# Patient Record
Sex: FEMALE | Race: WHITE | HISPANIC OR LATINO | Employment: OTHER | URBAN - METROPOLITAN AREA
[De-identification: names, ages, dates, MRNs, and addresses within clinical notes are randomized per-mention and may not be internally consistent; named-entity substitution may affect disease eponyms.]

---

## 2017-03-13 NOTE — H&P
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  Risks, benefits, alternatives of all individual procedures   were addressed with the patient in detail over several visits.  She has asked   appropriate questions, signed the appropriate consents and wishes to proceed   forward with surgery as planned.    PAST MEDICAL HISTORY:  Chronic hypertension, arthritis, mixed urinary   incontinence symptoms, currently on oxybutynin therapy.    PAST SURGICAL HISTORY:  Hysterectomy in 1999.    OBSTETRICAL HISTORY:  The patient reports 13 previous vaginal deliveries, 18   total pregnancies with 5 previous miscarriages.    GYNECOLOGIC HISTORY:  The patient has been amenorrheic for over the last 28   years.  She does report dyspareunia, pelvic pain, large bulge at the vaginal   introitus, mixed urinary incontinence symptoms.  She does understand a   dyspareunia, pelvic pain, and mixed urinary incontinence symptoms may actually   worsen with the surgery as described above, needing possible further medical   or surgical management to address these concerns.    SOCIAL HISTORY:  She is now  and is not currently sexually active.  She   denies use of any alcohol, tobacco, or recreational drug use.    MEDICATIONS:  Oxybutynin 5 mg 3 times daily.    ALLERGIES:  No known drug allergies.    PHYSICAL EXAMINATION:  VITAL SIGNS:  She is afebrile, hemodynamically stable.  HEART:  Regular rate and rhythm.  CHEST:  Clear to auscultation bilaterally.  ABDOMEN:  Soft,  nondistended, nontender.  Bowel sounds are present.  PELVIC:  Shows atrophic external female genitalia and vaginal vault grade II   anterior, posterior and apical vaginal relaxation.  Rectovaginal exam confirms   the above.  She is guaiac negative.  Urodynamic study shows mixed urinary   incontinence.    ASSESSMENT AND PLAN:  A 76-year-old woman with symptomatic pelvic floor   relaxation, pelvic pain, type 2 stress urinary incontinence with mixed urinary   incontinence symptoms, now interested in proceeding forward with attempted   definitive surgical correction of the surgery as described above.  The risks,   benefits and alternatives have been addressed with the patient in detail over   several visits.  She has asked appropriate questions, signed the appropriate   consents and wishes to proceed forward with surgery as planned.       ____________________________________     MD SUMAN ALEXIS / DEANA    DD:  03/08/2017 17:58:18  DT:  03/08/2017 18:31:52    D#:  [id number]  Job#:  [id number]

## 2017-03-16 DIAGNOSIS — Z01.812 PRE-OPERATIVE LABORATORY EXAMINATION: ICD-10-CM

## 2017-03-16 LAB
ANION GAP SERPL CALC-SCNC: 7 MMOL/L (ref 0–11.9)
BUN SERPL-MCNC: 20 MG/DL (ref 8–22)
CALCIUM SERPL-MCNC: 9.5 MG/DL (ref 8.5–10.5)
CHLORIDE SERPL-SCNC: 101 MMOL/L (ref 96–112)
CO2 SERPL-SCNC: 29 MMOL/L (ref 20–33)
CREAT SERPL-MCNC: 0.87 MG/DL (ref 0.5–1.4)
EKG IMPRESSION: NORMAL
ERYTHROCYTE [DISTWIDTH] IN BLOOD BY AUTOMATED COUNT: 42.5 FL (ref 35.9–50)
GFR SERPL CREATININE-BSD FRML MDRD: >60 ML/MIN/1.73 M 2
GLUCOSE SERPL-MCNC: 110 MG/DL (ref 65–99)
HCT VFR BLD AUTO: 45.3 % (ref 37–47)
HGB BLD-MCNC: 14.8 G/DL (ref 12–16)
MCH RBC QN AUTO: 29.2 PG (ref 27–33)
MCHC RBC AUTO-ENTMCNC: 32.7 G/DL (ref 33.6–35)
MCV RBC AUTO: 89.5 FL (ref 81.4–97.8)
PLATELET # BLD AUTO: 360 K/UL (ref 164–446)
PMV BLD AUTO: 8.6 FL (ref 9–12.9)
POTASSIUM SERPL-SCNC: 4 MMOL/L (ref 3.6–5.5)
RBC # BLD AUTO: 5.06 M/UL (ref 4.2–5.4)
SODIUM SERPL-SCNC: 137 MMOL/L (ref 135–145)
WBC # BLD AUTO: 7.6 K/UL (ref 4.8–10.8)

## 2017-03-16 PROCEDURE — 85027 COMPLETE CBC AUTOMATED: CPT

## 2017-03-16 PROCEDURE — 36415 COLL VENOUS BLD VENIPUNCTURE: CPT

## 2017-03-16 PROCEDURE — 80048 BASIC METABOLIC PNL TOTAL CA: CPT

## 2017-03-16 RX ORDER — OXYBUTYNIN CHLORIDE 5 MG/1
5 TABLET ORAL 3 TIMES DAILY
COMMUNITY
End: 2018-04-26

## 2017-03-16 RX ORDER — HYDROCODONE BITARTRATE AND ACETAMINOPHEN 5; 325 MG/1; MG/1
1-2 TABLET ORAL PRN
COMMUNITY
End: 2018-04-26

## 2017-03-20 ENCOUNTER — HOSPITAL ENCOUNTER (OUTPATIENT)
Facility: MEDICAL CENTER | Age: 77
End: 2017-03-20
Attending: SPECIALIST | Admitting: SPECIALIST
Payer: MEDICARE

## 2017-03-20 VITALS
TEMPERATURE: 98.6 F | HEART RATE: 67 BPM | OXYGEN SATURATION: 95 % | DIASTOLIC BLOOD PRESSURE: 76 MMHG | RESPIRATION RATE: 16 BRPM | WEIGHT: 186.29 LBS | BODY MASS INDEX: 34.28 KG/M2 | SYSTOLIC BLOOD PRESSURE: 120 MMHG | HEIGHT: 62 IN

## 2017-03-20 PROBLEM — N39.3 SUI (STRESS URINARY INCONTINENCE, FEMALE): Status: ACTIVE | Noted: 2017-03-20

## 2017-03-20 PROCEDURE — 700101 HCHG RX REV CODE 250

## 2017-03-20 PROCEDURE — 160002 HCHG RECOVERY MINUTES (STAT): Performed by: SPECIALIST

## 2017-03-20 PROCEDURE — 110371 HCHG SHELL REV 272: Performed by: SPECIALIST

## 2017-03-20 PROCEDURE — 501330 HCHG SET, CYSTO IRRIG TUBING: Performed by: SPECIALIST

## 2017-03-20 PROCEDURE — 160048 HCHG OR STATISTICAL LEVEL 1-5: Performed by: SPECIALIST

## 2017-03-20 PROCEDURE — 501838 HCHG SUTURE GENERAL: Performed by: SPECIALIST

## 2017-03-20 PROCEDURE — A4606 OXYGEN PROBE USED W OXIMETER: HCPCS | Performed by: SPECIALIST

## 2017-03-20 PROCEDURE — 160035 HCHG PACU - 1ST 60 MINS PHASE I: Performed by: SPECIALIST

## 2017-03-20 PROCEDURE — 501516 HCHG SUTURE, CAPIO: Performed by: SPECIALIST

## 2017-03-20 PROCEDURE — 502240 HCHG MISC OR SUPPLY RC 0272: Performed by: SPECIALIST

## 2017-03-20 PROCEDURE — 160009 HCHG ANES TIME/MIN: Performed by: SPECIALIST

## 2017-03-20 PROCEDURE — 700102 HCHG RX REV CODE 250 W/ 637 OVERRIDE(OP)

## 2017-03-20 PROCEDURE — A9270 NON-COVERED ITEM OR SERVICE: HCPCS

## 2017-03-20 PROCEDURE — A4338 INDWELLING CATHETER LATEX: HCPCS | Performed by: SPECIALIST

## 2017-03-20 PROCEDURE — 160029 HCHG SURGERY MINUTES - 1ST 30 MINS LEVEL 4: Performed by: SPECIALIST

## 2017-03-20 PROCEDURE — 160036 HCHG PACU - EA ADDL 30 MINS PHASE I: Performed by: SPECIALIST

## 2017-03-20 PROCEDURE — 160041 HCHG SURGERY MINUTES - EA ADDL 1 MIN LEVEL 4: Performed by: SPECIALIST

## 2017-03-20 PROCEDURE — 700111 HCHG RX REV CODE 636 W/ 250 OVERRIDE (IP)

## 2017-03-20 PROCEDURE — 110382 HCHG SHELL REV 271: Performed by: SPECIALIST

## 2017-03-20 PROCEDURE — 500892 HCHG PACK, PERI-GYN: Performed by: SPECIALIST

## 2017-03-20 PROCEDURE — C1771 REP DEV, URINARY, W/SLING: HCPCS | Performed by: SPECIALIST

## 2017-03-20 DEVICE — SLING TOT OBTRYX I HALO: Type: IMPLANTABLE DEVICE | Status: FUNCTIONAL

## 2017-03-20 RX ORDER — LIDOCAINE HYDROCHLORIDE 10 MG/ML
INJECTION, SOLUTION INFILTRATION; PERINEURAL
Status: COMPLETED
Start: 2017-03-20 | End: 2017-03-20

## 2017-03-20 RX ORDER — ACETAMINOPHEN 500 MG
1000 TABLET ORAL EVERY 6 HOURS
Status: DISCONTINUED | OUTPATIENT
Start: 2017-03-20 | End: 2017-03-20 | Stop reason: HOSPADM

## 2017-03-20 RX ORDER — KETOROLAC TROMETHAMINE 30 MG/ML
15 INJECTION, SOLUTION INTRAMUSCULAR; INTRAVENOUS EVERY 6 HOURS
Status: DISCONTINUED | OUTPATIENT
Start: 2017-03-20 | End: 2017-03-20 | Stop reason: HOSPADM

## 2017-03-20 RX ORDER — SIMETHICONE 80 MG
80 TABLET,CHEWABLE ORAL EVERY 8 HOURS PRN
Status: DISCONTINUED | OUTPATIENT
Start: 2017-03-20 | End: 2017-03-20 | Stop reason: HOSPADM

## 2017-03-20 RX ORDER — BUPIVACAINE HYDROCHLORIDE AND EPINEPHRINE 2.5; 5 MG/ML; UG/ML
INJECTION, SOLUTION INFILTRATION; PERINEURAL
Status: DISCONTINUED | OUTPATIENT
Start: 2017-03-20 | End: 2017-03-20 | Stop reason: HOSPADM

## 2017-03-20 RX ORDER — SODIUM CHLORIDE, SODIUM LACTATE, POTASSIUM CHLORIDE, CALCIUM CHLORIDE 600; 310; 30; 20 MG/100ML; MG/100ML; MG/100ML; MG/100ML
1000 INJECTION, SOLUTION INTRAVENOUS
Status: COMPLETED | OUTPATIENT
Start: 2017-03-20 | End: 2017-03-20

## 2017-03-20 RX ORDER — BUPIVACAINE HYDROCHLORIDE AND EPINEPHRINE 2.5; 5 MG/ML; UG/ML
INJECTION, SOLUTION EPIDURAL; INFILTRATION; INTRACAUDAL; PERINEURAL
Status: DISCONTINUED
Start: 2017-03-20 | End: 2017-03-20 | Stop reason: HOSPADM

## 2017-03-20 RX ORDER — METOCLOPRAMIDE HYDROCHLORIDE 5 MG/ML
10 INJECTION INTRAMUSCULAR; INTRAVENOUS EVERY 4 HOURS PRN
Status: DISCONTINUED | OUTPATIENT
Start: 2017-03-20 | End: 2017-03-20 | Stop reason: HOSPADM

## 2017-03-20 RX ORDER — OXYCODONE HYDROCHLORIDE 5 MG/1
10 TABLET ORAL
Status: DISCONTINUED | OUTPATIENT
Start: 2017-03-20 | End: 2017-03-20 | Stop reason: HOSPADM

## 2017-03-20 RX ORDER — ACETAMINOPHEN 500 MG
TABLET ORAL
Status: COMPLETED
Start: 2017-03-20 | End: 2017-03-20

## 2017-03-20 RX ORDER — ONDANSETRON 2 MG/ML
4 INJECTION INTRAMUSCULAR; INTRAVENOUS EVERY 6 HOURS PRN
Status: DISCONTINUED | OUTPATIENT
Start: 2017-03-20 | End: 2017-03-20 | Stop reason: HOSPADM

## 2017-03-20 RX ADMIN — SODIUM CHLORIDE, SODIUM LACTATE, POTASSIUM CHLORIDE, CALCIUM CHLORIDE 1000 ML: 600; 310; 30; 20 INJECTION, SOLUTION INTRAVENOUS at 12:30

## 2017-03-20 RX ADMIN — LIDOCAINE HYDROCHLORIDE 0.1 ML: 10 INJECTION, SOLUTION INFILTRATION; PERINEURAL at 12:30

## 2017-03-20 RX ADMIN — ACETAMINOPHEN 1000 MG: 500 TABLET, FILM COATED ORAL at 12:15

## 2017-03-20 ASSESSMENT — PAIN SCALES - GENERAL
PAINLEVEL_OUTOF10: 0

## 2017-03-20 NOTE — OP REPORT
DATE OF SERVICE:  3/20/2017     PREOPERATIVE DIAGNOSES:  Pelvic pain, symptomatic pelvic floor relaxation, Mixed urinary incontinence with sig Type II stress urinary incontinence     POSTOPERATIVE DIAGNOSES: none     PROCEDURES PERFORMED:  Anterior and posterior vaginal repair, enterocele    repair, sacrospinous vault suspension, transobturator tape midurethral sling    procedure, cystoscopy.     SURGEON:  Regulo Burton MD     ASSISTANT:  River Diaz MD     ANESTHESIA:  General     ANESTHESIOLOGIST:  Anesthesiologist: Keyonna Urias M.D.     ESTIMATED BLOOD LOSS FOR THE PROCEDURE:  30 mL.     FINDINGS:  Good support of the anterior and posterior vaginal walls in    addition to the apical vaginal tissue without any undue tension in the suture    sites.  Cystoscopy revealed bilateral ureteral efflux and normal bladder and    no undue tension noted at the level of the mid urethra after sling placement    on cystoscopic evaluation.     DESCRIPTION OF PROCEDURE:  The patient was taken to the operating room where    general anesthesia was performed without difficulty.  Patient was then prepped   and draped in usual sterile fashion.  Lower extremities placed in Omar    stirrups.  Attention was first turned to the perineum where a weighted    speculum was placed with the use of Tracey retractor.  Anterior vaginal mucosa    was then injected with 10 mL of 0.25% Marcaine with epinephrine.  The vaginal    mucosa was then dissected off the underlying pubocervical fascia bridgette until    the levator muscles were then reached.  Her previous transobturator tape sling   was resected followed by placement of horizontal mattress sutures    reapproximating the pubocervical fascia in midline in a double layer closure,    thereby reducing the midline cystocele followed by injection of 10 mL of 0.25%   Marcaine with epinephrine lateral to the clitoris in the labial crural folds    followed by stab incision made with the use of 11 blade  scalpel in this    location on each side followed by placement of the respective Obtryx halo    needle through the labial crural incision sites to the obturator membranes    through the endopelvic fascia out through the vaginal mucosal incision at the    level of the mid urethra.  The sling was then applied to the Obtryx halo    needle.  Needle was then taken back up through the original tract.  Tensioning   of position was then performed.  Jimenez catheter was removed, which had been    placed at the beginning of the case for placement of a 30-degree cystoscope,    which revealed normal urinary bladder, bilateral ureteral efflux and no undue    tension noted at the level of the mid urethra.  The sling was then released,    tensioning of position was then finalized under direct cystoscopic evaluation.    Cystoscope removed.  Jimenez catheter replaced.  All excess vaginal mucosa and   sling material were then excised.  The vaginal mucosa was then reapproximated   with 2-0 Vicryl in a running interlocking fashion in one segment.  Posterior    vaginal mucosa was then injected with 10 mL of 0.25% Marcaine with    epinephrine.  The vaginal mucosa was then dissected off the underlying    rectovaginal fascia bridgette until the levator muscles were then reached.     Horizontal mattress sutures were then used to reapproximate the rectovaginal    fascia in the midline in a double 0 closure, thereby reducing the midline    rectocele.  A large enterocele was located at the superior aspect of the    dissection.  Dissection of the sacrospinous process was then performed in the    ischial spine, 3 cm medial along the sacrospinous ligament with straight    catheterization needle device, 0 Ethibond suture was taken through the    sacrospinous ligament taken out through the right apical portion of the    vaginal cuff and the overlying vaginal mucosa.  Once tied down, there was good   reapproximation of the apex of the vaginal vault to the  sacrospinous    ligament.  The rectovaginal septum was then reapproximated in the posterior    aspect of the vaginal cuff in a double pursestring suture, thereby reducing    the large enterocele located at the superior aspect of the dissection.  All    excess vaginal mucosa was then trimmed.  The vaginal mucosa was then    reapproximated with 2-0 Vicryl in running locking fashion in one segment    performing a perineoplasty on the perineum.  The patient tolerated procedure    well and was awoken from general anesthesia, was taken to the recovery in    stable condition.        ____________________________________     BLANCA TRIMBLE MD

## 2017-03-20 NOTE — IP AVS SNAPSHOT
" Home Care Instructions                                                                                                                Name:Lizzette Burton  Medical Record Number:[id number]  CSN: [id number]    YOB: 1940   Age: 76 y.o.  Sex: female  HT:1.575 m (5' 2\") WT: 84.5 kg (186 lb 4.6 oz)          Admit Date: 3/20/2017     Discharge Date:   Today's Date: 3/20/2017  Attending Doctor:  Regulo Burton M.D.                  Allergies:  Review of patient's allergies indicates no known allergies.                Discharge Instructions       Instrucciones Para [place]  (Home Care Instructions)    ACTIVIDAD: Descanse y tome todo con mucha calma las primeras 24 horas después de cantrell cirugía.  Marizol persona adulta responsable debe permanecer con usted evaristo evangelista periodo de tiempo.  Es normal sentirse sonoliento o sonolienta evaristo esas primeras horas.  Le recomendamos que no melany nada que requiera equilibrio, taylor decisiones a mucha coordinación de cantrell parte.    NO MELANY ESTO PURANTE LAS PRIMERAS 24 HORAS:   Manejar o conducir algún vehiculo, operar maquinarias o utilizar electrodomesticos.   Beber cerveza o algún otro tipo de bebida alcohólica.   Taylor decisiones importantes o firmar documentos legales.    INSTRUCCIONES ESPECIALES: FOLLOW UP TOMORROW WITH DR. BURTON BETWEEN 9AM AND 12 NOON TO HAVE CATHETER REMOVED.  NO HEAVY LIFTING (NOTHING GREATER THAN 10-15 POUNDS).  PELVIC REST FOR 6 WEEKS (NO INTERCOURSE, DOUCHING, OR TAMPONS).  YOU MAY SHOWER IN 24 HOURS AFTER CATHETER IS REMOVED.  NO BATHS, HOT TUBS OR SWIMMING FOR 2 WEEKS.  EMPTY URINE FROM CATHETER INTO TOILET AS NEEDED.    DIETA: Para evitar las nauseas, prosiga despacito con cnatrell dieta a medida que pueda ir tolerándola mejor, evite comidas muy condimentadas o grasosas evaristo evangelista primer día.  Vaya agregando comidas más substanciadas a cantrell dieta a medida que asi lo indique cantrell médica.  Los bebés pueden beber leche preparada o formula, ásl nae también " leche del seno de la madre a medida que vayan teniendo hambre.  SIGA AGREGANDO LIQUIDOS Y COMIDAS CON FIBRA PARA EVITAR ESTREÑIMIENTO.    MEDICAMENTOS/MEDICINAS:  Vuelva a gilberto karin medicamentos diarios.  [place] los medicamentos que se le prescribe con un poco de comida.  Si no le prescribe ningún tipo de medicamento, entonces puede gilberto medicinas para el dolor que no contienen aspirina, si las necesita.  LAS MEDICINAS PARA EL DOLOR PUEDEN ESTREÑIRLE MUCHO.  [place] un suavizante para el excremento o materia fecal (stool softener) o un laxativo nae por ejemplo: senokot, pericolase, o leche de magnesia, si lo necesita.    La prescripción la administro *GIVEN AT PRE-OP APPOINTMENT**.  La ultima sosis de medicina para el dolor fue administrada *______________________**.     Se debe hacer isabel consulta medica con el doctor en *TOMORROW BETWEEN 9AM-12PM*.    Usted debe LIAMAR A CANTRELL MEDICO si tiene los siguientes síntomas:   -   Isabel fiebre más francisco de 101 grados Fahrenheit.   -   Un dolor incesante aún con los medicamentos, o nauseas y vómito persistente.   -   Un sangrado excesivo (max que traspasa los vendajes o gasas) o algúln tipo de drenaje inesperado que proviene de la henda.     -   Un color neumann exagerado o hinchazón alrededor del área en donde se le hizo incisión o rosita, o un drenaje de pus o con olor lei proveniente de la henda.   -    La inhabilidad de orinar o vaciar cantrell vejiga en 8 horas.   -    Problemas con a respiración o sara en el pecho.    Usted debe llamar al 911 si se presentan problemas con el dolor al respirar o el pecho.  Si no se puede ponnoer en comunicación con un medica o con el centro de cirugía, usted debe ir a la estación de emergencia (emergency room) más cercana o a un centro de atención de urgencia (urgent care center).  El teléfono del medico es: *DR. TRIMBLE [phone]**    LOS SÍNTOMAS DE UN LEVE RESFRIO SON MUY NORMALES.  ADEMÁS USTED PUEDE LLEGAR A SENTIR SARA GENERALES DE  MÚSCULOS, IRRITACIÓN EN LA GARGANTA, ASHLEIGH DE JULIO CESAR Y/O UN POCO DE NAUSEAS.    Sie tiene alguna pregunta, llame a cantrell médico.  Si cantrell médico no se encuentra disponible, por favor llame al Centro de Cirugía at [phone].  el Centro está abierto de Lunes a Viernes desde las 7:00 de la manana hasta las 7:00 de la noche.  Usted también puede llamar al CENTRO DE LLAMADAS SOBRE LA CATE o HEALTH HOTLINE.  Ananya está abierto viente y cuatro horas por ludivina, siete niño por semana, allí podrá hablar con isabel enfermera.  Llame al [phone], o al número gratis 1 [phone].    Mi firma a continuación indica que he recibido y entiendco estas instrucciones acera de los cuidados en la casa (Home Care Instructions)    Usted recibirá isabel encuesta en la correspondencia en las siguientes semanas y le pedimos que por favor tome un momento para completar sadie encuesta y regresaría a hosotros.  Nuestro objetivó es brindarle un cuidado muy higuera y par lo tanto apreciamos karin coméntanos.  Muchas michelle por mariam escogido el Centro [facility] Cirugía [facility].       Medication List      CONTINUE taking these medications        Instructions    amlodipine 10 MG Tabs   Commonly known as:  NORVASC    Take 10 mg by mouth every day.   Dose:  10 mg       hydrocodone-acetaminophen 5-325 MG Tabs per tablet   Commonly known as:  NORCO    Take 1-2 Tabs by mouth as needed.   Dose:  1-2 Tab       levothyroxine 175 MCG Tabs   Commonly known as:  SYNTHROID    Take 175 mcg by mouth Every morning on an empty stomach.   Dose:  175 mcg       meloxicam 15 MG tablet   Commonly known as:  MOBIC    Take 1 Tab by mouth every day.   Dose:  15 mg       oxybutynin 5 MG Tabs   Commonly known as:  DITROPAN    Take 5 mg by mouth 3 times a day.   Dose:  5 mg       ranitidine 150 MG Tabs   Commonly known as:  ZANTAC    Take 150 mg by mouth 2 times a day.   Dose:  150 mg               Medication Information     Above and/or attached are the  medications your physician expects you to take upon discharge. Review all of your home medications and newly ordered medications with your doctor and/or pharmacist. Follow medication instructions as directed by your doctor and/or pharmacist. Please keep your medication list with you and share with your physician. Update the information when medications are discontinued, doses are changed, or new medications (including over-the-counter products) are added; and carry medication information at all times in the event of emergency situations.        Resources     Quit Smoking / Tobacco Use:    I understand the use of any tobacco products increases my chance of suffering from future heart disease or stroke and could cause other illnesses which may shorten my life. Quitting the use of tobacco products is the single most important thing I can do to improve my health. For further information on smoking / tobacco cessation call a Toll Free Quit Line at 1-469.267.2204 (*[employer]) or 1-368.369.5388 (American [employer] Association) or you can access the web based program at www.[employer].org.    [place] Tobacco Users Help Line:  (464) 316-3050       Toll Free: 1-769.784.4025  Quit Tobacco Program [facility] ([phone]    Crisis Hotline:    [place] Crisis Hotline:  5-884-JXHWMUF or 1-823.580.6541    [place] Crisis Hotline:    1-806.336.5440 or 937-872-8376    Discharge Survey:   Thank you for choosing [facility]. We hope we did everything we could to make your hospital stay a pleasant one. You may be receiving a survey and we would appreciate your time and participation in answering the questions. Your input is very valuable to us in our efforts to improve our service to our patients and their families.            Signatures     My signature on this form indicates that:    1. I acknowledge receipt and understanding of these Home Care Instruction.  2. My questions regarding this information  have been answered to my satisfaction.  3. I have formulated a plan with my discharge nurse to obtain my prescribed medications for home.    __________________________________      __________________________________                   Patient Signature                                 Guardian/Responsible Adult Signature      __________________________________                 __________       ________                       Nurse Signature                                               Date                 Time

## 2017-03-20 NOTE — OR SURGEON
Immediate Post-Operative Note      PreOp Diagnosis: pelvic pain, sx por, ken(mixed)    PostOp Diagnosis: same    Procedure(s):  ANTERIOR AND POSTERIOR REPAIR - Wound Class: Clean Contaminated  ENTEROCELE REPAIR - PERINEOPLASTY - Wound Class: Clean Contaminated  BLADDER SLING FEMALE - TOT - Wound Class: Clean Contaminated  VAGINAL SUSPENSION -SACROSPINOUS VAULT - Wound Class: Clean Contaminated    Surgeon(s):  VALENCIA Torrez M.D.    Anesthesiologist/Type of Anesthesia:  Anesthesiologist: Keyonna Urias M.D./General    Surgical Staff:  Circulator: Bessie Velasco R.N.  Relief Circulator: Blanca Armenta R.N.  Scrub Person: Ada Hansen    Specimen: none    Estimated Blood Loss: less than 30ccs    Findings: good support of the anterior and posterior and apical vaginal tissue without any undue tension at any suture sites, cystoscopy revealed bilateral ureteral efflux normal bladder and no undue tension mid urethra after sling placement    Complications: none        3/20/2017 3:00 PM Regulo Burton

## 2017-03-20 NOTE — IP AVS SNAPSHOT
3/20/2017          Lizzette Burton  [address]  [place] [address]    Dear Lizzette:    [facility] wants to ensure your discharge home is safe and you or your loved ones have had all your questions answered regarding your care after you leave the hospital.    You may receive a telephone call within two days of your discharge.  This call is to make certain you understand your discharge instructions as well as ensure we provided you with the best care possible during your stay with us.     The call will only last approximately 3-5 minutes and will be done by a nurse.    Once again, we want to ensure your discharge home is safe and that you have a clear understanding of any next steps in your care.  If you have any questions or concerns, please do not hesitate to contact us, we are here for you.  Thank you for choosing [facility] for your healthcare needs.    Sincerely,    Quan Castro    [facility]

## 2017-03-20 NOTE — PROGRESS NOTES
1501-Pt received to PACU with report from Dr. Urias.  Respirations even and unlabored.  VSS.  Jimenez catheter in place, draining clear straw urine to gravity.  Theresa-pad in place, CDI.  Abdomen soft.  Pt denies pain or nausea at this time.  Mainly Turkmen speaking, able to understand a little English.    1630-Daughter Maritza brought to bedside.  Pt still sleeping, no complaints.    1735-Pt eating crackers, tolerates well.      1800-Pt condition stable and meets criteria for discharge.  VSS.  Jimenez in place, continuing to drain to gravity.  Pad has only scant sanguinous drainage.  Replaced with new pad.  Disposable underwear given.  Jimenez secured to left leg.  Jimenez teaching completed with daughter, gloves and urinal to empty given.  Reviewed discharge instructions with pt and her daughter, verbalized understanding.  Daughter and patient refused translation line and provided translation.  Pt dressed and into wheelchair.  Extra crackers and sprite given.  Abdomen remains soft.  VSS.  Pt denies pain or nausea.     1838-Pt discharged by RN in  with son in law and daughter at side.

## 2017-03-21 NOTE — DISCHARGE INSTRUCTIONS
Instrucciones Para [place]  (Home Care Instructions)    ACTIVIDAD: Descanse y tome todo con mucha calma las primeras 24 horas después de cantrell cirugía.  Marizol persona adulta responsable debe permanecer con usted evaristo evangelista periodo de tiempo.  Es normal sentirse sonoliento o sonolienta evaristo esas primeras horas.  Le recomendamos que no melany nada que requiera equilibrio, taylor decisiones a mucha coordinación de cantrell parte.    NO MELANY ESTO PURANTE LAS PRIMERAS 24 HORAS:   Manejar o conducir algún vehiculo, operar maquinarias o utilizar electrodomesticos.   Beber cerveza o algún otro tipo de bebida alcohólica.   [place] decisiones importantes o firmar documentos legales.    INSTRUCCIONES ESPECIALES: FOLLOW UP TOMORROW WITH DR. TRIMBLE BETWEEN 9AM AND 12 NOON TO HAVE CATHETER REMOVED.  NO HEAVY LIFTING (NOTHING GREATER THAN 10-15 POUNDS).  PELVIC REST FOR 6 WEEKS (NO INTERCOURSE, DOUCHING, OR TAMPONS).  YOU MAY SHOWER IN 24 HOURS AFTER CATHETER IS REMOVED.  NO BATHS, HOT TUBS OR SWIMMING FOR 2 WEEKS.  EMPTY URINE FROM CATHETER INTO TOILET AS NEEDED.    DIETA: Para evitar las nauseas, prosiga despacito con cantrell dieta a medida que pueda ir tolerándola mejor, evite comidas muy condimentadas o grasosas evaristo evangelista primer día.  Vaya agregando comidas más substanciadas a cantrell dieta a medida que asi lo indique cantrell médica.  Los bebés pueden beber leche preparada o formula, ásl nae también leche del seno de la madre a medida que vayan teniendo hambre.  SIGA AGREGANDO LIQUIDOS Y COMIDAS CON FIBRA PARA EVITAR ESTREÑIMIENTO.    MEDICAMENTOS/MEDICINAS:  Vuelva a taylor karin medicamentos diarios.  [place] los medicamentos que se le prescribe con un poco de comida.  Si no le prescribe ningún tipo de medicamento, entonces puede taylor medicinas para el dolor que no contienen aspirina, si las necesita.  LAS MEDICINAS PARA EL DOLOR PUEDEN ESTREÑIRLE MUCHO.  [place] un suavizante para el excremento o materia fecal (stool softener) o un laxativo nae por  ejemplo: senokot, pericolase, o leche de magnesia, si lo necesita.    La prescripción la administro *GIVEN AT PRE-OP APPOINTMENT**.  La ultima sosis de medicina para el dolor fue administrada *______________________**.     Se debe hacer isabel consulta medica con el doctor en *TOMORROW BETWEEN 9AM-12PM*.    Usted debe LIAMAR A CORNEJO MEDICO si tiene los siguientes síntomas:   -   Isabel fiebre más francisco de 101 grados Fahrenheit.   -   Un dolor incesante aún con los medicamentos, o nauseas y vómito persistente.   -   Un sangrado excesivo (max que traspasa los vendajes o gasas) o algúln tipo de drenaje inesperado que proviene de la henda.     -   Un color neumann exagerado o hinchazón alrededor del área en donde se le hizo incisión o rosita, o un drenaje de pus o con olor lei proveniente de la henda.   -    La inhabilidad de orinar o vaciar cornejo vejiga en 8 horas.   -    Problemas con a respiración o sara en el pecho.    Usted debe llamar al 911 si se presentan problemas con el dolor al respirar o el pecho.  Si no se puede ponnoer en comunicación con un medica o con el centro de cirugía, usted debe ir a la estación de emergencia (emergency room) más cercana o a un centro de atención de urgencia (urgent care center).  El teléfono del medico es: *DR. TRIMBLE [phone]**    LOS SÍNTOMAS DE UN LEVE RESFRIO SON MUY NORMALES.  ADEMÁS USTED PUEDE LLEGAR A SENTIR SARA GENERALES DE MÚSCULOS, IRRITACIÓN EN LA GARGANTA, SARA DE JULIO CESAR Y/O UN POCO DE NAUSEAS.    Sie tiene alguna pregunta, llame a cornejo médico.  Si cornejo médico no se encuentra disponible, por favor llame al Centro de Cirugía at [phone].  el Centro está abierto de Lunes a Viernes desde las 7:00 de la manana hasta las 7:00 de la noche.  Usted también puede llamar al CENTRO DE LLAMADAS SOBRE LA CATE o HEALTH HOTLINE.  Ananya está abierto viente y cuatro horas por ludivina, siete niño por semana, allí podrá hablar con isabel enfermera.  Llame al [phone], o al número genetis  [phone].    Mi firma a continuación indica que he recibido y entiendco estas instrucciones acera de los cuidados en la casa (Home Care Instructions)    Usted recibirá isabel encuesta en la correspondencia en las siguientes semanas y le pedimos que por favor tome un momento para completar sadie encuesta y regresaría a hosotros.  Nuestro objetivó es brindarle un cuidado muy higuera y par lo tanto apreciamos karin coméntanos.  Muchas michelle por mariam escogido el Cent[facility] Cirugía de [facility].

## 2017-05-01 ENCOUNTER — HOSPITAL ENCOUNTER (OUTPATIENT)
Dept: RADIOLOGY | Facility: MEDICAL CENTER | Age: 77
End: 2017-05-01
Attending: NURSE PRACTITIONER
Payer: MEDICARE

## 2017-05-01 DIAGNOSIS — R60.9 EDEMA, UNSPECIFIED TYPE: ICD-10-CM

## 2017-05-01 DIAGNOSIS — R01.1 UNDIAGNOSED CARDIAC MURMURS: ICD-10-CM

## 2017-05-01 DIAGNOSIS — I10 ESSENTIAL HYPERTENSION, MALIGNANT: ICD-10-CM

## 2017-05-01 PROCEDURE — 71020 DX-CHEST-2 VIEWS: CPT

## 2017-05-06 ENCOUNTER — HOSPITAL ENCOUNTER (OUTPATIENT)
Dept: LAB | Facility: MEDICAL CENTER | Age: 77
End: 2017-05-06
Attending: FAMILY MEDICINE
Payer: MEDICARE

## 2017-05-06 LAB
ALBUMIN SERPL BCP-MCNC: 3.7 G/DL (ref 3.2–4.9)
ALBUMIN/GLOB SERPL: 0.9 G/DL
ALP SERPL-CCNC: 88 U/L (ref 30–99)
ALT SERPL-CCNC: 10 U/L (ref 2–50)
ANION GAP SERPL CALC-SCNC: 7 MMOL/L (ref 0–11.9)
AST SERPL-CCNC: 15 U/L (ref 12–45)
BASOPHILS # BLD AUTO: 1.2 % (ref 0–1.8)
BASOPHILS # BLD: 0.09 K/UL (ref 0–0.12)
BILIRUB SERPL-MCNC: 0.5 MG/DL (ref 0.1–1.5)
BNP SERPL-MCNC: 35 PG/ML (ref 0–100)
BUN SERPL-MCNC: 12 MG/DL (ref 8–22)
CALCIUM SERPL-MCNC: 9.4 MG/DL (ref 8.5–10.5)
CHLORIDE SERPL-SCNC: 104 MMOL/L (ref 96–112)
CHOLEST SERPL-MCNC: 180 MG/DL (ref 100–199)
CO2 SERPL-SCNC: 28 MMOL/L (ref 20–33)
CREAT SERPL-MCNC: 0.8 MG/DL (ref 0.5–1.4)
EOSINOPHIL # BLD AUTO: 0.21 K/UL (ref 0–0.51)
EOSINOPHIL NFR BLD: 2.7 % (ref 0–6.9)
ERYTHROCYTE [DISTWIDTH] IN BLOOD BY AUTOMATED COUNT: 44.7 FL (ref 35.9–50)
GFR SERPL CREATININE-BSD FRML MDRD: >60 ML/MIN/1.73 M 2
GLOBULIN SER CALC-MCNC: 4.1 G/DL (ref 1.9–3.5)
GLUCOSE SERPL-MCNC: 92 MG/DL (ref 65–99)
HCT VFR BLD AUTO: 43 % (ref 37–47)
HDLC SERPL-MCNC: 50 MG/DL
HGB BLD-MCNC: 13.9 G/DL (ref 12–16)
IMM GRANULOCYTES # BLD AUTO: 0.02 K/UL (ref 0–0.11)
IMM GRANULOCYTES NFR BLD AUTO: 0.3 % (ref 0–0.9)
LDLC SERPL CALC-MCNC: 110 MG/DL
LYMPHOCYTES # BLD AUTO: 2.05 K/UL (ref 1–4.8)
LYMPHOCYTES NFR BLD: 26.3 % (ref 22–41)
MCH RBC QN AUTO: 29.5 PG (ref 27–33)
MCHC RBC AUTO-ENTMCNC: 32.3 G/DL (ref 33.6–35)
MCV RBC AUTO: 91.3 FL (ref 81.4–97.8)
MONOCYTES # BLD AUTO: 0.71 K/UL (ref 0–0.85)
MONOCYTES NFR BLD AUTO: 9.1 % (ref 0–13.4)
NEUTROPHILS # BLD AUTO: 4.72 K/UL (ref 2–7.15)
NEUTROPHILS NFR BLD: 60.4 % (ref 44–72)
NRBC # BLD AUTO: 0 K/UL
NRBC BLD AUTO-RTO: 0 /100 WBC
PLATELET # BLD AUTO: 433 K/UL (ref 164–446)
PMV BLD AUTO: 8.7 FL (ref 9–12.9)
POTASSIUM SERPL-SCNC: 3.8 MMOL/L (ref 3.6–5.5)
PROT SERPL-MCNC: 7.8 G/DL (ref 6–8.2)
RBC # BLD AUTO: 4.71 M/UL (ref 4.2–5.4)
SODIUM SERPL-SCNC: 139 MMOL/L (ref 135–145)
TRIGL SERPL-MCNC: 102 MG/DL (ref 0–149)
WBC # BLD AUTO: 7.8 K/UL (ref 4.8–10.8)

## 2017-05-06 PROCEDURE — 80061 LIPID PANEL: CPT

## 2017-05-06 PROCEDURE — 85025 COMPLETE CBC W/AUTO DIFF WBC: CPT

## 2017-05-06 PROCEDURE — 36415 COLL VENOUS BLD VENIPUNCTURE: CPT

## 2017-05-06 PROCEDURE — 83880 ASSAY OF NATRIURETIC PEPTIDE: CPT

## 2017-05-06 PROCEDURE — 80053 COMPREHEN METABOLIC PANEL: CPT

## 2017-06-13 ENCOUNTER — HOSPITAL ENCOUNTER (OUTPATIENT)
Dept: CARDIOLOGY | Facility: MEDICAL CENTER | Age: 77
End: 2017-06-13
Attending: NURSE PRACTITIONER
Payer: MEDICARE

## 2017-06-13 DIAGNOSIS — R01.1 UNDIAGNOSED CARDIAC MURMURS: ICD-10-CM

## 2017-06-13 DIAGNOSIS — R60.9 EDEMA, UNSPECIFIED TYPE: ICD-10-CM

## 2017-06-13 DIAGNOSIS — I10 ESSENTIAL HYPERTENSION, MALIGNANT: ICD-10-CM

## 2017-06-13 DIAGNOSIS — I51.7 CARDIOMEGALY: ICD-10-CM

## 2017-06-13 LAB
LV EJECT FRACT  99904: 60
LV EJECT FRACT MOD 2C 99903: 57.34
LV EJECT FRACT MOD 4C 99902: 59.91
LV EJECT FRACT MOD BP 99901: 60.53

## 2017-06-13 PROCEDURE — 93306 TTE W/DOPPLER COMPLETE: CPT | Mod: 26 | Performed by: INTERNAL MEDICINE

## 2017-06-13 PROCEDURE — 93306 TTE W/DOPPLER COMPLETE: CPT

## 2017-08-09 ENCOUNTER — OFFICE VISIT (OUTPATIENT)
Dept: CARDIOLOGY | Facility: MEDICAL CENTER | Age: 77
End: 2017-08-09
Payer: MEDICARE

## 2017-08-09 VITALS
SYSTOLIC BLOOD PRESSURE: 132 MMHG | OXYGEN SATURATION: 92 % | HEIGHT: 62 IN | BODY MASS INDEX: 34.78 KG/M2 | DIASTOLIC BLOOD PRESSURE: 86 MMHG | HEART RATE: 60 BPM | WEIGHT: 189 LBS

## 2017-08-09 DIAGNOSIS — I10 ESSENTIAL HYPERTENSION: ICD-10-CM

## 2017-08-09 DIAGNOSIS — I15.9 SECONDARY HYPERTENSION: ICD-10-CM

## 2017-08-09 DIAGNOSIS — R01.1 HEART MURMUR: ICD-10-CM

## 2017-08-09 DIAGNOSIS — R00.2 PALPITATIONS: ICD-10-CM

## 2017-08-09 LAB — EKG IMPRESSION: NORMAL

## 2017-08-09 PROCEDURE — 99204 OFFICE O/P NEW MOD 45 MIN: CPT | Performed by: INTERNAL MEDICINE

## 2017-08-09 PROCEDURE — 93000 ELECTROCARDIOGRAM COMPLETE: CPT | Performed by: INTERNAL MEDICINE

## 2017-08-09 ASSESSMENT — ENCOUNTER SYMPTOMS
LOSS OF CONSCIOUSNESS: 0
BRUISES/BLEEDS EASILY: 0
PALPITATIONS: 1
PND: 0
DIZZINESS: 0
DEPRESSION: 0
SHORTNESS OF BREATH: 0
ORTHOPNEA: 0
FALLS: 0
ABDOMINAL PAIN: 0

## 2017-08-09 NOTE — PROGRESS NOTES
Subjective:   Lizzette Burton is a 77 y.o. female with possible history of CVA who was referred to cardiology clinical concern for cardiomegaly. Patient reports undergoing an echocardiogram following which the referral was placed. She reports having occasional palpitations which only last for a short duration. Later in the interview she said that she was having symptoms very rarely. She denies any dizziness or history of syncope. No chest pain or pressure. No history of dyspnea. She reports a history of her food getting stuck in her throat for which she is going to get some workup in the near future. Her main concern is significant tiredness recently.     Per chart review she possibly has a history of a CVA, however patient does not know. She reports having a severe nosebleed 6 to 7 years ago for which she thinks she underwent cauterization.    History is limited due to language barrier despite using an . Patient's answers are very tangential.     Past Medical History   Diagnosis Date   • Glaucoma    • CATARACT    • Hypertension    • Coughing blood    • Breath shortness      WITH EXERCISE   • Stroke (CMS-HCC)      denies hx. of stroke   • Unspecified disorder of thyroid      'thyroid medicine taken in past'   • Angina      CONSULT NEG. RESULTS   • Pneumonia 2/8/2014   • Acute renal failure (CMS-HCC) 2/11/2014   • Urinary incontinence    • Heart burn    • Indigestion    • Arthritis      bilateral knees   • Unspecified hemorrhagic conditions 2009      treated in er,nose bleed   • Bowel habit changes      constipation   • Cold 2 weeks ago   • Sepsis (CMS-HCC) 2/8/2014   • Anesthesia      PONV     Past Surgical History   Procedure Laterality Date   • Hysterectomy, total abdominal  1989   • Gyn surgery     • Trabeculectomy  9/26/2012     Performed by Miller Fletcher M.D. at SURGERY SAME DAY [facility]   • Cataract phaco with iol  9/26/2012     Performed by Miller Fletcher M.D. at SURGERY [facility] DAY  [facility]   • Knee arthroplasty total  2/5/2009     Performed by GISELA OLSON at SURGERY [facility]   • Baerveldt implant  12/12/2012     Performed by Miller Fletcher M.D. at SURGERY SAME DAY [facility]   • Baerveldt implant  3/13/2013     Performed by Miller Fletcher M.D. at SURGERY SAME DAY [facility]   • Baerveldt implant  5/8/2013     Performed by Miller Fletcher M.D. at SURGERY SAME DAY [facility]   • Cataract phaco with iol  5/8/2013     Performed by Miller Fletcher M.D. at SURGERY SAME DAY [facility]   • Knee arthroplasty total Left 8/20/2015     Procedure: KNEE ARTHROPLASTY TOTAL;  Surgeon: Juan Manuel Choi M.D.;  Location: SURGERY [facility];  Service:    • Anterior and posterior repair  3/20/2017     Procedure: ANTERIOR AND POSTERIOR REPAIR;  Surgeon: Regluo Burton M.D.;  Location: SURGERY SAME DAY [facility];  Service:    • Enterocele repair  3/20/2017     Procedure: ENTEROCELE REPAIR - PERINEOPLASTY;  Surgeon: Regulo Burton M.D.;  Location: SURGERY SAME DAY [facility];  Service:    • Bladder sling female  3/20/2017     Procedure: BLADDER SLING FEMALE - TOT;  Surgeon: Regulo Burton M.D.;  Location: SURGERY SAME DAY [facility];  Service:    • Vaginal suspension  3/20/2017     Procedure: VAGINAL SUSPENSION -SACROSPINOUS VAULT;  Surgeon: Regulo Burton M.D.;  Location: SURGERY SAME DAY [facility];  Service:      History reviewed. No pertinent family history.  History   Smoking status   • Former Smoker   • Types: Cigarettes   • Quit date: 03/16/2002   Smokeless tobacco   • Never Used     No Known Allergies  Outpatient Encounter Prescriptions as of 8/9/2017   Medication Sig Dispense Refill   • sertraline (ZOLOFT) 50 MG Tab Take 50 mg by mouth every day.     • levothyroxine (SYNTHROID) 175 MCG Tab Take 175 mcg by mouth Every morning on an empty stomach.     • hydrocodone-acetaminophen (NORCO) 5-325 MG Tab per tablet Take 1-2 Tabs by mouth as needed.     •  "oxybutynin (DITROPAN) 5 MG Tab Take 5 mg by mouth 3 times a day.     • ranitidine (ZANTAC) 150 MG Tab Take 150 mg by mouth 2 times a day.     • meloxicam (MOBIC) 15 MG tablet Take 1 Tab by mouth every day. 30 Tab 1   • amlodipine (NORVASC) 10 MG Tab Take 10 mg by mouth every day.       No facility-administered encounter medications on file as of 8/9/2017.     Review of Systems   Constitutional: Positive for malaise/fatigue.   HENT: Negative.    Respiratory: Negative for shortness of breath.    Cardiovascular: Positive for palpitations. Negative for chest pain, orthopnea, leg swelling and PND.   Gastrointestinal: Negative for abdominal pain.   Musculoskeletal: Negative for falls.   Skin: Negative.    Neurological: Negative for dizziness and loss of consciousness.   Endo/Heme/Allergies: Does not bruise/bleed easily.   Psychiatric/Behavioral: Negative for depression.   All other systems reviewed and are negative.       Objective:   /86 mmHg  Pulse 60  Ht 1.575 m (5' 2.01\")  Wt 85.73 kg (189 lb)  BMI 34.56 kg/m2  SpO2 92%    Physical Exam   Constitutional: She is oriented to person, place, and time. No distress.   HENT:   Head: Normocephalic and atraumatic.   Eyes: Conjunctivae are normal.   Neck: Normal range of motion. Neck supple. No JVD present.   Cardiovascular: Normal rate and regular rhythm.  Exam reveals no gallop and no friction rub.    Murmur heard.   Crescendo decrescendo systolic murmur is present with a grade of 1/6   Pulmonary/Chest: Effort normal and breath sounds normal. No respiratory distress. She has no wheezes. She has no rales.   Abdominal: Soft. There is no tenderness.   Musculoskeletal: She exhibits no edema.   Neurological: She is alert and oriented to person, place, and time.   Skin: Skin is warm and dry. She is not diaphoretic.   Psychiatric: She has a normal mood and affect.   Nursing note and vitals reviewed.       Labs from June 2017 were reviewed. Hemoglobin 13.9. Creatinine " normal. .    Echocardiogram from June 2017 was reviewed  CONCLUSIONS  Normal left ventricular systolic function.  Mild left ventricular hypertrophy.  Aortic sclerosis without stenosis.  No prior study is available for comparison.    EKG from today was reviewed and shows normal sinus rhythm at 60 bpm, minimal criteria for left ventricular hypertrophy, intraventricular conduction delay.    Assessment:     1. Heart murmur     2. Secondary hypertension     3. Essential hypertension  EKG   4. Palpitations  HOLTER MONITOR STUDY       Medical Decision Making:  Today's Assessment / Status / Plan:     Concern for cardiomegaly-it appears that cardiomegaly was noted on a chest x-ray performed in May 2017. Patient recently underwent a echocardiogram which did not show LV enlargement or hypertrophy. Echocardiogram was relatively unremarkable. We have requested records from the PCPs office to confirm reason for referral.    Heart murmur-likely secondary to aortic sclerosis. No evidence of stenosis.    Hypertension-blood pressure is at goal. Continue current medication regimen.    Palpitations-I have referred the patient for a 48 hour Holter monitor for further evaluation. No new medications at this time.    Return to clinic in 6 months or earlier if needed.    Thank you for allowing me to participate in the care of this patient. Please do not hesitate to contact me with any questions.    Sandy Mena MD  Cardiologist  [facility]      PLEASE NOTE: This dictation was created using voice recognition software.

## 2017-08-09 NOTE — Clinical Note
[facility]   [address], [address]  [place] [address]  Phone: [phone]  Fax: [phone]              Lizzette Burton  1940    Encounter Date: 8/9/2017    Sandy Mena M.D.          PROGRESS NOTE:  Subjective:   Lizzette Burton is a 77 y.o. female with possible history of CVA who was referred to cardiology clinical concern for cardiomegaly. Patient reports undergoing an echocardiogram following which the referral was placed. She reports having occasional palpitations which only last for a short duration. Later in the interview she said that she was having symptoms very rarely. She denies any dizziness or history of syncope. No chest pain or pressure. No history of dyspnea. She reports a history of her food getting stuck in her throat for which she is going to get some workup in the near future. Her main concern is significant tiredness recently.     Per chart review she possibly has a history of a CVA, however patient does not know. She reports having a severe nosebleed 6 to 7 years ago for which she thinks she underwent cauterization.    History is limited due to language barrier despite using an . Patient's answers are very tangential.     Past Medical History   Diagnosis Date   • Glaucoma    • CATARACT    • Hypertension    • Coughing blood    • Breath shortness      WITH EXERCISE   • Stroke (CMS-HCC)      denies hx. of stroke   • Unspecified disorder of thyroid      'thyroid medicine taken in past'   • Angina      CONSULT NEG. RESULTS   • Pneumonia 2/8/2014   • Acute renal failure (CMS-HCC) 2/11/2014   • Urinary incontinence    • Heart burn    • Indigestion    • Arthritis      bilateral knees   • Unspecified hemorrhagic conditions 2009      treated in er,nose bleed   • Bowel habit changes      constipation   • Cold 2 weeks ago   • Sepsis (CMS-HCC) 2/8/2014   • Anesthesia      PONV     Past Surgical History   Procedure Laterality  Date   • Hysterectomy, total abdominal  1989   • Gyn surgery     • Trabeculectomy  9/26/2012     Performed by Miller Fletcher M.D. at SURGERY SAME DAY [facility]   • Cataract phaco with iol  9/26/2012     Performed by Miller Fletcher M.D. at SURGERY SAME DAY [facility]   • Knee arthroplasty total  2/5/2009     Performed by GISELA OLSON at SURGERY [facility]   • Baerveldt implant  12/12/2012     Performed by Miller Fletcher M.D. at SURGERY SAME DAY [facility]   • Baerveldt implant  3/13/2013     Performed by Miller Fletcher M.D. at SURGERY SAME DAY [facility]   • Baerveldt implant  5/8/2013     Performed by Miller Fletcher M.D. at SURGERY SAME DAY [facility]   • Cataract phaco with iol  5/8/2013     Performed by Miller Fletcher M.D. at SURGERY SAME DAY [facility]   • Knee arthroplasty total Left 8/20/2015     Procedure: KNEE ARTHROPLASTY TOTAL;  Surgeon: Juan Manuel Choi M.D.;  Location: [facility];  Service:    • Anterior and posterior repair  3/20/2017     Procedure: ANTERIOR AND POSTERIOR REPAIR;  Surgeon: Regulo Burton M.D.;  Location: SURGERY SAME DAY [facility];  Service:    • Enterocele repair  3/20/2017     Procedure: ENTEROCELE REPAIR - PERINEOPLASTY;  Surgeon: Regulo Burton M.D.;  Location: SURGERY SAME DAY [facility];  Service:    • Bladder sling female  3/20/2017     Procedure: BLADDER SLING FEMALE - TOT;  Surgeon: Regulo Burton M.D.;  Location: SURGERY SAME DAY [facility];  Service:    • Vaginal suspension  3/20/2017     Procedure: VAGINAL SUSPENSION -SACROSPINOUS VAULT;  Surgeon: Regulo Burton M.D.;  Location: SURGERY SAME DAY [facility];  Service:      History reviewed. No pertinent family history.  History   Smoking status   • Former Smoker   • Types: Cigarettes   • Quit date: 03/16/2002   Smokeless tobacco   • Never Used     No Known Allergies  Outpatient Encounter Prescriptions as of 8/9/2017   Medication Sig Dispense Refill   •  "sertraline (ZOLOFT) 50 MG Tab Take 50 mg by mouth every day.     • levothyroxine (SYNTHROID) 175 MCG Tab Take 175 mcg by mouth Every morning on an empty stomach.     • hydrocodone-acetaminophen (NORCO) 5-325 MG Tab per tablet Take 1-2 Tabs by mouth as needed.     • oxybutynin (DITROPAN) 5 MG Tab Take 5 mg by mouth 3 times a day.     • ranitidine (ZANTAC) 150 MG Tab Take 150 mg by mouth 2 times a day.     • meloxicam (MOBIC) 15 MG tablet Take 1 Tab by mouth every day. 30 Tab 1   • amlodipine (NORVASC) 10 MG Tab Take 10 mg by mouth every day.       No facility-administered encounter medications on file as of 8/9/2017.     Review of Systems   Constitutional: Positive for malaise/fatigue.   HENT: Negative.    Respiratory: Negative for shortness of breath.    Cardiovascular: Positive for palpitations. Negative for chest pain, orthopnea, leg swelling and PND.   Gastrointestinal: Negative for abdominal pain.   Musculoskeletal: Negative for falls.   Skin: Negative.    Neurological: Negative for dizziness and loss of consciousness.   Endo/Heme/Allergies: Does not bruise/bleed easily.   Psychiatric/Behavioral: Negative for depression.   All other systems reviewed and are negative.       Objective:   /86 mmHg  Pulse 60  Ht 1.575 m (5' 2.01\")  Wt 85.73 kg (189 lb)  BMI 34.56 kg/m2  SpO2 92%    Physical Exam   Constitutional: She is oriented to person, place, and time. No distress.   HENT:   Head: Normocephalic and atraumatic.   Eyes: Conjunctivae are normal.   Neck: Normal range of motion. Neck supple. No JVD present.   Cardiovascular: Normal rate and regular rhythm.  Exam reveals no gallop and no friction rub.    Murmur heard.   Crescendo decrescendo systolic murmur is present with a grade of 1/6   Pulmonary/Chest: Effort normal and breath sounds normal. No respiratory distress. She has no wheezes. She has no rales.   Abdominal: Soft. There is no tenderness.   Musculoskeletal: She exhibits no edema.   Neurological: " She is alert and oriented to person, place, and time.   Skin: Skin is warm and dry. She is not diaphoretic.   Psychiatric: She has a normal mood and affect.   Nursing note and vitals reviewed.       Labs from June 2017 were reviewed. Hemoglobin 13.9. Creatinine normal. .    Echocardiogram from June 2017 was reviewed  CONCLUSIONS  Normal left ventricular systolic function.  Mild left ventricular hypertrophy.  Aortic sclerosis without stenosis.  No prior study is available for comparison.    Assessment:     1. Heart murmur     2. Secondary hypertension     3. Essential hypertension  EKG   4. Palpitations  HOLTER MONITOR STUDY       Medical Decision Making:  Today's Assessment / Status / Plan:     Concern for cardiomegaly-it appears that cardiomegaly was noted on a chest x-ray performed in May 2017. Patient recently underwent a echocardiogram which did not show LV enlargement or hypertrophy. Echocardiogram was relatively unremarkable. We have requested records from the PCPs office to confirm reason for referral.    Heart murmur-likely secondary to aortic sclerosis. No evidence of stenosis.    Hypertension-blood pressure is at goal. Continue current medication regimen.    Palpitations-I have referred the patient for a 48 hour Holter monitor for further evaluation. No new medications at this time.    Return to clinic in 6 months or earlier if needed.    Thank you for allowing me to participate in the care of this patient. Please do not hesitate to contact me with any questions.    Sandy Mena MD  Cardiologist  [facility]      PLEASE NOTE: This dictation was created using voice recognition software.         Frederick Javier M.D.  [address]  [address]  [place] [address]  VIA Facsimile: [phone]

## 2017-08-09 NOTE — MR AVS SNAPSHOT
"        Lizzette Gunn Chacon   2017 2:20 PM   Office Visit   MRN: [id number]    Department:  [facility]   Dept Phone:  [phone]    Description:  Female : 1940   Provider:  Sandy Mena M.D.           Reason for Visit     New Patient           Allergies as of 2017     No Known Allergies      You were diagnosed with     Heart murmur   [072266]       Secondary hypertension   [4116839]       Essential hypertension   [4290233]       Palpitations   [785.1.ICD-9-CM]         Vital Signs     Blood Pressure Pulse Height Weight Body Mass Index Oxygen Saturation    132/86 mmHg 60 1.575 m (5' 2.01\") 85.73 kg (189 lb) 34.56 kg/m2 92%    Smoking Status                   Former Smoker           Basic Information     Date Of Birth Sex Race Ethnicity Preferred Language    1940 Female  or   Origin (Kinyarwanda,Maltese,Fijian,Palestinian, etc) English      Your appointments     Aug 23, 2017 11:00 AM   HOLTER MONITOR 48 HRS with [facility]   [facility] (--)    [address]  [place] [address]   [phone]            2018 10:20 AM   FOLLOW UP with Sandy Mena M.D.   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   2013 - Present    Sepsis (CMS-HCC) A41.9 High  2014 - Present    HTN (hypertension) I10 Medium  2014 - Present    Hyponatremia E87.1 Medium  2014 - Present    Hypokalemia E87.6 Medium  2014 - Present    Hypoxia R09.02 High  2014 - Present    Hypothyroid E03.9   2014 - Present    Hypertension I10   2014 - Present    Overweight E66.3   2014 - Present    Menopause Z78.0   2014 - " Present    Pneumonia J18.9   7/30/2014 - Present    Overweight E66.3   7/30/2014 - Present    Osteoarthrosis involving lower leg M17.10   8/20/2015 - Present    CAP (community acquired pneumonia) J18.9   12/29/2015 - Present    Status post bilateral knee replacements Z96.653   12/12/2016 - Present    MILAN (stress urinary incontinence, female) N39.3   3/20/2017 - Present      Health Maintenance        Date Due Completion Dates    IMM DTaP/Tdap/Td Vaccine (1 - Tdap) 4/15/1958 ---    PAP SMEAR 4/15/1960 ---    MAMMOGRAM 4/15/1979 ---    COLONOSCOPY 4/15/1989 ---    IMM ZOSTER VACCINE 4/15/1999 ---    BONE DENSITY 4/15/2004 ---    IMM INFLUENZA (1) 9/1/2017 2/9/2014            Results       Current Immunizations     13-VALENT PCV PREVNAR 8/21/2015  8:47 AM    Influenza TIV (IM) 2/9/2014 11:23 AM    Pneumococcal polysaccharide vaccine (PPSV-23) 2/9/2014  4:11 PM      Below and/or attached are the medications your provider expects you to take. Review all of your home medications and newly ordered medications with your provider and/or pharmacist. Follow medication instructions as directed by your provider and/or pharmacist. Please keep your medication list with you and share with your provider. Update the information when medications are discontinued, doses are changed, or new medications (including over-the-counter products) are added; and carry medication information at all times in the event of emergency situations     Allergies:  No Known Allergies          Medications  Valid as of: August 09, 2017 -  3:07 PM    Generic Name Brand Name Tablet Size Instructions for use    AmLODIPine Besylate (Tab) NORVASC 10 MG Take 10 mg by mouth every day.        Hydrocodone-Acetaminophen (Tab) NORCO 5-325 MG Take 1-2 Tabs by mouth as needed.        Levothyroxine Sodium (Tab) SYNTHROID 175 MCG Take 175 mcg by mouth Every morning on an empty stomach.        Meloxicam (Tab) MOBIC 15 MG Take 1 Tab by mouth every day.        Oxybutynin  Chloride (Tab) DITROPAN 5 MG Take 5 mg by mouth 3 times a day.        RaNITidine HCl (Tab) ZANTAC 150 MG Take 150 mg by mouth 2 times a day.        Sertraline HCl (Tab) ZOLOFT 50 MG Take 50 mg by mouth every day.        .                 Medicines prescribed today were sent to:     [id number] - [place], [place] - [address]    [address] [place] [address]    Phone: [phone] Fax: [phone]    Open 24 Hours?: No      Medication refill instructions:       If your prescription bottle indicates you have medication refills left, it is not necessary to call your provider’s office. Please contact your pharmacy and they will refill your medication.    If your prescription bottle indicates you do not have any refills left, you may request refills at any time through one of the following ways: The online [employer] system (except Urgent Care), by calling your provider’s office, or by asking your pharmacy to contact your provider’s office with a refill request. Medication refills are processed only during regular business hours and may not be available until the next business day. Your provider may request additional information or to have a follow-up visit with you prior to refilling your medication.   *Please Note: Medication refills are assigned a new Rx number when refilled electronically. Your pharmacy may indicate that no refills were authorized even though a new prescription for the same medication is available at the pharmacy. Please request the medicine by name with the pharmacy before contacting your provider for a refill.        Your To Do List     Future Labs/Procedures Complete By Expires    HOLTER MONITOR STUDY  As directed 8/9/2018         [employer] Access Code: [email or web address]  Expires: 9/8/2017  3:07 PM    [employer]  A secure, online tool to manage your health information     [employer]® is a secure, online tool that connects you to your personalized health information from the privacy of your  home -- day or night - making it very easy for you to manage your healthcare. Once the activation process is completed, you can even access your medical information using the [employer] augustine, which is available for free in the [employer] store or Google Play store.     [employer] provides the following levels of access (as shown below):   My Chart Features   [facility] Doctor [facility]  Specialists [facility]  Urgent  Care Non-[facility]  Primary Care  Doctor   Email your healthcare team securely and privately 24/7 X X X    Manage appointments: schedule your next appointment; view details of past/upcoming appointments X      Request prescription refills. X      View recent personal medical records, including lab and immunizations X X X X   View health record, including health history, allergies, medications X X X X   Read reports about your outpatient visits, procedures, consult and ER notes X X X X   See your discharge summary, which is a recap of your hospital and/or ER visit that includes your diagnosis, lab results, and care plan. X X       How to register for [employer]:  1. Go to  [email or web address].  2. Click on the Sign Up Now box, which takes you to the New Member Sign Up page. You will need to provide the following information:  a. Enter your [employer] Access Code exactly as it appears at the top of this page. (You will not need to use this code after you’ve completed the sign-up process. If you do not sign up before the expiration date, you must request a new code.)   b. Enter your date of birth.   c. Enter your home email address.   d. Click Submit, and follow the next screen’s instructions.  3. Create a [employer] ID. This will be your [employer] login ID and cannot be changed, so think of one that is secure and easy to remember.  4. Create a [employer] password. You can change your password at any time.  5. Enter your Password Reset Question and Answer. This can be used at a later time if you forget your password.    6. Enter your e-mail address. This allows you to receive e-mail notifications when new information is available in [employer].  7. Click Sign Up. You can now view your health information.    For assistance activating your [employer] account, call [phone]

## 2017-08-23 ENCOUNTER — NON-PROVIDER VISIT (OUTPATIENT)
Dept: CARDIOLOGY | Facility: MEDICAL CENTER | Age: 77
End: 2017-08-23
Payer: MEDICARE

## 2017-08-23 DIAGNOSIS — R00.1 SINUS BRADYCARDIA: ICD-10-CM

## 2017-08-23 DIAGNOSIS — I49.8 BIGEMINY: ICD-10-CM

## 2017-08-23 DIAGNOSIS — I49.1 PREMATURE ATRIAL CONTRACTION: ICD-10-CM

## 2017-08-23 DIAGNOSIS — I49.3 PVC (PREMATURE VENTRICULAR CONTRACTION): ICD-10-CM

## 2017-08-28 ENCOUNTER — TELEPHONE (OUTPATIENT)
Dept: CARDIOLOGY | Facility: MEDICAL CENTER | Age: 77
End: 2017-08-28

## 2017-08-28 DIAGNOSIS — R00.2 PALPITATIONS: ICD-10-CM

## 2017-08-28 LAB — EKG IMPRESSION: NORMAL

## 2017-08-28 PROCEDURE — 93224 XTRNL ECG REC UP TO 48 HRS: CPT | Performed by: INTERNAL MEDICINE

## 2017-08-28 NOTE — TELEPHONE ENCOUNTER
Patient [place] speaking only. Lucila RASHID assisted in informing pt of Holter results. FU with AA 1/31/18

## 2017-08-28 NOTE — TELEPHONE ENCOUNTER
Reviewed Holter monitor. Occasional PACs associated with symptoms. Only 2% of total beats.  Please advise patient to watch her caffeine intake. Ideally she should restrict her caffeine intake to no more than one drink per day.  Given the low burden of her PACs, there is no clear indication for medications at this time.  I will reevaluate her in follow-up as previously discussed.    Thank you    Sandy Mena MD  Cardiologist  [facility]

## 2018-01-05 ENCOUNTER — HOSPITAL ENCOUNTER (OUTPATIENT)
Dept: LAB | Facility: MEDICAL CENTER | Age: 78
End: 2018-01-05
Attending: NURSE PRACTITIONER
Payer: MEDICARE

## 2018-01-05 LAB
ALBUMIN SERPL BCP-MCNC: 4 G/DL (ref 3.2–4.9)
ALBUMIN/GLOB SERPL: 1.1 G/DL
ALP SERPL-CCNC: 71 U/L (ref 30–99)
ALT SERPL-CCNC: 18 U/L (ref 2–50)
ANION GAP SERPL CALC-SCNC: 4 MMOL/L (ref 0–11.9)
AST SERPL-CCNC: 22 U/L (ref 12–45)
BILIRUB SERPL-MCNC: 0.5 MG/DL (ref 0.1–1.5)
BUN SERPL-MCNC: 19 MG/DL (ref 8–22)
CALCIUM SERPL-MCNC: 9.4 MG/DL (ref 8.5–10.5)
CHLORIDE SERPL-SCNC: 104 MMOL/L (ref 96–112)
CHOLEST SERPL-MCNC: 189 MG/DL (ref 100–199)
CO2 SERPL-SCNC: 30 MMOL/L (ref 20–33)
CREAT SERPL-MCNC: 0.93 MG/DL (ref 0.5–1.4)
GFR SERPL CREATININE-BSD FRML MDRD: 58 ML/MIN/1.73 M 2
GLOBULIN SER CALC-MCNC: 3.6 G/DL (ref 1.9–3.5)
GLUCOSE SERPL-MCNC: 91 MG/DL (ref 65–99)
HDLC SERPL-MCNC: 58 MG/DL
LDLC SERPL CALC-MCNC: 106 MG/DL
POTASSIUM SERPL-SCNC: 4.7 MMOL/L (ref 3.6–5.5)
PROT SERPL-MCNC: 7.6 G/DL (ref 6–8.2)
SODIUM SERPL-SCNC: 138 MMOL/L (ref 135–145)
TRIGL SERPL-MCNC: 125 MG/DL (ref 0–149)
TSH SERPL DL<=0.005 MIU/L-ACNC: 0.32 UIU/ML (ref 0.38–5.33)

## 2018-01-05 PROCEDURE — 80053 COMPREHEN METABOLIC PANEL: CPT

## 2018-01-05 PROCEDURE — 36415 COLL VENOUS BLD VENIPUNCTURE: CPT

## 2018-01-05 PROCEDURE — 84443 ASSAY THYROID STIM HORMONE: CPT

## 2018-01-05 PROCEDURE — 80061 LIPID PANEL: CPT

## 2018-03-06 ENCOUNTER — OFFICE VISIT (OUTPATIENT)
Dept: URGENT CARE | Facility: PHYSICIAN GROUP | Age: 78
End: 2018-03-06
Payer: MEDICARE

## 2018-03-06 ENCOUNTER — HOSPITAL ENCOUNTER (OUTPATIENT)
Dept: RADIOLOGY | Facility: MEDICAL CENTER | Age: 78
End: 2018-03-06
Attending: PHYSICIAN ASSISTANT
Payer: MEDICARE

## 2018-03-06 VITALS
SYSTOLIC BLOOD PRESSURE: 132 MMHG | BODY MASS INDEX: 34.74 KG/M2 | WEIGHT: 190 LBS | DIASTOLIC BLOOD PRESSURE: 68 MMHG | OXYGEN SATURATION: 96 % | TEMPERATURE: 97.6 F | RESPIRATION RATE: 20 BRPM | HEART RATE: 59 BPM

## 2018-03-06 DIAGNOSIS — J22 LRTI (LOWER RESPIRATORY TRACT INFECTION): ICD-10-CM

## 2018-03-06 DIAGNOSIS — R05.9 COUGH: ICD-10-CM

## 2018-03-06 PROCEDURE — 99203 OFFICE O/P NEW LOW 30 MIN: CPT | Performed by: PHYSICIAN ASSISTANT

## 2018-03-06 PROCEDURE — 71046 X-RAY EXAM CHEST 2 VIEWS: CPT

## 2018-03-06 RX ORDER — FLUTICASONE PROPIONATE 50 MCG
1 SPRAY, SUSPENSION (ML) NASAL DAILY
COMMUNITY
End: 2022-12-26

## 2018-03-06 RX ORDER — ALBUTEROL SULFATE 90 UG/1
1 AEROSOL, METERED RESPIRATORY (INHALATION) EVERY 4 HOURS PRN
Qty: 1 INHALER | Refills: 0 | Status: SHIPPED | OUTPATIENT
Start: 2018-03-06 | End: 2020-11-01

## 2018-03-06 RX ORDER — LOSARTAN POTASSIUM 50 MG/1
100 TABLET ORAL DAILY
COMMUNITY
End: 2022-12-26

## 2018-03-06 RX ORDER — ALBUTEROL SULFATE 2.5 MG/3ML
2.5 SOLUTION RESPIRATORY (INHALATION) EVERY 4 HOURS PRN
Qty: 30 BULLET | Refills: 1 | Status: SHIPPED | OUTPATIENT
Start: 2018-03-06 | End: 2020-11-01

## 2018-03-06 RX ORDER — BENZONATATE 200 MG/1
200 CAPSULE ORAL 3 TIMES DAILY PRN
Qty: 30 CAP | Refills: 0 | Status: SHIPPED | OUTPATIENT
Start: 2018-03-06 | End: 2021-11-28

## 2018-03-06 RX ORDER — DOXYCYCLINE HYCLATE 100 MG
100 TABLET ORAL 2 TIMES DAILY
Qty: 14 TAB | Refills: 0 | Status: SHIPPED | OUTPATIENT
Start: 2018-03-06 | End: 2018-03-13

## 2018-03-06 ASSESSMENT — ENCOUNTER SYMPTOMS
VOMITING: 0
MYALGIAS: 0
FOCAL WEAKNESS: 0
HEADACHES: 0
WHEEZING: 1
RHINORRHEA: 1
SHORTNESS OF BREATH: 1
TINGLING: 0
PALPITATIONS: 0
ABDOMINAL PAIN: 0
DIARRHEA: 0
SENSORY CHANGE: 0
COUGH: 1
HEMOPTYSIS: 0
NAUSEA: 0
CHILLS: 0
SORE THROAT: 0
SPUTUM PRODUCTION: 1
FEVER: 1

## 2018-03-06 ASSESSMENT — COPD QUESTIONNAIRES: COPD: 0

## 2018-03-06 NOTE — PROGRESS NOTES
Subjective:      Lizzette Burton is a 77 y.o. female who presents with Cough (x5days, congestion, wheezing)            Cough   This is a new problem. Episode onset: 5 days. The problem has been unchanged. The cough is productive of sputum (yellow-green sputum). Associated symptoms include a fever (subjective fever 2 days ago), nasal congestion, rhinorrhea, shortness of breath and wheezing. Pertinent negatives include no chest pain, chills, ear congestion, ear pain, headaches, hemoptysis, myalgias, postnasal drip or sore throat. Treatments tried: albuterol nebulizer, Flonase, Mucinex. The treatment provided mild relief. Her past medical history is significant for bronchitis and pneumonia. There is no history of asthma or COPD.     Past Medical History:   Diagnosis Date   • Acute renal failure (CMS-HCC) 2/11/2014   • Anesthesia     PONV   • Angina     CONSULT NEG. RESULTS   • Arthritis     bilateral knees   • Bowel habit changes     constipation   • Breath shortness     WITH EXERCISE   • CATARACT    • Cold 2 weeks ago   • Coughing blood    • Glaucoma    • Heart burn    • Hypertension    • Indigestion    • Pneumonia 2/8/2014   • Sepsis (CMS-HCC) 2/8/2014   • Stroke (CMS-HCC)     denies hx. of stroke   • Unspecified disorder of thyroid     'thyroid medicine taken in past'   • Unspecified hemorrhagic conditions 2009     treated in er,nose bleed   • Urinary incontinence        Past Surgical History:   Procedure Laterality Date   • ANTERIOR AND POSTERIOR REPAIR  3/20/2017    Procedure: ANTERIOR AND POSTERIOR REPAIR;  Surgeon: Regulo Burton M.D.;  Location: SURGERY SAME DAY [facility] ORS;  Service:    • ENTEROCELE REPAIR  3/20/2017    Procedure: ENTEROCELE REPAIR - PERINEOPLASTY;  Surgeon: Regulo Burton M.D.;  Location: SURGERY SAME DAY [facility] ORS;  Service:    • BLADDER SLING FEMALE  3/20/2017    Procedure: BLADDER SLING FEMALE - TOT;  Surgeon: Regulo Burton M.D.;  Location: SURGERY SAME DAY [facility] ORS;   Service:    • VAGINAL SUSPENSION  3/20/2017    Procedure: VAGINAL SUSPENSION -SACROSPINOUS VAULT;  Surgeon: Regulo Burton M.D.;  Location: SURGERY SAME DAY [facility];  Service:    • KNEE ARTHROPLASTY TOTAL Left 8/20/2015    Procedure: KNEE ARTHROPLASTY TOTAL;  Surgeon: Juan Manuel Choi M.D.;  Location: SURGERY [facility];  Service:    • BAERVELDT IMPLANT  5/8/2013    Performed by Miller Fletcher M.D. at SURGERY SAME DAY [facility]   • CATARACT PHACO WITH IOL  5/8/2013    Performed by Miller Fletcher M.D. at SURGERY SAME DAY [facility]   • BAERVELDT IMPLANT  3/13/2013    Performed by Miller Fletcher M.D. at SURGERY SAME DAY [facility]   • BAERVELDT IMPLANT  12/12/2012    Performed by Miller Fletcher M.D. at SURGERY SAME DAY [facility]   • TRABECULECTOMY  9/26/2012    Performed by Miller Fletcher M.D. at SURGERY SAME DAY [facility]   • CATARACT PHACO WITH IOL  9/26/2012    Performed by Miller Fletcher M.D. at SURGERY SAME DAY [facility] ORS   • KNEE ARTHROPLASTY TOTAL  2/5/2009    Performed by GISELA OLSON at SURGERY [facility]   • HYSTERECTOMY, TOTAL ABDOMINAL  1989   • GYN SURGERY         No family history on file.    No Known Allergies    Medications, Allergies, and current problem list reviewed today in Epic      Review of Systems   Constitutional: Positive for fever (subjective fever 2 days ago) and malaise/fatigue. Negative for chills.   HENT: Positive for congestion and rhinorrhea. Negative for ear discharge, ear pain, postnasal drip and sore throat.    Respiratory: Positive for cough, sputum production, shortness of breath and wheezing. Negative for hemoptysis.    Cardiovascular: Negative for chest pain, palpitations and leg swelling.        No calf swelling or pain   Gastrointestinal: Negative for abdominal pain, diarrhea, nausea and vomiting.   Musculoskeletal: Negative for myalgias.   Neurological: Negative for tingling, sensory change, focal weakness and headaches.      All other systems reviewed and are negative.        Objective:     /68   Pulse (!) 59   Temp 36.4 °C (97.6 °F)   Resp 20   Wt 86.2 kg (190 lb)   SpO2 96%   BMI 34.74 kg/m²      Physical Exam   Constitutional: She is oriented to person, place, and time. She appears well-developed and well-nourished. No distress.   HENT:   Head: Normocephalic and atraumatic.   Right Ear: Tympanic membrane, external ear and ear canal normal.   Left Ear: Tympanic membrane, external ear and ear canal normal.   Nose: Mucosal edema and rhinorrhea present.   Mouth/Throat: Uvula is midline, oropharynx is clear and moist and mucous membranes are normal.   Eyes: Conjunctivae are normal.   Cardiovascular: Normal rate, regular rhythm and normal heart sounds.  Exam reveals no gallop and no friction rub.    No murmur heard.  Pulmonary/Chest: Effort normal. No respiratory distress. She has no decreased breath sounds. She has wheezes (diffuse wheezes. Rhonchi upper lung fields ). She has rhonchi in the right upper field and the left upper field. She has no rales.   Musculoskeletal:   Lower extremities without edema, swelling or TTP   Neurological: She is alert and oriented to person, place, and time. No cranial nerve deficit.   Skin: Skin is warm and dry. No rash noted.   Psychiatric: She has a normal mood and affect. Her behavior is normal. Judgment and thought content normal.            3/6/2018 11:17 AM    HISTORY/REASON FOR EXAM:  Cough.      TECHNIQUE/EXAM DESCRIPTION AND NUMBER OF VIEWS:  Two views of the chest.    COMPARISON:  05/01/2017    FINDINGS:  The heart is mildly enlarged.  Perihilar opacifications with poorly defined borders are again noted medially in each lung but are less prominent than on prior exam. No new infiltrates or consolidations are identified.  No pleural effusions are appreciated.     Impression       1.  Mild cardiomegaly again noted.    2.  Some decrease in perihilar opacifications with poorly defined  borders since prior exam could indicate decrease in pulmonary edema or inflammation.    3.  No new infiltrates or consolidations identified.          Assessment/Plan:     1. LRTI (lower respiratory tract infection)  DX-CHEST-2 VIEWS    doxycycline (VIBRAMYCIN) 100 MG Tab    albuterol (PROVENTIL) 2.5mg/3ml Nebu Soln solution for nebulization    benzonatate (TESSALON) 200 MG capsule    albuterol 108 (90 Base) MCG/ACT Aero Soln inhalation aerosol       •  doxycycline (VIBRAMYCIN) 100 MG Tab, Take 1 Tab by mouth 2 times a day for 7 days., Disp: 14 Tab, Rfl: 0  •  albuterol (PROVENTIL) 2.5mg/3ml Nebu Soln solution for nebulization, 3 mL by Nebulization route every four hours as needed for Shortness of Breath., Disp: 30 Bullet, Rfl: 1  •  benzonatate (TESSALON) 200 MG capsule, Take 1 Cap by mouth 3 times a day as needed for Cough., Disp: 30 Cap, Rfl: 0  •  albuterol 108 (90 Base) MCG/ACT Aero Soln inhalation aerosol, Inhale 1 Puff by mouth every four hours as needed for Shortness of Breath., Disp: 1 Inhaler, Rfl: 0     Differential diagnoses, Supportive care, and indications for immediate follow-up discussed with patient and son.   Instructed to return to clinic or nearest emergency department for any change in condition, further concerns, or worsening of symptoms.    The patient and sondemonstrated a good understanding and agreed with the treatment plan.    Radha Colmenares P.A.-C.

## 2018-04-26 DIAGNOSIS — Z01.812 PRE-OPERATIVE LABORATORY EXAMINATION: ICD-10-CM

## 2018-04-26 DIAGNOSIS — Z01.810 PRE-OPERATIVE CARDIOVASCULAR EXAMINATION: ICD-10-CM

## 2018-04-26 LAB
ANION GAP SERPL CALC-SCNC: 5 MMOL/L (ref 0–11.9)
BUN SERPL-MCNC: 17 MG/DL (ref 8–22)
CALCIUM SERPL-MCNC: 9.4 MG/DL (ref 8.5–10.5)
CHLORIDE SERPL-SCNC: 105 MMOL/L (ref 96–112)
CO2 SERPL-SCNC: 31 MMOL/L (ref 20–33)
CREAT SERPL-MCNC: 0.76 MG/DL (ref 0.5–1.4)
EKG IMPRESSION: NORMAL
ERYTHROCYTE [DISTWIDTH] IN BLOOD BY AUTOMATED COUNT: 44.1 FL (ref 35.9–50)
GLUCOSE SERPL-MCNC: 94 MG/DL (ref 65–99)
HCT VFR BLD AUTO: 41.5 % (ref 37–47)
HGB BLD-MCNC: 13.9 G/DL (ref 12–16)
MCH RBC QN AUTO: 31 PG (ref 27–33)
MCHC RBC AUTO-ENTMCNC: 33.5 G/DL (ref 33.6–35)
MCV RBC AUTO: 92.4 FL (ref 81.4–97.8)
PLATELET # BLD AUTO: 312 K/UL (ref 164–446)
PMV BLD AUTO: 9.3 FL (ref 9–12.9)
POTASSIUM SERPL-SCNC: 4.1 MMOL/L (ref 3.6–5.5)
RBC # BLD AUTO: 4.49 M/UL (ref 4.2–5.4)
SODIUM SERPL-SCNC: 141 MMOL/L (ref 135–145)
WBC # BLD AUTO: 6.6 K/UL (ref 4.8–10.8)

## 2018-04-26 PROCEDURE — 93010 ELECTROCARDIOGRAM REPORT: CPT | Performed by: INTERNAL MEDICINE

## 2018-04-26 PROCEDURE — 80048 BASIC METABOLIC PNL TOTAL CA: CPT

## 2018-04-26 PROCEDURE — 93005 ELECTROCARDIOGRAM TRACING: CPT

## 2018-04-26 PROCEDURE — 85027 COMPLETE CBC AUTOMATED: CPT

## 2018-04-26 PROCEDURE — 36415 COLL VENOUS BLD VENIPUNCTURE: CPT

## 2018-04-26 RX ORDER — IBUPROFEN 200 MG
200 TABLET ORAL EVERY 6 HOURS PRN
COMMUNITY
End: 2021-11-28

## 2018-04-27 ENCOUNTER — HOSPITAL ENCOUNTER (OUTPATIENT)
Dept: LAB | Facility: MEDICAL CENTER | Age: 78
End: 2018-04-27
Attending: NURSE PRACTITIONER
Payer: MEDICARE

## 2018-04-27 LAB — TSH SERPL DL<=0.005 MIU/L-ACNC: 0.24 UIU/ML (ref 0.38–5.33)

## 2018-04-27 PROCEDURE — 36415 COLL VENOUS BLD VENIPUNCTURE: CPT

## 2018-04-27 PROCEDURE — 84443 ASSAY THYROID STIM HORMONE: CPT

## 2018-05-02 ENCOUNTER — APPOINTMENT (OUTPATIENT)
Dept: RADIOLOGY | Facility: MEDICAL CENTER | Age: 78
DRG: 483 | End: 2018-05-02
Attending: SURGERY
Payer: MEDICARE

## 2018-05-02 ENCOUNTER — HOSPITAL ENCOUNTER (INPATIENT)
Facility: MEDICAL CENTER | Age: 78
LOS: 1 days | DRG: 483 | End: 2018-05-03
Attending: SURGERY | Admitting: SURGERY
Payer: MEDICARE

## 2018-05-02 DIAGNOSIS — Z96.611 STATUS POST REVERSE TOTAL ARTHROPLASTY OF RIGHT SHOULDER: ICD-10-CM

## 2018-05-02 DIAGNOSIS — G89.18 POSTOPERATIVE PAIN: ICD-10-CM

## 2018-05-02 DIAGNOSIS — R09.02 HYPOXIA: ICD-10-CM

## 2018-05-02 PROCEDURE — 160036 HCHG PACU - EA ADDL 30 MINS PHASE I: Performed by: SURGERY

## 2018-05-02 PROCEDURE — 0RRJ00Z REPLACEMENT OF RIGHT SHOULDER JOINT WITH REVERSE BALL AND SOCKET SYNTHETIC SUBSTITUTE, OPEN APPROACH: ICD-10-PCS | Performed by: SURGERY

## 2018-05-02 PROCEDURE — 700101 HCHG RX REV CODE 250

## 2018-05-02 PROCEDURE — 160009 HCHG ANES TIME/MIN: Performed by: SURGERY

## 2018-05-02 PROCEDURE — 73030 X-RAY EXAM OF SHOULDER: CPT | Mod: RT

## 2018-05-02 PROCEDURE — 700102 HCHG RX REV CODE 250 W/ 637 OVERRIDE(OP)

## 2018-05-02 PROCEDURE — 160035 HCHG PACU - 1ST 60 MINS PHASE I: Performed by: SURGERY

## 2018-05-02 PROCEDURE — 502000 HCHG MISC OR IMPLANTS RC 0278: Performed by: SURGERY

## 2018-05-02 PROCEDURE — 700111 HCHG RX REV CODE 636 W/ 250 OVERRIDE (IP): Performed by: SURGERY

## 2018-05-02 PROCEDURE — 160002 HCHG RECOVERY MINUTES (STAT): Performed by: SURGERY

## 2018-05-02 PROCEDURE — A9270 NON-COVERED ITEM OR SERVICE: HCPCS | Performed by: SURGERY

## 2018-05-02 PROCEDURE — 302128 INFUSION PUMP: Performed by: PHYSICIAN ASSISTANT

## 2018-05-02 PROCEDURE — 160041 HCHG SURGERY MINUTES - EA ADDL 1 MIN LEVEL 4: Performed by: SURGERY

## 2018-05-02 PROCEDURE — 700105 HCHG RX REV CODE 258: Performed by: SURGERY

## 2018-05-02 PROCEDURE — 501838 HCHG SUTURE GENERAL: Performed by: SURGERY

## 2018-05-02 PROCEDURE — 700102 HCHG RX REV CODE 250 W/ 637 OVERRIDE(OP): Performed by: SURGERY

## 2018-05-02 PROCEDURE — 700112 HCHG RX REV CODE 229: Performed by: SURGERY

## 2018-05-02 PROCEDURE — 700111 HCHG RX REV CODE 636 W/ 250 OVERRIDE (IP)

## 2018-05-02 PROCEDURE — A9270 NON-COVERED ITEM OR SERVICE: HCPCS

## 2018-05-02 PROCEDURE — 502578 HCHG PACK, TOTAL HIP: Performed by: SURGERY

## 2018-05-02 PROCEDURE — 160029 HCHG SURGERY MINUTES - 1ST 30 MINS LEVEL 4: Performed by: SURGERY

## 2018-05-02 PROCEDURE — 770006 HCHG ROOM/CARE - MED/SURG/GYN SEMI*

## 2018-05-02 PROCEDURE — 160048 HCHG OR STATISTICAL LEVEL 1-5: Performed by: SURGERY

## 2018-05-02 PROCEDURE — A6222 GAUZE <=16 IN NO W/SAL W/O B: HCPCS | Performed by: SURGERY

## 2018-05-02 DEVICE — IMPLANTABLE DEVICE: Type: IMPLANTABLE DEVICE | Site: SHOULDER | Status: FUNCTIONAL

## 2018-05-02 RX ORDER — AMOXICILLIN 250 MG
1 CAPSULE ORAL
Status: DISCONTINUED | OUTPATIENT
Start: 2018-05-02 | End: 2018-05-03 | Stop reason: HOSPADM

## 2018-05-02 RX ORDER — BENZONATATE 100 MG/1
200 CAPSULE ORAL 3 TIMES DAILY PRN
Status: DISCONTINUED | OUTPATIENT
Start: 2018-05-02 | End: 2018-05-03 | Stop reason: HOSPADM

## 2018-05-02 RX ORDER — ENEMA 19; 7 G/133ML; G/133ML
1 ENEMA RECTAL
Status: DISCONTINUED | OUTPATIENT
Start: 2018-05-02 | End: 2018-05-03 | Stop reason: HOSPADM

## 2018-05-02 RX ORDER — SCOLOPAMINE TRANSDERMAL SYSTEM 1 MG/1
1 PATCH, EXTENDED RELEASE TRANSDERMAL
Status: DISCONTINUED | OUTPATIENT
Start: 2018-05-02 | End: 2018-05-03 | Stop reason: HOSPADM

## 2018-05-02 RX ORDER — OXYCODONE HYDROCHLORIDE 5 MG/1
5 TABLET ORAL
Status: DISCONTINUED | OUTPATIENT
Start: 2018-05-02 | End: 2018-05-03 | Stop reason: HOSPADM

## 2018-05-02 RX ORDER — ONDANSETRON 2 MG/ML
4 INJECTION INTRAMUSCULAR; INTRAVENOUS EVERY 4 HOURS PRN
Status: DISCONTINUED | OUTPATIENT
Start: 2018-05-02 | End: 2018-05-03 | Stop reason: HOSPADM

## 2018-05-02 RX ORDER — DEXAMETHASONE SODIUM PHOSPHATE 4 MG/ML
4 INJECTION, SOLUTION INTRA-ARTICULAR; INTRALESIONAL; INTRAMUSCULAR; INTRAVENOUS; SOFT TISSUE
Status: DISCONTINUED | OUTPATIENT
Start: 2018-05-02 | End: 2018-05-03 | Stop reason: HOSPADM

## 2018-05-02 RX ORDER — CEFAZOLIN SODIUM 2 G/100ML
2 INJECTION, SOLUTION INTRAVENOUS EVERY 8 HOURS
Status: COMPLETED | OUTPATIENT
Start: 2018-05-02 | End: 2018-05-03

## 2018-05-02 RX ORDER — LABETALOL HYDROCHLORIDE 5 MG/ML
INJECTION, SOLUTION INTRAVENOUS
Status: COMPLETED
Start: 2018-05-02 | End: 2018-05-02

## 2018-05-02 RX ORDER — FLUTICASONE PROPIONATE 50 MCG
1 SPRAY, SUSPENSION (ML) NASAL DAILY
Status: DISCONTINUED | OUTPATIENT
Start: 2018-05-02 | End: 2018-05-03 | Stop reason: HOSPADM

## 2018-05-02 RX ORDER — BISACODYL 10 MG
10 SUPPOSITORY, RECTAL RECTAL
Status: DISCONTINUED | OUTPATIENT
Start: 2018-05-02 | End: 2018-05-03 | Stop reason: HOSPADM

## 2018-05-02 RX ORDER — ALBUTEROL SULFATE 90 UG/1
1 AEROSOL, METERED RESPIRATORY (INHALATION) EVERY 4 HOURS PRN
Status: DISCONTINUED | OUTPATIENT
Start: 2018-05-02 | End: 2018-05-03 | Stop reason: HOSPADM

## 2018-05-02 RX ORDER — LEVOTHYROXINE SODIUM 0.03 MG/1
175 TABLET ORAL
Status: DISCONTINUED | OUTPATIENT
Start: 2018-05-02 | End: 2018-05-03 | Stop reason: HOSPADM

## 2018-05-02 RX ORDER — CELECOXIB 200 MG/1
200 CAPSULE ORAL
Status: DISCONTINUED | OUTPATIENT
Start: 2018-05-03 | End: 2018-05-03 | Stop reason: HOSPADM

## 2018-05-02 RX ORDER — ACETAMINOPHEN 500 MG
TABLET ORAL
Status: COMPLETED
Start: 2018-05-02 | End: 2018-05-02

## 2018-05-02 RX ORDER — OXYCODONE HYDROCHLORIDE 10 MG/1
10 TABLET ORAL
Status: DISCONTINUED | OUTPATIENT
Start: 2018-05-02 | End: 2018-05-03 | Stop reason: HOSPADM

## 2018-05-02 RX ORDER — AMOXICILLIN 250 MG
1 CAPSULE ORAL NIGHTLY
Status: DISCONTINUED | OUTPATIENT
Start: 2018-05-02 | End: 2018-05-03 | Stop reason: HOSPADM

## 2018-05-02 RX ORDER — ACETAMINOPHEN 500 MG
1000 TABLET ORAL EVERY 6 HOURS
Status: DISCONTINUED | OUTPATIENT
Start: 2018-05-02 | End: 2018-05-03 | Stop reason: HOSPADM

## 2018-05-02 RX ORDER — POLYETHYLENE GLYCOL 3350 17 G/17G
1 POWDER, FOR SOLUTION ORAL 2 TIMES DAILY PRN
Status: DISCONTINUED | OUTPATIENT
Start: 2018-05-02 | End: 2018-05-03 | Stop reason: HOSPADM

## 2018-05-02 RX ORDER — SODIUM CHLORIDE, SODIUM LACTATE, POTASSIUM CHLORIDE, CALCIUM CHLORIDE 600; 310; 30; 20 MG/100ML; MG/100ML; MG/100ML; MG/100ML
INJECTION, SOLUTION INTRAVENOUS CONTINUOUS
Status: DISCONTINUED | OUTPATIENT
Start: 2018-05-02 | End: 2018-05-03 | Stop reason: HOSPADM

## 2018-05-02 RX ORDER — DIPHENHYDRAMINE HYDROCHLORIDE 50 MG/ML
25 INJECTION INTRAMUSCULAR; INTRAVENOUS EVERY 6 HOURS PRN
Status: DISCONTINUED | OUTPATIENT
Start: 2018-05-02 | End: 2018-05-03 | Stop reason: HOSPADM

## 2018-05-02 RX ORDER — MAGNESIUM HYDROXIDE 1200 MG/15ML
LIQUID ORAL
Status: COMPLETED | OUTPATIENT
Start: 2018-05-02 | End: 2018-05-02

## 2018-05-02 RX ORDER — DOCUSATE SODIUM 100 MG/1
100 CAPSULE, LIQUID FILLED ORAL 2 TIMES DAILY
Status: DISCONTINUED | OUTPATIENT
Start: 2018-05-02 | End: 2018-05-03 | Stop reason: HOSPADM

## 2018-05-02 RX ORDER — HALOPERIDOL 5 MG/ML
1 INJECTION INTRAMUSCULAR EVERY 6 HOURS PRN
Status: DISCONTINUED | OUTPATIENT
Start: 2018-05-02 | End: 2018-05-03 | Stop reason: HOSPADM

## 2018-05-02 RX ORDER — LOSARTAN POTASSIUM 50 MG/1
100 TABLET ORAL DAILY
Status: DISCONTINUED | OUTPATIENT
Start: 2018-05-03 | End: 2018-05-03 | Stop reason: HOSPADM

## 2018-05-02 RX ADMIN — ACETAMINOPHEN 1000 MG: 500 TABLET ORAL at 23:44

## 2018-05-02 RX ADMIN — ACETAMINOPHEN 1000 MG: 500 TABLET ORAL at 11:35

## 2018-05-02 RX ADMIN — ACETAMINOPHEN 1000 MG: 500 TABLET ORAL at 16:59

## 2018-05-02 RX ADMIN — SODIUM CHLORIDE, POTASSIUM CHLORIDE, SODIUM LACTATE AND CALCIUM CHLORIDE: 600; 310; 30; 20 INJECTION, SOLUTION INTRAVENOUS at 17:02

## 2018-05-02 RX ADMIN — CEFAZOLIN SODIUM 2 G: 2 INJECTION, SOLUTION INTRAVENOUS at 16:59

## 2018-05-02 RX ADMIN — LABETALOL HYDROCHLORIDE 5 MG: 5 INJECTION, SOLUTION INTRAVENOUS at 10:20

## 2018-05-02 RX ADMIN — OXYCODONE HYDROCHLORIDE 5 MG: 5 TABLET ORAL at 22:29

## 2018-05-02 RX ADMIN — CEFAZOLIN SODIUM 2 G: 2 INJECTION, SOLUTION INTRAVENOUS at 23:44

## 2018-05-02 RX ADMIN — ACETAMINOPHEN 1000 MG: 500 TABLET, FILM COATED ORAL at 11:35

## 2018-05-02 RX ADMIN — DOCUSATE SODIUM 100 MG: 100 CAPSULE ORAL at 20:03

## 2018-05-02 RX ADMIN — STANDARDIZED SENNA CONCENTRATE AND DOCUSATE SODIUM 1 TABLET: 8.6; 5 TABLET, FILM COATED ORAL at 20:03

## 2018-05-02 RX ADMIN — OXYCODONE HYDROCHLORIDE 5 MG: 5 TABLET ORAL at 18:56

## 2018-05-02 ASSESSMENT — PATIENT HEALTH QUESTIONNAIRE - PHQ9
1. LITTLE INTEREST OR PLEASURE IN DOING THINGS: NOT AT ALL
SUM OF ALL RESPONSES TO PHQ9 QUESTIONS 1 AND 2: 0
2. FEELING DOWN, DEPRESSED, IRRITABLE, OR HOPELESS: NOT AT ALL

## 2018-05-02 ASSESSMENT — COPD QUESTIONNAIRES
IN THE PAST 12 MONTHS DO YOU DO LESS THAN YOU USED TO BECAUSE OF YOUR BREATHING PROBLEMS: DISAGREE/UNSURE
HAVE YOU SMOKED AT LEAST 100 CIGARETTES IN YOUR ENTIRE LIFE: NO/DON'T KNOW
COPD SCREENING SCORE: 2
DO YOU EVER COUGH UP ANY MUCUS OR PHLEGM?: NO/ONLY WITH OCCASIONAL COLDS OR INFECTIONS
DURING THE PAST 4 WEEKS HOW MUCH DID YOU FEEL SHORT OF BREATH: NONE/LITTLE OF THE TIME

## 2018-05-02 ASSESSMENT — PAIN SCALES - GENERAL
PAINLEVEL_OUTOF10: 2
PAINLEVEL_OUTOF10: 0
PAINLEVEL_OUTOF10: 2
PAINLEVEL_OUTOF10: 0
PAINLEVEL_OUTOF10: 6
PAINLEVEL_OUTOF10: 2
PAINLEVEL_OUTOF10: 0
PAINLEVEL_OUTOF10: 0
PAINLEVEL_OUTOF10: ASSUMED PAIN PRESENT
PAINLEVEL_OUTOF10: 2
PAINLEVEL_OUTOF10: 2

## 2018-05-02 ASSESSMENT — LIFESTYLE VARIABLES
EVER_SMOKED: NEVER
ALCOHOL_USE: NO

## 2018-05-02 NOTE — PROGRESS NOTES
2 RN skin check completed.  R shoulder dressing in place, CDI.  Hemovac in place with scant drainage.   All other skin intact.

## 2018-05-02 NOTE — OR NURSING
0955- Pt arrives from OR and report received.  Drain and sling in place to right shoulder.  Good CMS.  Pt drowsy.  Pt to be admitted.    1010- Pt denies pain.    1013- Daughter brought to bedside.    1020- 5mg labetolol given IV for HBP.  Pt tolerating sips of water.  Switched pt to n/c.    1053- Xray at bedside of right shoulder.    1100- Pt changed into hospital gown, awaiting room assignment.    1115- Ice pack applied to right shoulder.    1156- Report called to Monique on ortho.  Transport requested.    1215- Transport here to take pt to room.  Family with pt.  All belongings (and teeth) provided.

## 2018-05-02 NOTE — OP REPORT
DATE OF SERVICE:  05/02/2018    SURGEON:  Scott Payton MD    ASSISTANT:  Maycol Veliz PA-C.    PREOPERATIVE DIAGNOSIS:  Right shoulder rotator cuff arthropathy.    POSTOPERATIVE DIAGNOSIS:  Right shoulder rotator cuff arthropathy.    PROCEDURES PERFORMED:  1.  Right reverse total shoulder arthroplasty.  2.  Right open biceps tenodesis.    IMPLANTS:  Integra Titan reverse total shoulder arthroplasty system, size 11   press fit stem standard body 3 mm polyethylene and a 50 mm baseplate with a   38x5 mm lateralized glenosphere.    ANESTHESIOLOGIST:  Carl Bañuelos MD    ANESTHESIA:  General with upper extremity nerve block for pain control.    COMPLICATIONS:  None noted.    DRAINS:  Hemovac x1.    SPECIMENS:  None.    ESTIMATED BLOOD LOSS:  100 mL.    INDICATIONS:  The patient is a 78-year-old female well known to me through my   outpatient clinic for the above-mentioned diagnosis.  She believes and agrees   she has failed conservative treatment and is a candidate for the   above-mentioned procedure.  Prior to the procedure, she understood the risks,   benefits and alternatives to surgery.  She understood the risks to include,   but not be limited to the risk of infection requiring repeat surgery, bleeding   requiring blood transfusion, nerve, blood vessel and tendon injury requiring   repair, chronic pain, failure to alleviate or worsening of her symptoms   requiring revision surgery, periprosthetic fracture subsidence or loosening,   DVT, pulmonary embolism, heart attack, stroke and even death.  The patient   states despite these risks, she wished to proceed with surgery.    DESCRIPTION OF PROCEDURE:  The patient was met in the preoperative holding   area.  Her right shoulder was initialed as correct operative site.  She had an   opportunity to ask questions, all questions were answered and informed   consent was obtained.  The patient was brought to the operating room and laid   supine on the  operating table.  All bony and dependent prominences were   well-padded.  Upper extremity nerve block and general anesthesia were induced   without any complication.  Ancef was administered for infection prophylaxis.    The patient was placed in well-padded beachchair position.  The right upper   extremity was prepped and draped in the usual sterile fashion.  A formal   time-out was performed, in which all parties agreed upon the correct patient,   procedure, and operative site.  I began the procedure by making an oblique   incision over the anterior aspect of the shoulder for deltopectoral approach,   identified the cephalic vein and retracted medially, I cauterized lateral   branches.  I then released the upper third of the pectoralis major tendon to   mobilize the shoulder.  Deep to this, I identified the long head of the biceps   tendon.  I then performed an open biceps tenodesis at resting tension to the   upper half of the pectoralis major tendon using 0 Ethibond suture to prevent   painful sesar deformity and subluxation of the prosthesis.  I then released   the biceps tendon proximally and followed into the rotator interval and   excised off the superior aspect of the labrum.  I then performed a   longitudinal tenotomy of the attenuated subscapularis tendon, which I tagged   for later repair.  I then while palpating and protecting the axillary nerve   released the capsule and labrum circumferentially around the glenoid, placed   the glenoid guide, reamed and then placed a 50 mm baseplate with multiple   locking and nonlocking screws, which had excellent fixation of the scapula and   placed the above-mentioned glenosphere.  I turned my attention back to the   humerus.  I broached up to a size 11, which had excellent press fit, reamed   the metaphysis and placed a size 11 stem with standard body at 30 degrees of   retroversion.  I then trialed up to a size 3 mm polyethylene, which had   excellent stability  and range of motion and selected this final component   after copiously irrigating the joint with normal saline and then repaired the   subscapularis tenotomy with #5 Tycron suture.  I then closed the skin over   deep Hemovac drain with 2-0 Monocryl suture and skin stapler all covered with   sterile Xeroform, 4x4s, Ioban and a sling.  The patient awoke from anesthesia   without any complication and was transferred in stable condition to the PACU   where she had no immediate postoperative complaints.    POSTOPERATIVE PLAN:  The patient will be admitted for postoperative pain   control, likely discharged home on postoperative day #1.       ____________________________________     MD AVA Harkins / DEANA    DD:  05/02/2018 10:19:59  DT:  05/02/2018 10:37:33    D#:  [id number]  Job#:  [id number]

## 2018-05-02 NOTE — CARE PLAN
Problem: Communication  Goal: The ability to communicate needs accurately and effectively will improve  Pt is Kinyarwanda speaking only. Family at bedside and able to translate at this time. Will get  when needed.    Problem: Fluid Volume:  Goal: Will maintain balanced intake and output  Explained to family that pt needs to urinate. Will bladder scan/straight cath PRN.

## 2018-05-02 NOTE — PROGRESS NOTES
Pt received from transport.  Pt is AAO x4.  Malian speaking only.  Pt reports a 2/10 R shoulder pain level.  Ice pack given.  R shoulder dressing in place, CDI.  Hemovac in place with scant drainage.  SCDs place.  L PIV, running fluids.  VS WNL.  Family at bedside.  POC discussed.  All needs met at this time.  Bed in low position.  Call light within reach.  Rounding in place.

## 2018-05-03 VITALS
OXYGEN SATURATION: 93 % | HEIGHT: 61 IN | RESPIRATION RATE: 16 BRPM | SYSTOLIC BLOOD PRESSURE: 164 MMHG | WEIGHT: 191.58 LBS | HEART RATE: 68 BPM | DIASTOLIC BLOOD PRESSURE: 93 MMHG | TEMPERATURE: 97.8 F | BODY MASS INDEX: 36.17 KG/M2

## 2018-05-03 PROBLEM — G89.18 POSTOPERATIVE PAIN: Status: ACTIVE | Noted: 2018-05-03

## 2018-05-03 PROBLEM — M19.011 PRIMARY OSTEOARTHRITIS OF RIGHT SHOULDER: Status: ACTIVE | Noted: 2018-05-03

## 2018-05-03 PROBLEM — Z96.611 STATUS POST REVERSE TOTAL ARTHROPLASTY OF RIGHT SHOULDER: Status: ACTIVE | Noted: 2018-05-03

## 2018-05-03 PROCEDURE — 700102 HCHG RX REV CODE 250 W/ 637 OVERRIDE(OP): Performed by: SURGERY

## 2018-05-03 PROCEDURE — A9270 NON-COVERED ITEM OR SERVICE: HCPCS | Performed by: SURGERY

## 2018-05-03 PROCEDURE — 700112 HCHG RX REV CODE 229: Performed by: SURGERY

## 2018-05-03 RX ORDER — PSEUDOEPHEDRINE HCL 30 MG
100 TABLET ORAL 2 TIMES DAILY
Qty: 60 CAP | Refills: 0 | Status: SHIPPED | OUTPATIENT
Start: 2018-05-03 | End: 2020-11-01

## 2018-05-03 RX ORDER — OXYCODONE HYDROCHLORIDE 5 MG/1
5 TABLET ORAL
Qty: 60 TAB | Refills: 0 | Status: SHIPPED | OUTPATIENT
Start: 2018-05-03 | End: 2018-05-18

## 2018-05-03 RX ADMIN — FLUTICASONE PROPIONATE 50 MCG: 50 SPRAY, METERED NASAL at 11:20

## 2018-05-03 RX ADMIN — OXYCODONE HYDROCHLORIDE 5 MG: 5 TABLET ORAL at 09:32

## 2018-05-03 RX ADMIN — CELECOXIB 200 MG: 200 CAPSULE ORAL at 09:32

## 2018-05-03 RX ADMIN — ACETAMINOPHEN 1000 MG: 500 TABLET ORAL at 06:24

## 2018-05-03 RX ADMIN — LEVOTHYROXINE SODIUM 175 MCG: 25 TABLET ORAL at 06:27

## 2018-05-03 RX ADMIN — LOSARTAN POTASSIUM 100 MG: 50 TABLET, FILM COATED ORAL at 09:32

## 2018-05-03 RX ADMIN — ACETAMINOPHEN 1000 MG: 500 TABLET ORAL at 12:50

## 2018-05-03 RX ADMIN — OXYCODONE HYDROCHLORIDE 5 MG: 5 TABLET ORAL at 06:27

## 2018-05-03 RX ADMIN — OXYCODONE HYDROCHLORIDE 10 MG: 10 TABLET ORAL at 15:52

## 2018-05-03 RX ADMIN — DOCUSATE SODIUM 100 MG: 100 CAPSULE ORAL at 09:32

## 2018-05-03 RX ADMIN — ACETAMINOPHEN 1000 MG: 500 TABLET ORAL at 18:23

## 2018-05-03 ASSESSMENT — PAIN SCALES - GENERAL
PAINLEVEL_OUTOF10: 8
PAINLEVEL_OUTOF10: 3
PAINLEVEL_OUTOF10: 3
PAINLEVEL_OUTOF10: 2
PAINLEVEL_OUTOF10: 3
PAINLEVEL_OUTOF10: 5
PAINLEVEL_OUTOF10: 6
PAINLEVEL_OUTOF10: 3

## 2018-05-03 NOTE — PROGRESS NOTES
Notified PA Robe of difficulty weaning pt off O2; sats drop to 80s when below 1.5 L and pt seems SOB. Will do home O2 test per Robe.

## 2018-05-03 NOTE — PROGRESS NOTES
Family at bedside for most of morning. Discussed discharge, pt family request after 4 pm. Discussed requirement for pt to be weaned off oxygen as she does not use home O2. Pt started shift at 3 L, down to 1.5L via nc and 93%. Encouraged IS and TCDB with return demonstration.

## 2018-05-03 NOTE — FACE TO FACE
Face to Face Note  -  Durable Medical Equipment    Maycol Veliz P.A.-C. - NPI: [id number]  I certify that this patient is under my care and that they had a durable medical equipment(DME)face to face encounter by myself that meets the physician DME face-to-face encounter requirements with this patient on:    Date of encounter:   Patient:                    MRN:                       YOB: 2019  Lizzette Burton  [id number]  1940     The encounter with the patient was in whole, or in part, for the following medical condition, which is the primary reason for durable medical equipment:  Post-Op Surgery    I certify that, based on my findings, the following durable medical equipment is medically necessary:  Oxygen, continuous, 1.5 LPM via nasal Cannula.    HOME O2 Saturation Measurements:(Values must be present for Home Oxygen orders)    Testing recorded by RN   93% SAO2 at 1.5 LPM via NC  80% SAO2 at 1 LPM     My Clinical findings support the need for the above equipment due to:  Hypoxia    Supporting Symptoms: as above. Lungs CTAB on exam.

## 2018-05-03 NOTE — CARE PLAN
Problem: Pain Management  Goal: Pain level will decrease to patient's comfort goal  Patient medicated for pain rated at 6/10.  Arm elevated on pillows.  Ice pack used.  Patient able to rest comfortably.

## 2018-05-03 NOTE — CARE PLAN
Problem: Safety  Goal: Will remain free from falls  Patient's room is located near nurses station.  Patient is compliant with call light.  Family is present.  Frequent rounding.

## 2018-05-03 NOTE — DISCHARGE PLANNING
Anticipated Discharge Disposition: Home with home oxygen    Action: Pt will require home oxygen. LSW met with pt and pt's daughter, Maritza, at bedside to obtain DME choice. DME choice form signed by daughter consenting for referral to be sent to [facility]. LSW faxed choice form to [facility].     Barriers to Discharge: None    Plan: Pending DME referral acceptance to [facility].

## 2018-05-03 NOTE — CARE PLAN
Problem: Safety  Goal: Will remain free from injury  Outcome: PROGRESSING AS EXPECTED  Pt calls appropriately, steady on feet with hand held assist.    Problem: Knowledge Deficit  Goal: Knowledge of disease process/condition, treatment plan, diagnostic tests, and medications will improve  Outcome: PROGRESSING AS EXPECTED  Discussed oxygen weaning and discharge. Pt able to do 1250 IS, TCDB teaching with return demonstration.    Problem: Respiratory:  Goal: Respiratory status will improve  Outcome: PROGRESSING SLOWER THAN EXPECTED  Pt SOB when oxygen off, dim LL sounds. Can do 1250 IS, TCDB with return demonstration, trying to wean off O2. Currently using 1.5L and sat 93%, from 3L nc at beginning of shift.

## 2018-05-03 NOTE — DISCHARGE SUMMARY
DISCHARGE SUMMARY    PATIENTS NAME: Lizzette Burton    MRN: [id number]  CSN: [id number]    ADMIT DATE:  5/2/2018  ADMIT MD: Scott Payton M.D.    DISCHARGE DATE: 5/3/2018  DISCHARGE DIAGNOSIS:Status Post Right Reverse Total Shoulder Arthroplasty  DISCHARGE MD: Scott Payton M.D    REASON FOR ADMISSION:Severe osteoarthritis right shoulder    PRINCIPAL DIAGNOSIS:Severe osteoarthritis right shoulder    SECONDARY DIAGNOSIS:none    PROCEDURES: Right Reverse Total Shoulder Arthroplasty  Scott Payton M.D.    CONSULTATIONS: RICKI Qureshi*     HOSPITAL COURSE: Patient is a pleaseant 78 year-old female who failed lengthy conservative treatment for her severe right shoulder arhriotis. She consulted Dr. Payton and  They discussed the indications, risks, benefits, and alternatives to surgical treatment. The patient wished to proceed with surgery. Accordingly she was admitted and taken to the OR for the above mentioned procedure.  There were no complications and minimal blood loss. Lizzette Burton has done well with mobilization and pain has been well controlled with oral medications. Drain was removed the morning of postoperatiove day 1.Wound care instructions were given, patient's questions answered, and Lizzette Burton is ready for discharge to [facility] at this time.     DISCHARGE LOCATION: [place], [place]    DVT PROPHYLAXIS:daily ambulation  ANTIBIOTICS:complete  MEDICATIONS:   Current Outpatient Prescriptions   Medication Sig Dispense Refill   • oxyCODONE immediate-release (ROXICODONE) 5 MG Tab Take 1 Tab by mouth every 3 hours as needed for up to 15 days. 60 Tab 0   • docusate sodium 100 MG Cap Take 100 mg by mouth 2 Times a Day. 60 Cap 0     WEIGHT BEARING STATUS:non-weight-bearing right shoulder    FOLLOW UP: 10-14 days post operatively with Dr. Scott Payton M.D

## 2018-05-04 ENCOUNTER — PATIENT OUTREACH (OUTPATIENT)
Dept: HEALTH INFORMATION MANAGEMENT | Facility: OTHER | Age: 78
End: 2018-05-04

## 2018-05-04 NOTE — PROGRESS NOTES
Discharge orders and prescriptions given to patient. Pt verbalized understanding of the orders. Will f/u with Dr. Payton. IV removed, tip intact. R shoulder incision with gauze/occlusive dressing CDI. Daughter will take patient home. Pt has new oxygen for home; completed teaching with patient and daughter on equipment use and safety. Pt discharged to home and escorted via wheelchair with all her personal belongings after all questions were answered.

## 2018-05-04 NOTE — DISCHARGE INSTRUCTIONS
Discharge Instructions    Discharged to home by car with relative. Discharged via wheelchair, hospital escort: Yes.  Special equipment needed: Oxygen    Be sure to schedule a follow-up appointment with your primary care doctor or any specialists as instructed.     Discharge Plan:      Keep Sling on at all times until clinic followup except for gentle elbow motion and hygeine.   Sedentary use only of hand, no shoulder motion until then.   Okay to remove bandage in 4 days (Monday) and cover incision with bandaids and get wet.   No driving while on narcotic pain medications.   Contact auto-insurance carrier for clearance to begin driving again; wait at least 6 weeks.   Call MD or come to ER for any major concerns.    I understand that a diet low in cholesterol, fat, and sodium is recommended for good health. Unless I have been given specific instructions below for another diet, I accept this instruction as my diet prescription.   Other diet: regular    Special Instructions: Discharge instructions for the Orthopedic Patient    Follow up with Primary Care Physician within 2 weeks of discharge to home, regarding:  Review of medications and diagnostic testing.  Surveillance for medical complications.  Workup and treatment of osteoporosis, if appropriate.     -Is this a Joint Replacement patient? No    -Is this patient being discharged with medication to prevent blood clots?  No    · Is patient discharged on Warfarin / Coumadin?   No       Depression / Suicide Risk    As you are discharged from this [facility] facility, it is important to learn how to keep safe from harming yourself.    Recognize the warning signs:  · Abrupt changes in personality, positive or negative- including increase in energy   · Giving away possessions  · Change in eating patterns- significant weight changes-  positive or negative  · Change in sleeping patterns- unable to sleep or sleeping all the time   · Unwillingness or inability to  communicate  · Depression  · Unusual sadness, discouragement and loneliness  · Talk of wanting to die  · Neglect of personal appearance   · Rebelliousness- reckless behavior  · Withdrawal from people/activities they love  · Confusion- inability to concentrate     If you or a loved one observes any of these behaviors or has concerns about self-harm, here's what you can do:  · Talk about it- your feelings and reasons for harming yourself  · Remove any means that you might use to hurt yourself (examples: pills, rope, extension cords, firearm)  · Get professional help from the community (Mental Health, Substance Abuse, psychological counseling)  · Do not be alone:Call your Safe Contact- someone whom you trust who will be there for you.  · Call your local CRISIS HOTLINE 660-5899 or 873-301-8904  · Call your local Children's Mobile Crisis Response Team [place] (735) 671-0768 or www.norin.tv  · Call the toll free National Suicide Prevention Hotlines   · National Suicide Prevention Lifeline 786-451-SWBO (0919)  · [facility] Line Network 800-SUICIDE (587-4218)      Uso del oxígeno en el hogar  (Oxygen Use at Home)  Se le ha prescripto oxígeno para cantrell uso domiciliario. Se requiere la prescripción del médico. La prescripción mostrará la tasa del flujo, que es cuánto oxígeno se utilizará por minuto. Estará indicado en litros por minuto (LPM o L/M). Un litro es isabel medida métrica de cantidad (volumen) que es un poco más que un cuarto de galón.  Use el oxígeno del modo en que se le ha indicado. Puede usarlo mientras practica ejercicios, mientras duerme o descansa. Kal vez necesite oxígeno de forma continua. El médico podrá pedirle un análisis de max (gasometría) o isabel prueba de oxímetro de pulso que mostrará cuál es cantrell nivel de oxígeno. El médico utilizará estas medidas para saber cuáles son karin necesidades y seguir cantrell progreso.   La terapia de oxígeno en el hogar suele utilizarse en pacientes con enfermedades  relacionadas con los pulmones. Entre ellas se incluyen:  · Asma.  · Cáncer de pulmón.  · Neumonía.  · Enfisema.  · Bronquitis crónica  · Fibrosis quística  · Otras enfermedades pulmonares.  · Fibrosis pulmonar.  · Enfermedad pulmonar ocupacional.  · Insuficiencia cardiaca congestiva.  · Enfermedad pulmonar obstructiva crónica.  BELGICA FORMAS COMUNES DE PROPORCIONAR SHONDA TERAPIA DE OXÍGENO:  · El oxígeno puede administrarse en casa en forma de gas con cilindros o tanques de varios tamaños. Estos contienen oxígeno comprimido. El cilindro está equipado con un regulador que controla el ritmo del flujo. Debido a que el flujo de oxígeno fuera del cilindro es albin, se adjuntará al sistema un dispositivo conservador para evitar el desperdicio. Ananya dispositivo libera el gas sólo cuando inhala y se corta cuando exhala. El oxígeno puede proporcionarse en un cilindro pequeño que podrá llevar con usted, khushbu los tanques grandes son pesados y sólo para cantrell uso domiciliario. Luego de la utilización, los tanques vacíos deberán cambiarse por otros llenos.  · El oxígeno puede estar en forma líquida en un contenedor similar a un termo. Al liberarse, el líquido se convierte en gas y lo respirará nae aire comprimido. Ananya método de almacenamiento kathy menos espacio que el cilindro de gas comprimido, y podrá transferir el líquido a un recipiente pequeño y portable en casa. El oxígeno líquido es más darnell que el gas comprimido, y se venteará (seguirá saliendo del recipiente que lo aloja) aún cuando no se utilice. Para conservar en oxígeno en el recipiente, podrá colocar un dispositivo conservador en él. El oxígeno líquido es muy frio  alrededor de 297 grados Farenheit bajo cero (-182,7° C).  · La tercera forma de proporcionar terapia con oxígeno es con un concentrador. Estos dispositivos médicos filtran el oxígeno de la habitación y otorgan sumit un 100 por ciento de oxígeno al paciente. Los concentradores de oxígeno funcionan con  electricidad. Ananya sistema tiene varias ventajas:  · No debe preocuparse por conseguir el suministro.  · No es tan costoso nae el oxígeno líquido.  · Algunos tubos adicionales le permitirán moverse con facilidad.  Se barrera desarrollado sistemas pequeños y portables para ofrecer al usuario marizol mayor capacidad para moverse. Hay varios tipos de oxígeno portable para ayudarlo a permanecer activo y poder moverse. Utilizar oxígeno portable no es diferente de utilizar marizol ayuda para oir; es sólo un poco más voluminoso. Deberá tener un cilindro de oxígeno nae ayuda en jane de que no haya electricidad. Deberá avisar a la compañía de electricidad que está bajo terapia de oxígeno para tener prioridad en el servicio cuando hay marizol abbi eléctrica.  DISPOSITIVOS DE ADMISTRACIÓN DE OXÍGENO (PARA QUE INGRESE A CORNEJO CUERPO)  Hay maurizio formas comunes de administrar oxígeno a cornejo cuerpo:  · Marizol cánula nasal es un dispositivo de dos partes que se insertan en los orificios nasales y se conectan al tubo que lleva el oxígeno. La tubería puede pasar por la oreja o estar unida al miladis de los anteojos. Si utiliza marizol cánula nasal evaristo el día, preferirá marizol máscara por la noche o cuando cornejo nariz esté irritada o tapada por un resfriado.  · Las personas que necesitan marizol gran cantidad de oxígeno por lo general utilizan marizol máscara.  · La terapia con oxígeno transtraqueal requiere la inserción de un catéter pequeño y flexible en la tráquea. El catéter transtraqueal se mantiene en cornejo sitio con un collar. Debido a que el oxígeno transtraqueal saltea la boca, la nariz y la garganta, necesitará un humidificador para las tasas de 1 litro por minuto o mayores.  TEMAS DE SEGURIDAD  · Nunca fume mientras utiliza oxígeno. No debería fumar de todas maneras, ni nadie de cornejo casa.  · El oxígeno no se quema ni explota, khushbu los materiales sherrill más rápido en presencia del oxígeno.  · Tenga un extinguidor a mano, y avise al departamento de bomberos que tiene  oxígeno en casa.  · Advierta a las visitas que no fumen cerca cuando utiliza oxígeno. Coloque señales de no fumar en casa en los lugares en que utilice el oxígeno con frecuencia.  · Cuando vaya a un restaurante con isabel debbie de oxígeno portable, pida que lo sienten en el sector de no fumadores.  · Manténgase al menos a rachel pies de cocinas a gas, juan manuel, chimeneas encendidas u otras michael de calor.  · No utilice productos inflamables (materiales que se queman fácilmente) cuando use el oxígeno.  · Si utiliza un cilindro de oxígeno, asegúrese de que está fijo. Si utiliza oxígeno líquido, asegúrese de que el recipiente está hacia arriba para evitr que el exígeno se salga; el oxígeno líquido es muy frío y puede quemar cantrell piel.  · Si utiliza un concentrador, avise a la compañía de electricidad para que le den prioridad si hay isabel abbi eléctrica. Además, de ser posible evite utilizar prolongadores.  DIRECTIVAS PARA LIMPIAR EL EQUIPO  · Lave los dispositivos nasales con jabón líquido y enjuáguelos cuidadosamente isabel o dos veces por semana.  · Reemplácelos cada dos a cuatro semanas. Si tiene isabel infección nae un resfrío o neumonía, cámbielos cuando ya se sienta kathryn.  · El médico le dará instrucciones sobre cómo limpiar el catéter transtraqueal.  · La botella humidificadora deberá lavarse con jabón y [place] y enjuagarse cuidadosamente entre llenados. Seque la botella con aire antes de llenarla con agua estéril o destilada. La botella y cantrell parte superior deberán desinfectarse isabel vez limpias.  · Si utiliza un concentrador de oxígeno, desenchufe la unidad, luego pásele un trapo húmedo y séquelo a diario. El filtro de aire deberá limpiarse al menos dos veces por semana.  · Sigas las directivas del equipo y de la compañía que le marilu el servicio para limpiar el filtro compresor.  INSTRUCCIONES PARA EL CUIDADO DOMICILIARIO  · No modifique el flujo de oxígeno a menos que se lo haya indicado el profesional que lo  asiste.  · No consuma drogas sedantes ni alcohol a menos que se lo indique. Le bajarán la frecuencia cardíaca.  · Tenga siempre a mano un tanque de oxígeno de repuesto y tenga en cuenta las vacaciones cuando no tendrá isabel prescripción disponible. El departamento de emergencia es un buen lugar para hacerse cargo de esos problemas.  · Utilice lubricantes basados en agua para karin labios u orificios nasales. No utilice un producto basado en aceite, nae vaselina.  · Para evitar que karin mejillas o la piel de detrás de la orejas se irriten, coloque un poco de gasa debajo de las tuberías.  · Si tiene enrojecimiento persistente debajo de la nariz, contáctese con el profesional que lo asiste.  · El oxígeno no es adictivo ni crea hábito. Cuando ya no lo necesite, el médico interrumpirá el tratamiento.  · El oxígeno es isabel droga y debe utilizarse symone nae se le indica. Demasiado oxígeno puede ser dañino y demasiado poco podrá no darle el beneficio que necesita.  · La falta de respiración no siempre es debido a la falta de oxígeno. Si cantrell nivel de oxígeno no es la causa de los problemas respiratorios, el tomarlo no le ayudará.  · El oxígeno no se quema ni explota. Sin embargo, los materiales sherrill más rápido en presencia del oxígeno (combustible).  SOLICITE ATENCIÓN MÉDICA SI:  · Eliza de ton frecuentes  · Presenta dificultad para respirar, tos persistente.  · Ansiedad  · Confusión.  · Confusión o somnolencia.  · Presenta isabel enfermedad que le produce problemas para respirar.  · La intolerancia al ejercicio empeora.  · Labios o uñas azulados  · Agitación o ansiedad.  · Respiración dificultosa o irregular que empeora.  · Usted tiene isabel temperatura oral de más de 38,9° C (102° F).  Document Released: 03/14/2011 Document Revised: 03/11/2013  [employer]® Patient Information ©2014 [employer].

## 2018-05-19 ENCOUNTER — HOSPITAL ENCOUNTER (OUTPATIENT)
Dept: LAB | Facility: MEDICAL CENTER | Age: 78
End: 2018-05-19
Attending: NURSE PRACTITIONER
Payer: MEDICARE

## 2018-05-19 LAB — TSH SERPL DL<=0.005 MIU/L-ACNC: 0.02 UIU/ML (ref 0.38–5.33)

## 2018-05-19 PROCEDURE — 84443 ASSAY THYROID STIM HORMONE: CPT

## 2018-05-19 PROCEDURE — 36415 COLL VENOUS BLD VENIPUNCTURE: CPT

## 2018-06-06 ENCOUNTER — PATIENT OUTREACH (OUTPATIENT)
Dept: HEALTH INFORMATION MANAGEMENT | Facility: OTHER | Age: 78
End: 2018-06-06

## 2018-06-06 NOTE — PROGRESS NOTES
Patient Lizzette Burton  was discharged on 05/03/2018. [facility] Patient Advocate assisted with multiple discharge orders including, 1- Orthopedic follow up with Dr. Payton on 05/15/2018.  The patient did not follow  discharge orders to follow up with her Primary care physician upon discharge. [facility] offered patient assistance with scheduling PCP appointment and patient declined. [facility] also assisted with discharge orders for Home oxygen which the patient received  on 05/03/2018 .The patient has  1 future Orthopedic follow up with Dr. Payton on 6/14/2018 scheduled.

## 2018-06-16 ENCOUNTER — HOSPITAL ENCOUNTER (OUTPATIENT)
Dept: LAB | Facility: MEDICAL CENTER | Age: 78
End: 2018-06-16
Attending: NURSE PRACTITIONER
Payer: MEDICARE

## 2018-06-16 LAB — TSH SERPL DL<=0.005 MIU/L-ACNC: 0.03 UIU/ML (ref 0.38–5.33)

## 2018-06-16 PROCEDURE — 84443 ASSAY THYROID STIM HORMONE: CPT

## 2018-06-16 PROCEDURE — 36415 COLL VENOUS BLD VENIPUNCTURE: CPT

## 2018-08-23 ENCOUNTER — APPOINTMENT (OUTPATIENT)
Dept: RADIOLOGY | Facility: MEDICAL CENTER | Age: 78
End: 2018-08-23
Attending: PHYSICIAN ASSISTANT
Payer: MEDICARE

## 2018-08-23 DIAGNOSIS — M25.552 LEFT HIP PAIN: ICD-10-CM

## 2018-08-23 PROCEDURE — 73721 MRI JNT OF LWR EXTRE W/O DYE: CPT | Mod: LT

## 2018-11-26 ENCOUNTER — HOSPITAL ENCOUNTER (OUTPATIENT)
Dept: LAB | Facility: MEDICAL CENTER | Age: 78
End: 2018-11-26
Attending: NURSE PRACTITIONER
Payer: MEDICARE

## 2018-11-26 PROCEDURE — 80061 LIPID PANEL: CPT

## 2018-11-26 PROCEDURE — 36415 COLL VENOUS BLD VENIPUNCTURE: CPT

## 2018-11-26 PROCEDURE — 84443 ASSAY THYROID STIM HORMONE: CPT

## 2018-11-26 PROCEDURE — 80053 COMPREHEN METABOLIC PANEL: CPT

## 2018-11-27 LAB
ALBUMIN SERPL BCP-MCNC: 4 G/DL (ref 3.2–4.9)
ALBUMIN/GLOB SERPL: 1.1 G/DL
ALP SERPL-CCNC: 74 U/L (ref 30–99)
ALT SERPL-CCNC: 17 U/L (ref 2–50)
ANION GAP SERPL CALC-SCNC: 6 MMOL/L (ref 0–11.9)
AST SERPL-CCNC: 22 U/L (ref 12–45)
BILIRUB SERPL-MCNC: 0.6 MG/DL (ref 0.1–1.5)
BUN SERPL-MCNC: 19 MG/DL (ref 8–22)
CALCIUM SERPL-MCNC: 9.4 MG/DL (ref 8.5–10.5)
CHLORIDE SERPL-SCNC: 102 MMOL/L (ref 96–112)
CHOLEST SERPL-MCNC: 204 MG/DL (ref 100–199)
CO2 SERPL-SCNC: 30 MMOL/L (ref 20–33)
CREAT SERPL-MCNC: 0.94 MG/DL (ref 0.5–1.4)
GLOBULIN SER CALC-MCNC: 3.6 G/DL (ref 1.9–3.5)
GLUCOSE SERPL-MCNC: 77 MG/DL (ref 65–99)
HDLC SERPL-MCNC: 57 MG/DL
LDLC SERPL CALC-MCNC: 127 MG/DL
POTASSIUM SERPL-SCNC: 4.1 MMOL/L (ref 3.6–5.5)
PROT SERPL-MCNC: 7.6 G/DL (ref 6–8.2)
SODIUM SERPL-SCNC: 138 MMOL/L (ref 135–145)
TRIGL SERPL-MCNC: 101 MG/DL (ref 0–149)
TSH SERPL DL<=0.005 MIU/L-ACNC: 0.94 UIU/ML (ref 0.38–5.33)

## 2018-12-05 ENCOUNTER — HOSPITAL ENCOUNTER (OUTPATIENT)
Dept: RADIOLOGY | Facility: MEDICAL CENTER | Age: 78
End: 2018-12-05
Attending: NURSE PRACTITIONER
Payer: MEDICARE

## 2018-12-05 DIAGNOSIS — Z12.39 SCREENING BREAST EXAMINATION: ICD-10-CM

## 2018-12-05 DIAGNOSIS — N95.9 MENOPAUSAL PROBLEM: ICD-10-CM

## 2018-12-05 PROCEDURE — 77080 DXA BONE DENSITY AXIAL: CPT

## 2018-12-05 PROCEDURE — 77067 SCR MAMMO BI INCL CAD: CPT

## 2018-12-11 ENCOUNTER — HOSPITAL ENCOUNTER (OUTPATIENT)
Dept: RADIOLOGY | Facility: MEDICAL CENTER | Age: 78
End: 2018-12-11

## 2019-08-12 ENCOUNTER — HOSPITAL ENCOUNTER (OUTPATIENT)
Dept: RADIOLOGY | Facility: MEDICAL CENTER | Age: 79
End: 2019-08-12
Attending: NURSE PRACTITIONER
Payer: MEDICARE

## 2019-08-12 DIAGNOSIS — J20.8 ACUTE BRONCHITIS, VIRAL: ICD-10-CM

## 2019-08-12 PROCEDURE — 71046 X-RAY EXAM CHEST 2 VIEWS: CPT

## 2019-10-13 ENCOUNTER — HOSPITAL ENCOUNTER (OUTPATIENT)
Dept: RADIOLOGY | Facility: MEDICAL CENTER | Age: 79
End: 2019-10-13
Attending: PHYSICIAN ASSISTANT
Payer: MEDICARE

## 2019-10-13 ENCOUNTER — OFFICE VISIT (OUTPATIENT)
Dept: URGENT CARE | Facility: PHYSICIAN GROUP | Age: 79
End: 2019-10-13
Payer: MEDICARE

## 2019-10-13 VITALS
TEMPERATURE: 97.1 F | SYSTOLIC BLOOD PRESSURE: 142 MMHG | DIASTOLIC BLOOD PRESSURE: 78 MMHG | HEART RATE: 70 BPM | OXYGEN SATURATION: 96 % | HEIGHT: 61 IN | BODY MASS INDEX: 33.99 KG/M2 | WEIGHT: 180 LBS

## 2019-10-13 DIAGNOSIS — S82.831A CLOSED FRACTURE OF DISTAL END OF RIGHT FIBULA, UNSPECIFIED FRACTURE MORPHOLOGY, INITIAL ENCOUNTER: ICD-10-CM

## 2019-10-13 DIAGNOSIS — M25.561 ACUTE PAIN OF RIGHT KNEE: ICD-10-CM
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
## 2019-10-13 DIAGNOSIS — Z96.653 STATUS POST BILATERAL KNEE REPLACEMENTS: ICD-10-CM

## 2019-10-13 DIAGNOSIS — M25.552 ACUTE HIP PAIN, LEFT: ICD-10-CM

## 2019-10-13 DIAGNOSIS — M25.571 ACUTE RIGHT ANKLE PAIN: ICD-10-CM

## 2019-10-13 DIAGNOSIS — S82.831A CLOSED FRACTURE OF PROXIMAL END OF RIGHT FIBULA, UNSPECIFIED FRACTURE MORPHOLOGY, INITIAL ENCOUNTER: ICD-10-CM

## 2019-10-13 DIAGNOSIS — M79.661 PAIN OF RIGHT LOWER LEG: ICD-10-CM

## 2019-10-13 PROCEDURE — 73522 X-RAY EXAM HIPS BI 3-4 VIEWS: CPT

## 2019-10-13 PROCEDURE — 73562 X-RAY EXAM OF KNEE 3: CPT | Mod: RT

## 2019-10-13 PROCEDURE — 99354 PR PROLONGED SVC OUTPATIENT SETTING 1ST HOUR: CPT | Performed by: PHYSICIAN ASSISTANT

## 2019-10-13 PROCEDURE — 73610 X-RAY EXAM OF ANKLE: CPT | Mod: RT

## 2019-10-13 PROCEDURE — 99214 OFFICE O/P EST MOD 30 MIN: CPT | Performed by: PHYSICIAN ASSISTANT

## 2019-10-13 PROCEDURE — 73590 X-RAY EXAM OF LOWER LEG: CPT | Mod: RT

## 2019-10-13 RX ORDER — HYDROCODONE BITARTRATE AND ACETAMINOPHEN 5; 325 MG/1; MG/1
1 TABLET ORAL EVERY 4 HOURS PRN
Qty: 10 TAB | Refills: 0 | Status: SHIPPED | OUTPATIENT
Start: 2019-10-13 | End: 2019-10-16

## 2019-10-13 ASSESSMENT — ENCOUNTER SYMPTOMS
LOSS OF CONSCIOUSNESS: 0
HEADACHES: 0
BACK PAIN: 0
CHILLS: 0
NAUSEA: 0
FEVER: 0
VOMITING: 0
DIZZINESS: 0
MYALGIAS: 1
FALLS: 1

## 2019-10-13 NOTE — PROGRESS NOTES
Subjective:   Lizzette Burton is a 79 y.o. female who presents for Leg Pain (knee injury, leg pain, hx of knee surgery, fell down stairs yesterday)        This is a new problem.  Patient complains of diffuse right leg pain x1 day.  Symptoms began after she slipped and fell down multiple stairs.  She states that the primary site of pain is right knee and secondary site of pain in his right ankle.  She is also experiencing some diffuse lower leg pain and left hip pain.  Pain is aching and does not radiate.  She is unable to bear weight due to discomfort.  She is tried applying ice with mild symptomatic improvement.  She also took some Motrin without significant symptomatic relief.  No other aggravating or alleviating factors.  Denies history of osteoporosis and osteopenia.  Past medical history significant for bilateral total knee arthroplasty. Surgeon is with [facility].    Review of Systems   Constitutional: Negative for chills and fever.   Gastrointestinal: Negative for nausea and vomiting.   Musculoskeletal: Positive for falls, joint pain and myalgias. Negative for back pain.   Neurological: Negative for dizziness, loss of consciousness and headaches.   All other systems reviewed and are negative.      PMH:  has a past medical history of Acute renal failure ([facility]) (2/11/2014), Anesthesia, Angina, Arthritis, Asthma, Bowel habit changes, Breath shortness, CATARACT, Dental disorder, Glaucoma, Heart burn, Hypertension, Indigestion, Pneumonia (03/2018), Sepsis ([facility]) (2/8/2014), Stroke ([facility]), Unspecified disorder of thyroid, Unspecified hemorrhagic conditions (2009), and Urinary incontinence. She also has no past medical history of COPD.  MEDS:   Current Outpatient Medications:   •  ibuprofen (MOTRIN) 200 MG Tab, Take 200 mg by mouth every 6 hours as needed., Disp: , Rfl:   •  fluticasone (FLONASE) 50 MCG/ACT nasal spray, Spray 1 Spray in nose every day., Disp: , Rfl:   •  losartan (COZAAR) 50 MG Tab, Take 100 mg by mouth  every day., Disp: , Rfl:   •  benzonatate (TESSALON) 200 MG capsule, Take 1 Cap by mouth 3 times a day as needed for Cough., Disp: 30 Cap, Rfl: 0  •  levothyroxine (SYNTHROID) 175 MCG Tab, Take 175 mcg by mouth Every morning on an empty stomach., Disp: , Rfl:   •  docusate sodium 100 MG Cap, Take 100 mg by mouth 2 Times a Day. (Patient not taking: Reported on 10/13/2019), Disp: 60 Cap, Rfl: 0  •  albuterol (PROVENTIL) 2.5mg/3ml Nebu Soln solution for nebulization, 3 mL by Nebulization route every four hours as needed for Shortness of Breath. (Patient not taking: Reported on 10/13/2019), Disp: 30 Bullet, Rfl: 1  •  albuterol 108 (90 Base) MCG/ACT Aero Soln inhalation aerosol, Inhale 1 Puff by mouth every four hours as needed for Shortness of Breath. (Patient not taking: Reported on 10/13/2019), Disp: 1 Inhaler, Rfl: 0  ALLERGIES: No Known Allergies  SURGHX:   Past Surgical History:   Procedure Laterality Date   • SHOULDER ARTHROPLASTY TOTAL Right 5/2/2018    Procedure: SHOULDER ARTHROPLASTY TOTAL/ REVERSE;  Surgeon: Scott Payton M.D.;  Location: SURGERY SAME DAY [facility];  Service: Orthopedics   • ANTERIOR AND POSTERIOR REPAIR  3/20/2017    Procedure: ANTERIOR AND POSTERIOR REPAIR;  Surgeon: Regulo Burton M.D.;  Location: SURGERY SAME DAY [facility];  Service:    • ENTEROCELE REPAIR  3/20/2017    Procedure: ENTEROCELE REPAIR - PERINEOPLASTY;  Surgeon: Regulo Burton M.D.;  Location: SURGERY SAME DAY [facility];  Service:    • BLADDER SLING FEMALE  3/20/2017    Procedure: BLADDER SLING FEMALE - TOT;  Surgeon: Regulo Burton M.D.;  Location: SURGERY SAME DAY [facility];  Service:    • VAGINAL SUSPENSION  3/20/2017    Procedure: VAGINAL SUSPENSION -SACROSPINOUS VAULT;  Surgeon: Regulo Burton M.D.;  Location: SURGERY SAME DAY [facility];  Service:    • KNEE ARTHROPLASTY TOTAL Left 8/20/2015    Procedure: KNEE ARTHROPLASTY TOTAL;  Surgeon: Juan Manuel Choi M.D.;  Location: [facility]  "ORS;  Service:    • BAERVELDT IMPLANT  5/8/2013    Performed by Miller Fletcher M.D. at SURGERY SAME DAY [facility]   • CATARACT PHACO WITH IOL  5/8/2013    Performed by Miller Fletcher M.D. at SURGERY SAME DAY [facility]   • BAERVELDT IMPLANT  3/13/2013    Performed by Miller Fletcher M.D. at SURGERY SAME DAY [facility]   • BAERVELDT IMPLANT  12/12/2012    Performed by Miller Fletcher M.D. at SURGERY SAME DAY [facility]   • TRABECULECTOMY  9/26/2012    Performed by Miller Fletcher M.D. at SURGERY SAME DAY [facility]   • CATARACT PHACO WITH IOL  9/26/2012    Performed by Miller Fletcher M.D. at SURGERY SAME DAY [facility]   • KNEE ARTHROPLASTY TOTAL  2/5/2009    Performed by GISELA OLSON at SURGERY [facility]   • HYSTERECTOMY, TOTAL ABDOMINAL  1989   • GYN SURGERY       SOCHX:  reports that she quit smoking about 17 years ago. Her smoking use included cigarettes. She has a 10.00 pack-year smoking history. She has never used smokeless tobacco. She reports that she does not drink alcohol or use drugs.  FH: Family history was reviewed, no pertinent findings to report   Objective:   /78   Pulse 70   Temp 36.2 °C (97.1 °F)   Ht 1.542 m (5' 0.7\")   Wt 81.6 kg (180 lb)   SpO2 96%   BMI 34.35 kg/m²   Physical Exam   Constitutional: She is oriented to person, place, and time. She appears well-developed and well-nourished.  Non-toxic appearance. No distress.   HENT:   Head: Normocephalic and atraumatic.   Right Ear: External ear normal.   Left Ear: External ear normal.   Nose: Nose normal.   Eyes: Conjunctivae and lids are normal.   Neck: Neck supple.   Cardiovascular: Normal rate and regular rhythm.   Pulmonary/Chest: Effort normal and breath sounds normal. No respiratory distress.   Abdominal: Normal appearance.   Musculoskeletal:   Patient is in wheelchair.    Right knee  Appearance -well-healed midline surgical incision.  No bruising, erythema, or deformity " appreciated  Palpation -proximal fibula and lateral joint  to palpation.  No tenderness to palpation  patellar tendon, hamstring tendons, or quads.    ROM -limited  Strength - not tested due to pain  Neuro - Sensation equal and intact bilaterally    Right ankle/foot:  Appearance -diffuse moderate edema.  No bruising, erythema, or deformity appreciated  Palpation: lateral malleolus and AITFL tender to palpation.  No TTP along medial malleolus or deltoid ligament.  No TTP ATFL, CFL, or PTFL.  No TTP along midfoot, base of the 5th metatarsal, MTP joints, or toes.   ROM - limited  Strength - not tested due to pain  Special testing - positive squeeze test  Neurovascular - 2+ dorsalis pedis and posterior tibial.  Sensation intact and equal bilaterally    Patient is unable to get on exam table.  No significant pain or instability with pelvic compression.  Greater trochanter tender to palpation bilaterally-left more than right.       Neurological: She is alert and oriented to person, place, and time. No cranial nerve deficit or sensory deficit.   Skin: Skin is warm, dry and intact. Capillary refill takes less than 2 seconds.   Psychiatric: She has a normal mood and affect. Her speech is normal and behavior is normal. Judgment and thought content normal. Cognition and memory are normal.   Vitals reviewed.        Assessment/Plan:   1. Acute pain of right knee  - DX-TIBIA AND FIBULA RIGHT; Future    2. Acute right ankle pain  - DX-ANKLE 3+ VIEWS RIGHT; Future    3. Fall, initial encounter    4. Status post bilateral knee replacements    5. Pain of right lower leg    Patient has diffuse pain following significant mechanism.  I will obtain initial imaging of the ankle and knee, as these are primary sites of pain.  We will adjust work-up as indicated. Pt given ice packs for pain and swelling.    Ankle:  FINDINGS:  Bony alignment shows borderline widening of the mortise joint, just above 4 mm, measuring about 4.4 mm,  however, the mortise joint measured about 4.3 mm on prior study from 11/12/2013.    There is a subtle cortical step-off the medial aspect of the right distal fibular metaphysis seen on the oblique view which is suspicious for fracture. However, no overt fracture line is seen.    There is moderate soft tissue swelling over the lateral malleolus and mild soft tissue swelling over the medial malleolus.    Again seen is some dystrophic calcification at the tip of the medial malleolus which is unchanged from 2013. This does not represent an acute avulsion fracture.    The lateral view shows some nonspecific calcaneal spurring. Incidentally noted os peroneum. Minor talonavicular spurring.      Impression       1.  Borderline widening of the mortise joint at about 4.4 mm, however, no significant change from plain films 11/12/2013.  2.  Subtle cortical step-off at the distal fibular metaphysis suspicious for minimally displaced fracture. No such contour irregularity is evident on the prior study from 2013.  3.  Moderate soft tissue swelling over the lateral malleolus and mild soft tissue swelling over the medial malleolus.     Knee:  FINDINGS:  Bone density is normal.  Comminuted and mildly displaced acute fracture of the proximal fibula is identified. Right knee arthroplasty is noted in place. No acute fracture of the distal femur or tibia is identified. There is no evidence of dislocation.      Impression       1.  There appears to be a an acute and mildly displaced fracture of the proximal fibular diaphysis.    2.  No other fracture or dislocation.    3.  Right knee arthroplasty is noted in place.     1311: Patient advised that imaging is concerning for both proximal and distal fibula fractures.  I will obtain additional imaging of the lower leg as well as hips and pelvis.    Tib/Fib:  FINDINGS:  Bone mineralization is normal.  Comminuted mildly displaced acute fracture of the proximal fibular diaphysis is identified.   Possible nondisplaced oblique fracture of distal fibula is again noted.  Right knee arthroplasty is again noted.      Impression       1.  Follow-up displaced proximal fibular diaphyseal fracture.    2.  Possible subtle nondisplaced fracture of the distal fibula.    3.  No tibial fractures identified.        Hip/Pelvis:  FINDINGS:  There is no evidence of acute fracture involving the pelvis. No proximal femoral fracture is identified.  There is no evidence of femoral head flattening. No femoral head deformity is identified. No bone erosions are appreciated.  There is mild hip joint space narrowing.      Impression       1.  No radiographic evidence of acute traumatic injury.    2.  Findings are consistent with mild osteoarthritis.     Patient advised that no additional fractures identified on imaging.  Patient placed in full-length cam boot.  She was fitted with crutches.  Patient may benefit from wheelchair-patient's daughter was advised that they may be able to rent one for patient.  Urgent referral placed for orthopedics.  I would like her to be evaluated this week.  Patient and daughter instructed to call orthopedic surgeon directly tomorrow to set up appointment.    Patient was given a limited amount of narcotic pain medication.  Patient verbalized understanding of the risks, benefits, side effects of this medication.    Patient evaluation commenced at 1210.  Patient discharged from clinic at 1400.    Differential diagnosis, natural history, supportive care, and indications for immediate follow-up discussed.

## 2020-11-01 ENCOUNTER — OFFICE VISIT (OUTPATIENT)
Dept: URGENT CARE | Facility: PHYSICIAN GROUP | Age: 80
End: 2020-11-01
Payer: MEDICARE

## 2020-11-01 VITALS
DIASTOLIC BLOOD PRESSURE: 72 MMHG | RESPIRATION RATE: 18 BRPM | BODY MASS INDEX: 34.35 KG/M2 | TEMPERATURE: 96.9 F | WEIGHT: 180 LBS | OXYGEN SATURATION: 97 % | HEART RATE: 78 BPM | SYSTOLIC BLOOD PRESSURE: 138 MMHG

## 2020-11-01 DIAGNOSIS — M54.42 ACUTE BILATERAL LOW BACK PAIN WITH BILATERAL SCIATICA: ICD-10-CM

## 2020-11-01 DIAGNOSIS — M54.41 ACUTE BILATERAL LOW BACK PAIN WITH BILATERAL SCIATICA: ICD-10-CM

## 2020-11-01 PROCEDURE — 99214 OFFICE O/P EST MOD 30 MIN: CPT | Performed by: NURSE PRACTITIONER

## 2020-11-01 RX ORDER — FLUTICASONE PROPIONATE 50 MCG
1 SPRAY, SUSPENSION (ML) NASAL DAILY
Qty: 16 G | Refills: 0 | Status: SHIPPED | OUTPATIENT
Start: 2020-11-01 | End: 2021-11-28

## 2020-11-01 RX ORDER — IBUPROFEN 600 MG/1
600 TABLET ORAL EVERY 6 HOURS PRN
Qty: 30 TAB | Refills: 0 | Status: SHIPPED | OUTPATIENT
Start: 2020-11-01 | End: 2021-11-28

## 2020-11-01 RX ORDER — LORATADINE 10 MG/1
1 CAPSULE, LIQUID FILLED ORAL DAILY
Qty: 30 CAP | Refills: 0 | Status: SHIPPED | OUTPATIENT
Start: 2020-11-01 | End: 2020-12-01

## 2020-11-01 ASSESSMENT — ENCOUNTER SYMPTOMS
CHILLS: 0
SINUS PAIN: 0
ABDOMINAL PAIN: 0
SHORTNESS OF BREATH: 0
SORE THROAT: 0
NAUSEA: 0
STRIDOR: 0
HEADACHES: 0
DIZZINESS: 1
DIARRHEA: 0
BACK PAIN: 1
FALLS: 0
FEVER: 1
VOMITING: 0
WHEEZING: 0
COUGH: 0

## 2020-11-02 NOTE — PROGRESS NOTES
Subjective:   Lizzette Burton is a 80 y.o. female who presents for Back Pain (fever , headache since wednesday , dizzy worse )      HPI  80-year-old [place]ak-speaking female in urgent care for new problem that started 5 days ago.  Patient states she has bilateral lower back pain with radiation to bilateral thighs.  Patient denies any mechanism of injury but does have arthritis and is currently not taking anything over-the-counter for symptoms.  Denies any issues with her bowel or bladder but does note that she has been dizzy for the last 5 days as well.  Denies any seasonal allergies and is currently not taking anything over-the-counter for symptoms.  Denies any nausea, vomiting.  Patient also states that she has felt warm with subjective fever for the last 5 days as well and is taken over-the-counter Tylenol with moderate relief of fever but then when she is due for her next dose she needs to take additional Tylenol.  Has not taken any Tylenol since this morning.  Denies cough, dysuria, diarrhea, runny nose, recent upper respiratory tract infection or sinus congestion.  Patient denies ear pain    Review of Systems   Constitutional: Positive for fever. Negative for chills and malaise/fatigue.   HENT: Negative for congestion, ear discharge, ear pain, sinus pain and sore throat.    Respiratory: Negative for cough, shortness of breath, wheezing and stridor.    Gastrointestinal: Negative for abdominal pain, diarrhea, nausea and vomiting.   Musculoskeletal: Positive for back pain. Negative for falls.   Neurological: Positive for dizziness. Negative for headaches.       Patient Active Problem List   Diagnosis   • Glaucoma, left eye   • Primary open angle glaucoma   • Senile nuclear sclerosis   • Sepsis (HCC)   • HTN (hypertension)   • Hyponatremia   • Hypokalemia   • Hypoxia   • Hypothyroid   • Hypertension   • Overweight   • Menopause   • Pneumonia   • Overweight   • Osteoarthrosis involving lower leg   • CAP  (community acquired pneumonia)   • Status post bilateral knee replacements   • MILAN (stress urinary incontinence, female)   • Postoperative pain   • Status post reverse total arthroplasty of right shoulder   • Primary osteoarthritis of right shoulder     Past Surgical History:   Procedure Laterality Date   • SHOULDER ARTHROPLASTY TOTAL Right 5/2/2018    Procedure: SHOULDER ARTHROPLASTY TOTAL/ REVERSE;  Surgeon: Scott Payton M.D.;  Location: SURGERY SAME DAY [facility];  Service: Orthopedics   • ANTERIOR AND POSTERIOR REPAIR  3/20/2017    Procedure: ANTERIOR AND POSTERIOR REPAIR;  Surgeon: Regulo Burton M.D.;  Location: SURGERY SAME DAY [facility];  Service:    • ENTEROCELE REPAIR  3/20/2017    Procedure: ENTEROCELE REPAIR - PERINEOPLASTY;  Surgeon: Regulo Burton M.D.;  Location: SURGERY SAME DAY [facility];  Service:    • BLADDER SLING FEMALE  3/20/2017    Procedure: BLADDER SLING FEMALE - TOT;  Surgeon: Regulo Burton M.D.;  Location: SURGERY SAME DAY [facility];  Service:    • VAGINAL SUSPENSION  3/20/2017    Procedure: VAGINAL SUSPENSION -SACROSPINOUS VAULT;  Surgeon: Regulo Burton M.D.;  Location: SURGERY SAME DAY [facility];  Service:    • KNEE ARTHROPLASTY TOTAL Left 8/20/2015    Procedure: KNEE ARTHROPLASTY TOTAL;  Surgeon: Juan Manuel Choi M.D.;  Location: [facility];  Service:    • BAERVELDT IMPLANT  5/8/2013    Performed by Miller Fletcher M.D. at SURGERY SAME DAY [facility]   • CATARACT PHACO WITH IOL  5/8/2013    Performed by Miller Fletcher M.D. at SURGERY SAME DAY [facility]   • BAERVELDT IMPLANT  3/13/2013    Performed by Miller Fletcher M.D. at SURGERY SAME DAY [facility]   • BAERVELDT IMPLANT  12/12/2012    Performed by Miller Fletcher M.D. at SURGERY SAME DAY [facility]   • TRABECULECTOMY  9/26/2012    Performed by Miller Fletcher M.D. at SURGERY SAME DAY [facility]   • CATARACT PHACO WITH IOL  9/26/2012    Performed by Miller Fletcher  M.D. at SURGERY SAME DAY [facility]   • KNEE ARTHROPLASTY TOTAL  2009    Performed by GISELA OLSON at SURGERY [facility]S   • HYSTERECTOMY, TOTAL ABDOMINAL  1989   • GYN SURGERY       Social History     Tobacco Use   • Smoking status: Former Smoker     Packs/day: 0.50     Years: 20.00     Pack years: 10.00     Types: Cigarettes     Quit date: 3/16/2002     Years since quittin.6   • Smokeless tobacco: Never Used   Substance Use Topics   • Alcohol use: No   • Drug use: No      History reviewed. No pertinent family history.   (Allergies, Medications, & Tobacco/Substance Use were reconciled by the Medical Assistant and reviewed by myself. The family history is prepopulated)     Objective:     /72   Pulse 78   Temp 36.1 °C (96.9 °F) (Temporal)   Resp 18   Wt 81.6 kg (180 lb)   SpO2 97%   BMI 34.35 kg/m²     Physical Exam  Vitals signs reviewed.   Constitutional:       Appearance: Normal appearance.   HENT:      Right Ear: Ear canal and external ear normal.      Left Ear: Ear canal and external ear normal.      Ears:      Comments: Bilateral middle ear effusion     Mouth/Throat:      Mouth: Mucous membranes are moist.      Comments: Post nasal drip noted  Cardiovascular:      Rate and Rhythm: Normal rate and regular rhythm.      Heart sounds: Normal heart sounds.   Pulmonary:      Effort: Pulmonary effort is normal.      Breath sounds: Normal breath sounds.   Musculoskeletal:      Lumbar back: She exhibits decreased range of motion, tenderness and pain. She exhibits no bony tenderness, no swelling, no edema, no deformity, no laceration, no spasm and normal pulse.        Back:    Skin:     General: Skin is warm.   Neurological:      Mental Status: She is alert and oriented to person, place, and time.   Psychiatric:         Mood and Affect: Mood normal.         Behavior: Behavior normal.         Thought Content: Thought content normal.         Judgment: Judgment normal.          Assessment/Plan:     1. Acute bilateral low back pain with bilateral sciatica  ibuprofen (MOTRIN) 600 MG Tab    fluticasone (FLONASE) 50 MCG/ACT nasal spray    Loratadine (CLARITIN) 10 MG Cap     Discussed physical examination findings and patient presentation is consistent with bilateral middle ear effusion due to allergies and sinusitis which could be causing her dizziness. Will prescribe flonase and advised to start claritin. As patient has not sustained an injury, more than likely has a strain or arthritis to the spine causing the low back pain. Advised, heat, ice, rest and gentle exercises and ibuprofen. Advised to follow up with primary care provider for further work up and evaluation.     Differential diagnosis, natural history, supportive care, and indications for immediate follow-up discussed.    Advised the patient to follow-up with the primary care physician for recheck, reevaluation, and consideration of further management.    Please note that this dictation was created using voice recognition software. I have made a reasonable attempt to correct obvious errors, but I expect that there are errors of grammar and possibly content that I did not discover before finalizing the note.    This note was electronically signed RALPH Duron

## 2021-01-11 DIAGNOSIS — Z23 NEED FOR VACCINATION: ICD-10-CM

## 2021-06-25 ENCOUNTER — PATIENT OUTREACH (OUTPATIENT)
Dept: HEALTH INFORMATION MANAGEMENT | Facility: OTHER | Age: 81
End: 2021-06-25

## 2021-06-25 NOTE — PROGRESS NOTES
Outcome: Left Message    Called PT to schedule JONATHAN.   LVM requesting a call back.    Please transfer to Patient Outreach Team at [phone] when patient returns call.    Attempt # 1

## 2021-06-29 ENCOUNTER — HOSPITAL ENCOUNTER (OUTPATIENT)
Dept: LAB | Facility: MEDICAL CENTER | Age: 81
End: 2021-06-29
Attending: NURSE PRACTITIONER
Payer: MEDICARE

## 2021-06-29 LAB
25(OH)D3 SERPL-MCNC: 28 NG/ML (ref 30–100)
ALBUMIN SERPL BCP-MCNC: 3.9 G/DL (ref 3.2–4.9)
ALBUMIN/GLOB SERPL: 1.3 G/DL
ALP SERPL-CCNC: 65 U/L (ref 30–99)
ALT SERPL-CCNC: 12 U/L (ref 2–50)
ANION GAP SERPL CALC-SCNC: 9 MMOL/L (ref 7–16)
AST SERPL-CCNC: 21 U/L (ref 12–45)
BASOPHILS # BLD AUTO: 0.7 % (ref 0–1.8)
BASOPHILS # BLD: 0.05 K/UL (ref 0–0.12)
BILIRUB SERPL-MCNC: 0.4 MG/DL (ref 0.1–1.5)
BUN SERPL-MCNC: 14 MG/DL (ref 8–22)
CALCIUM SERPL-MCNC: 8.7 MG/DL (ref 8.5–10.5)
CHLORIDE SERPL-SCNC: 104 MMOL/L (ref 96–112)
CHOLEST SERPL-MCNC: 167 MG/DL (ref 100–199)
CO2 SERPL-SCNC: 25 MMOL/L (ref 20–33)
CREAT SERPL-MCNC: 0.95 MG/DL (ref 0.5–1.4)
EOSINOPHIL # BLD AUTO: 0.14 K/UL (ref 0–0.51)
EOSINOPHIL NFR BLD: 2 % (ref 0–6.9)
ERYTHROCYTE [DISTWIDTH] IN BLOOD BY AUTOMATED COUNT: 45 FL (ref 35.9–50)
GLOBULIN SER CALC-MCNC: 3.1 G/DL (ref 1.9–3.5)
GLUCOSE SERPL-MCNC: 126 MG/DL (ref 65–99)
HCT VFR BLD AUTO: 44.2 % (ref 37–47)
HDLC SERPL-MCNC: 51 MG/DL
HGB BLD-MCNC: 14.4 G/DL (ref 12–16)
IMM GRANULOCYTES # BLD AUTO: 0.03 K/UL (ref 0–0.11)
IMM GRANULOCYTES NFR BLD AUTO: 0.4 % (ref 0–0.9)
LDLC SERPL CALC-MCNC: 94 MG/DL
LYMPHOCYTES # BLD AUTO: 3.08 K/UL (ref 1–4.8)
LYMPHOCYTES NFR BLD: 43.4 % (ref 22–41)
MCH RBC QN AUTO: 30.4 PG (ref 27–33)
MCHC RBC AUTO-ENTMCNC: 32.6 G/DL (ref 33.6–35)
MCV RBC AUTO: 93.2 FL (ref 81.4–97.8)
MONOCYTES # BLD AUTO: 0.57 K/UL (ref 0–0.85)
MONOCYTES NFR BLD AUTO: 8 % (ref 0–13.4)
NEUTROPHILS # BLD AUTO: 3.23 K/UL (ref 2–7.15)
NEUTROPHILS NFR BLD: 45.5 % (ref 44–72)
NRBC # BLD AUTO: 0 K/UL
NRBC BLD-RTO: 0 /100 WBC
PLATELET # BLD AUTO: 264 K/UL (ref 164–446)
PMV BLD AUTO: 9.6 FL (ref 9–12.9)
POTASSIUM SERPL-SCNC: 3.8 MMOL/L (ref 3.6–5.5)
PROT SERPL-MCNC: 7 G/DL (ref 6–8.2)
RBC # BLD AUTO: 4.74 M/UL (ref 4.2–5.4)
SODIUM SERPL-SCNC: 138 MMOL/L (ref 135–145)
TRIGL SERPL-MCNC: 111 MG/DL (ref 0–149)
TSH SERPL DL<=0.005 MIU/L-ACNC: 3.07 UIU/ML (ref 0.38–5.33)
WBC # BLD AUTO: 7.1 K/UL (ref 4.8–10.8)

## 2021-06-29 PROCEDURE — 80061 LIPID PANEL: CPT

## 2021-06-29 PROCEDURE — 80053 COMPREHEN METABOLIC PANEL: CPT

## 2021-06-29 PROCEDURE — 82306 VITAMIN D 25 HYDROXY: CPT

## 2021-06-29 PROCEDURE — 84443 ASSAY THYROID STIM HORMONE: CPT

## 2021-06-29 PROCEDURE — 36415 COLL VENOUS BLD VENIPUNCTURE: CPT

## 2021-06-29 PROCEDURE — 85025 COMPLETE CBC W/AUTO DIFF WBC: CPT

## 2021-07-19 ENCOUNTER — HOSPITAL ENCOUNTER (OUTPATIENT)
Dept: RADIOLOGY | Facility: MEDICAL CENTER | Age: 81
End: 2021-07-19
Attending: NURSE PRACTITIONER
Payer: MEDICARE

## 2021-07-19 DIAGNOSIS — R42 DIZZINESS AND GIDDINESS: ICD-10-CM

## 2021-07-19 PROCEDURE — 70551 MRI BRAIN STEM W/O DYE: CPT | Mod: MH

## 2021-09-17 ENCOUNTER — HOSPITAL ENCOUNTER (OUTPATIENT)
Dept: CARDIOLOGY | Facility: MEDICAL CENTER | Age: 81
End: 2021-09-17
Attending: NURSE PRACTITIONER
Payer: MEDICARE

## 2021-09-17 DIAGNOSIS — R42 DIZZINESS AND GIDDINESS: ICD-10-CM

## 2021-09-17 LAB
LV EJECT FRACT  99904: 55
LV EJECT FRACT MOD 2C 99903: 65.25
LV EJECT FRACT MOD 4C 99902: 53.14
LV EJECT FRACT MOD BP 99901: 58.45

## 2021-09-17 PROCEDURE — 93306 TTE W/DOPPLER COMPLETE: CPT | Mod: 26 | Performed by: INTERNAL MEDICINE

## 2021-09-17 PROCEDURE — 93306 TTE W/DOPPLER COMPLETE: CPT

## 2021-11-10 NOTE — PROGRESS NOTES
Outcome: Left Message    Please transfer to Patient Outreach Team at [phone] when patient returns call.      Attempt # 2

## 2021-11-28 ENCOUNTER — HOSPITAL ENCOUNTER (OUTPATIENT)
Dept: RADIOLOGY | Facility: MEDICAL CENTER | Age: 81
End: 2021-11-28
Attending: FAMILY MEDICINE
Payer: MEDICARE

## 2021-11-28 ENCOUNTER — OFFICE VISIT (OUTPATIENT)
Dept: URGENT CARE | Facility: PHYSICIAN GROUP | Age: 81
End: 2021-11-28
Payer: MEDICARE

## 2021-11-28 VITALS
OXYGEN SATURATION: 96 % | DIASTOLIC BLOOD PRESSURE: 92 MMHG | HEART RATE: 68 BPM | WEIGHT: 195 LBS | TEMPERATURE: 98.2 F | HEIGHT: 63 IN | RESPIRATION RATE: 12 BRPM | SYSTOLIC BLOOD PRESSURE: 144 MMHG | BODY MASS INDEX: 34.55 KG/M2

## 2021-11-28 DIAGNOSIS — S09.93XA FACIAL INJURY, INITIAL ENCOUNTER: ICD-10-CM

## 2021-11-28 PROCEDURE — 99215 OFFICE O/P EST HI 40 MIN: CPT | Performed by: FAMILY MEDICINE

## 2021-11-28 PROCEDURE — 70450 CT HEAD/BRAIN W/O DYE: CPT | Mod: ME

## 2021-11-28 PROCEDURE — 70480 CT ORBIT/EAR/FOSSA W/O DYE: CPT | Mod: ME

## 2021-11-28 RX ORDER — BRIMONIDINE TARTRATE, TIMOLOL MALEATE 2; 5 MG/ML; MG/ML
SOLUTION/ DROPS OPHTHALMIC
COMMUNITY
Start: 2021-11-07 | End: 2022-12-26

## 2021-11-28 RX ORDER — LATANOPROST 50 UG/ML
SOLUTION/ DROPS OPHTHALMIC
COMMUNITY
Start: 2021-09-20 | End: 2022-12-26

## 2021-11-28 ASSESSMENT — FIBROSIS 4 INDEX: FIB4 SCORE: 1.88
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
## 2021-12-13 ENCOUNTER — HOSPITAL ENCOUNTER (OUTPATIENT)
Dept: RADIOLOGY | Facility: MEDICAL CENTER | Age: 81
End: 2021-12-13
Attending: NURSE PRACTITIONER
Payer: MEDICARE

## 2021-12-13 ENCOUNTER — OFFICE VISIT (OUTPATIENT)
Dept: URGENT CARE | Facility: PHYSICIAN GROUP | Age: 81
End: 2021-12-13
Payer: MEDICARE

## 2021-12-13 VITALS
BODY MASS INDEX: 31.89 KG/M2 | OXYGEN SATURATION: 92 % | HEIGHT: 63 IN | DIASTOLIC BLOOD PRESSURE: 82 MMHG | SYSTOLIC BLOOD PRESSURE: 142 MMHG | WEIGHT: 180 LBS | TEMPERATURE: 97.5 F | RESPIRATION RATE: 12 BRPM | HEART RATE: 76 BPM
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
## 2021-12-13 DIAGNOSIS — M54.50 ACUTE BILATERAL LOW BACK PAIN WITHOUT SCIATICA: ICD-10-CM

## 2021-12-13 DIAGNOSIS — M25.552 LEFT HIP PAIN: ICD-10-CM

## 2021-12-13 DIAGNOSIS — M43.10 RETROLISTHESIS OF VERTEBRAE: ICD-10-CM

## 2021-12-13 DIAGNOSIS — M47.816 FACET ARTHROPATHY, LUMBAR: ICD-10-CM

## 2021-12-13 DIAGNOSIS — M79.10 MYALGIA: ICD-10-CM

## 2021-12-13 DIAGNOSIS — E03.9 HYPOTHYROIDISM, UNSPECIFIED TYPE: ICD-10-CM

## 2021-12-13 PROBLEM — R73.09 ABNORMAL GLUCOSE LEVEL: Status: ACTIVE | Noted: 2021-12-13

## 2021-12-13 PROBLEM — M81.0 SENILE OSTEOPOROSIS: Status: ACTIVE | Noted: 2018-12-26

## 2021-12-13 PROBLEM — N18.30 STAGE 3 CHRONIC KIDNEY DISEASE: Status: ACTIVE | Noted: 2018-12-26

## 2021-12-13 PROCEDURE — 73521 X-RAY EXAM HIPS BI 2 VIEWS: CPT

## 2021-12-13 PROCEDURE — 99214 OFFICE O/P EST MOD 30 MIN: CPT | Performed by: NURSE PRACTITIONER

## 2021-12-13 PROCEDURE — 72100 X-RAY EXAM L-S SPINE 2/3 VWS: CPT

## 2021-12-13 RX ORDER — LEVOTHYROXINE SODIUM 175 UG/1
175 TABLET ORAL
Qty: 30 TABLET | Refills: 0 | Status: SHIPPED | OUTPATIENT
Start: 2021-12-13 | End: 2022-12-26

## 2021-12-13 RX ORDER — KETOROLAC TROMETHAMINE 30 MG/ML
15 INJECTION, SOLUTION INTRAMUSCULAR; INTRAVENOUS ONCE
Status: COMPLETED | OUTPATIENT
Start: 2021-12-13 | End: 2021-12-13

## 2021-12-13 RX ORDER — METHYLPREDNISOLONE 4 MG/1
TABLET ORAL
Qty: 21 TABLET | Refills: 0 | Status: ON HOLD | OUTPATIENT
Start: 2021-12-13 | End: 2023-01-03

## 2021-12-13 RX ORDER — TOLTERODINE 4 MG/1
CAPSULE, EXTENDED RELEASE ORAL
COMMUNITY
End: 2022-12-26

## 2021-12-13 RX ADMIN — KETOROLAC TROMETHAMINE 15 MG: 30 INJECTION, SOLUTION INTRAMUSCULAR; INTRAVENOUS at 13:05

## 2021-12-13 ASSESSMENT — ENCOUNTER SYMPTOMS
BACK PAIN: 1
CONSTITUTIONAL NEGATIVE: 1
MYALGIAS: 1
EYES NEGATIVE: 1
FALLS: 1
GASTROINTESTINAL NEGATIVE: 1
NEUROLOGICAL NEGATIVE: 1
CARDIOVASCULAR NEGATIVE: 1
RESPIRATORY NEGATIVE: 1
PSYCHIATRIC NEGATIVE: 1

## 2021-12-13 ASSESSMENT — FIBROSIS 4 INDEX: FIB4 SCORE: 1.88

## 2021-12-13 NOTE — PROGRESS NOTES
Subjective:   Lizzette Burton is a 81 y.o. female who presents for Fall (2weeks ago, body pain)       HPI  Pt presents for evaluation of a new problem, presents with her time who interprets at the visit, and reports falling on her carpet 2 weeks ago, and being seen in urgent care at that time.  She had a CT scan done due to hitting her left eye on the corner of a dresser, did not show acute intracranial process.  Since that time, patient has had significant myalgias, mostly her low back and left hip, unfortunately making it difficulty for her to perform ADLs and walking.  Patient has tried over-the-counter Tylenol and Advil without relief.  Patient denies bowel or bladder changes, and/or sensory changes.    Additionally, patient request refill of her levothyroxine.  In my review, patient levothyroxine refilled in July, she has not seen her primary care provider since that time.    Review of Systems   Constitutional: Negative.    HENT: Negative.    Eyes: Negative.    Respiratory: Negative.    Cardiovascular: Negative.    Gastrointestinal: Negative.    Genitourinary: Negative.    Musculoskeletal: Positive for back pain, falls and myalgias.   Skin: Negative.    Neurological: Negative.    Psychiatric/Behavioral: Negative.    All other systems reviewed and are negative.        MEDS:   Current Outpatient Medications:   •  tolterodine ER (DETROL LA) 4 MG CAPSULE SR 24 HR, 1 capsule, Disp: , Rfl:   •  COMBIGAN 0.2-0.5 % Solution, , Disp: , Rfl:   •  latanoprost (XALATAN) 0.005 % Solution, , Disp: , Rfl:   •  fluticasone (FLONASE) 50 MCG/ACT nasal spray, Spray 1 Spray in nose every day., Disp: , Rfl:   •  losartan (COZAAR) 50 MG Tab, Take 100 mg by mouth every day., Disp: , Rfl:   •  levothyroxine (SYNTHROID) 175 MCG Tab, Take 175 mcg by mouth Every morning on an empty stomach., Disp: , Rfl:   ALLERGIES: No Known Allergies    Patient's PMH, SocHx, SurgHx, FamHx, Drug allergies and medications were reviewed.      "Objective:   /82   Pulse (!) 46   Temp 36.4 °C (97.5 °F) (Temporal)   Resp 12   Ht 1.6 m (5' 3\")   Wt 81.6 kg (180 lb) Comment: pt states  SpO2 92%   BMI 31.89 kg/m²     Physical Exam  Vitals and nursing note reviewed.   Constitutional:       General: She is awake.      Appearance: Normal appearance. She is well-developed.   HENT:      Head: Normocephalic and atraumatic.      Right Ear: External ear normal.      Left Ear: External ear normal.      Nose: Nose normal.      Mouth/Throat:      Mouth: Mucous membranes are moist.      Pharynx: Oropharynx is clear.   Eyes:      Extraocular Movements: Extraocular movements intact.      Conjunctiva/sclera: Conjunctivae normal.   Cardiovascular:      Rate and Rhythm: Normal rate and regular rhythm.   Pulmonary:      Effort: Pulmonary effort is normal.      Breath sounds: Normal breath sounds.   Musculoskeletal:         General: Normal range of motion.      Cervical back: Normal range of motion and neck supple.      Lumbar back: Spasms and tenderness present.        Back:       Comments: Gait is stable and unassisted, though it is noted that she hunches forward and walks slowly.   Skin:     General: Skin is warm and dry.   Neurological:      Mental Status: She is alert and oriented to person, place, and time.   Psychiatric:         Mood and Affect: Mood normal.         Behavior: Behavior normal.         Thought Content: Thought content normal.       Narrative & Impression     12/13/2021 1:11 PM     HISTORY/REASON FOR EXAM:  Pelvic/Hip Pain Following Trauma. Low back and bilateral hip pain. Fall 2 weeks prior        TECHNIQUE/EXAM DESCRIPTION AND NUMBER OF VIEWS:  3 views of the bilateral hip.     COMPARISON: 10/13/2019     FINDINGS:  Diffuse, decreased bone mineral density. SI joints and pubic symphysis are intact. Distal sacrum is obscured by bowel gas. There is no displaced fracture. No dislocation. Mild bilateral hip joint space " narrowing.     IMPRESSION:     1.  Diffuse, decreased bone mineral density with no acute, displaced fracture or dislocation.  2.  Mild bilateral hip joint space narrowing.             Exam Ended: 12/13/21  1:34 PM Last Resulted: 12/13/21  2:06 PM           Narrative & Impression     12/13/2021 1:12 PM     HISTORY/REASON FOR EXAM:  Pain Following Trauma.  Low back pain and bilateral hip pain with a fall 2 weeks prior     TECHNIQUE/ EXAM DESCRIPTION AND NUMBER OF VIEWS:  3 views of the lumbar spine.     COMPARISON: CT 1-14     FINDINGS:  The lowest formed intervertebral disc will be designated L5-S1 for the purposes of this report and vertebral levels numbered accordingly.  Diffuse, decreased bone mineral density.  Atherosclerotic disease of the aorta is noted.     No acute fracture is evident.  There is retrolisthesis of L1 on L2 and L2 on L3 with anterolisthesis of L4 on L5 and L5 on S1.  Multilevel disc height loss.  Mild dextroscoliotic curvature.  There are degenerative changes of the mid to lower lumbar facet joints.     IMPRESSION:        1. Diffuse, decreased bone mineral density.  2. No acute vertebral body height loss.  3. There is anterolisthesis of L4 on L5 and L5 on S1 with minimal retrolisthesis of L1 on L2.  4. Multilevel disc height loss and lower lumbar predominate facet arthropathy.  5. Atherosclerotic disease noted.             Exam Ended: 12/13/21  1:34 PM Last Resulted: 12/13/21  2:10 PM               Assessment/Plan:   Assessment      1. Retrolisthesis of vertebrae  - ketorolac (TORADOL) injection 15 mg  - methylPREDNISolone (MEDROL DOSEPAK) 4 MG Tablet Therapy Pack; Follow schedule on package instructions.  Dispense: 21 Tablet; Refill: 0  - Referral to Sports Medicine    2. Facet arthropathy, lumbar  - ketorolac (TORADOL) injection 15 mg  - methylPREDNISolone (MEDROL DOSEPAK) 4 MG Tablet Therapy Pack; Follow schedule on package instructions.  Dispense: 21 Tablet; Refill: 0  - Referral to Sports  Medicine    3. Fall from ground level  - DX-LUMBAR SPINE-2 OR 3 VIEWS; Future  - DX-HIP-BILATERAL-WITH PELVIS-2 VIEWS; Future  - ketorolac (TORADOL) injection 15 mg  - methylPREDNISolone (MEDROL DOSEPAK) 4 MG Tablet Therapy Pack; Follow schedule on package instructions.  Dispense: 21 Tablet; Refill: 0  - Referral to Sports Medicine    4. Acute bilateral low back pain without sciatica  - DX-LUMBAR SPINE-2 OR 3 VIEWS; Future  - ketorolac (TORADOL) injection 15 mg  - methylPREDNISolone (MEDROL DOSEPAK) 4 MG Tablet Therapy Pack; Follow schedule on package instructions.  Dispense: 21 Tablet; Refill: 0  - Referral to Sports Medicine    5. Left hip pain  - DX-HIP-BILATERAL-WITH PELVIS-2 VIEWS; Future  - ketorolac (TORADOL) injection 15 mg  - methylPREDNISolone (MEDROL DOSEPAK) 4 MG Tablet Therapy Pack; Follow schedule on package instructions.  Dispense: 21 Tablet; Refill: 0  - Referral to Sports Medicine    6. Myalgia  - ketorolac (TORADOL) injection 15 mg  - methylPREDNISolone (MEDROL DOSEPAK) 4 MG Tablet Therapy Pack; Follow schedule on package instructions.  Dispense: 21 Tablet; Refill: 0  - Referral to Sports Medicine    7. Hypothyroidism, unspecified type  - levothyroxine (SYNTHROID) 175 MCG Tab; Take 1 Tablet by mouth every morning on an empty stomach.  Dispense: 30 Tablet; Refill: 0      Vital signs stable at today's acute urgent care visit.  Patient felt significant relief with Toradol in clinic, discussed with patient and her son at great length potential side effects to this medication over the next 3 days.  Reviewed test results that were completed in the clinic. Begin medications as listed. Discussed management options (risks, benefits, and alternatives to treatment).  Will refer the patient to sports medicine.    Advised the patient to follow-up with the primary care provider for recheck, reevaluation, and/or consideration of further management.  Refilled 1 months worth of levothyroxine, advised patient follow  with primary care for labs as well as further prescriptions.  Return to urgent care with any worsening symptoms or if there is no improvement in their current condition. Red flags discussed and indications to immediately call 911 or present to the ED.  All questions were encouraged and answered to the patient's satisfaction and understanding, and they agree to the plan of care.     I personally reviewed prior external notes and test results pertinent to today's visit.  I have independently reviewed and interpreted all diagnostics ordered during this urgent care acute visit. Time spent evaluating this patient was a minimum of 30 minutes and includes preparing for visit, counseling/education, exam, evaluation, obtaining history, and ordering lab/test/procedures.      Please note that this dictation was created using voice recognition software. I have made a reasonable attempt to correct obvious errors, but I expect that there are errors of grammar and possibly content that I did not discover before finalizing the note.

## 2022-01-22 ENCOUNTER — HOSPITAL ENCOUNTER (OUTPATIENT)
Dept: LAB | Facility: MEDICAL CENTER | Age: 82
End: 2022-01-22
Attending: NURSE PRACTITIONER
Payer: MEDICARE

## 2022-01-22 LAB
ALBUMIN SERPL BCP-MCNC: 4.1 G/DL (ref 3.2–4.9)
ALBUMIN/GLOB SERPL: 1.4 G/DL
ALP SERPL-CCNC: 88 U/L (ref 30–99)
ALT SERPL-CCNC: 17 U/L (ref 2–50)
ANION GAP SERPL CALC-SCNC: 10 MMOL/L (ref 7–16)
AST SERPL-CCNC: 22 U/L (ref 12–45)
BILIRUB SERPL-MCNC: 0.5 MG/DL (ref 0.1–1.5)
BUN SERPL-MCNC: 14 MG/DL (ref 8–22)
CALCIUM SERPL-MCNC: 9.3 MG/DL (ref 8.5–10.5)
CHLORIDE SERPL-SCNC: 99 MMOL/L (ref 96–112)
CHOLEST SERPL-MCNC: 192 MG/DL (ref 100–199)
CO2 SERPL-SCNC: 27 MMOL/L (ref 20–33)
CREAT SERPL-MCNC: 0.76 MG/DL (ref 0.5–1.4)
GLOBULIN SER CALC-MCNC: 3 G/DL (ref 1.9–3.5)
GLUCOSE SERPL-MCNC: 87 MG/DL (ref 65–99)
HDLC SERPL-MCNC: 66 MG/DL
LDLC SERPL CALC-MCNC: 110 MG/DL
POTASSIUM SERPL-SCNC: 4.4 MMOL/L (ref 3.6–5.5)
PROT SERPL-MCNC: 7.1 G/DL (ref 6–8.2)
SODIUM SERPL-SCNC: 136 MMOL/L (ref 135–145)
TRIGL SERPL-MCNC: 82 MG/DL (ref 0–149)
TSH SERPL DL<=0.005 MIU/L-ACNC: 0.21 UIU/ML (ref 0.38–5.33)

## 2022-01-22 PROCEDURE — 80061 LIPID PANEL: CPT

## 2022-01-22 PROCEDURE — 84443 ASSAY THYROID STIM HORMONE: CPT

## 2022-01-22 PROCEDURE — 36415 COLL VENOUS BLD VENIPUNCTURE: CPT

## 2022-01-22 PROCEDURE — 80053 COMPREHEN METABOLIC PANEL: CPT

## 2022-01-28 ENCOUNTER — TELEPHONE (OUTPATIENT)
Dept: HEALTH INFORMATION MANAGEMENT | Facility: OTHER | Age: 82
End: 2022-01-28

## 2022-03-08 ENCOUNTER — HOSPITAL ENCOUNTER (OUTPATIENT)
Dept: LAB | Facility: MEDICAL CENTER | Age: 82
End: 2022-03-08
Attending: NURSE PRACTITIONER
Payer: MEDICARE

## 2022-03-08 LAB — TSH SERPL DL<=0.005 MIU/L-ACNC: 0.1 UIU/ML (ref 0.38–5.33)

## 2022-03-08 PROCEDURE — 84443 ASSAY THYROID STIM HORMONE: CPT

## 2022-03-08 PROCEDURE — 36415 COLL VENOUS BLD VENIPUNCTURE: CPT

## 2022-05-07 ENCOUNTER — HOSPITAL ENCOUNTER (OUTPATIENT)
Dept: LAB | Facility: MEDICAL CENTER | Age: 82
End: 2022-05-07
Attending: NURSE PRACTITIONER
Payer: MEDICARE

## 2022-05-07 LAB — TSH SERPL DL<=0.005 MIU/L-ACNC: 1.41 UIU/ML (ref 0.38–5.33)

## 2022-05-07 PROCEDURE — 36415 COLL VENOUS BLD VENIPUNCTURE: CPT

## 2022-05-07 PROCEDURE — 84443 ASSAY THYROID STIM HORMONE: CPT

## 2022-05-26 ENCOUNTER — HOSPITAL ENCOUNTER (OUTPATIENT)
Dept: RADIOLOGY | Facility: MEDICAL CENTER | Age: 82
End: 2022-05-26
Attending: NURSE PRACTITIONER
Payer: MEDICARE

## 2022-05-26 DIAGNOSIS — E04.1 NONTOXIC SINGLE THYROID NODULE: ICD-10-CM

## 2022-05-26 DIAGNOSIS — E03.9 HYPOTHYROIDISM, UNSPECIFIED TYPE: ICD-10-CM

## 2022-05-26 PROCEDURE — 76536 US EXAM OF HEAD AND NECK: CPT

## 2022-05-31 ENCOUNTER — OFFICE VISIT (OUTPATIENT)
Dept: URGENT CARE | Facility: PHYSICIAN GROUP | Age: 82
End: 2022-05-31
Payer: MEDICARE

## 2022-06-07 ENCOUNTER — APPOINTMENT (OUTPATIENT)
Dept: RADIOLOGY | Facility: MEDICAL CENTER | Age: 82
End: 2022-06-07
Attending: EMERGENCY MEDICINE
Payer: MEDICARE

## 2022-06-07 ENCOUNTER — HOSPITAL ENCOUNTER (EMERGENCY)
Facility: MEDICAL CENTER | Age: 82
End: 2022-06-08
Attending: EMERGENCY MEDICINE
Payer: MEDICARE

## 2022-06-07 DIAGNOSIS — M54.31 BILATERAL SCIATICA: ICD-10-CM

## 2022-06-07 DIAGNOSIS — M54.17 LUMBOSACRAL RADICULOPATHY: ICD-10-CM

## 2022-06-07 DIAGNOSIS — M54.32 BILATERAL SCIATICA: ICD-10-CM

## 2022-06-07 LAB
ANION GAP SERPL CALC-SCNC: 11 MMOL/L (ref 7–16)
APPEARANCE UR: CLEAR
BACTERIA #/AREA URNS HPF: NEGATIVE /HPF
BASOPHILS # BLD AUTO: 0.4 % (ref 0–1.8)
BASOPHILS # BLD: 0.04 K/UL (ref 0–0.12)
BILIRUB UR QL STRIP.AUTO: NEGATIVE
BUN SERPL-MCNC: 27 MG/DL (ref 8–22)
CALCIUM SERPL-MCNC: 9.1 MG/DL (ref 8.5–10.5)
CHLORIDE SERPL-SCNC: 100 MMOL/L (ref 96–112)
CO2 SERPL-SCNC: 26 MMOL/L (ref 20–33)
COLOR UR: YELLOW
CREAT SERPL-MCNC: 0.88 MG/DL (ref 0.5–1.4)
EOSINOPHIL # BLD AUTO: 0.15 K/UL (ref 0–0.51)
EOSINOPHIL NFR BLD: 1.4 % (ref 0–6.9)
EPI CELLS #/AREA URNS HPF: ABNORMAL /HPF
ERYTHROCYTE [DISTWIDTH] IN BLOOD BY AUTOMATED COUNT: 44.5 FL (ref 35.9–50)
GFR SERPLBLD CREATININE-BSD FMLA CKD-EPI: 65 ML/MIN/1.73 M 2
GLUCOSE SERPL-MCNC: 98 MG/DL (ref 65–99)
GLUCOSE UR STRIP.AUTO-MCNC: NEGATIVE MG/DL
HCT VFR BLD AUTO: 43.3 % (ref 37–47)
HGB BLD-MCNC: 14.8 G/DL (ref 12–16)
HYALINE CASTS #/AREA URNS LPF: ABNORMAL /LPF
IMM GRANULOCYTES # BLD AUTO: 0.06 K/UL (ref 0–0.11)
IMM GRANULOCYTES NFR BLD AUTO: 0.6 % (ref 0–0.9)
KETONES UR STRIP.AUTO-MCNC: NEGATIVE MG/DL
LEUKOCYTE ESTERASE UR QL STRIP.AUTO: ABNORMAL
LYMPHOCYTES # BLD AUTO: 3.06 K/UL (ref 1–4.8)
LYMPHOCYTES NFR BLD: 28.8 % (ref 22–41)
MCH RBC QN AUTO: 30.6 PG (ref 27–33)
MCHC RBC AUTO-ENTMCNC: 34.2 G/DL (ref 33.6–35)
MCV RBC AUTO: 89.5 FL (ref 81.4–97.8)
MICRO URNS: ABNORMAL
MONOCYTES # BLD AUTO: 0.91 K/UL (ref 0–0.85)
MONOCYTES NFR BLD AUTO: 8.6 % (ref 0–13.4)
NEUTROPHILS # BLD AUTO: 6.42 K/UL (ref 2–7.15)
NEUTROPHILS NFR BLD: 60.2 % (ref 44–72)
NITRITE UR QL STRIP.AUTO: NEGATIVE
NRBC # BLD AUTO: 0 K/UL
NRBC BLD-RTO: 0 /100 WBC
PH UR STRIP.AUTO: 6 [PH] (ref 5–8)
PLATELET # BLD AUTO: 342 K/UL (ref 164–446)
PMV BLD AUTO: 7.9 FL (ref 9–12.9)
POTASSIUM SERPL-SCNC: 3.8 MMOL/L (ref 3.6–5.5)
PROT UR QL STRIP: NEGATIVE MG/DL
RBC # BLD AUTO: 4.84 M/UL (ref 4.2–5.4)
RBC # URNS HPF: ABNORMAL /HPF
RBC UR QL AUTO: NEGATIVE
SODIUM SERPL-SCNC: 137 MMOL/L (ref 135–145)
SP GR UR STRIP.AUTO: 1.02
UROBILINOGEN UR STRIP.AUTO-MCNC: 0.2 MG/DL
WBC # BLD AUTO: 10.6 K/UL (ref 4.8–10.8)
WBC #/AREA URNS HPF: ABNORMAL /HPF

## 2022-06-07 PROCEDURE — 85025 COMPLETE CBC W/AUTO DIFF WBC: CPT

## 2022-06-07 PROCEDURE — 96374 THER/PROPH/DIAG INJ IV PUSH: CPT

## 2022-06-07 PROCEDURE — 96375 TX/PRO/DX INJ NEW DRUG ADDON: CPT

## 2022-06-07 PROCEDURE — 700111 HCHG RX REV CODE 636 W/ 250 OVERRIDE (IP): Performed by: EMERGENCY MEDICINE

## 2022-06-07 PROCEDURE — 72131 CT LUMBAR SPINE W/O DYE: CPT | Mod: ME

## 2022-06-07 PROCEDURE — 81001 URINALYSIS AUTO W/SCOPE: CPT

## 2022-06-07 PROCEDURE — 99284 EMERGENCY DEPT VISIT MOD MDM: CPT

## 2022-06-07 PROCEDURE — 80048 BASIC METABOLIC PNL TOTAL CA: CPT

## 2022-06-07 PROCEDURE — 36415 COLL VENOUS BLD VENIPUNCTURE: CPT

## 2022-06-07 RX ORDER — KETOROLAC TROMETHAMINE 30 MG/ML
15 INJECTION, SOLUTION INTRAMUSCULAR; INTRAVENOUS ONCE
Status: COMPLETED | OUTPATIENT
Start: 2022-06-07 | End: 2022-06-07

## 2022-06-07 RX ORDER — ONDANSETRON 2 MG/ML
4 INJECTION INTRAMUSCULAR; INTRAVENOUS ONCE
Status: COMPLETED | OUTPATIENT
Start: 2022-06-07 | End: 2022-06-07

## 2022-06-07 RX ORDER — MORPHINE SULFATE 4 MG/ML
4 INJECTION INTRAVENOUS ONCE
Status: COMPLETED | OUTPATIENT
Start: 2022-06-07 | End: 2022-06-07

## 2022-06-07 RX ORDER — HYDROCODONE BITARTRATE AND ACETAMINOPHEN 5; 325 MG/1; MG/1
1 TABLET ORAL EVERY 6 HOURS PRN
Qty: 12 TABLET | Refills: 0 | Status: SHIPPED | OUTPATIENT
Start: 2022-06-07 | End: 2022-06-12

## 2022-06-07 RX ORDER — KETOROLAC TROMETHAMINE 10 MG/1
10 TABLET, FILM COATED ORAL EVERY 6 HOURS PRN
Qty: 15 TABLET | Refills: 0 | Status: SHIPPED | OUTPATIENT
Start: 2022-06-07 | End: 2022-12-26

## 2022-06-07 RX ADMIN — ONDANSETRON HYDROCHLORIDE 4 MG: 2 SOLUTION INTRAMUSCULAR; INTRAVENOUS at 21:55

## 2022-06-07 RX ADMIN — MORPHINE SULFATE 4 MG: 4 INJECTION INTRAVENOUS at 21:55

## 2022-06-07 RX ADMIN — KETOROLAC TROMETHAMINE 15 MG: 30 INJECTION, SOLUTION INTRAMUSCULAR at 23:00

## 2022-06-07 ASSESSMENT — FIBROSIS 4 INDEX: FIB4 SCORE: 1.68

## 2022-06-07 ASSESSMENT — PAIN DESCRIPTION - PAIN TYPE: TYPE: CHRONIC PAIN

## 2022-06-08 VITALS
OXYGEN SATURATION: 94 % | DIASTOLIC BLOOD PRESSURE: 88 MMHG | TEMPERATURE: 97.4 F | SYSTOLIC BLOOD PRESSURE: 189 MMHG | RESPIRATION RATE: 15 BRPM | HEART RATE: 68 BPM | HEIGHT: 62 IN | WEIGHT: 170 LBS | BODY MASS INDEX: 31.28 KG/M2

## 2022-06-08 NOTE — ED PROVIDER NOTES
ED Provider Note    ED Provider Note    Scribed for Bayron Olmstead MD by Bayron Olmstead M.D.. 6/7/2022, 9:39 PM.    Primary care provider: [facility]  Means of arrival: Private  History obtained from: Patient  History limited by: None    CHIEF COMPLAINT  Chief Complaint   Patient presents with   • Low Back Pain     X3wks now bilat hip pain and right leg pain       HPI  Lizztete Burton is a 82 y.o. female who presents to the Emergency Department for evaluation of severe low back pain.  Patient's had the symptoms for last 3 weeks, pain radiates down both of her legs including the hips and below the knees.  No preceding trauma.  Patient notes no bowel or bladder dysfunction acutely.  She was seen at [facility] and started on Medrol Dosepak on the fourth, no improvement with that as such she came to be assessed.  MRI was done but results are not available at this time.  No fever, no dysuria.    REVIEW OF SYSTEMS  Pertinent positives include low back pain rating down both legs without trauma. Pertinent negatives include no bowel or bladder dysfunction, no new numbness nor weakness, no trauma, no fever.  All other systems reviewed and negative.    PAST MEDICAL HISTORY   has a past medical history of Acute renal failure (HCC) (2/11/2014), Anesthesia, Angina, Arthritis, Asthma, Bowel habit changes, Breath shortness, CATARACT, Dental disorder, Glaucoma, Heart burn, Hypertension, Indigestion, Pneumonia (03/2018), Sepsis (HCC) (2/8/2014), Stroke (HCC), Unspecified disorder of thyroid, Unspecified hemorrhagic conditions (2009), and Urinary incontinence.    SURGICAL HISTORY   has a past surgical history that includes hysterectomy, total abdominal (1989); gyn surgery; trabeculectomy (9/26/2012); cataract phaco with iol (9/26/2012); knee arthroplasty total (2/5/2009); baerveldt implant (12/12/2012); baerveldt implant (3/13/2013); baerveldt implant (5/8/2013); cataract phaco with iol  "(2013); knee arthroplasty total (Left, 2015); anterior and posterior repair (3/20/2017); enterocele repair (3/20/2017); bladder sling female (3/20/2017); vaginal suspension (3/20/2017); and shoulder arthroplasty total (Right, 2018).    SOCIAL HISTORY  Social History     Tobacco Use   • Smoking status: Former Smoker     Packs/day: 0.50     Years: 20.00     Pack years: 10.00     Types: Cigarettes     Quit date: 3/16/2002     Years since quittin.2   • Smokeless tobacco: Never Used   Substance Use Topics   • Alcohol use: No   • Drug use: No      Social History     Substance and Sexual Activity   Drug Use No       FAMILY HISTORY  Noncontributory    CURRENT MEDICATIONS  Home Medications     Reviewed by Leila Helm R.N. (Registered Nurse) on 22 at 1851  Med List Status: Partial   Medication Last Dose Status   COMBIGAN 0.2-0.5 % Solution  Active   cyclobenzaprine (FLEXERIL) 5 mg tablet  Active   fluticasone (FLONASE) 50 MCG/ACT nasal spray  Active   latanoprost (XALATAN) 0.005 % Solution  Active   levothyroxine (SYNTHROID) 175 MCG Tab  Active   losartan (COZAAR) 50 MG Tab  Active   methylPREDNISolone (MEDROL DOSEPAK) 4 MG Tablet Therapy Pack  Active   methylPREDNISolone (MEDROL DOSEPAK) 4 MG Tablet Therapy Pack  Active   tolterodine ER (DETROL LA) 4 MG CAPSULE SR 24 HR  Active                ALLERGIES  No Known Allergies    PHYSICAL EXAM  VITAL SIGNS: BP (!) 189/88   Pulse 68   Temp 36.3 °C (97.4 °F) (Temporal)   Resp 15   Ht 1.575 m (5' 2\")   Wt 77.1 kg (170 lb)   SpO2 94%   BMI 31.09 kg/m²     General: Alert, mild acute distress, appears uncomfortable  Skin: Warm, dry, normal for ethnicity  Head: Normocephalic, atraumatic  Neck: Trachea midline, no tenderness  Eye: Sclera anicteric  ENMT: Oral mucosa moist, no pharyngeal erythema or exudate  Cardiovascular: Regular rate and rhythm,  Normal peripheral perfusion  Respiratory:  respirations are non-labored  Musculoskeletal: No swelling, " no deformity.  Midline and paraspinous tenderness throughout the lumbosacral spine without step-off.  Positive straight leg raise on the left.  Neurological: Alert and oriented to person, place, time, and situation.  No foot drop, no saddle anesthesia.  5 x 5 dorsal and plantar flexion of feet and toes.  Lymphatics: No lymphadenopathy  Psychiatric: Cooperative, appropriate mood & affect      DIAGNOSTIC STUDIES/PROCEDURES    LABS  Results for orders placed or performed during the hospital encounter of 06/07/22   CBC WITH DIFFERENTIAL   Result Value Ref Range    WBC 10.6 4.8 - 10.8 K/uL    RBC 4.84 4.20 - 5.40 M/uL    Hemoglobin 14.8 12.0 - 16.0 g/dL    Hematocrit 43.3 37.0 - 47.0 %    MCV 89.5 81.4 - 97.8 fL    MCH 30.6 27.0 - 33.0 pg    MCHC 34.2 33.6 - 35.0 g/dL    RDW 44.5 35.9 - 50.0 fL    Platelet Count 342 164 - 446 K/uL    MPV 7.9 (L) 9.0 - 12.9 fL    Neutrophils-Polys 60.20 44.00 - 72.00 %    Lymphocytes 28.80 22.00 - 41.00 %    Monocytes 8.60 0.00 - 13.40 %    Eosinophils 1.40 0.00 - 6.90 %    Basophils 0.40 0.00 - 1.80 %    Immature Granulocytes 0.60 0.00 - 0.90 %    Nucleated RBC 0.00 /100 WBC    Neutrophils (Absolute) 6.42 2.00 - 7.15 K/uL    Lymphs (Absolute) 3.06 1.00 - 4.80 K/uL    Monos (Absolute) 0.91 (H) 0.00 - 0.85 K/uL    Eos (Absolute) 0.15 0.00 - 0.51 K/uL    Baso (Absolute) 0.04 0.00 - 0.12 K/uL    Immature Granulocytes (abs) 0.06 0.00 - 0.11 K/uL    NRBC (Absolute) 0.00 K/uL   BASIC METABOLIC PANEL   Result Value Ref Range    Sodium 137 135 - 145 mmol/L    Potassium 3.8 3.6 - 5.5 mmol/L    Chloride 100 96 - 112 mmol/L    Co2 26 20 - 33 mmol/L    Glucose 98 65 - 99 mg/dL    Bun 27 (H) 8 - 22 mg/dL    Creatinine 0.88 0.50 - 1.40 mg/dL    Calcium 9.1 8.5 - 10.5 mg/dL    Anion Gap 11.0 7.0 - 16.0   URINALYSIS    Specimen: Urine   Result Value Ref Range    Color Yellow     Character Clear     Specific Gravity 1.017 <1.035    Ph 6.0 5.0 - 8.0    Glucose Negative Negative mg/dL    Ketones Negative  Negative mg/dL    Protein Negative Negative mg/dL    Bilirubin Negative Negative    Urobilinogen, Urine 0.2 Negative    Nitrite Negative Negative    Leukocyte Esterase Small (A) Negative    Occult Blood Negative Negative    Micro Urine Req Microscopic    ESTIMATED GFR   Result Value Ref Range    GFR (CKD-EPI) 65 >60 mL/min/1.73 m 2   URINE MICROSCOPIC (W/UA)   Result Value Ref Range    WBC 2-5 /hpf    RBC 0-2 /hpf    Bacteria Negative None /hpf    Epithelial Cells Few /hpf    Hyaline Cast 3-5 (A) /lpf     All labs reviewed by me.      RADIOLOGY  CT-LSPINE W/O PLUS RECONS   Final Result      1.  Moderate to severe lumbar spondylosis, with grade 2 degenerative anterolisthesis at L4-L5 level.   2.  Grade 1 degenerative retrolisthesis at L1-L3 levels. Grade 1 degenerative anterolisthesis at L5-S1 level.   3.  At least mild canal stenosis at L1-L2, secondary to posterior osteophytic spurring.   4.  No acute fracture.   5.  Bone demineralization.        The radiologist's interpretation of all radiological studies have been reviewed by me.    COURSE & MEDICAL DECISION MAKING  Pertinent Labs & Imaging studies reviewed. (See chart for details)    9:39 PM - Patient seen and examined at bedside. Patient will be treated with morphine/Zofran. Ordered CT imaging of the lumbosacral spine and metabolic work-up to evaluate her symptoms. The differential diagnoses include but are not limited to: Lumbosacral radiculopathy    2251: Patient reassessed, feeling better but still somewhat uncomfortable.  I have updated with unremarkable labs as far, awaiting imaging.  I have ordered ketorolac given the continued pain, dose attenuated for age.  Renal function thankfully is quite reassuring.    0018: Patient reassessed.  She is pain-free after the low dose of ketorolac.  I updated her with the degenerative changes noted on the CT but thankfully no evidence of a compression fracture.    Patient Vitals for the past 24 hrs:   BP Temp Temp src  "Pulse Resp SpO2 Height Weight   06/08/22 0000 (!) 189/88 36.3 °C (97.4 °F) Temporal 68 15 94 % -- --   06/07/22 2301 (!) 183/82 -- -- 68 -- -- -- --   06/07/22 2300 -- -- -- 66 -- 92 % -- --   06/07/22 2200 (!) 204/89 -- -- 77 -- 96 % -- --   06/07/22 2147 (!) 223/103 -- -- -- -- -- -- --   06/07/22 2145 -- -- -- 71 -- 97 % -- --   06/07/22 2114 -- -- -- 69 -- 94 % -- --   06/07/22 2113 (!) 207/95 -- -- -- -- -- -- --   06/07/22 1848 -- -- -- -- -- -- -- 77.1 kg (170 lb)   06/07/22 1832 (!) 172/89 36.1 °C (96.9 °F) Temporal 95 18 98 % 1.575 m (5' 2\") --     HTN/IDDM FOLLOW UP:  The patient is referred to a primary physician for blood pressure management, diabetic screening, and for all other preventive health concerns    Decision Making:  This is a 82 y.o. year old female who presents with radicular back pain radiating down the low back to the hips and down both legs.  Patient has had no trauma, she has no numbness or weakness below the waist, and she has had no acute bowel or bladder dysfunction.  Patient has completed a Medrol Dosepak with no improvement.  Also written for Flexeril by [facility] again with no improvement.  Given severity and radicular nature of her pain I do think is reasonable to obtain acute imaging especially given the patient's age.  Unfortunately I am unable to access the MRI that she had done on the fourth.  CT is obtained here in the ED and thankfully demonstrates no evidence of a compression fracture.  She has a fair amount of degenerative changes including anterolisthesis of L4-L5 and mild canal narrowing at L1-L2.  Given no foot drop, no saddle anesthesia, there is no indication for emergent neurosurgical intervention.  Given the radicular symptoms I suspect sciatica, she is doing better after small dose of morphine and a dose of Toradol and is attenuated for her age.  Patient's GFR is within normal limits, there is no contraindication to a short course of NSAIDs given no " better after her course of steroids.  Given failure of nonnarcotic analgesia there is no contraindication to a brief course of Norco.  Patient comfortable following up with [facility] with whom she is already established or her MRI results and further evaluation.    I reviewed prescription monitoring program for patient's narcotic use before prescribing a scheduled drug.The patient will not drink alcohol nor drive with prescribed medications      In prescribing controlled substances to this patient, I certify that I have obtained and reviewed the medical history this patient I have also made a good fidel effort to obtain applicable records from other providers who have treated the patient and records did not demonstrate any increased risk of substance abuse that would prevent me from prescribing controlled substances.     I have conducted a physical exam and documented it. I have reviewed Ms. Burton’s prescription history as maintained by the [place] Prescription Monitoring Program.     I have assessed the patient’s risk for abuse, dependency, and addiction using the validated Opioid Risk Tool available at https://www.mdcalc.com/kfxxfm-orzp-kkbe-ort-narcotic-abuse.     Given the above, I believe the benefits of controlled substance therapy outweigh the risks. The reasons for prescribing controlled substances include in my professional opinion, controlled substances are a reasonable choice for this patient. Accordingly, I have discussed the risk and benefits, treatment plan, and alternative therapies with the patient. The patient has been consented for the medication and understands the risks.      I reviewed prescription monitoring program for patient's narcotic use before prescribing a scheduled drug.The patient will not drink alcohol nor drive with prescribed medications. The patient will return for new or worsening symptoms and is stable at the time of discharge.    Patient has had high blood pressure  while in the emergency department, felt likely secondary to medical condition. Counseled patient to monitor blood pressure at home and follow up with primary care physician.     DISPOSITION:  Patient will be discharged home in stable condition.    FOLLOW UP:  [facility]  [address]  [address]  [place] [id number]  [phone]    Schedule an appointment as soon as possible for a visit       [facility]  [address] [place] [address]  [phone]  Schedule an appointment as soon as possible for a visit         OUTPATIENT MEDICATIONS:  Discharge Medication List as of 6/8/2022 12:18 AM      START taking these medications    Details   ketorolac (TORADOL) 10 MG Tab Take 1 Tablet by mouth every 6 hours as needed for Moderate Pain., Disp-15 Tablet, R-0, Normal      HYDROcodone-acetaminophen (NORCO) 5-325 MG Tab per tablet Take 1 Tablet by mouth every 6 hours as needed (Severe Pain) for up to 5 days., Disp-12 Tablet, R-0, Normal             FINAL IMPRESSION  1. Bilateral sciatica    2. Lumbosacral radiculopathy          Bayron STALEY M.D. (Scribe), am scribing for, and in the presence of, Bayron Olmstead MD.    Electronically signed by: Bayron Olmstead M.D. (Scribe), 6/7/2022    Bayron STALEY MD personally performed the services described in this documentation, as scribed by Bayron Olmstead M.D. in my presence, and it is both accurate and complete    The note accurately reflects work and decisions made by me.  Bayron Olmstead M.D.  6/8/2022  2:57 AM

## 2022-06-08 NOTE — ED TRIAGE NOTES
Pt to triage in WC w/ daughter.  Chief Complaint   Patient presents with   • Low Back Pain     X3wks now bilat hip pain and right leg pain     Pt went to [facility], has had an MRI and tried PT yesterday. No relief. Pt also taking flexeril and medrol dose pack.

## 2022-06-08 NOTE — ED NOTES
"Pt discharged to lobby with slow, steady gait. AOX4. IV discontinued and gauze placed, pt in possession of belongings. Pt provided discharge education and information pertaining to medications and follow up appointments. Pt received copy of discharge instructions and verbalized understanding. BP (!) 189/88   Pulse 68   Temp 36.3 °C (97.4 °F) (Temporal)   Resp 15   Ht 1.575 m (5' 2\")   Wt 77.1 kg (170 lb)   SpO2 94%   BMI 31.09 kg/m²   "

## 2022-06-08 NOTE — ED NOTES
Pt ambulatory to restroom with slow, steady gait and assistance by family and this RN. Family in restroom with pt, pt requested this RN to not be in restroom.

## 2022-09-06 ENCOUNTER — DOCUMENTATION (OUTPATIENT)
Dept: HEALTH INFORMATION MANAGEMENT | Facility: OTHER | Age: 82
End: 2022-09-06
Payer: MEDICARE

## 2022-11-05 ENCOUNTER — HOSPITAL ENCOUNTER (OUTPATIENT)
Dept: LAB | Facility: MEDICAL CENTER | Age: 82
End: 2022-11-05
Attending: NURSE PRACTITIONER
Payer: MEDICARE

## 2022-11-05 LAB
ALBUMIN SERPL BCP-MCNC: 4.1 G/DL (ref 3.2–4.9)
ALBUMIN/GLOB SERPL: 1.3 G/DL
ALP SERPL-CCNC: 77 U/L (ref 30–99)
ALT SERPL-CCNC: 16 U/L (ref 2–50)
ANION GAP SERPL CALC-SCNC: 10 MMOL/L (ref 7–16)
AST SERPL-CCNC: 14 U/L (ref 12–45)
BILIRUB SERPL-MCNC: 0.5 MG/DL (ref 0.1–1.5)
BUN SERPL-MCNC: 26 MG/DL (ref 8–22)
CALCIUM SERPL-MCNC: 9.1 MG/DL (ref 8.5–10.5)
CHLORIDE SERPL-SCNC: 101 MMOL/L (ref 96–112)
CHOLEST SERPL-MCNC: 214 MG/DL (ref 100–199)
CO2 SERPL-SCNC: 27 MMOL/L (ref 20–33)
CREAT SERPL-MCNC: 0.7 MG/DL (ref 0.5–1.4)
FASTING STATUS PATIENT QL REPORTED: NORMAL
GFR SERPLBLD CREATININE-BSD FMLA CKD-EPI: 85 ML/MIN/1.73 M 2
GLOBULIN SER CALC-MCNC: 3.2 G/DL (ref 1.9–3.5)
GLUCOSE SERPL-MCNC: 93 MG/DL (ref 65–99)
HDLC SERPL-MCNC: 65 MG/DL
LDLC SERPL CALC-MCNC: 127 MG/DL
POTASSIUM SERPL-SCNC: 4 MMOL/L (ref 3.6–5.5)
PROT SERPL-MCNC: 7.3 G/DL (ref 6–8.2)
SODIUM SERPL-SCNC: 138 MMOL/L (ref 135–145)
TRIGL SERPL-MCNC: 108 MG/DL (ref 0–149)
TSH SERPL DL<=0.005 MIU/L-ACNC: 0.32 UIU/ML (ref 0.38–5.33)

## 2022-11-05 PROCEDURE — 36415 COLL VENOUS BLD VENIPUNCTURE: CPT

## 2022-11-05 PROCEDURE — 80061 LIPID PANEL: CPT

## 2022-11-05 PROCEDURE — 80053 COMPREHEN METABOLIC PANEL: CPT

## 2022-11-05 PROCEDURE — 84443 ASSAY THYROID STIM HORMONE: CPT

## 2022-11-16 ENCOUNTER — HOSPITAL ENCOUNTER (OUTPATIENT)
Dept: RADIOLOGY | Facility: MEDICAL CENTER | Age: 82
End: 2022-11-16
Attending: PHYSICAL MEDICINE & REHABILITATION
Payer: MEDICARE

## 2022-11-16 DIAGNOSIS — M51.36 DEGENERATION OF LUMBAR INTERVERTEBRAL DISC: ICD-10-CM

## 2022-11-16 PROCEDURE — 72148 MRI LUMBAR SPINE W/O DYE: CPT

## 2022-12-23 ENCOUNTER — HOSPITAL ENCOUNTER (OUTPATIENT)
Dept: LAB | Facility: MEDICAL CENTER | Age: 82
DRG: 193 | End: 2022-12-23
Attending: NURSE PRACTITIONER
Payer: MEDICARE

## 2022-12-23 LAB — TSH SERPL DL<=0.005 MIU/L-ACNC: 0.74 UIU/ML (ref 0.38–5.33)

## 2022-12-23 PROCEDURE — 36415 COLL VENOUS BLD VENIPUNCTURE: CPT

## 2022-12-23 PROCEDURE — 84443 ASSAY THYROID STIM HORMONE: CPT

## 2022-12-26 ENCOUNTER — APPOINTMENT (OUTPATIENT)
Dept: RADIOLOGY | Facility: MEDICAL CENTER | Age: 82
DRG: 193 | End: 2022-12-26
Attending: EMERGENCY MEDICINE
Payer: MEDICARE

## 2022-12-26 ENCOUNTER — HOSPITAL ENCOUNTER (INPATIENT)
Facility: MEDICAL CENTER | Age: 82
LOS: 8 days | DRG: 193 | End: 2023-01-03
Attending: EMERGENCY MEDICINE | Admitting: STUDENT IN AN ORGANIZED HEALTH CARE EDUCATION/TRAINING PROGRAM
Payer: MEDICARE

## 2022-12-26 ENCOUNTER — APPOINTMENT (OUTPATIENT)
Dept: CARDIOLOGY | Facility: MEDICAL CENTER | Age: 82
DRG: 193 | End: 2022-12-26
Attending: STUDENT IN AN ORGANIZED HEALTH CARE EDUCATION/TRAINING PROGRAM
Payer: MEDICARE

## 2022-12-26 DIAGNOSIS — J18.9 PNEUMONIA DUE TO INFECTIOUS ORGANISM, UNSPECIFIED LATERALITY, UNSPECIFIED PART OF LUNG: ICD-10-CM

## 2022-12-26 DIAGNOSIS — R29.6 RECURRENT FALLS: ICD-10-CM

## 2022-12-26 LAB
ALBUMIN SERPL BCP-MCNC: 3.8 G/DL (ref 3.2–4.9)
ALBUMIN/GLOB SERPL: 1.3 G/DL
ALP SERPL-CCNC: 71 U/L (ref 30–99)
ALT SERPL-CCNC: 11 U/L (ref 2–50)
ANION GAP SERPL CALC-SCNC: 10 MMOL/L (ref 7–16)
APTT PPP: 30.4 SEC (ref 24.7–36)
AST SERPL-CCNC: 15 U/L (ref 12–45)
BASOPHILS # BLD AUTO: 0.7 % (ref 0–1.8)
BASOPHILS # BLD: 0.08 K/UL (ref 0–0.12)
BILIRUB SERPL-MCNC: 0.6 MG/DL (ref 0.1–1.5)
BUN SERPL-MCNC: 20 MG/DL (ref 8–22)
CALCIUM ALBUM COR SERPL-MCNC: 9.3 MG/DL (ref 8.5–10.5)
CALCIUM SERPL-MCNC: 9.1 MG/DL (ref 8.5–10.5)
CHLORIDE SERPL-SCNC: 96 MMOL/L (ref 96–112)
CO2 SERPL-SCNC: 25 MMOL/L (ref 20–33)
CREAT SERPL-MCNC: 1.13 MG/DL (ref 0.5–1.4)
EKG IMPRESSION: NORMAL
EOSINOPHIL # BLD AUTO: 0.01 K/UL (ref 0–0.51)
EOSINOPHIL NFR BLD: 0.1 % (ref 0–6.9)
ERYTHROCYTE [DISTWIDTH] IN BLOOD BY AUTOMATED COUNT: 45.4 FL (ref 35.9–50)
GFR SERPLBLD CREATININE-BSD FMLA CKD-EPI: 48 ML/MIN/1.73 M 2
GLOBULIN SER CALC-MCNC: 2.9 G/DL (ref 1.9–3.5)
GLUCOSE SERPL-MCNC: 167 MG/DL (ref 65–99)
HCT VFR BLD AUTO: 37.5 % (ref 37–47)
HGB BLD-MCNC: 12.8 G/DL (ref 12–16)
IMM GRANULOCYTES # BLD AUTO: 0.05 K/UL (ref 0–0.11)
IMM GRANULOCYTES NFR BLD AUTO: 0.5 % (ref 0–0.9)
INR PPP: 1.11 (ref 0.87–1.13)
LACTATE SERPL-SCNC: 1.1 MMOL/L (ref 0.5–2)
LACTATE SERPL-SCNC: 2.2 MMOL/L (ref 0.5–2)
LV EJECT FRACT  99904: 60
LV EJECT FRACT MOD 2C 99903: 55.69
LV EJECT FRACT MOD 4C 99902: 62.19
LV EJECT FRACT MOD BP 99901: 55.63
LYMPHOCYTES # BLD AUTO: 1.05 K/UL (ref 1–4.8)
LYMPHOCYTES NFR BLD: 9.8 % (ref 22–41)
MCH RBC QN AUTO: 31.6 PG (ref 27–33)
MCHC RBC AUTO-ENTMCNC: 34.1 G/DL (ref 33.6–35)
MCV RBC AUTO: 92.6 FL (ref 81.4–97.8)
MONOCYTES # BLD AUTO: 1.2 K/UL (ref 0–0.85)
MONOCYTES NFR BLD AUTO: 11.2 % (ref 0–13.4)
NEUTROPHILS # BLD AUTO: 8.31 K/UL (ref 2–7.15)
NEUTROPHILS NFR BLD: 77.7 % (ref 44–72)
NRBC # BLD AUTO: 0 K/UL
NRBC BLD-RTO: 0 /100 WBC
NT-PROBNP SERPL IA-MCNC: 2025 PG/ML (ref 0–125)
PLATELET # BLD AUTO: 256 K/UL (ref 164–446)
PMV BLD AUTO: 8.7 FL (ref 9–12.9)
POTASSIUM SERPL-SCNC: 3.5 MMOL/L (ref 3.6–5.5)
PROT SERPL-MCNC: 6.7 G/DL (ref 6–8.2)
PROTHROMBIN TIME: 14.1 SEC (ref 12–14.6)
RBC # BLD AUTO: 4.05 M/UL (ref 4.2–5.4)
SODIUM SERPL-SCNC: 131 MMOL/L (ref 135–145)
TROPONIN T SERPL-MCNC: 25 NG/L (ref 6–19)
TROPONIN T SERPL-MCNC: 32 NG/L (ref 6–19)
WBC # BLD AUTO: 10.7 K/UL (ref 4.8–10.8)

## 2022-12-26 PROCEDURE — 85730 THROMBOPLASTIN TIME PARTIAL: CPT

## 2022-12-26 PROCEDURE — 770020 HCHG ROOM/CARE - TELE (206)

## 2022-12-26 PROCEDURE — 700105 HCHG RX REV CODE 258: Performed by: EMERGENCY MEDICINE

## 2022-12-26 PROCEDURE — 87040 BLOOD CULTURE FOR BACTERIA: CPT

## 2022-12-26 PROCEDURE — 85025 COMPLETE CBC W/AUTO DIFF WBC: CPT

## 2022-12-26 PROCEDURE — 70450 CT HEAD/BRAIN W/O DYE: CPT

## 2022-12-26 PROCEDURE — 96367 TX/PROPH/DG ADDL SEQ IV INF: CPT

## 2022-12-26 PROCEDURE — 94760 N-INVAS EAR/PLS OXIMETRY 1: CPT

## 2022-12-26 PROCEDURE — 700117 HCHG RX CONTRAST REV CODE 255: Performed by: EMERGENCY MEDICINE

## 2022-12-26 PROCEDURE — 93306 TTE W/DOPPLER COMPLETE: CPT

## 2022-12-26 PROCEDURE — 96376 TX/PRO/DX INJ SAME DRUG ADON: CPT

## 2022-12-26 PROCEDURE — 96375 TX/PRO/DX INJ NEW DRUG ADDON: CPT

## 2022-12-26 PROCEDURE — 85610 PROTHROMBIN TIME: CPT

## 2022-12-26 PROCEDURE — 99285 EMERGENCY DEPT VISIT HI MDM: CPT

## 2022-12-26 PROCEDURE — 99223 1ST HOSP IP/OBS HIGH 75: CPT | Mod: AI | Performed by: STUDENT IN AN ORGANIZED HEALTH CARE EDUCATION/TRAINING PROGRAM

## 2022-12-26 PROCEDURE — 93005 ELECTROCARDIOGRAM TRACING: CPT | Performed by: EMERGENCY MEDICINE

## 2022-12-26 PROCEDURE — 83605 ASSAY OF LACTIC ACID: CPT

## 2022-12-26 PROCEDURE — 93306 TTE W/DOPPLER COMPLETE: CPT | Mod: 26 | Performed by: INTERNAL MEDICINE

## 2022-12-26 PROCEDURE — 71260 CT THORAX DX C+: CPT

## 2022-12-26 PROCEDURE — A9270 NON-COVERED ITEM OR SERVICE: HCPCS | Performed by: STUDENT IN AN ORGANIZED HEALTH CARE EDUCATION/TRAINING PROGRAM

## 2022-12-26 PROCEDURE — 72125 CT NECK SPINE W/O DYE: CPT

## 2022-12-26 PROCEDURE — 700111 HCHG RX REV CODE 636 W/ 250 OVERRIDE (IP): Performed by: STUDENT IN AN ORGANIZED HEALTH CARE EDUCATION/TRAINING PROGRAM

## 2022-12-26 PROCEDURE — 96365 THER/PROPH/DIAG IV INF INIT: CPT

## 2022-12-26 PROCEDURE — 83880 ASSAY OF NATRIURETIC PEPTIDE: CPT

## 2022-12-26 PROCEDURE — 94669 MECHANICAL CHEST WALL OSCILL: CPT

## 2022-12-26 PROCEDURE — 87077 CULTURE AEROBIC IDENTIFY: CPT | Mod: 91

## 2022-12-26 PROCEDURE — 700111 HCHG RX REV CODE 636 W/ 250 OVERRIDE (IP): Performed by: EMERGENCY MEDICINE

## 2022-12-26 PROCEDURE — 80053 COMPREHEN METABOLIC PANEL: CPT

## 2022-12-26 PROCEDURE — 84484 ASSAY OF TROPONIN QUANT: CPT | Mod: 91

## 2022-12-26 PROCEDURE — 36415 COLL VENOUS BLD VENIPUNCTURE: CPT

## 2022-12-26 PROCEDURE — 87150 DNA/RNA AMPLIFIED PROBE: CPT | Mod: 91

## 2022-12-26 PROCEDURE — 700102 HCHG RX REV CODE 250 W/ 637 OVERRIDE(OP): Performed by: STUDENT IN AN ORGANIZED HEALTH CARE EDUCATION/TRAINING PROGRAM

## 2022-12-26 RX ORDER — LEVOTHYROXINE SODIUM 0.1 MG/1
100 TABLET ORAL
Status: DISCONTINUED | OUTPATIENT
Start: 2022-12-26 | End: 2022-12-31

## 2022-12-26 RX ORDER — LOSARTAN POTASSIUM 100 MG/1
100 TABLET ORAL DAILY
Status: ON HOLD | COMMUNITY
End: 2023-01-03 | Stop reason: SDUPTHER

## 2022-12-26 RX ORDER — IBUPROFEN 200 MG
400 TABLET ORAL EVERY 6 HOURS PRN
Status: ON HOLD | COMMUNITY
End: 2023-01-03

## 2022-12-26 RX ORDER — AZITHROMYCIN 250 MG/1
500 TABLET, FILM COATED ORAL DAILY
Status: DISCONTINUED | OUTPATIENT
Start: 2022-12-27 | End: 2022-12-28

## 2022-12-26 RX ORDER — POTASSIUM CHLORIDE 20 MEQ/1
40 TABLET, EXTENDED RELEASE ORAL ONCE
Status: DISPENSED | OUTPATIENT
Start: 2022-12-26 | End: 2022-12-27

## 2022-12-26 RX ORDER — MORPHINE SULFATE 4 MG/ML
3 INJECTION INTRAVENOUS EVERY 4 HOURS PRN
Status: DISCONTINUED | OUTPATIENT
Start: 2022-12-26 | End: 2022-12-29

## 2022-12-26 RX ORDER — ACETAMINOPHEN 325 MG/1
650 TABLET ORAL EVERY 6 HOURS PRN
Status: DISCONTINUED | OUTPATIENT
Start: 2022-12-26 | End: 2022-12-30

## 2022-12-26 RX ORDER — AMOXICILLIN 250 MG
2 CAPSULE ORAL 2 TIMES DAILY
Status: DISCONTINUED | OUTPATIENT
Start: 2022-12-26 | End: 2023-01-03 | Stop reason: HOSPADM

## 2022-12-26 RX ORDER — POLYETHYLENE GLYCOL 3350 17 G/17G
1 POWDER, FOR SOLUTION ORAL
Status: DISCONTINUED | OUTPATIENT
Start: 2022-12-26 | End: 2023-01-03 | Stop reason: HOSPADM

## 2022-12-26 RX ORDER — LEVOTHYROXINE SODIUM 0.1 MG/1
100 TABLET ORAL
Status: ON HOLD | COMMUNITY
Start: 2022-12-07 | End: 2023-01-03 | Stop reason: SDUPTHER

## 2022-12-26 RX ORDER — OXYBUTYNIN CHLORIDE 5 MG/1
5 TABLET, EXTENDED RELEASE ORAL EVERY EVENING
Status: DISCONTINUED | OUTPATIENT
Start: 2022-12-26 | End: 2022-12-31

## 2022-12-26 RX ORDER — OXYCODONE HYDROCHLORIDE 5 MG/1
5 TABLET ORAL EVERY 4 HOURS PRN
Status: DISCONTINUED | OUTPATIENT
Start: 2022-12-26 | End: 2022-12-29

## 2022-12-26 RX ORDER — TOLTERODINE 4 MG/1
4 CAPSULE, EXTENDED RELEASE ORAL DAILY
Status: DISCONTINUED | OUTPATIENT
Start: 2022-12-26 | End: 2022-12-26

## 2022-12-26 RX ORDER — LOSARTAN POTASSIUM 50 MG/1
100 TABLET ORAL DAILY
Status: DISCONTINUED | OUTPATIENT
Start: 2022-12-26 | End: 2023-01-03 | Stop reason: HOSPADM

## 2022-12-26 RX ORDER — OXYCODONE HYDROCHLORIDE 5 MG/1
5 TABLET ORAL
Status: DISCONTINUED | OUTPATIENT
Start: 2022-12-26 | End: 2022-12-26

## 2022-12-26 RX ORDER — BISACODYL 10 MG
10 SUPPOSITORY, RECTAL RECTAL
Status: DISCONTINUED | OUTPATIENT
Start: 2022-12-26 | End: 2023-01-03 | Stop reason: HOSPADM

## 2022-12-26 RX ORDER — OXYCODONE HYDROCHLORIDE 5 MG/1
2.5 TABLET ORAL
Status: DISCONTINUED | OUTPATIENT
Start: 2022-12-26 | End: 2022-12-26

## 2022-12-26 RX ORDER — HYDRALAZINE HYDROCHLORIDE 20 MG/ML
10 INJECTION INTRAMUSCULAR; INTRAVENOUS ONCE
Status: COMPLETED | OUTPATIENT
Start: 2022-12-26 | End: 2022-12-26

## 2022-12-26 RX ORDER — MIRABEGRON 25 MG/1
25 TABLET, FILM COATED, EXTENDED RELEASE ORAL DAILY
Status: ON HOLD | COMMUNITY
Start: 2022-12-14 | End: 2023-01-03 | Stop reason: SDUPTHER

## 2022-12-26 RX ORDER — METHOCARBAMOL 750 MG/1
1500 TABLET, FILM COATED ORAL 3 TIMES DAILY PRN
Status: ON HOLD | COMMUNITY
End: 2023-01-03

## 2022-12-26 RX ORDER — MORPHINE SULFATE 4 MG/ML
2 INJECTION INTRAVENOUS
Status: DISCONTINUED | OUTPATIENT
Start: 2022-12-26 | End: 2022-12-26

## 2022-12-26 RX ORDER — GUAIFENESIN 600 MG/1
600 TABLET, EXTENDED RELEASE ORAL EVERY 12 HOURS
Status: DISCONTINUED | OUTPATIENT
Start: 2022-12-26 | End: 2023-01-03 | Stop reason: HOSPADM

## 2022-12-26 RX ORDER — HYDRALAZINE HYDROCHLORIDE 20 MG/ML
10 INJECTION INTRAMUSCULAR; INTRAVENOUS EVERY 4 HOURS PRN
Status: DISCONTINUED | OUTPATIENT
Start: 2022-12-26 | End: 2023-01-03 | Stop reason: HOSPADM

## 2022-12-26 RX ORDER — AZITHROMYCIN 500 MG/1
500 INJECTION, POWDER, LYOPHILIZED, FOR SOLUTION INTRAVENOUS ONCE
Status: COMPLETED | OUTPATIENT
Start: 2022-12-26 | End: 2022-12-26

## 2022-12-26 RX ORDER — SODIUM CHLORIDE 9 MG/ML
INJECTION, SOLUTION INTRAVENOUS CONTINUOUS
Status: DISCONTINUED | OUTPATIENT
Start: 2022-12-26 | End: 2022-12-26

## 2022-12-26 RX ADMIN — IOHEXOL 100 ML: 350 INJECTION, SOLUTION INTRAVENOUS at 06:00

## 2022-12-26 RX ADMIN — LOSARTAN POTASSIUM 100 MG: 50 TABLET, FILM COATED ORAL at 13:04

## 2022-12-26 RX ADMIN — MORPHINE SULFATE 2 MG: 4 INJECTION INTRAVENOUS at 05:45

## 2022-12-26 RX ADMIN — OXYBUTYNIN CHLORIDE 5 MG: 5 TABLET, EXTENDED RELEASE ORAL at 18:34

## 2022-12-26 RX ADMIN — AMPICILLIN AND SULBACTAM 3 G: 1; 2 INJECTION, POWDER, FOR SOLUTION INTRAMUSCULAR; INTRAVENOUS at 08:45

## 2022-12-26 RX ADMIN — GUAIFENESIN 600 MG: 600 TABLET, EXTENDED RELEASE ORAL at 17:24

## 2022-12-26 RX ADMIN — MORPHINE SULFATE 2 MG: 4 INJECTION INTRAVENOUS at 09:47

## 2022-12-26 RX ADMIN — HYDRALAZINE HYDROCHLORIDE 10 MG: 20 INJECTION INTRAMUSCULAR; INTRAVENOUS at 09:40

## 2022-12-26 RX ADMIN — CEFTRIAXONE SODIUM 1000 MG: 10 INJECTION, POWDER, FOR SOLUTION INTRAVENOUS at 14:11

## 2022-12-26 RX ADMIN — ACETAMINOPHEN 650 MG: 325 TABLET, FILM COATED ORAL at 22:10

## 2022-12-26 RX ADMIN — AZITHROMYCIN MONOHYDRATE 500 MG: 500 INJECTION, POWDER, LYOPHILIZED, FOR SOLUTION INTRAVENOUS at 09:27

## 2022-12-26 RX ADMIN — MORPHINE SULFATE 3 MG: 4 INJECTION INTRAVENOUS at 14:44

## 2022-12-26 RX ADMIN — GUAIFENESIN 600 MG: 600 TABLET, EXTENDED RELEASE ORAL at 12:28

## 2022-12-26 RX ADMIN — HYDRALAZINE HYDROCHLORIDE 10 MG: 20 INJECTION INTRAMUSCULAR; INTRAVENOUS at 07:28

## 2022-12-26 RX ADMIN — RIVAROXABAN 10 MG: 10 TABLET, FILM COATED ORAL at 17:24

## 2022-12-26 ASSESSMENT — LIFESTYLE VARIABLES: SUBSTANCE_ABUSE: 0

## 2022-12-26 ASSESSMENT — PATIENT HEALTH QUESTIONNAIRE - PHQ9
2. FEELING DOWN, DEPRESSED, IRRITABLE, OR HOPELESS: NOT AT ALL
1. LITTLE INTEREST OR PLEASURE IN DOING THINGS: NOT AT ALL
SUM OF ALL RESPONSES TO PHQ9 QUESTIONS 1 AND 2: 0

## 2022-12-26 ASSESSMENT — ENCOUNTER SYMPTOMS
DIZZINESS: 0
BACK PAIN: 0
ABDOMINAL PAIN: 0
FEVER: 0
CHILLS: 1
HEADACHES: 0
INSOMNIA: 0
SHORTNESS OF BREATH: 0
SPUTUM PRODUCTION: 1
WHEEZING: 0
LOSS OF CONSCIOUSNESS: 0
FALLS: 1
PALPITATIONS: 0
FOCAL WEAKNESS: 0
VOMITING: 0
COUGH: 1
SENSORY CHANGE: 0
NAUSEA: 0

## 2022-12-26 ASSESSMENT — COPD QUESTIONNAIRES
HAVE YOU SMOKED AT LEAST 100 CIGARETTES IN YOUR ENTIRE LIFE: NO/DON'T KNOW
COPD SCREENING SCORE: 5
DURING THE PAST 4 WEEKS HOW MUCH DID YOU FEEL SHORT OF BREATH: SOME OF THE TIME
DO YOU EVER COUGH UP ANY MUCUS OR PHLEGM?: YES, A FEW DAYS A WEEK OR MONTH

## 2022-12-26 ASSESSMENT — FIBROSIS 4 INDEX
FIB4 SCORE: 0.85
FIB4 SCORE: 1.47

## 2022-12-26 ASSESSMENT — PAIN DESCRIPTION - PAIN TYPE
TYPE: ACUTE PAIN
TYPE: ACUTE PAIN;CHRONIC PAIN

## 2022-12-26 NOTE — LETTER
December 28, 2022    To Whom It May Concern:         This is confirmation that Lizzette Burton has been hospitalized since 12/26/22 to the present date. Her daughter Maritza Simmons, as her main caregiver, has been taking care of her while in the hospital as well.          If you have any questions please do not hesitate to call me at the phone number listed below.    Sincerely,          Kerry Acosta M.D.

## 2022-12-26 NOTE — ASSESSMENT & PLAN NOTE
TSH 0.75, possibly contributing to dizziness and HTN, as TSH might be too suppressed for her age. Adjusted this admission.   - Adjust LVTX to 88mcg   - Will need follow up as outpt

## 2022-12-26 NOTE — ED NOTES
Pt became very restless, pulling at all her lines, pulse dipped in the mid 80's and pt had a wet sounding cough, OXy mask applied, 2nd PIV inserted.  Hospitalist paged to re-assess pt.    Pt repositioned, given morphine for pain, Hydralazine again for her BP.  Pt now resting quietly on 6L NC, at 90-91%.   BP improved.  Holding IV fluids at this time

## 2022-12-26 NOTE — ASSESSMENT & PLAN NOTE
Clinical presentation compatible with CAP, image with COURTNEY consolidation and elevated procal. Viral panel was negative. Blood cultures grew Coag-neg staph, likely contaminated. Patient improved with CAP ABx regimen, no SIRS, no O2 requirements.  -Completed ceftriaxone and doxycycline  -Awaiting placement for rehab; Covid-19 swab ordered

## 2022-12-26 NOTE — ED TRIAGE NOTES
Chief Complaint   Patient presents with    T-5000 GLF     BIBA after MGLF. -Head strike, -LOC, -thinners. Per EMS, pt has multiple recent falls, and recent broken rib on left side. Pt reports left side rib pain.  Pt hypoxic on RA and course crackles in all lung fields.     .BP (!) 127/91   Pulse 78   Temp 36.9 °C (98.5 °F) (Oral)   Resp 20   SpO2 88%

## 2022-12-26 NOTE — ASSESSMENT & PLAN NOTE
Hypertensive urgency on presentation, likely related to pain and discomfort. Now better controlled but still stage II. Goal Systolic <160 while inpatient with plan for further titration w/ PCP.  - Continue losartan, amlodipine  - maintain carvedilol to 6.25 BID in setting of HR 60-70  - Avoiding thiazides due to hyponatremia  - Monitor

## 2022-12-26 NOTE — ED NOTES
Received report, pt resting with son at bedside.  Lab called to draw blood cultures.  No needs at this time

## 2022-12-26 NOTE — DISCHARGE PLANNING
HTH/SCP TCN chart review completed. Collaborated with WINDY Gannon prior to meeting with the pt. The most current review of medical record, knowledge of pt's PLOF and social support, LACE+ score of 25 and 6 clicks scores (none) were considered.      Pt seen at bedside. She speaks Albanian only. TCN able to communicate in Albanian with her. Patient had a hard time staying awake. RN reports she was just given pain meds. TCN called patient's son, Norberto and spoke to his wife She. Introduced TCN program. Provided education regarding post acute levels of care. Discussed HTH/SCP plan benefits (Meds to Beds, medical uber and [facility] transitional care). Family verbalizes understanding.     Daughter in law reports that patient lives with her dtr Maritza in [place], [place]. Prior to fall, patient was independent with mobility, ADLs, IADLs, and driving. She does not use any assistive devices or home oxygen. This morning, patient was on 4L O2 via NC.     PT/OT evals pending. Family would like to wait and see how patient does with PT/OT before giving choice on HH, SNF, or IRF. They are okay with using DME for O2 if needed at time of discharge. Family would like RN, MD, and/or WINDY to call jonatan Thornton at [phone] or dtr Maritza [phone] with any updates (patient room #, etc.). Choice form faxed to [facility] and saved in media.    TCN will continue to follow and collaborate with discharge planning team as additional post acute needs arise. Thank you.     Completed today:  PT/OT evals pending  Choice obtained: DME (O2)  [facility] referral sent 12/26/22

## 2022-12-26 NOTE — DISCHARGE PLANNING
Received Choice Form @: 1527  Agency/ Facility Name: [facility]  Referral Sent per Choice Form @: Did not send as there are no orders or F2F

## 2022-12-26 NOTE — ED NOTES
Pt much more comfortable and relaxed, maintaining on 5 L NC.  HS eis able to swallow pills and medicated for BP and with Mucinex for her rattle cough

## 2022-12-26 NOTE — ED PROVIDER NOTES
ED Provider Note    CHIEF COMPLAINT  Chief Complaint   Patient presents with    T-5000 GLF       HPI  Lizzette Burton is a 82 y.o. female who presents after a ground-level fall.  Patient fell down a few days ago.  She went to [facility].  Was diagnosed with rib fractures.  Today got up to use the restroom lost her balance fell onto her nightstand.  Hit the center of her chest has had persistent chest pain.  Pain is worse with movement bending and breathing.  She has had a moist cough.  She has not had a fever.  No hemoptysis, leg swelling or leg pain.  Denies abdominal pain vomiting diarrhea.  Does not think she hit her head.  Denies spine pain.    REVIEW OF SYSTEMS  As per HPI, otherwise a 10 point review of systems is negative    PAST MEDICAL HISTORY  Past Medical History:   Diagnosis Date    Acute renal failure (HCC) 2014    Anesthesia     PONV    Angina     CONSULT NEG. RESULTS    Arthritis     bilateral knees    Asthma     Bowel habit changes     constipation    Breath shortness     WITH EXERCISE    CATARACT     bilat IOL    Dental disorder     upper/lower partials    Glaucoma     Heart burn     Hypertension     Indigestion     Pneumonia 2018    Sepsis ([facility]) 2014    Stroke ([facility])     denies hx. of stroke    Unspecified disorder of thyroid     'thyroid medicine taken in past'    Unspecified hemorrhagic conditions      treated in er,nose bleed    Urinary incontinence        SOCIAL HISTORY  Social History     Tobacco Use    Smoking status: Former     Packs/day: 0.50     Years: 20.00     Pack years: 10.00     Types: Cigarettes     Quit date: 3/16/2002     Years since quittin.7    Smokeless tobacco: Never   Vaping Use    Vaping Use: Never used   Substance Use Topics    Alcohol use: No    Drug use: No       SURGICAL HISTORY  Past Surgical History:   Procedure Laterality Date    SHOULDER ARTHROPLASTY TOTAL Right 2018    Procedure: SHOULDER ARTHROPLASTY TOTAL/ REVERSE;   Surgeon: Scott Payton M.D.;  Location: SURGERY SAME DAY [facility];  Service: Orthopedics    ANTERIOR AND POSTERIOR REPAIR  3/20/2017    Procedure: ANTERIOR AND POSTERIOR REPAIR;  Surgeon: Regulo Bruton M.D.;  Location: SURGERY SAME DAY [facility];  Service:     ENTEROCELE REPAIR  3/20/2017    Procedure: ENTEROCELE REPAIR - PERINEOPLASTY;  Surgeon: Regulo Burton M.D.;  Location: SURGERY SAME DAY [facility];  Service:     BLADDER SLING FEMALE  3/20/2017    Procedure: BLADDER SLING FEMALE - TOT;  Surgeon: Regulo Burton M.D.;  Location: SURGERY SAME DAY [facility];  Service:     VAGINAL SUSPENSION  3/20/2017    Procedure: VAGINAL SUSPENSION -SACROSPINOUS VAULT;  Surgeon: Regulo Burton M.D.;  Location: SURGERY SAME DAY [facility];  Service:     KNEE ARTHROPLASTY TOTAL Left 8/20/2015    Procedure: KNEE ARTHROPLASTY TOTAL;  Surgeon: Juan Manuel Choi M.D.;  Location: SURGERY [facility];  Service:     BAERVELDT IMPLANT  5/8/2013    Performed by Miller Fletcher M.D. at SURGERY SAME DAY [facility] ORS    CATARACT PHACO WITH IOL  5/8/2013    Performed by Miller Fletcher M.D. at SURGERY SAME DAY [facility]    BAERVELDT IMPLANT  3/13/2013    Performed by Miller Fletcher M.D. at SURGERY SAME DAY [facility]    BAERVELDT IMPLANT  12/12/2012    Performed by Miller Fletcher M.D. at SURGERY SAME DAY [facility] ORS    TRABECULECTOMY  9/26/2012    Performed by Miller Fletcher M.D. at SURGERY SAME DAY [facility]    CATARACT PHACO WITH IOL  9/26/2012    Performed by Miller Fletcher M.D. at SURGERY SAME DAY [facility]    KNEE ARTHROPLASTY TOTAL  2/5/2009    Performed by GISELA OLSON at SURGERY [facility] ORS    HYSTERECTOMY, TOTAL ABDOMINAL  1989    GYN SURGERY         CURRENT MEDICATIONS  Home Medications       Reviewed by Sun Hendrix R.N. (Registered Nurse) on 12/26/22 at 0504  Med List Status: Not Addressed     Medication Last Dose Status   COMBIGAN 0.2-0.5 % Solution  Active  "  fluticasone (FLONASE) 50 MCG/ACT nasal spray  Active   ketorolac (TORADOL) 10 MG Tab  Active   latanoprost (XALATAN) 0.005 % Solution  Active   levothyroxine (SYNTHROID) 175 MCG Tab  Active   losartan (COZAAR) 50 MG Tab  Active   methylPREDNISolone (MEDROL DOSEPAK) 4 MG Tablet Therapy Pack  Active   methylPREDNISolone (MEDROL DOSEPAK) 4 MG Tablet Therapy Pack  Active   tolterodine ER (DETROL LA) 4 MG CAPSULE SR 24 HR  Active                    ALLERGIES  No Known Allergies    PHYSICAL EXAM  VITAL SIGNS: BP (!) 189/88   Pulse 80   Temp 36.9 °C (98.5 °F) (Oral)   Resp (!) 22   Ht 1.575 m (5' 2\")   Wt 77.1 kg (170 lb)   SpO2 90%   BMI 31.09 kg/m²    Constitutional: Awake and alert.  Appears [id number]  HENT: Normal inspection  Eyes: Normal inspection  Neck: Grossly normal range of motion.  Cardiovascular: Normal heart rate, Normal rhythm.  Symmetric peripheral pulses.   Thorax & Lungs: Endor chest wall right anterior and left lateral.  Rhonchorous breath sounds notable on the left versus the right.  No wheezing.  Abdomen: Bowel sounds normal, soft, non-distended, nontender, no mass  Skin: No obvious rash.  Back: No tenderness, No CVA tenderness.   Extremities: No clubbing, cyanosis, edema, no Homans or cords.  Neurologic: Grossly normal   Psychiatric: Normal for situation    RADIOLOGY/PROCEDURES  CT-CHEST,ABDOMEN,PELVIS WITH   Final Result      1.  No definite acute traumatic abnormality.   2.  Left upper lobe perihilar opacity suspicious for pneumonitis.   3.  Subsegmental right middle lobe atelectasis.   4.  Additional findings as detailed.      CT-CSPINE WITHOUT PLUS RECONS   Final Result      No acute fracture or dislocation cervical spine.      Degenerative changes.      CT-HEAD W/O   Final Result      1.  Chronic microvascular ischemic type changes.   2.  No acute intracranial abnormality.              Imaging is interpreted by radiologist    Labs:  Results for orders placed or performed during the " hospital encounter of 12/26/22   CBC WITH DIFFERENTIAL   Result Value Ref Range    WBC 10.7 4.8 - 10.8 K/uL    RBC 4.05 (L) 4.20 - 5.40 M/uL    Hemoglobin 12.8 12.0 - 16.0 g/dL    Hematocrit 37.5 37.0 - 47.0 %    MCV 92.6 81.4 - 97.8 fL    MCH 31.6 27.0 - 33.0 pg    MCHC 34.1 33.6 - 35.0 g/dL    RDW 45.4 35.9 - 50.0 fL    Platelet Count 256 164 - 446 K/uL    MPV 8.7 (L) 9.0 - 12.9 fL    Neutrophils-Polys 77.70 (H) 44.00 - 72.00 %    Lymphocytes 9.80 (L) 22.00 - 41.00 %    Monocytes 11.20 0.00 - 13.40 %    Eosinophils 0.10 0.00 - 6.90 %    Basophils 0.70 0.00 - 1.80 %    Immature Granulocytes 0.50 0.00 - 0.90 %    Nucleated RBC 0.00 /100 WBC    Neutrophils (Absolute) 8.31 (H) 2.00 - 7.15 K/uL    Lymphs (Absolute) 1.05 1.00 - 4.80 K/uL    Monos (Absolute) 1.20 (H) 0.00 - 0.85 K/uL    Eos (Absolute) 0.01 0.00 - 0.51 K/uL    Baso (Absolute) 0.08 0.00 - 0.12 K/uL    Immature Granulocytes (abs) 0.05 0.00 - 0.11 K/uL    NRBC (Absolute) 0.00 K/uL   COMP METABOLIC PANEL   Result Value Ref Range    Sodium 131 (L) 135 - 145 mmol/L    Potassium 3.5 (L) 3.6 - 5.5 mmol/L    Chloride 96 96 - 112 mmol/L    Co2 25 20 - 33 mmol/L    Anion Gap 10.0 7.0 - 16.0    Glucose 167 (H) 65 - 99 mg/dL    Bun 20 8 - 22 mg/dL    Creatinine 1.13 0.50 - 1.40 mg/dL    Calcium 9.1 8.5 - 10.5 mg/dL    AST(SGOT) 15 12 - 45 U/L    ALT(SGPT) 11 2 - 50 U/L    Alkaline Phosphatase 71 30 - 99 U/L    Total Bilirubin 0.6 0.1 - 1.5 mg/dL    Albumin 3.8 3.2 - 4.9 g/dL    Total Protein 6.7 6.0 - 8.2 g/dL    Globulin 2.9 1.9 - 3.5 g/dL    A-G Ratio 1.3 g/dL   TROPONIN   Result Value Ref Range    Troponin T 32 (H) 6 - 19 ng/L   APTT   Result Value Ref Range    APTT 30.4 24.7 - 36.0 sec   PROTHROMBIN TIME (INR)   Result Value Ref Range    PT 14.1 12.0 - 14.6 sec    INR 1.11 0.87 - 1.13   proBrain Natriuretic Peptide, NT   Result Value Ref Range    NT-proBNP 2025 (H) 0 - 125 pg/mL   ESTIMATED GFR   Result Value Ref Range    GFR (CKD-EPI) 48 (A) >60 mL/min/1.73 m 2    CORRECTED CALCIUM   Result Value Ref Range    Correct Calcium 9.3 8.5 - 10.5 mg/dL   EKG   Result Value Ref Range    Report       [facility] Emergency Dept.    Test Date:  2022  Pt Name:    MONICA TRIMBLE                 Department: ER  MRN:        [id number]                      Room:        22  Gender:     Female                       Technician: [id number]  :        [id number]                   Requested By:STACEY MANN  Order #:    [id number]                    Reading MD: STACEY MANN MD    Measurements  Intervals                                Axis  Rate:       78                           P:          41  KS:         186                          QRS:        -22  QRSD:       118                          T:          56  QT:         427  QTc:        487    Interpretive Statements  Sinus arrhythmia  LVH with secondary repolarization abnormality  Borderline prolonged QT interval  Baseline wander in lead(s) V2  Compared to ECG 2018 10:33:22  Early repolarization now present  Sinus rhythm no longer present  Electronically Signed On 2022 7:10:43 PST by STACEY MANN MD         Medications   morphine 4 MG/ML injection 2 mg (2 mg Intravenous Given 22 0545)   ampicillin/sulbactam (UNASYN) 3 g in  mL IVPB (has no administration in time range)   azithromycin (ZITHROMAX) injection 500 mg (has no administration in time range)   hydrALAZINE (APRESOLINE) injection 10 mg (has no administration in time range)   iohexol (OMNIPAQUE) 350 mg/mL (IV) (100 mL Intravenous Given 22 0600)     COURSE & MEDICAL DECISION MAKING  Patient presents with chest pain after trauma.  Has tenderness on examination.  She was noted to be hypoxic on arrival.  She has abnormal breath sounds.  Evaluation for trauma was undertaken.  Obtain CT scan of the head, cervical spine, chest abdomen pelvis.  Laboratory data was ordered.  Treat with morphine.  Monitor for improvement of blood  pressure.    EKG does not show acute ischemia.  LVH is present.  Pressure not improved.  Ordered hydralazine intravenously.    CT scan returned showing pneumonia.  Ordered antibiotics.  Patient has an elevated troponin and BNP.  This will need to be explored further on an inpatient basis while being treated for pneumonia.  Hospitalist was paged for admission.    FINAL IMPRESSION  1.  Hypoxic respiratory failure  2.  Pneumonia  3.  Chest wall contusion  4.  Elevated troponin  5.  Elevated BNP  6.  Hypertensive crisis    CRITICAL CARE TIME 35 minutes  There was a very real possibility of deterioration of the patient's condition.  This patient required the highest level of care.  I provided critical care services which included: review of the medical record, treatment orders, ordering and reviewing test results, frequent reevaluation of the patient's condition and response to treatment, as well as discussing the case with appropriate personnel and various consultants. The critical care time associated with the care of this patient is exclusive of any procedures or specific interventions.      This dictation was created using voice recognition software. The accuracy of the dictation is limited to the abilities of the software.  The nursing notes were reviewed and certain aspects of this information were incorporated into this note.      Electronically signed by: Damine Muniz M.D., 12/26/2022 5:40 AM

## 2022-12-26 NOTE — H&P
Hospital Medicine History & Physical Note    Date of Service  12/26/2022    Primary Care Physician  LIBERTY Dyer      Code Status  Full Code    Chief Complaint  Chief Complaint   Patient presents with    T-5000 GLF       History of Presenting Illness  Lizzette Burton is a 82 y.o. female who presented 12/26/2022 with fall. Patient with history of hypertension, presents with fall. Patient reports she had fall a few days ago and was told she had rib fractures when seen at [facility], she was then discharged home. Today she states she fell again yesterday and continues to have chest pain. She also reports cough with productive sputum, but states her breathing feels okay. She denies fever but states she is very cold. She denies pain anywhere else.  In the ED, RR 20, max /93, on 4L oxygen NC. WBC 10.7, K 3.5, troponin T 32, BNP 2000, CT chest shows left upper lobe opacity, subsegmental right middle lobe atelectasis, no acute trauma abnormalities. CT head with no acute abnormalities, CT c spine with degenerative changes. EKG shows normal sinus rhythm with no acute ischemic changes. Patient will be admitted for acute hypoxia due to pneumonia.    I discussed the plan of care with patient.    Review of Systems  Review of Systems   Constitutional:  Positive for chills. Negative for fever.   Respiratory:  Positive for cough and sputum production. Negative for shortness of breath and wheezing.    Cardiovascular:  Positive for chest pain. Negative for palpitations and leg swelling.   Gastrointestinal:  Negative for abdominal pain, nausea and vomiting.   Genitourinary:  Negative for dysuria and urgency.   Musculoskeletal:  Positive for falls. Negative for back pain.   Neurological:  Negative for dizziness, sensory change, focal weakness, loss of consciousness and headaches.   Psychiatric/Behavioral:  Negative for substance abuse. The patient does not have insomnia.      Past Medical History   has a past  medical history of Acute renal failure (HCC) (2/11/2014), Anesthesia, Angina, Arthritis, Asthma, Bowel habit changes, Breath shortness, CATARACT, Dental disorder, Glaucoma, Heart burn, Hypertension, Indigestion, Pneumonia (03/2018), Sepsis (HCC) (2/8/2014), Stroke (HCC), Unspecified disorder of thyroid, Unspecified hemorrhagic conditions (2009), and Urinary incontinence.    Surgical History   has a past surgical history that includes hysterectomy, total abdominal (1989); gyn surgery; trabeculectomy (9/26/2012); cataract phaco with iol (9/26/2012); knee arthroplasty total (2/5/2009); baerveldt implant (12/12/2012); baerveldt implant (3/13/2013); baerveldt implant (5/8/2013); cataract phaco with iol (5/8/2013); knee arthroplasty total (Left, 8/20/2015); anterior and posterior repair (3/20/2017); enterocele repair (3/20/2017); bladder sling female (3/20/2017); vaginal suspension (3/20/2017); and shoulder arthroplasty total (Right, 5/2/2018).     Family History  family history is not on file.   Family history reviewed with patient. There is no family history that is pertinent to the chief complaint.     Social History   reports that she quit smoking about 20 years ago. Her smoking use included cigarettes. She has a 10.00 pack-year smoking history. She has never used smokeless tobacco. She reports that she does not drink alcohol and does not use drugs.    Allergies  No Known Allergies    Medications  Prior to Admission Medications   Prescriptions Last Dose Informant Patient Reported? Taking?   MYRBETRIQ 25 MG TABLET SR 24 HR 12/25/2022 at AM Family Member Yes No   Sig: Take 25 mg by mouth every day.   ibuprofen (MOTRIN) 200 MG Tab 12/24/2022 at PM Family Member Yes Yes   Sig: Take 400 mg by mouth every 6 hours as needed for Mild Pain or Inflammation.   levothyroxine (SYNTHROID) 100 MCG Tab 12/25/2022 at AM Family Member Yes No   Sig: Take 100 mcg by mouth every morning before breakfast.   losartan (COZAAR) 100 MG Tab  12/25/2022 at AM Family Member Yes No   Sig: Take 100 mg by mouth every day.   methocarbamol (ROBAXIN) 750 MG Tab 12/25/2022 at PM Family Member Yes No   Sig: Take 1,500 mg by mouth 3 times a day as needed for Muscle Spasms.   methylPREDNISolone (MEDROL DOSEPAK) 4 MG Tablet Therapy [facility] Family [facility] No No   Sig: Follow schedule on package instructions.      Facility-Administered Medications: None       Physical Exam  Temp:  [36.9 °C (98.5 °F)] 36.9 °C (98.5 °F)  Pulse:  [78-82] 78  Resp:  [15-49] 49  BP: (127-213)/(88-93) 213/93  SpO2:  [88 %-90 %] 90 %  Blood Pressure : (!) 213/93   Temperature: 36.9 °C (98.5 °F)   Pulse: 78   Respiration: (!) 49   Pulse Oximetry: 90 %       Physical Exam  Vitals reviewed.   Constitutional:       General: She is in acute distress.      Appearance: She is not diaphoretic.   HENT:      Head: Normocephalic and atraumatic.      Right Ear: External ear normal.      Left Ear: External ear normal.   Eyes:      Extraocular Movements: Extraocular movements intact.      Pupils: Pupils are equal, round, and reactive to light.   Cardiovascular:      Rate and Rhythm: Normal rate and regular rhythm.   Pulmonary:      Effort: Pulmonary effort is normal. No respiratory distress.      Breath sounds: Rhonchi present. No wheezing or rales.   Abdominal:      General: Bowel sounds are normal. There is no distension.      Palpations: Abdomen is soft.      Tenderness: There is no abdominal tenderness. There is no guarding.   Musculoskeletal:         General: No swelling. Normal range of motion.      Cervical back: Normal range of motion and neck supple.      Right lower leg: No edema.      Left lower leg: No edema.   Skin:     General: Skin is warm and dry.   Neurological:      General: No focal deficit present.      Mental Status: She is alert and oriented to person, place, and time.      Cranial Nerves: No cranial nerve deficit.      Sensory: No sensory deficit.      Motor: No  weakness.   Psychiatric:         Mood and Affect: Mood normal.         Behavior: Behavior normal.       Laboratory:  Recent Labs     12/26/22 0512   WBC 10.7   RBC 4.05*   HEMOGLOBIN 12.8   HEMATOCRIT 37.5   MCV 92.6   MCH 31.6   MCHC 34.1   RDW 45.4   PLATELETCT 256   MPV 8.7*     Recent Labs     12/26/22 0512   SODIUM 131*   POTASSIUM 3.5*   CHLORIDE 96   CO2 25   GLUCOSE 167*   BUN 20   CREATININE 1.13   CALCIUM 9.1     Recent Labs     12/26/22 0512   ALTSGPT 11   ASTSGOT 15   ALKPHOSPHAT 71   TBILIRUBIN 0.6   GLUCOSE 167*     Recent Labs     12/26/22 0512   APTT 30.4   INR 1.11     Recent Labs     12/26/22 0512   NTPROBNP 2025*         Recent Labs     12/26/22 0512   TROPONINT 32*       Imaging:  CT-CHEST,ABDOMEN,PELVIS WITH   Final Result      1.  No definite acute traumatic abnormality.   2.  Left upper lobe perihilar opacity suspicious for pneumonitis.   3.  Subsegmental right middle lobe atelectasis.   4.  Additional findings as detailed.      CT-CSPINE WITHOUT PLUS RECONS   Final Result      No acute fracture or dislocation cervical spine.      Degenerative changes.      CT-HEAD W/O   Final Result      1.  Chronic microvascular ischemic type changes.   2.  No acute intracranial abnormality.         EC-ECHOCARDIOGRAM COMPLETE W/O CONT    (Results Pending)       EKG:  I have personally reviewed the images and compared with prior images.    Assessment/Plan:  Justification for Admission Status  I anticipate this patient will require at least two midnights for appropriate medical management, necessitating inpatient admission because treatment of hypoxia and pneumonia is expected to take >2 midnights.    Patient will need a Telemetry bed on MEDICAL service .  The need is secondary to continued IV antibiotics and treatment.    * Pneumonia due to infectious organism- (present on admission)  Assessment & Plan  Presents for fall and chest pain, she reports fall few days ago, dx with rib fracture at [facility] and  discharged home, fell again yesterday and presents for chest pain after fall  CT chest shows pneumonia, no displaced rib fractures  Troponin mildly elevated, EKG with no acute ischemic changes  Bruising on anterior chest, she states chest pain is by bruising and left lateral body wall  Continue antibiotics for pneumonia  Pain control, incentive spirometry  Trend troponin, monitor on telemetry  Wean oxygen as tolerated    Hypothyroidism- (present on admission)  Assessment & Plan  Continue home thyroid supplement  TSH checked few days ago normal    Hypoxia- (present on admission)  Assessment & Plan  Likely due to combination of pneumonia and atelectasis in setting of rib fracture pain  On 4L oxygen on presentation, wean as tolerated  Antibiotics and pain control    Hypokalemia- (present on admission)  Assessment & Plan  K 3.5 on presentation  Replete, monitor    HTN (hypertension)- (present on admission)  Assessment & Plan  Hypertensive urgency on presentation, likely related to pain and discomfort  Continue home losartan  HTN prn's  Pain control        VTE prophylaxis: Xarelto 10 mg daily as prophylaxis

## 2022-12-26 NOTE — ED NOTES
Med rec completed per patients daughterMaritza, on landline, [phone]  Interviewed patient at bedside with Language Line Interpretor  Adeel // [id number]  Patient stated daughter handles all medications  Allergies reviewed with Maritza  No PO antibiotics in the last 30 days

## 2022-12-26 NOTE — LETTER
December 28, 2022    To Whom It May Concern:         This is confirmation that Lizzette Burton has been hospitalized since 12/26/22 to present date. Her daughter She is her main caregiver and has been taking care of her as well while in the hospital.          If you have any questions please do not hesitate to call me at the phone number listed below.    Sincerely,          Kerry Acosta M.D.

## 2022-12-27 PROBLEM — S22.49XA RIB FRACTURES: Status: ACTIVE | Noted: 2022-12-27

## 2022-12-27 PROBLEM — R29.6 RECURRENT FALLS: Status: ACTIVE | Noted: 2022-12-27

## 2022-12-27 LAB
ANION GAP SERPL CALC-SCNC: 13 MMOL/L (ref 7–16)
BUN SERPL-MCNC: 22 MG/DL (ref 8–22)
CALCIUM SERPL-MCNC: 8.6 MG/DL (ref 8.5–10.5)
CHLORIDE SERPL-SCNC: 99 MMOL/L (ref 96–112)
CO2 SERPL-SCNC: 22 MMOL/L (ref 20–33)
CREAT SERPL-MCNC: 1.13 MG/DL (ref 0.5–1.4)
ERYTHROCYTE [DISTWIDTH] IN BLOOD BY AUTOMATED COUNT: 47.2 FL (ref 35.9–50)
FLUAV RNA SPEC QL NAA+PROBE: NEGATIVE
FLUBV RNA SPEC QL NAA+PROBE: NEGATIVE
GFR SERPLBLD CREATININE-BSD FMLA CKD-EPI: 48 ML/MIN/1.73 M 2
GLUCOSE SERPL-MCNC: 123 MG/DL (ref 65–99)
HCT VFR BLD AUTO: 34.5 % (ref 37–47)
HGB BLD-MCNC: 11.4 G/DL (ref 12–16)
MAGNESIUM SERPL-MCNC: 2 MG/DL (ref 1.5–2.5)
MCH RBC QN AUTO: 31.5 PG (ref 27–33)
MCHC RBC AUTO-ENTMCNC: 33 G/DL (ref 33.6–35)
MCV RBC AUTO: 95.3 FL (ref 81.4–97.8)
PHOSPHATE SERPL-MCNC: 3.2 MG/DL (ref 2.5–4.5)
PLATELET # BLD AUTO: 230 K/UL (ref 164–446)
PMV BLD AUTO: 8.7 FL (ref 9–12.9)
POTASSIUM SERPL-SCNC: 3.6 MMOL/L (ref 3.6–5.5)
PROCALCITONIN SERPL-MCNC: 1.47 NG/ML
RBC # BLD AUTO: 3.62 M/UL (ref 4.2–5.4)
RSV RNA SPEC QL NAA+PROBE: NEGATIVE
SARS-COV-2 RNA RESP QL NAA+PROBE: NOTDETECTED
SODIUM SERPL-SCNC: 134 MMOL/L (ref 135–145)
SPECIMEN SOURCE: NORMAL
WBC # BLD AUTO: 10.6 K/UL (ref 4.8–10.8)

## 2022-12-27 PROCEDURE — 94640 AIRWAY INHALATION TREATMENT: CPT

## 2022-12-27 PROCEDURE — 700102 HCHG RX REV CODE 250 W/ 637 OVERRIDE(OP)

## 2022-12-27 PROCEDURE — 94760 N-INVAS EAR/PLS OXIMETRY 1: CPT

## 2022-12-27 PROCEDURE — 97162 PT EVAL MOD COMPLEX 30 MIN: CPT

## 2022-12-27 PROCEDURE — 700101 HCHG RX REV CODE 250: Performed by: STUDENT IN AN ORGANIZED HEALTH CARE EDUCATION/TRAINING PROGRAM

## 2022-12-27 PROCEDURE — 770020 HCHG ROOM/CARE - TELE (206)

## 2022-12-27 PROCEDURE — A9270 NON-COVERED ITEM OR SERVICE: HCPCS

## 2022-12-27 PROCEDURE — 85027 COMPLETE CBC AUTOMATED: CPT

## 2022-12-27 PROCEDURE — 97535 SELF CARE MNGMENT TRAINING: CPT

## 2022-12-27 PROCEDURE — 94669 MECHANICAL CHEST WALL OSCILL: CPT

## 2022-12-27 PROCEDURE — 99232 SBSQ HOSP IP/OBS MODERATE 35: CPT | Mod: GC | Performed by: INTERNAL MEDICINE

## 2022-12-27 PROCEDURE — A9270 NON-COVERED ITEM OR SERVICE: HCPCS | Performed by: STUDENT IN AN ORGANIZED HEALTH CARE EDUCATION/TRAINING PROGRAM

## 2022-12-27 PROCEDURE — 84145 PROCALCITONIN (PCT): CPT

## 2022-12-27 PROCEDURE — 36415 COLL VENOUS BLD VENIPUNCTURE: CPT

## 2022-12-27 PROCEDURE — 83735 ASSAY OF MAGNESIUM: CPT

## 2022-12-27 PROCEDURE — 84100 ASSAY OF PHOSPHORUS: CPT

## 2022-12-27 PROCEDURE — 83036 HEMOGLOBIN GLYCOSYLATED A1C: CPT

## 2022-12-27 PROCEDURE — C9803 HOPD COVID-19 SPEC COLLECT: HCPCS

## 2022-12-27 PROCEDURE — 700101 HCHG RX REV CODE 250

## 2022-12-27 PROCEDURE — 80048 BASIC METABOLIC PNL TOTAL CA: CPT

## 2022-12-27 PROCEDURE — 0241U HCHG SARS-COV-2 COVID-19 NFCT DS RESP RNA 4 TRGT MIC: CPT

## 2022-12-27 PROCEDURE — 700111 HCHG RX REV CODE 636 W/ 250 OVERRIDE (IP): Performed by: STUDENT IN AN ORGANIZED HEALTH CARE EDUCATION/TRAINING PROGRAM

## 2022-12-27 PROCEDURE — 700102 HCHG RX REV CODE 250 W/ 637 OVERRIDE(OP): Performed by: STUDENT IN AN ORGANIZED HEALTH CARE EDUCATION/TRAINING PROGRAM

## 2022-12-27 RX ORDER — DOXYCYCLINE 100 MG/1
100 TABLET ORAL EVERY 12 HOURS
Status: COMPLETED | OUTPATIENT
Start: 2022-12-28 | End: 2022-12-28

## 2022-12-27 RX ORDER — HYDROCODONE BITARTRATE AND HOMATROPINE METHYLBROMIDE ORAL SOLUTION 5; 1.5 MG/5ML; MG/5ML
5 LIQUID ORAL EVERY 4 HOURS
Status: DISCONTINUED | OUTPATIENT
Start: 2022-12-27 | End: 2022-12-28

## 2022-12-27 RX ORDER — POTASSIUM CHLORIDE 20 MEQ/1
40 TABLET, EXTENDED RELEASE ORAL ONCE
Status: COMPLETED | OUTPATIENT
Start: 2022-12-27 | End: 2022-12-27

## 2022-12-27 RX ORDER — FAMOTIDINE 20 MG/1
20 TABLET, FILM COATED ORAL DAILY
Status: DISCONTINUED | OUTPATIENT
Start: 2022-12-27 | End: 2023-01-03 | Stop reason: HOSPADM

## 2022-12-27 RX ORDER — FLUTICASONE PROPIONATE 50 MCG
2 SPRAY, SUSPENSION (ML) NASAL
Status: DISCONTINUED | OUTPATIENT
Start: 2022-12-27 | End: 2023-01-03 | Stop reason: HOSPADM

## 2022-12-27 RX ORDER — ALBUTEROL SULFATE 90 UG/1
2 AEROSOL, METERED RESPIRATORY (INHALATION)
Status: DISCONTINUED | OUTPATIENT
Start: 2022-12-27 | End: 2022-12-29 | Stop reason: ALTCHOICE

## 2022-12-27 RX ORDER — LIDOCAINE 50 MG/G
1 PATCH TOPICAL EVERY 24 HOURS
Status: DISCONTINUED | OUTPATIENT
Start: 2022-12-27 | End: 2023-01-03 | Stop reason: HOSPADM

## 2022-12-27 RX ORDER — DEXTROMETHORPHAN POLISTIREX 30 MG/5ML
60 SUSPENSION ORAL 2 TIMES DAILY
Status: DISCONTINUED | OUTPATIENT
Start: 2022-12-27 | End: 2022-12-28

## 2022-12-27 RX ADMIN — ALBUTEROL SULFATE 2 PUFF: 90 AEROSOL, METERED RESPIRATORY (INHALATION) at 15:10

## 2022-12-27 RX ADMIN — GUAIFENESIN 600 MG: 600 TABLET, EXTENDED RELEASE ORAL at 05:29

## 2022-12-27 RX ADMIN — ALBUTEROL SULFATE 2 PUFF: 90 AEROSOL, METERED RESPIRATORY (INHALATION) at 21:58

## 2022-12-27 RX ADMIN — OXYBUTYNIN CHLORIDE 5 MG: 5 TABLET, EXTENDED RELEASE ORAL at 17:33

## 2022-12-27 RX ADMIN — ACETAMINOPHEN 650 MG: 325 TABLET, FILM COATED ORAL at 06:09

## 2022-12-27 RX ADMIN — LOSARTAN POTASSIUM 100 MG: 50 TABLET, FILM COATED ORAL at 05:28

## 2022-12-27 RX ADMIN — DOCUSATE SODIUM 50 MG AND SENNOSIDES 8.6 MG 2 TABLET: 8.6; 5 TABLET, FILM COATED ORAL at 05:27

## 2022-12-27 RX ADMIN — DEXTROMETHORPHAN POLISTIREX 60 MG: 30 SUSPENSION, EXTENDED RELEASE ORAL at 17:35

## 2022-12-27 RX ADMIN — GUAIFENESIN 600 MG: 600 TABLET, EXTENDED RELEASE ORAL at 17:34

## 2022-12-27 RX ADMIN — HYDROCODONE BITARTRATE AND HOMATROPINE METHYLBROMIDE 5 ML: 5; 1.5 SOLUTION ORAL at 18:47

## 2022-12-27 RX ADMIN — HYDROCODONE BITARTRATE AND HOMATROPINE METHYLBROMIDE 5 ML: 5; 1.5 SOLUTION ORAL at 14:12

## 2022-12-27 RX ADMIN — LIDOCAINE 1 PATCH: 50 PATCH TOPICAL at 09:48

## 2022-12-27 RX ADMIN — RIVAROXABAN 10 MG: 10 TABLET, FILM COATED ORAL at 17:34

## 2022-12-27 RX ADMIN — CEFTRIAXONE SODIUM 1000 MG: 10 INJECTION, POWDER, FOR SOLUTION INTRAVENOUS at 05:29

## 2022-12-27 RX ADMIN — POTASSIUM CHLORIDE 40 MEQ: 1500 TABLET, EXTENDED RELEASE ORAL at 08:25

## 2022-12-27 RX ADMIN — ALBUTEROL SULFATE 2 PUFF: 90 AEROSOL, METERED RESPIRATORY (INHALATION) at 18:49

## 2022-12-27 RX ADMIN — FAMOTIDINE 20 MG: 20 TABLET, FILM COATED ORAL at 12:38

## 2022-12-27 RX ADMIN — LEVOTHYROXINE SODIUM 100 MCG: 0.1 TABLET ORAL at 05:29

## 2022-12-27 RX ADMIN — AZITHROMYCIN MONOHYDRATE 500 MG: 250 TABLET ORAL at 09:47

## 2022-12-27 ASSESSMENT — COGNITIVE AND FUNCTIONAL STATUS - GENERAL
PERSONAL GROOMING: A LOT
TOILETING: A LOT
SUGGESTED CMS G CODE MODIFIER MOBILITY: CL
MOBILITY SCORE: 10
DRESSING REGULAR LOWER BODY CLOTHING: A LOT
SUGGESTED CMS G CODE MODIFIER MOBILITY: CL
MOVING FROM LYING ON BACK TO SITTING ON SIDE OF FLAT BED: A LOT
CLIMB 3 TO 5 STEPS WITH RAILING: TOTAL
MOBILITY SCORE: 11
STANDING UP FROM CHAIR USING ARMS: A LOT
STANDING UP FROM CHAIR USING ARMS: A LOT
TURNING FROM BACK TO SIDE WHILE IN FLAT BAD: A LOT
WALKING IN HOSPITAL ROOM: A LOT
HELP NEEDED FOR BATHING: A LOT
EATING MEALS: A LOT
MOVING TO AND FROM BED TO CHAIR: A LOT
DRESSING REGULAR UPPER BODY CLOTHING: A LOT
DAILY ACTIVITIY SCORE: 12
CLIMB 3 TO 5 STEPS WITH RAILING: TOTAL
TURNING FROM BACK TO SIDE WHILE IN FLAT BAD: A LOT
MOVING FROM LYING ON BACK TO SITTING ON SIDE OF FLAT BED: A LOT
WALKING IN HOSPITAL ROOM: TOTAL
MOVING TO AND FROM BED TO CHAIR: A LOT
SUGGESTED CMS G CODE MODIFIER DAILY ACTIVITY: CL

## 2022-12-27 ASSESSMENT — ENCOUNTER SYMPTOMS
NAUSEA: 0
HEADACHES: 0
FEVER: 0
VOMITING: 0
DIZZINESS: 1
FOCAL WEAKNESS: 0
INSOMNIA: 0
SPUTUM PRODUCTION: 1
SENSORY CHANGE: 0
SHORTNESS OF BREATH: 0
LOSS OF CONSCIOUSNESS: 0
ABDOMINAL PAIN: 0
COUGH: 1
WHEEZING: 0
SORE THROAT: 0
BACK PAIN: 0
CHILLS: 1
PALPITATIONS: 0
FALLS: 1

## 2022-12-27 ASSESSMENT — GAIT ASSESSMENTS
DEVIATION: BRADYKINETIC;INCREASED BASE OF SUPPORT;SHUFFLED GAIT
DISTANCE (FEET): 8
ASSISTIVE DEVICE: FRONT WHEEL WALKER
GAIT LEVEL OF ASSIST: MINIMAL ASSIST

## 2022-12-27 ASSESSMENT — LIFESTYLE VARIABLES: SUBSTANCE_ABUSE: 0

## 2022-12-27 NOTE — CARE PLAN
The patient is Stable - Low risk of patient condition declining or worsening    Shift Goals  Clinical Goals: Decrease work of breathing, Pain management  Patient Goals: Rest  Family Goals: Get her home    Progress made toward(s) clinical / shift goals:    Problem: Skin Integrity  Goal: Skin integrity is maintained or improved  Outcome: Progressing     Problem: Fall Risk  Goal: Patient will remain free from falls  Outcome: Progressing     Problem: Pain - Standard  Goal: Alleviation of pain or a reduction in pain to the patient’s comfort goal  Outcome: Progressing       Patient is not progressing towards the following goals:

## 2022-12-27 NOTE — PROGRESS NOTES
4 Eyes Skin Assessment Completed by JOSSIE Beavers and JOSSIE Perez.    Head WDL  Ears Blanching  Nose WDL  Mouth WDL  Neck WDL  Breast/Chest WDL  Shoulder Blades WDL  Spine WDL  (R) Arm/Elbow/Hand WDL  (L) Arm/Elbow/Hand WDL  Abdomen WDL  Groin Redness and Blanching  Scrotum/Coccyx/Buttocks Redness and Blanching  (R) Leg WDL  (L) Leg WDL  (R) Heel/Foot/Toe Redness and Blanching  (L) Heel/Foot/Toe Redness and Blanching          Devices In Places Tele Box, Pulse Ox, and Oxy Mask      Interventions In Place Waffle Overlay, Pillows, Q2 Turns, Barrier Cream, and Heels Loaded W/Pillows    Possible Skin Injury No    Pictures Uploaded Into Epic N/A  Wound Consult Placed N/A  RN Wound Prevention Protocol Ordered Yes

## 2022-12-27 NOTE — CARE PLAN
Problem: Bronchoconstriction  Goal: Improve in air movement and diminished wheezing  Description: Target End Date:  2 to 3 days    1.  Implement inhaled treatments  2.  Evaluate and manage medication effects  Outcome: Progressing  Flowsheets (Taken 12/27/2022 1520)  Bronchodilator Goals/Outcome: Patient at Stable Baseline  Bronchodilator Indications: Strong Subjective / Objective Improvement      Respiratory Update    Treatment modality: ALB  Frequency: Q4    Pt tolerating current treatments well with no adverse reactions.       Problem: Hyperinflation  Goal: Prevent or improve atelectasis  Description: Target End Date:  3 to 4 days    1. Instruct incentive spirometry usage  2.  Perform hyperinflation therapy as indicated  Outcome: Progressing  Flowsheets (Taken 12/26/2022 1715 by Elizabeth Perkins, NAVNEET)  Hyperinflation Protocol Goals/Outcome: Improvement in Repeat CXR  Hyperinflation Protocol Indications: Pneumonia      Respiratory Update    Treatment modality: PEP/IPV  Frequency: QID      Pt tolerating current treatments well with no adverse reactions.

## 2022-12-27 NOTE — PROGRESS NOTES
[facility] Internal Medicine Daily Progress Note    Date of Service  12/27/2022    R Team: R IM Green Team   Attending: Eber Lisa M.d.  Senior Resident: Dr. Levine  Intern:  Dr. Garcia  Contact Number: [phone]    Chief Complaint  Lizzette Burton is a 82 y.o. female admitted 12/26/2022 with acute hypoxia due to pneumonia.    Hospital Course  82 y.o. female with history of HTN, previous tobacco dependence (10 pack year, quit 20 years ago), arthritis (s/p knee surgery), asthma, hypothyroidism and urinary rentention who presented 12/26/2022 with GLF morning of admission. Also s/p GLF few days ago at which time she was seen by NN and told she had rib fractures. Presented to ED on 12/16 due to progressively worsening coughing with yellow sputum and musculoskeletal chest pain (L  > R) related to rib fractures. Also has been feeling some chills and subjective fever.     In ED, afebrile and normal HR at 78 however tachypneic at 49, hypertensive to 213/93. Required up to 4L oxygen (none at baseline). Labs remarkable for leukocytosis to 10.7, hyponatremia to 131, hypokalemia to 3.5, hyperglycemia to 167,  elevated trop 32, elevated BNP to 2025. Imaging remarkable for CT chest showing COURTNEY opacity and subsegmental RML atelectasis without acute trauma abnormalities. CT head unremarkable for acute abnormalities and CT C spine also remarkable apart from age related degenerative changes. Exam remarkable for rhonchi throughout and bruising over left anterior ribs. EKG normal sinus rhythm without acute ischemic changes however slightly elevated qtc at 487.Given IV unasyn 3g x1 and IV azithromycin 500mg.     Interval Problem Update    No acute events overnight. Seen at bedside this morning with son Norberto and daughter in law She who helped with translation as patient is only Citizen of [place] speaking. Also help with translation from Dr. Acosta who is a also a resident on medical team. She asking for work note stating she needs to take a  few weeks off in order to take care of the patient. Communicated to her that the medical team can only state that patient has been admitted to [facility] on 12/26/2022 and nothing else and she expressed understanding.    Patient continues to have wet coughing with sputum production. Required 7L oxymask at night as she is a mouth breather and desatting during sleep on nasal cannula. WBC 10.6 from 10.7. Continues to have L sided rib pain worse with coughing or leaning forward. On exam, there is an area of yellow-purple bruising over left rib cage. Coarse crackles and diminished breath sounds throughout all lung fields.    RVP negative for covid or influenza. Procal this morning elevated to 1.47. Started on famotidine. Switched azithromycin to doxycycline given prolonged qtc at 487, c/w IV rocephin. Pending blood cx. Started on scheduled albuterol, hydrocodone-homotropine, dextrometorphan, flonase prn and c/w IS, RT protocol, mucinex. Nurse will obtain orthostatic vitals today given history of recent falls. Provided with lidocaine patch for chest pain likely MSK etiology due to rib fractures. Reminded patient that she can ask for oxycodone and morphine if pain.    I have discussed this patient's plan of care and discharge plan at IDT rounds today with Case Management, Nursing, Nursing leadership, and other members of the IDT team.    Consultants/Specialty  PT/OT  RT    Code Status  Full Code    Disposition  Patient is not medically cleared for discharge.   Anticipate discharge to to home with organized home healthcare and close outpatient follow-up/SNF.  I have placed the appropriate orders for post-discharge needs.    Review of Systems  Review of Systems   Constitutional:  Positive for chills. Negative for fever.   HENT:  Positive for congestion. Negative for sore throat.    Respiratory:  Positive for cough and sputum production. Negative for shortness of breath and wheezing.    Cardiovascular:  Positive for chest pain  and leg swelling. Negative for palpitations.   Gastrointestinal:  Negative for abdominal pain, nausea and vomiting.   Genitourinary:  Negative for dysuria and urgency.   Musculoskeletal:  Positive for falls. Negative for back pain.        Rib pain R>L   Neurological:  Positive for dizziness. Negative for sensory change, focal weakness, loss of consciousness and headaches.   Psychiatric/Behavioral:  Negative for substance abuse. The patient does not have insomnia.    All other systems reviewed and are negative.     Physical Exam  Temp:  [36.4 °C (97.5 °F)-37.8 °C (100.1 °F)] 36.7 °C (98 °F)  Pulse:  [73-98] 80  Resp:  [18-20] 19  BP: (119-168)/(67-92) 146/67  SpO2:  [91 %-99 %] 96 %    Physical Exam  Vitals and nursing note reviewed. Exam conducted with a chaperone present (son, daughter in law).   Constitutional:       General: She is not in acute distress.     Appearance: She is not diaphoretic.   HENT:      Head: Normocephalic and atraumatic.      Right Ear: External ear normal.      Left Ear: External ear normal.   Eyes:      Extraocular Movements: Extraocular movements intact.      Pupils: Pupils are equal, round, and reactive to light.   Cardiovascular:      Rate and Rhythm: Normal rate and regular rhythm.   Pulmonary:      Effort: Pulmonary effort is normal. No respiratory distress.      Breath sounds: Rhonchi and rales present. No wheezing.   Abdominal:      General: Bowel sounds are normal. There is no distension.      Palpations: Abdomen is soft.      Tenderness: There is no abdominal tenderness. There is no guarding.   Musculoskeletal:         General: Tenderness (rib cage R > L) present. No swelling. Normal range of motion.      Cervical back: Normal range of motion and neck supple.      Right lower leg: Edema (1+ pitting) present.      Left lower leg: Edema (1+ pitting) present.   Skin:     General: Skin is warm and dry.   Neurological:      General: No focal deficit present.      Mental Status: She is  alert and oriented to person, place, and time.      Cranial Nerves: No cranial nerve deficit.      Sensory: No sensory deficit.      Motor: No weakness.   Psychiatric:         Mood and Affect: Mood normal.         Behavior: Behavior normal.       Fluids    Intake/Output Summary (Last 24 hours) at 12/27/2022 1957  Last data filed at 12/27/2022 1100  Gross per 24 hour   Intake 120 ml   Output --   Net 120 ml       Laboratory  Recent Labs     12/26/22 0512 12/27/22 0018   WBC 10.7 10.6   RBC 4.05* 3.62*   HEMOGLOBIN 12.8 11.4*   HEMATOCRIT 37.5 34.5*   MCV 92.6 95.3   MCH 31.6 31.5   MCHC 34.1 33.0*   RDW 45.4 47.2   PLATELETCT 256 230   MPV 8.7* 8.7*     Recent Labs     12/26/22 0512 12/27/22 0018   SODIUM 131* 134*   POTASSIUM 3.5* 3.6   CHLORIDE 96 99   CO2 25 22   GLUCOSE 167* 123*   BUN 20 22   CREATININE 1.13 1.13   CALCIUM 9.1 8.6     Recent Labs     12/26/22 0512   APTT 30.4   INR 1.11               Imaging  EC-ECHOCARDIOGRAM COMPLETE W/O CONT   Final Result      CT-CHEST,ABDOMEN,PELVIS WITH   Final Result      1.  No definite acute traumatic abnormality.   2.  Left upper lobe perihilar opacity suspicious for pneumonitis.   3.  Subsegmental right middle lobe atelectasis.   4.  Additional findings as detailed.      CT-CSPINE WITHOUT PLUS RECONS   Final Result      No acute fracture or dislocation cervical spine.      Degenerative changes.      CT-HEAD W/O   Final Result      1.  Chronic microvascular ischemic type changes.   2.  No acute intracranial abnormality.              Assessment/Plan  Problem Representation:    * Pneumonia due to infectious organism- (present on admission)  Assessment & Plan  Presents for fall and chest pain, she reports fall few days ago, dx with rib fracture at [facility] and discharged home, fell again yesterday and presents for chest pain after fall  CT chest shows pneumonia, no displaced rib fractures  Troponin mildly elevated, EKG with no acute ischemic changes  Bruising on anterior  chest, she states chest pain is by bruising and left lateral body wall  Pain control, incentive spirometry  Trend troponin, monitor on telemetry  Wean oxygen as tolerated  C/w C3, doxy (12/26- ), pending blood cx    Recurrent falls  Assessment & Plan  Orthostatic vitals  PT/OT eval    Rib fractures  Assessment & Plan  Sustained prior to admission due to GLF  Pain control with prn tylenol, oxy, morphine  Lidocaine patch provided    Hypoxia- (present on admission)  Assessment & Plan  Likely due to combination of pneumonia and atelectasis in setting of rib fracture pain  On 4L oxygen on presentation, wean as tolerated  Antibiotics and pain control    Hypokalemia- (present on admission)  Assessment & Plan  K 3.5 on presentation  Replete, monitor    HTN (hypertension)- (present on admission)  Assessment & Plan  Hypertensive urgency on presentation, likely related to pain and discomfort  Continue home losartan  HTN prn's  Pain control    Hypothyroidism- (present on admission)  Assessment & Plan  Continue home thyroid supplement  TSH checked few days ago normal       VTE prophylaxis: Xarelto 10 mg daily as prophylaxis    I have performed a physical exam and reviewed and updated ROS and Plan today (12/27/2022). In review of yesterday's note (12/26/2022), there are no changes except as documented above.

## 2022-12-27 NOTE — PROGRESS NOTES
Pt rested quietly throughout the night. 02 sat % with oxymask 6L. Pt spiked slight temp 100.1 Tylenol 650 mg with cool compresses applied to forehead effective 98.6. Wet sounding weak cough non productive. Lungs sounds adventitious  Rales. No report of pain.

## 2022-12-27 NOTE — HOSPITAL COURSE
82 y.o. female with history of HTN, previous tobacco dependence (10 pack year, quit 20 years ago), arthritis (s/p knee surgery), asthma, hypothyroidism and urinary rentention who presented 12/26/2022 with GLF morning of admission. Also s/p GLF few days ago at which time she was seen by NN and told she had rib fractures. Presented to ED on 12/16 due to progressively worsening coughing with yellow sputum and musculoskeletal chest pain (L  > R) related to rib fractures. Also has been feeling some chills and subjective fever.     In ED, afebrile and normal HR at 78 however tachypneic at 49, hypertensive to 213/93. Required up to 4L oxygen (none at baseline). Labs remarkable for leukocytosis to 10.7, hyponatremia to 131, hypokalemia to 3.5, hyperglycemia to 167,  elevated trop 32, elevated BNP to 2025. Imaging remarkable for CT chest showing COURTNEY opacity and subsegmental RML atelectasis without acute trauma abnormalities. CT head unremarkable for acute abnormalities and CT C spine also remarkable apart from age related degenerative changes. Exam remarkable for rhonchi throughout and bruising over left anterior ribs. EKG normal sinus rhythm without acute ischemic changes however slightly elevated qtc at 487.Given IV unasyn 3g x1 and IV azithromycin 500mg.

## 2022-12-28 PROBLEM — R73.9 HYPERGLYCEMIA: Status: ACTIVE | Noted: 2022-12-28

## 2022-12-28 LAB
BACTERIA BLD CULT: ABNORMAL
BACTERIA BLD CULT: ABNORMAL
EST. AVERAGE GLUCOSE BLD GHB EST-MCNC: 114 MG/DL
HBA1C MFR BLD: 5.6 % (ref 4–5.6)
SIGNIFICANT IND 70042: ABNORMAL
SITE SITE: ABNORMAL
SOURCE SOURCE: ABNORMAL

## 2022-12-28 PROCEDURE — 700102 HCHG RX REV CODE 250 W/ 637 OVERRIDE(OP)

## 2022-12-28 PROCEDURE — 99232 SBSQ HOSP IP/OBS MODERATE 35: CPT | Mod: GC | Performed by: INTERNAL MEDICINE

## 2022-12-28 PROCEDURE — A9270 NON-COVERED ITEM OR SERVICE: HCPCS

## 2022-12-28 PROCEDURE — 700101 HCHG RX REV CODE 250

## 2022-12-28 PROCEDURE — 94669 MECHANICAL CHEST WALL OSCILL: CPT

## 2022-12-28 PROCEDURE — 700102 HCHG RX REV CODE 250 W/ 637 OVERRIDE(OP): Performed by: STUDENT IN AN ORGANIZED HEALTH CARE EDUCATION/TRAINING PROGRAM

## 2022-12-28 PROCEDURE — 94640 AIRWAY INHALATION TREATMENT: CPT

## 2022-12-28 PROCEDURE — 94760 N-INVAS EAR/PLS OXIMETRY 1: CPT

## 2022-12-28 PROCEDURE — A9270 NON-COVERED ITEM OR SERVICE: HCPCS | Performed by: STUDENT IN AN ORGANIZED HEALTH CARE EDUCATION/TRAINING PROGRAM

## 2022-12-28 PROCEDURE — 770020 HCHG ROOM/CARE - TELE (206)

## 2022-12-28 PROCEDURE — 700111 HCHG RX REV CODE 636 W/ 250 OVERRIDE (IP): Performed by: STUDENT IN AN ORGANIZED HEALTH CARE EDUCATION/TRAINING PROGRAM

## 2022-12-28 RX ORDER — DEXTROMETHORPHAN POLISTIREX 30 MG/5ML
60 SUSPENSION ORAL 2 TIMES DAILY PRN
Status: DISCONTINUED | OUTPATIENT
Start: 2022-12-28 | End: 2022-12-29

## 2022-12-28 RX ADMIN — DOXYCYCLINE 100 MG: 100 TABLET, FILM COATED ORAL at 05:27

## 2022-12-28 RX ADMIN — ALBUTEROL SULFATE 2 PUFF: 90 AEROSOL, METERED RESPIRATORY (INHALATION) at 14:11

## 2022-12-28 RX ADMIN — OXYBUTYNIN CHLORIDE 5 MG: 5 TABLET, EXTENDED RELEASE ORAL at 17:38

## 2022-12-28 RX ADMIN — ACETAMINOPHEN 650 MG: 325 TABLET, FILM COATED ORAL at 09:26

## 2022-12-28 RX ADMIN — HYDROCODONE BITARTRATE AND HOMATROPINE METHYLBROMIDE 5 ML: 5; 1.5 SOLUTION ORAL at 05:28

## 2022-12-28 RX ADMIN — DEXTROMETHORPHAN POLISTIREX 60 MG: 30 SUSPENSION, EXTENDED RELEASE ORAL at 05:28

## 2022-12-28 RX ADMIN — LEVOTHYROXINE SODIUM 100 MCG: 0.1 TABLET ORAL at 05:28

## 2022-12-28 RX ADMIN — DOXYCYCLINE 100 MG: 100 TABLET, FILM COATED ORAL at 17:36

## 2022-12-28 RX ADMIN — GUAIFENESIN 600 MG: 600 TABLET, EXTENDED RELEASE ORAL at 17:36

## 2022-12-28 RX ADMIN — GUAIFENESIN 600 MG: 600 TABLET, EXTENDED RELEASE ORAL at 05:27

## 2022-12-28 RX ADMIN — DEXTROMETHORPHAN 60 MG: 30 SUSPENSION, EXTENDED RELEASE ORAL at 17:37

## 2022-12-28 RX ADMIN — RIVAROXABAN 10 MG: 10 TABLET, FILM COATED ORAL at 17:37

## 2022-12-28 RX ADMIN — LIDOCAINE 1 PATCH: 50 PATCH TOPICAL at 09:31

## 2022-12-28 RX ADMIN — LOSARTAN POTASSIUM 100 MG: 50 TABLET, FILM COATED ORAL at 05:28

## 2022-12-28 RX ADMIN — ALBUTEROL SULFATE 2 PUFF: 90 AEROSOL, METERED RESPIRATORY (INHALATION) at 06:15

## 2022-12-28 RX ADMIN — DOCUSATE SODIUM 50 MG AND SENNOSIDES 8.6 MG 2 TABLET: 8.6; 5 TABLET, FILM COATED ORAL at 05:27

## 2022-12-28 RX ADMIN — OXYCODONE 5 MG: 5 TABLET ORAL at 20:25

## 2022-12-28 RX ADMIN — CEFTRIAXONE SODIUM 1000 MG: 10 INJECTION, POWDER, FOR SOLUTION INTRAVENOUS at 05:28

## 2022-12-28 RX ADMIN — FAMOTIDINE 20 MG: 20 TABLET, FILM COATED ORAL at 05:27

## 2022-12-28 RX ADMIN — ALBUTEROL SULFATE 2 PUFF: 90 AEROSOL, METERED RESPIRATORY (INHALATION) at 01:48

## 2022-12-28 RX ADMIN — DOCUSATE SODIUM 50 MG AND SENNOSIDES 8.6 MG 2 TABLET: 8.6; 5 TABLET, FILM COATED ORAL at 17:36

## 2022-12-28 RX ADMIN — ALBUTEROL SULFATE 2 PUFF: 90 AEROSOL, METERED RESPIRATORY (INHALATION) at 11:08

## 2022-12-28 ASSESSMENT — ENCOUNTER SYMPTOMS
FEVER: 0
WHEEZING: 0
VOMITING: 0
SENSORY CHANGE: 0
SHORTNESS OF BREATH: 0
FALLS: 1
PALPITATIONS: 0
ABDOMINAL PAIN: 0
NAUSEA: 0
FOCAL WEAKNESS: 0
BACK PAIN: 0
SPUTUM PRODUCTION: 1
HEADACHES: 0
INSOMNIA: 0
COUGH: 1
DIZZINESS: 1
CHILLS: 0
LOSS OF CONSCIOUSNESS: 0
SORE THROAT: 0

## 2022-12-28 ASSESSMENT — PAIN DESCRIPTION - PAIN TYPE: TYPE: ACUTE PAIN

## 2022-12-28 ASSESSMENT — LIFESTYLE VARIABLES: SUBSTANCE_ABUSE: 0

## 2022-12-28 NOTE — DISCHARGE PLANNING
Case Management Discharge Planning    Admission Date: 12/26/2022  GMLOS: 4  ALOS: 2    6-Clicks ADL Score: 12  6-Clicks Mobility Score: 11  PT and/or OT Eval ordered: Yes  Post-acute Referrals Ordered: Yes  Post-acute Choice Obtained: Yes  Has referral(s) been sent to post-acute provider:  Yes      Anticipated Discharge Dispo:      DME Needed: No    Action(s) Taken: Updated Provider/Nurse on Discharge Plan    Escalations Completed: None    Medically Clear: Yes    Next Steps: pending accepting facility    Barriers to Discharge: None    Is the patient up for discharge tomorrow: Yes    Is transport arranged for discharge disposition: Yes  CM visits w/ pt, dtr Denise and MALDONADO @ bedside. Dtr speaks english and conveys message to pt.   Anticipating DC tomorrow if medically cleared. Choice signed for SNF for rehab Choices [facility].   [employer] TCN here and will build auth to accepting facility w/ beds available.  Following rehab, family intends for her to return home with them in the household of son Norberto and some additional  oversight if indicated @ that time.   DC plan: SNF for rehab likely 12/29. Accepting facility pending.

## 2022-12-28 NOTE — PROGRESS NOTES
[facility] Internal Medicine Daily Progress Note    Date of Service  12/28/2022    [facility] Team: R IM Green Team   Attending: Eber Lisa M.d.  Senior Resident: Dr. Levine  Intern:  Dr. Acosta   Contact Number: [phone]    Chief Complaint  Lizzette Burton is a 82 y.o. female admitted 12/26/2022 with dyspnea and cough.    Hospital Course  Lizzette Burton is a 82 y.o. female who presented 12/26/2022 with fall. Patient with history of hypertension, hypothyroidism, osteoarthritis (s/p knee surgery, lumbar spondylosis), asthma, and chronic urinary incontinence. She presented to the ED after 2 GFL, first few days ago and the second one right before this admission, non-syncopal GLF, she was admitted due to dyspnea and thoracic pain. In the ED she was found to be tachypneic, hypertensive, requiring 4lt by NC, hyperglycemia to 167,  elevated trop 32, elevated BNP to 2025. Imaging remarkable for CT chest showing COURTNEY opacity and subsegmental RML atelectasis without acute trauma abnormalities. CT head unremarkable for acute abnormalities and CT C spine also remarkable apart from age related degenerative changes. Given acute hypoxic respiratory failure, along with cough, sputum, elevated procalcitonin and COURTNEY oppacity, initiated management for CAP. RVT negative for covid or influenza. TTE with normal left ventricular systolic function (60%), moderate dilated LA, and grade I diastolic dysfunction. Blood cultures growing Staphylococcus sp.     Interval Problem Update    No overnight events, monitor with very frequent PACs and PVCs, intermittent bigemini.   Vital signs with O2sat 100% 2-3lt, HR 80s, no hypotension, no fever. Negative orthostatic vitals.     Patient reports feeling better, no orthopnea, exertional dyspnea improving, sputum improving as well. No N/V/D. Les lightheadedness when standing up although it persists if done rapidly, denies numbness or tingling over the legs.    Hospital admission certification provided to  daughter.     Consultants/Specialty  PT/OT  RT    Code Status  Full Code    Disposition  Patient is not medically cleared for discharge.   Anticipate discharge to to home with organized home healthcare and close outpatient follow-up/SNF.  I have placed the appropriate orders for post-discharge needs.    Review of Systems  Review of Systems   Constitutional:  Negative for chills and fever.   HENT:  Negative for congestion and sore throat.    Respiratory:  Positive for cough and sputum production. Negative for shortness of breath and wheezing.    Cardiovascular:  Negative for chest pain, palpitations and leg swelling.   Gastrointestinal:  Negative for abdominal pain, nausea and vomiting.   Genitourinary:  Negative for dysuria and urgency.   Musculoskeletal:  Positive for falls. Negative for back pain.        Rib pain R>L   Neurological:  Positive for dizziness. Negative for sensory change, focal weakness, loss of consciousness and headaches.   Psychiatric/Behavioral:  Negative for substance abuse. The patient does not have insomnia.    All other systems reviewed and are negative.     Physical Exam  Temp:  [36.1 °C (97 °F)-36.9 °C (98.5 °F)] 36.6 °C (97.8 °F)  Pulse:  [69-83] 76  Resp:  [16-20] 20  BP: (114-168)/(67-92) 167/90  SpO2:  [92 %-100 %] 92 %      General: Awake, alert and oriented. No acute distress.   Head and Neck: NC/AT, EOMI, no scleral icterus or conjunctival pallor, no nasal discharge or oral erythema or exudates. Neck supple, no cervical or supraclavicular LAD,   CV: Rhythmic heart sounds, no murmurs, gallops or rubs. No S3,S4 or JVD. Radial and dorsalis pedis pulses 2+ and equal bilaterally.   Pulm: Chest expansion is symmetrical, crackles in both bases, mild rhonchi over the middle field bilaterally as well.   GI: Increased abdominal girth. Flat, non distended, soft, nontender, normal bowel sounds.  Skin: Warm, no rashes, no lesions.  MSK: Normal ROM. No lower extremity edema. No pain, no lesions,    Neuro: Patient is alert and oriented x3. Speech is clear and fluent. Is able to name, repeat and comprehend. Pupils equal, round and reactive to light. Extra ocular movements intact. Facial and body symmetry. Strength 5 out of 5 in upper and lower extremities. Sensitivity intact. Normal deep tendon reflexes. Normal tone. No gait abnormalities.   Psych: Appropriate mood and affect.      Fluids    Intake/Output Summary (Last 24 hours) at 12/28/2022 1331  Last data filed at 12/28/2022 0926  Gross per 24 hour   Intake 240 ml   Output 300 ml   Net -60 ml       Laboratory  Recent Labs     12/26/22  0512 12/27/22  0018   WBC 10.7 10.6   RBC 4.05* 3.62*   HEMOGLOBIN 12.8 11.4*   HEMATOCRIT 37.5 34.5*   MCV 92.6 95.3   MCH 31.6 31.5   MCHC 34.1 33.0*   RDW 45.4 47.2   PLATELETCT 256 230   MPV 8.7* 8.7*     Recent Labs     12/26/22  0512 12/27/22  0018   SODIUM 131* 134*   POTASSIUM 3.5* 3.6   CHLORIDE 96 99   CO2 25 22   GLUCOSE 167* 123*   BUN 20 22   CREATININE 1.13 1.13   CALCIUM 9.1 8.6     Recent Labs     12/26/22 0512   APTT 30.4   INR 1.11               Imaging  EC-ECHOCARDIOGRAM COMPLETE W/O CONT   Final Result      CT-CHEST,ABDOMEN,PELVIS WITH   Final Result      1.  No definite acute traumatic abnormality.   2.  Left upper lobe perihilar opacity suspicious for pneumonitis.   3.  Subsegmental right middle lobe atelectasis.   4.  Additional findings as detailed.      CT-CSPINE WITHOUT PLUS RECONS   Final Result      No acute fracture or dislocation cervical spine.      Degenerative changes.      CT-HEAD W/O   Final Result      1.  Chronic microvascular ischemic type changes.   2.  No acute intracranial abnormality.              Assessment/Plan  Problem Representation:    * Pneumonia due to infectious organism- (present on admission)  Assessment & Plan  Patient presented with sputum, coughing and acute respiratory failure. Elevated procal and COURTNEY consolidation. High suspicion for bacterial community acquired  pneumonia. Viral panel was negative. Blood cultures are growing Coag-neg staph, likely contaminated. Patient improving with current regimen, no SIRS, weaning O2 requirements.   - O2 per protocol   - Continue ceftriaxone  - Continue doxycycline   - Monitor     Acute respiratory failure with hypoxemia (HCC)- (present on admission)  Assessment & Plan  Likely secondary to CAP and contusion from fall. Improving.   - O2 protocol   - Home O2 eval   - Incentive spirometry  - Monitor     Hyperglycemia  Assessment & Plan  Sustained in the 150s since admission.   - Check A1c     Recurrent falls and weakness  Assessment & Plan  Patient had 2 falls within the last 2 weeks, she complained of some lightheadedness while standing up, however orthostatic vitals are negative. More likely she was dehydrated and debilitated in the setting of ongoing illness leading to falls. No concern for peripheral neuropathy or focal neurological deficits. Improving mobilization.   - PT and OT recommended SNF placement     Rib fractures  Assessment & Plan  Old rib fracture seen in the right side (CT). Patient is improving. Pain is well controlled.   - Pain control with prn tylenol, oxy, morphine  - Lidocaine patch     Hypothyroidism- (present on admission)  Assessment & Plan  TSH ad admission wnl.   - Continue home thyroid supplement      Hypokalemia- (present on admission)  Assessment & Plan  K 3.5 on presentation, improving.   - Encourage PO, repletion as needed      HTN (hypertension)- (present on admission)  Assessment & Plan  Hypertensive urgency on presentation, likely related to pain and discomfort. Now BP is controled.   - Continue home losartan         VTE prophylaxis: Xarelto 10 mg daily as prophylaxis    I have performed a physical exam and reviewed and updated ROS and Plan today (12/28/2022). In review of yesterday's note (12/27/2022), there are no changes except as documented above.

## 2022-12-28 NOTE — DISCHARGE PLANNING
[facility] TCN chart review completed. Collaborated with WINDY Crandall. Patient seen at bedside. Noted pt was seen by PT on 12/27 with recs for post acute placement. Note per discussion with CM, CM obtained choice for SNF earlier today after discussion with pt/family. Pt may be medically cleared tomorrow per discussion with CM and CM requested TCN to inquire re: authorization at that time. TCN reached out to [facility] team for possible auth for dc to SNF tomorrow. At this time anticipate dc to SNF once medically cleared. TCN will continue to follow and collaborate with discharge planning team as additional post acute needs arise. Thank you.    Completed:  PT with recommendations for post acute placement on 12/27; awaiting OT recs  Choice obtained: SNF ,DME (02)  [facility] referral sent 12/26

## 2022-12-28 NOTE — ASSESSMENT & PLAN NOTE
Patient had 2 falls within the last 2 weeks before admission, negative orthostatic vitals, no concern for peripheral neuropathy or focal neurological deficits. CT head negative for acute events. More likely she was debilitated in the setting of ongoing illness and some symptomatic hyponatremia. Mobilization is significantly improved, needing WW.   - PT and OT recommended SNF placement

## 2022-12-28 NOTE — PROGRESS NOTES
Rhythm: SR w/ PVC, PAC, coup, bigem (per monitor room)  Rate: 75-89  .18/.10/.34

## 2022-12-28 NOTE — CARE PLAN
The patient is Stable - Low risk of patient condition declining or worsening    Shift Goals  Clinical Goals: pain management  Patient Goals: rest, pain control  Family Goals: Get her home    Progress made toward(s) clinical / shift goals:    Problem: Pain - Standard  Goal: Alleviation of pain or a reduction in pain to the patient’s comfort goal  Outcome: Progressing  Patient resting comfortably, sleeping at this time.      Problem: Fall Risk  Goal: Patient will remain free from falls  Outcome: Progressing   Appropriate fall precautions in place.    Patient is not progressing towards the following goals:

## 2022-12-28 NOTE — DISCHARGE PLANNING
[facility] TCN chart review completed. Collaborated with WINDY Teague. Per MD, patient not medically cleared for discharge today. Per RN, patient was on 6L O2 via oxymask this morning and spiked a fever.    PT/OT evals pending.    As previously noted, patient speaks Bermudian and needs an . Son, dtr, and/or daughter in law can be reached by phone to answer any questions or help interpret if needed. Daughter in law She reports that patient lives with her dtr Maritza in [place], [place]. Prior to fall, patient was independent with mobility, ADLs, IADLs, and driving. She does not use any assistive devices or home oxygen.     Family would like to wait and see how patient does with PT/OT before giving choice on HH, SNF, or IRF. They are okay with using DME for O2 if needed at time of discharge. Family would like RN, MD, and/or WINDY to call jonatan Thornton at [phone] or dtr Maritza [phone] with any updates (patient room #, etc.).     No new TCN needs identified today. TCN will continue to follow and collaborate with discharge planning team as additional post acute needs arise. Thank you.    Completed today:  PT/OT evals pending  Choice obtained: DME (O2)  [facility] referral sent 12/26/22    *Addendum 1700 - PT eval completed with recs for post acute placement. Patient ows FWW and lives with dtr who works during the day. OT eval still pending.    *TCN plan for Wednesday 12/28/22 - Obtain SNF and IRF choice

## 2022-12-28 NOTE — DISCHARGE PLANNING
Received Choice Form @: 7662  Agency/ Facility Name: [facility]   Referral Sent per Choice Form @: 2571

## 2022-12-28 NOTE — ASSESSMENT & PLAN NOTE
Old rib fracture seen in the right side (CT). Pain is predominant over the left side of the rib cage. Improving, pain is well controlled.   - Lidocaine patch   - scheduled tylenol 650 mg QID

## 2022-12-29 PROBLEM — R41.0 DELIRIUM: Status: ACTIVE | Noted: 2022-12-29

## 2022-12-29 LAB
ANION GAP SERPL CALC-SCNC: 10 MMOL/L (ref 7–16)
BUN SERPL-MCNC: 19 MG/DL (ref 8–22)
CALCIUM SERPL-MCNC: 8.9 MG/DL (ref 8.5–10.5)
CHLORIDE SERPL-SCNC: 94 MMOL/L (ref 96–112)
CO2 SERPL-SCNC: 25 MMOL/L (ref 20–33)
CREAT SERPL-MCNC: 0.58 MG/DL (ref 0.5–1.4)
ERYTHROCYTE [DISTWIDTH] IN BLOOD BY AUTOMATED COUNT: 45 FL (ref 35.9–50)
GFR SERPLBLD CREATININE-BSD FMLA CKD-EPI: 89 ML/MIN/1.73 M 2
GLUCOSE SERPL-MCNC: 138 MG/DL (ref 65–99)
HCT VFR BLD AUTO: 35.4 % (ref 37–47)
HGB BLD-MCNC: 12 G/DL (ref 12–16)
MCH RBC QN AUTO: 31.5 PG (ref 27–33)
MCHC RBC AUTO-ENTMCNC: 33.9 G/DL (ref 33.6–35)
MCV RBC AUTO: 92.9 FL (ref 81.4–97.8)
PLATELET # BLD AUTO: 300 K/UL (ref 164–446)
PMV BLD AUTO: 8.6 FL (ref 9–12.9)
POTASSIUM SERPL-SCNC: 3.8 MMOL/L (ref 3.6–5.5)
RBC # BLD AUTO: 3.81 M/UL (ref 4.2–5.4)
SODIUM SERPL-SCNC: 129 MMOL/L (ref 135–145)
WBC # BLD AUTO: 11.9 K/UL (ref 4.8–10.8)

## 2022-12-29 PROCEDURE — 80048 BASIC METABOLIC PNL TOTAL CA: CPT

## 2022-12-29 PROCEDURE — 97166 OT EVAL MOD COMPLEX 45 MIN: CPT

## 2022-12-29 PROCEDURE — 700111 HCHG RX REV CODE 636 W/ 250 OVERRIDE (IP): Performed by: STUDENT IN AN ORGANIZED HEALTH CARE EDUCATION/TRAINING PROGRAM

## 2022-12-29 PROCEDURE — A9270 NON-COVERED ITEM OR SERVICE: HCPCS | Performed by: STUDENT IN AN ORGANIZED HEALTH CARE EDUCATION/TRAINING PROGRAM

## 2022-12-29 PROCEDURE — 700102 HCHG RX REV CODE 250 W/ 637 OVERRIDE(OP)

## 2022-12-29 PROCEDURE — 36415 COLL VENOUS BLD VENIPUNCTURE: CPT

## 2022-12-29 PROCEDURE — 94760 N-INVAS EAR/PLS OXIMETRY 1: CPT

## 2022-12-29 PROCEDURE — 97530 THERAPEUTIC ACTIVITIES: CPT

## 2022-12-29 PROCEDURE — 85027 COMPLETE CBC AUTOMATED: CPT

## 2022-12-29 PROCEDURE — 770020 HCHG ROOM/CARE - TELE (206)

## 2022-12-29 PROCEDURE — 700101 HCHG RX REV CODE 250

## 2022-12-29 PROCEDURE — A9270 NON-COVERED ITEM OR SERVICE: HCPCS

## 2022-12-29 PROCEDURE — 99232 SBSQ HOSP IP/OBS MODERATE 35: CPT | Mod: GC | Performed by: INTERNAL MEDICINE

## 2022-12-29 PROCEDURE — 51798 US URINE CAPACITY MEASURE: CPT

## 2022-12-29 PROCEDURE — 700102 HCHG RX REV CODE 250 W/ 637 OVERRIDE(OP): Performed by: STUDENT IN AN ORGANIZED HEALTH CARE EDUCATION/TRAINING PROGRAM

## 2022-12-29 PROCEDURE — 94669 MECHANICAL CHEST WALL OSCILL: CPT

## 2022-12-29 RX ORDER — AMLODIPINE BESYLATE 5 MG/1
5 TABLET ORAL
Status: DISCONTINUED | OUTPATIENT
Start: 2022-12-29 | End: 2022-12-30

## 2022-12-29 RX ADMIN — OXYBUTYNIN CHLORIDE 5 MG: 5 TABLET, EXTENDED RELEASE ORAL at 17:42

## 2022-12-29 RX ADMIN — GUAIFENESIN 600 MG: 600 TABLET, EXTENDED RELEASE ORAL at 05:09

## 2022-12-29 RX ADMIN — GUAIFENESIN 600 MG: 600 TABLET, EXTENDED RELEASE ORAL at 17:42

## 2022-12-29 RX ADMIN — ACETAMINOPHEN 650 MG: 325 TABLET, FILM COATED ORAL at 13:30

## 2022-12-29 RX ADMIN — HYDRALAZINE HYDROCHLORIDE 10 MG: 20 INJECTION INTRAMUSCULAR; INTRAVENOUS at 05:11

## 2022-12-29 RX ADMIN — FAMOTIDINE 20 MG: 20 TABLET, FILM COATED ORAL at 05:10

## 2022-12-29 RX ADMIN — HYDRALAZINE HYDROCHLORIDE 10 MG: 20 INJECTION INTRAMUSCULAR; INTRAVENOUS at 13:30

## 2022-12-29 RX ADMIN — RIVAROXABAN 10 MG: 10 TABLET, FILM COATED ORAL at 17:42

## 2022-12-29 RX ADMIN — HYDRALAZINE HYDROCHLORIDE 10 MG: 20 INJECTION INTRAMUSCULAR; INTRAVENOUS at 00:07

## 2022-12-29 RX ADMIN — LIDOCAINE 1 PATCH: 50 PATCH TOPICAL at 10:07

## 2022-12-29 RX ADMIN — LOSARTAN POTASSIUM 100 MG: 50 TABLET, FILM COATED ORAL at 05:10

## 2022-12-29 RX ADMIN — LEVOTHYROXINE SODIUM 100 MCG: 0.1 TABLET ORAL at 05:09

## 2022-12-29 RX ADMIN — AMLODIPINE BESYLATE 5 MG: 5 TABLET ORAL at 08:14

## 2022-12-29 RX ADMIN — CEFTRIAXONE SODIUM 1000 MG: 10 INJECTION, POWDER, FOR SOLUTION INTRAVENOUS at 05:11

## 2022-12-29 RX ADMIN — DOCUSATE SODIUM 50 MG AND SENNOSIDES 8.6 MG 2 TABLET: 8.6; 5 TABLET, FILM COATED ORAL at 17:42

## 2022-12-29 RX ADMIN — ACETAMINOPHEN 650 MG: 325 TABLET, FILM COATED ORAL at 00:07

## 2022-12-29 ASSESSMENT — GAIT ASSESSMENTS
DISTANCE (FEET): 75
ASSISTIVE DEVICE: FRONT WHEEL WALKER
DISTANCE (FEET): 15
GAIT LEVEL OF ASSIST: MINIMAL ASSIST
DEVIATION: BRADYKINETIC;INCREASED BASE OF SUPPORT;SHUFFLED GAIT

## 2022-12-29 ASSESSMENT — COGNITIVE AND FUNCTIONAL STATUS - GENERAL
DAILY ACTIVITIY SCORE: 21
TOILETING: A LITTLE
WALKING IN HOSPITAL ROOM: A LITTLE
SUGGESTED CMS G CODE MODIFIER DAILY ACTIVITY: CJ
SUGGESTED CMS G CODE MODIFIER MOBILITY: CK
CLIMB 3 TO 5 STEPS WITH RAILING: A LOT
MOVING TO AND FROM BED TO CHAIR: A LOT
TURNING FROM BACK TO SIDE WHILE IN FLAT BAD: A LITTLE
MOVING FROM LYING ON BACK TO SITTING ON SIDE OF FLAT BED: A LOT
MOBILITY SCORE: 15
DRESSING REGULAR LOWER BODY CLOTHING: A LITTLE
HELP NEEDED FOR BATHING: A LITTLE
STANDING UP FROM CHAIR USING ARMS: A LITTLE

## 2022-12-29 ASSESSMENT — ENCOUNTER SYMPTOMS
INSOMNIA: 0
FOCAL WEAKNESS: 0
LOSS OF CONSCIOUSNESS: 0
ABDOMINAL PAIN: 0
SHORTNESS OF BREATH: 0
SENSORY CHANGE: 0
COUGH: 1
BACK PAIN: 0
PALPITATIONS: 0
SPUTUM PRODUCTION: 1
SORE THROAT: 0
FEVER: 0
DIZZINESS: 1
HEADACHES: 0
WHEEZING: 0
CHILLS: 0
FALLS: 1
NAUSEA: 0
VOMITING: 0

## 2022-12-29 ASSESSMENT — PATIENT HEALTH QUESTIONNAIRE - PHQ9
SUM OF ALL RESPONSES TO PHQ9 QUESTIONS 1 AND 2: 0
1. LITTLE INTEREST OR PLEASURE IN DOING THINGS: NOT AT ALL
2. FEELING DOWN, DEPRESSED, IRRITABLE, OR HOPELESS: NOT AT ALL
1. LITTLE INTEREST OR PLEASURE IN DOING THINGS: NOT AT ALL
2. FEELING DOWN, DEPRESSED, IRRITABLE, OR HOPELESS: NOT AT ALL
SUM OF ALL RESPONSES TO PHQ9 QUESTIONS 1 AND 2: 0

## 2022-12-29 ASSESSMENT — PAIN DESCRIPTION - PAIN TYPE: TYPE: ACUTE PAIN

## 2022-12-29 ASSESSMENT — ACTIVITIES OF DAILY LIVING (ADL): TOILETING: INDEPENDENT

## 2022-12-29 ASSESSMENT — LIFESTYLE VARIABLES: SUBSTANCE_ABUSE: 0

## 2022-12-29 NOTE — DISCHARGE PLANNING
Declined [facility] and [facility] SN[facility]  Pt is reported as out of restraints since 5am  DPA checking on [facility] pending acceptance.

## 2022-12-29 NOTE — PROGRESS NOTES
Patient was seen by OT this am and tolerated well. Lidoderm patch applied to Right knee. Bladder scanner completed as ordered with result of 120 ml, Provider notified

## 2022-12-29 NOTE — CARE PLAN
The patient is Stable - Low risk of patient condition declining or worsening    Problem: Pain - Standard  Goal: Alleviation of pain or a reduction in pain to the patient’s comfort goal  Outcome: Progressing     Problem: Fall Risk  Goal: Patient will remain free from falls  Outcome: Progressing  Patient walked with OT this am and tolerated, RN assisted patient to bathroom with walker and she tolerated with slight drop in O2 sat of 89 when ambulating.       Shift Goals  Clinical Goals: Pain management  Patient Goals: Rest  Family Goals: Get her home    Progress made toward(s) clinical / shift goals:  Patient declines severe pain, states pain is managed with Lidoderm patch to right knee at this time. RN will continue to monitor

## 2022-12-29 NOTE — DISCHARGE PLANNING
Agency/Facility Name: [facility]  Spoke To: Tala  Outcome: DPA called to check on referral status. Per Tala, she will look at pts referral     Agency/Facility Name: [facility]  Spoke To: Lisa  Outcome: DPA called to check on referral status. Per Lisa, she will look at pts referral but told DPA facility will have no beds until next week.       1534    Agency/Facility Name: [facility]  Outcome: DPA left vmail requesting updated referral status

## 2022-12-29 NOTE — CARE PLAN
Problem: Hyperinflation  Goal: Prevent or improve atelectasis  Description: Target End Date:  3 to 4 days    1. Instruct incentive spirometry usage  2.  Perform hyperinflation therapy as indicated  Outcome: Not Progressing  Flowsheets (Taken 12/26/2022 1715 by Elizabeth Perkins, NAVNEET)  Hyperinflation Protocol Goals/Outcome: Improvement in Repeat CXR  Hyperinflation Protocol Indications: Pneumonia      Respiratory Update    Treatment modality: IPV     Frequency: Q4    Pt tolerating current treatments well with no adverse reactions.       Problem: Bronchoconstriction  Goal: Improve in air movement and diminished wheezing  Description: Target End Date:  2 to 3 days    1.  Implement inhaled treatments  2.  Evaluate and manage medication effects  Outcome: Progressing  Flowsheets (Taken 12/27/2022 1520)  Bronchodilator Goals/Outcome: Patient at Stable Baseline  Bronchodilator Indications: Strong Subjective / Objective Improvement      Respiratory Update    Treatment modality: ALB  Frequency: Q4    Pt tolerating current treatments well with no adverse reactions.

## 2022-12-29 NOTE — PROGRESS NOTES
[facility] Daily Progress Note    Date of Service  12/29/2022    [facility] Team: R IM Green Team   Attending: Eber Lisa M.d.  Senior Resident: Dr. Levine  Intern:  Dr. Acosta   Contact Number: [phone]    Chief Complaint  Lizzette Burton is a 82 y.o. female admitted 12/26/2022 with dyspnea and cough.    Hospital Course  Lizzette Burton is a 82 y.o. female who presented 12/26/2022 with fall. Patient with history of hypertension, hypothyroidism, osteoarthritis (s/p knee surgery, lumbar spondylosis), asthma, and chronic urinary incontinence. She presented to the ED after 2 GFL, first few days ago and the second one right before this admission, non-syncopal GLF, she was admitted due to dyspnea and thoracic pain. In the ED she was found to be tachypneic, hypertensive, requiring 4lt by NC, hyperglycemia to 167,  elevated trop 32, elevated BNP to 2025. Imaging remarkable for CT chest showing COURTNEY opacity and subsegmental RML atelectasis without acute trauma abnormalities. CT head unremarkable for acute abnormalities and CT C spine also remarkable apart from age related degenerative changes. Given acute hypoxic respiratory failure, along with cough, sputum, elevated procalcitonin and COURTNEY oppacity, initiated management for CAP. RVT negative for covid or influenza. TTE with normal left ventricular systolic function (60%), moderate dilated LA, and grade I diastolic dysfunction. Blood cultures growing Staphylococcus sp, likely contamination. PT evaluated and recommended SNF. Pending placement.     Interval Problem Update    Yesterday afternoon I was notified about patient having odd behavior because of being disoriented in place and time, patient seen at bedside resting in bed although confirmed disorientation. Overnight she became agitated, soft wrist restrains were ordered since she was pulling devices out. Opioids were held.     Vital signs stable, no SIRS.     Patient reports feeling much better, dyspnea  significantly improved, she has been able to walk to the bathroom without significant issues, she has active BM and baseline urination incontinence. Cough is improving as well as sputum. No fever or chills. She is oriented, only missed day and year but otherwise aware of situation, condition and plan.      Consultants/Specialty  PT/OT  RT    Code Status  Full Code    Disposition  Patient is not medically cleared for discharge.   Anticipate discharge to to home with organized home healthcare and close outpatient follow-up/SNF.  I have placed the appropriate orders for post-discharge needs.    Review of Systems  Review of Systems   Constitutional:  Negative for chills and fever.   HENT:  Negative for congestion and sore throat.    Respiratory:  Positive for cough and sputum production. Negative for shortness of breath and wheezing.    Cardiovascular:  Negative for chest pain, palpitations and leg swelling.   Gastrointestinal:  Negative for abdominal pain, nausea and vomiting.   Genitourinary:  Negative for dysuria and urgency.   Musculoskeletal:  Positive for falls. Negative for back pain.        Rib pain R>L   Neurological:  Positive for dizziness. Negative for sensory change, focal weakness, loss of consciousness and headaches.   Psychiatric/Behavioral:  Negative for substance abuse. The patient does not have insomnia.    All other systems reviewed and are negative.     Physical Exam  Temp:  [36.3 °C (97.4 °F)-37.1 °C (98.8 °F)] 36.6 °C (97.9 °F)  Pulse:  [74-94] 94  Resp:  [16-20] 16  BP: (123-200)/() 182/84  SpO2:  [92 %-97 %] 94 %      General: Awake, alert and oriented. No acute distress.   Head and Neck: NC/AT, EOMI, no scleral icterus or conjunctival pallor, no nasal discharge or oral erythema or exudates. Neck supple, no cervical or supraclavicular LAD,   CV: Rhythmic heart sounds, no murmurs, gallops or rubs. No S3,S4 or JVD. Radial and dorsalis pedis pulses 2+ and equal bilaterally.   Pulm: Chest  expansion is symmetrical, crackles only on the left base field, right side more clear, no wheezing and very mild rhonchi.   GI: Increased abdominal girth. Flat, non distended, soft, nontender, normal bowel sounds.  Skin: Warm, no rashes, no lesions.  MSK: Normal ROM. No lower extremity edema. No pain, no lesions.   Neuro: Patient is alert and oriented x3. Speech is clear and fluent. Is able to name, repeat and comprehend. Pupils equal, round and reactive to light. Extra ocular movements intact. Facial and body symmetry. Strength 5 out of 5 in upper and lower extremities. Sensitivity intact. Normal deep tendon reflexes. Normal tone. No gait abnormalities.   Psych: Appropriate mood and affect.      Fluids    Intake/Output Summary (Last 24 hours) at 12/29/2022 1528  Last data filed at 12/29/2022 1100  Gross per 24 hour   Intake 360 ml   Output --   Net 360 ml       Laboratory  Recent Labs     12/27/22  0018 12/29/22  0154   WBC 10.6 11.9*   RBC 3.62* 3.81*   HEMOGLOBIN 11.4* 12.0   HEMATOCRIT 34.5* 35.4*   MCV 95.3 92.9   MCH 31.5 31.5   MCHC 33.0* 33.9   RDW 47.2 45.0   PLATELETCT 230 300   MPV 8.7* 8.6*     Recent Labs     12/27/22 0018 12/29/22  0154   SODIUM 134* 129*   POTASSIUM 3.6 3.8   CHLORIDE 99 94*   CO2 22 25   GLUCOSE 123* 138*   BUN 22 19   CREATININE 1.13 0.58   CALCIUM 8.6 8.9                     Imaging  EC-ECHOCARDIOGRAM COMPLETE W/O CONT   Final Result      CT-CHEST,ABDOMEN,PELVIS WITH   Final Result      1.  No definite acute traumatic abnormality.   2.  Left upper lobe perihilar opacity suspicious for pneumonitis.   3.  Subsegmental right middle lobe atelectasis.   4.  Additional findings as detailed.      CT-CSPINE WITHOUT PLUS RECONS   Final Result      No acute fracture or dislocation cervical spine.      Degenerative changes.      CT-HEAD W/O   Final Result      1.  Chronic microvascular ischemic type changes.   2.  No acute intracranial abnormality.              Assessment/Plan  Problem  Representation:    * Pneumonia due to infectious organism- (present on admission)  Assessment & Plan  Clinical presentation compatible with CAP, image with COURTNEY consolidation and elevated procal. Viral panel was negative. Blood cultures grew Coag-neg staph, likely contaminated. Patient improving with current regimen, no SIRS, weaning O2 requirements, on RA at rest.  - O2 per protocol   - Continue ceftriaxone  - Completed doxycycline   - Monitor     Acute respiratory failure with hypoxemia (HCC)- (present on admission)  Assessment & Plan  Likely secondary to CAP and contusion from fall. Improving.   - O2 protocol, to be continued at SNF  - Incentive spirometry  - Monitor     Delirium  Assessment & Plan  Acute transient disorientation and agitation. Patient is now calmed, alert and oriented x 3, improving and verbalized understanding situation and plan.   - Monitor  - No need for restrains  - Discontinued opioids  - Pending placement    Hyperglycemia  Assessment & Plan  Sustained in the 150s since admission. A1C of 5.7%.   - Monitor     Recurrent falls and weakness  Assessment & Plan  Patient had 2 falls within the last 2 weeks, negative orthostatic vitals. No concern for peripheral neuropathy or focal neurological deficits. More likely she was dehydrated and debilitated in the setting of ongoing illness leading to falls. Improving mobilization.   - PT and OT recommended SNF placement     Rib fractures  Assessment & Plan  Old rib fracture seen in the right side (CT). Patient is improving. Pain is well controlled.   - Lidocaine patch     Hypothyroidism- (present on admission)  Assessment & Plan  TSH ad admission wnl.   - Continue home thyroid supplement      Hypokalemia- (present on admission)  Assessment & Plan  K 3.5 on presentation, improving.   - Encourage PO, repletion as needed      HTN (hypertension)- (present on admission)  Assessment & Plan  Hypertensive urgency on presentation, likely related to pain and  discomfort. Now BP is controled.   - Continue home losartan         VTE prophylaxis: Xarelto 10 mg daily as prophylaxis    I have performed a physical exam and reviewed and updated ROS and Plan today (12/29/2022). In review of yesterday's note (12/28/2022), there are no changes except as documented above.

## 2022-12-29 NOTE — DISCHARGE PLANNING
[facility] TCN chart review completed. Collaborated with WINDY Crandall. Pt does have accepting SNFs once medically cleared per review/discussion with CM. Noted per review pt was placed in soft restraints overnight. Will need to be off restraints 24hrs for dc to SNF plan. As prior, anticipate dc to SNF once medically cleared. TCN will continue to follow and collaborate with discharge planning team as additional post acute needs arise. Thank you.     Completed:  PT and OT with recommendations for post acute placement on 12/29  Choice obtained: SNF ,DME (02)  [facility] referral sent 12/26

## 2022-12-29 NOTE — CARE PLAN
The patient is Stable - Low risk of patient condition declining or worsening    Shift Goals  Clinical Goals: pain management  Patient Goals: rest  Family Goals: Get her home    Progress made toward(s) clinical / shift goals:    Problem: Skin Integrity  Goal: Skin integrity is maintained or improved  Outcome: Progressing   Pt's skin remains intact    Patient is not progressing towards the following goals:      Problem: Safety - Medical Restraint  Goal: Remains free of injury from restraints (Restraint for Interference with Medical Device)  Outcome: Not Progressing   Pt continues to be in restraints. Increased confusion, will continue to monitor

## 2022-12-29 NOTE — PROGRESS NOTES
Assumed care of pt. Asked for pain medication, medicated with oxycodone PRN per order. A few hours later patient became increasingly confused. Notified resident of findings, continued to monitor. Over time patient became agitated and restless- removing telemetry despite education, attempting to get out of bed and removed IV. Unable to divert patient behavior with CNA and this RN sitting at bedside to closely observe patient. On call resident made aware and requested order for restraints. Pt placed in bilateral soft wrist restraints and will follow hospital policy. Will continue to monitor patient.

## 2022-12-29 NOTE — THERAPY
"Occupational Therapy   Initial Evaluation     Patient Name: Lizzette Burton  Age:  82 y.o., Sex:  female  Medical Record #: [id number]  Today's Date: 12/29/2022     Precautions: Fall Risk    Assessment    Patient is 82 y.o. female with h/o HTN, admitted following GLFs with generalized weakness. Pt dx with PNA, acute respiratory failure. Pt seen for OT eval; utilized iPad . Pt received in bed side chair. Agreeable to toileting, standing grooming. Pt mobilizing with FWW. Pt lives with daughter who works days so pt is home alone. She has assist for all I-ADL as well as most LB dressing PTA. Pt appears slightly below baseline from OT standpoint in this setting. Given that she is alone for periods during the day and has been falling, recommend post-acute transitional care to maximize functional independence and safety. Will follow for acute OT while in-house.     Plan    Occupational Therapy Initial Treatment Plan   Treatment Interventions: Self Care / Activities of Daily Living, Adaptive Equipment, Therapeutic Activity, Therapeutic Exercises, Neuro Re-Education / Balance  Treatment Frequency: 3 Times per Week  Duration: Until Therapy Goals Met    DC Equipment Recommendations: Tub / Shower Seat  Discharge Recommendations: Recommend post-acute placement for additional occupational therapy services prior to discharge home     Subjective    \"I'm weak from my legs.\"     Objective       12/29/22 1003   Prior Living Situation   Prior Services Intermittent Physical Support for ADL Per Family   Housing / Facility 2 Story House   Steps Into Home 0   Steps In Home   (full flight)   Rail Left Rail (Steps in Home)   Bathroom Set up Walk In Shower   Equipment Owned Front-Wheel Walker   Comments Pt lives with adult daughter who works days   Prior Level of ADL Function   Self Feeding Independent   Grooming / Hygiene Independent   Bathing Requires Assist   Dressing Requires Assist   Toileting Independent   Comments Pt reports " spending much of her time in bed at home   Prior Level of IADL Function   Medication Management Requires Assist   Laundry Requires Assist   Finances Requires Assist   Home Management Requires Assist   Shopping Dependent   Prior Level Of Mobility Independent With Device in Home  (Limited community mobility)   Driving / Transportation Relatives / Others Provide Transportation   Cognition    Speech/ Communication Hard of Hearing   Level of Consciousness Alert   Comments Pleasant & cooperative   Active ROM Upper Body   Dominant Hand Right   Comments mild windswept deformity B hands   Strength Upper Body   Upper Body Strength  WDL   Coordination Upper Body   Comments during functional ADL   Balance Assessment   Sitting Balance (Static) Fair   Sitting Balance (Dynamic) Fair -   Standing Balance (Static) Fair -   Standing Balance (Dynamic) Fair -   Weight Shift Sitting Fair   Weight Shift Standing Fair   Comments FWW for standing   Bed Mobility    Supine to Sit   (up to chair pre)   Sit to Supine Minimal Assist  (A for BLE)   Scooting Standby Assist  (seated)   ADL Assessment   Grooming Contact Guard Assist;Standing  (oral care at sink)   Lower Body Dressing Maximum Assist   (don B socks; pt reports daughter assists PTA)   Toileting Minimal Assist  (urination on toilet; min A for clothing management)   Functional Mobility   Sit to Stand Contact Guard Assist   Bed, Chair, Wheelchair Transfer Minimal Assist   Toilet Transfers Minimal Assist   Transfer Method Stand Step  (FWW)   Mobility Short gait and transfers in room   Short Term Goals   Short Term Goal # 1 Pt will complete ADL transfers with supv   Short Term Goal # 2 Pt will complete LB dressing with supv using AE   Short Term Goal # 3 Pt will complete toileting with supv

## 2022-12-29 NOTE — ASSESSMENT & PLAN NOTE
Acute transient disorientation and agitation. Likely hospital-associated, also side effects from opioids among other medications. Patient is now calmed, alert and oriented x 4.   - Pending placement

## 2022-12-29 NOTE — THERAPY
"Physical Therapy   Daily Treatment     Patient Name: Lizzette Burton  Age:  82 y.o., Sex:  female  Medical Record #: [id number]  Today's Date: 12/29/2022     Precautions  Precautions: Fall Risk    Assessment    Pt received up in a chair at bedside and agreeable to PT session. Pt required overall min A for transfers and ambulation with a FWW. Pt was able to progress with walking further than in prior evaluation, but continues to require min A for steadying. Pt unable to tolerate attempt at stair training today secondary to shortness of breath. Pt required 2L of O2 post-ambulation for SpO2 of 95%. Will continue to follow for acute PT to progress as able.    Plan    Continue with current treatment plan.    DC Equipment Recommendations: Unable to determine at this time  Discharge Recommendations: Recommend post-acute placement for additional physical therapy services prior to discharge home      Subjective    \"It is harder, but you have to do it young lady\" referring to mobility.     Objective       12/29/22 0951    Services   Is patient using  services for this encounter? Yes   Language Interpreted Telugu    Name [facility] #[id number]    Mode iPad   Refusal signed by patient? No   Content of Interpretation (select all) Patient Education;Other   Precautions   Precautions Fall Risk   Vitals   Vitals Comments SpO2 89% on room air after ambulation, placed on 2L and RN notified   Pain 0 - 10 Group   Therapist Pain Assessment Post Activity Pain Same as Prior to Activity;Nurse Notified  (no c/o pain)   Cognition    Level of Consciousness Alert   Comments Pleasant and cooperative   Balance   Sitting Balance (Static) Fair   Sitting Balance (Dynamic) Fair -   Standing Balance (Static) Fair -   Standing Balance (Dynamic) Fair -   Weight Shift Sitting Fair   Weight Shift Standing Fair   Skilled Intervention Verbal Cuing;Tactile Cuing   Comments FWW, cues for upright posture   Bed Mobility    Sit " to Supine Minimal Assist   Scooting Standby Assist   Skilled Intervention Verbal Cuing   Comments extra time   Gait Analysis   Gait Level Of Assist Minimal Assist   Assistive Device Front Wheel Walker   Distance (Feet) 75   # of Times Distance was Traveled 1   Deviation Bradykinetic;Increased Base Of Support;Shuffled Gait   Skilled Intervention Verbal Cuing;Tactile Cuing   Comments Significant time and effort for pt to ambulate. Min A required for stability. Cues for FWW proximity and upright posture.   Functional Mobility   Sit to Stand Contact Guard Assist   Bed, Chair, Wheelchair Transfer Minimal Assist   Transfer Method Stand Step   Mobility up with FWW   How much difficulty does the patient currently have...   Turning over in bed (including adjusting bedclothes, sheets and blankets)? 3   Sitting down on and standing up from a chair with arms (e.g., wheelchair, bedside commode, etc.) 2   Moving from lying on back to sitting on the side of the bed? 2   How much help from another person does the patient currently need...   Moving to and from a bed to a chair (including a wheelchair)? 3   Need to walk in a hospital room? 3   Climbing 3-5 steps with a railing? 2   6 clicks Mobility Score 15   Short Term Goals    Short Term Goal # 1 in 6 visits patient will demo all bed mobility indep for safe DC   Goal Outcome # 1 goal not met   Short Term Goal # 2 in 6 visis patient will ambualte 200' sup w/LRAD for safe DC   Goal Outcome # 2 Goal not met   Short Term Goal # 3 in 6 visits patient will navigate 14 stairs with Radha for safe DC   Goal Outcome # 3 Goal not met   Short Term Goal # 4 in 6 visits patient will demo all functional transfers with Radha for safe DC   Goal Outcome # 4 Progressing as expected   Anticipated Discharge Equipment and Recommendations   DC Equipment Recommendations Unable to determine at this time   Discharge Recommendations Recommend post-acute placement for additional physical therapy services prior  to discharge home   Interdisciplinary Plan of Care Collaboration   IDT Collaboration with  Nursing;Occupational Therapist   Patient Position at End of Therapy In Bed;Call Light within Reach;Tray Table within Reach;Phone within Reach   Collaboration Comments RN updated

## 2022-12-30 PROCEDURE — 700102 HCHG RX REV CODE 250 W/ 637 OVERRIDE(OP)

## 2022-12-30 PROCEDURE — A9270 NON-COVERED ITEM OR SERVICE: HCPCS | Performed by: STUDENT IN AN ORGANIZED HEALTH CARE EDUCATION/TRAINING PROGRAM

## 2022-12-30 PROCEDURE — 94669 MECHANICAL CHEST WALL OSCILL: CPT

## 2022-12-30 PROCEDURE — 99231 SBSQ HOSP IP/OBS SF/LOW 25: CPT | Mod: GC | Performed by: INTERNAL MEDICINE

## 2022-12-30 PROCEDURE — 700101 HCHG RX REV CODE 250

## 2022-12-30 PROCEDURE — 700111 HCHG RX REV CODE 636 W/ 250 OVERRIDE (IP): Performed by: STUDENT IN AN ORGANIZED HEALTH CARE EDUCATION/TRAINING PROGRAM

## 2022-12-30 PROCEDURE — A9270 NON-COVERED ITEM OR SERVICE: HCPCS | Performed by: INTERNAL MEDICINE

## 2022-12-30 PROCEDURE — 700102 HCHG RX REV CODE 250 W/ 637 OVERRIDE(OP): Performed by: STUDENT IN AN ORGANIZED HEALTH CARE EDUCATION/TRAINING PROGRAM

## 2022-12-30 PROCEDURE — 700102 HCHG RX REV CODE 250 W/ 637 OVERRIDE(OP): Performed by: INTERNAL MEDICINE

## 2022-12-30 PROCEDURE — A9270 NON-COVERED ITEM OR SERVICE: HCPCS

## 2022-12-30 PROCEDURE — 770001 HCHG ROOM/CARE - MED/SURG/GYN PRIV*

## 2022-12-30 RX ORDER — HYDROCHLOROTHIAZIDE 25 MG/1
12.5 TABLET ORAL
Status: DISCONTINUED | OUTPATIENT
Start: 2022-12-30 | End: 2022-12-31

## 2022-12-30 RX ORDER — ACETAMINOPHEN 325 MG/1
650 TABLET ORAL EVERY 6 HOURS
Status: DISCONTINUED | OUTPATIENT
Start: 2022-12-30 | End: 2023-01-03 | Stop reason: HOSPADM

## 2022-12-30 RX ORDER — HYDROCODONE BITARTRATE AND HOMATROPINE METHYLBROMIDE ORAL SOLUTION 5; 1.5 MG/5ML; MG/5ML
5 LIQUID ORAL EVERY 4 HOURS PRN
Status: DISCONTINUED | OUTPATIENT
Start: 2022-12-30 | End: 2023-01-03 | Stop reason: HOSPADM

## 2022-12-30 RX ORDER — AMLODIPINE BESYLATE 5 MG/1
10 TABLET ORAL
Status: DISCONTINUED | OUTPATIENT
Start: 2022-12-31 | End: 2023-01-03 | Stop reason: HOSPADM

## 2022-12-30 RX ORDER — HYDRALAZINE HYDROCHLORIDE 25 MG/1
25 TABLET, FILM COATED ORAL EVERY 8 HOURS
Status: DISCONTINUED | OUTPATIENT
Start: 2022-12-30 | End: 2022-12-30

## 2022-12-30 RX ADMIN — DOCUSATE SODIUM 50 MG AND SENNOSIDES 8.6 MG 2 TABLET: 8.6; 5 TABLET, FILM COATED ORAL at 17:48

## 2022-12-30 RX ADMIN — ACETAMINOPHEN 650 MG: 325 TABLET, FILM COATED ORAL at 01:35

## 2022-12-30 RX ADMIN — HYDROCHLOROTHIAZIDE 12.5 MG: 25 TABLET ORAL at 09:53

## 2022-12-30 RX ADMIN — GUAIFENESIN 600 MG: 600 TABLET, EXTENDED RELEASE ORAL at 04:28

## 2022-12-30 RX ADMIN — AMLODIPINE BESYLATE 5 MG: 5 TABLET ORAL at 04:29

## 2022-12-30 RX ADMIN — LEVOTHYROXINE SODIUM 100 MCG: 0.1 TABLET ORAL at 04:29

## 2022-12-30 RX ADMIN — RIVAROXABAN 10 MG: 10 TABLET, FILM COATED ORAL at 17:48

## 2022-12-30 RX ADMIN — LOSARTAN POTASSIUM 100 MG: 50 TABLET, FILM COATED ORAL at 04:29

## 2022-12-30 RX ADMIN — ACETAMINOPHEN 650 MG: 325 TABLET ORAL at 23:03

## 2022-12-30 RX ADMIN — LIDOCAINE 1 PATCH: 50 PATCH TOPICAL at 08:52

## 2022-12-30 RX ADMIN — FAMOTIDINE 20 MG: 20 TABLET, FILM COATED ORAL at 04:29

## 2022-12-30 RX ADMIN — GUAIFENESIN 600 MG: 600 TABLET, EXTENDED RELEASE ORAL at 17:48

## 2022-12-30 RX ADMIN — ACETAMINOPHEN 650 MG: 325 TABLET, FILM COATED ORAL at 08:49

## 2022-12-30 RX ADMIN — ACETAMINOPHEN 650 MG: 325 TABLET ORAL at 17:48

## 2022-12-30 RX ADMIN — OXYBUTYNIN CHLORIDE 5 MG: 5 TABLET, EXTENDED RELEASE ORAL at 17:50

## 2022-12-30 RX ADMIN — CEFTRIAXONE SODIUM 1000 MG: 10 INJECTION, POWDER, FOR SOLUTION INTRAVENOUS at 04:28

## 2022-12-30 ASSESSMENT — ENCOUNTER SYMPTOMS
SENSORY CHANGE: 0
SPUTUM PRODUCTION: 1
FALLS: 1
LOSS OF CONSCIOUSNESS: 0
FOCAL WEAKNESS: 0
ABDOMINAL PAIN: 0
NAUSEA: 0
VOMITING: 0
CHILLS: 0
WHEEZING: 0
HEADACHES: 0
SHORTNESS OF BREATH: 0
COUGH: 1
FEVER: 0
SORE THROAT: 0
BACK PAIN: 0
DIZZINESS: 1
INSOMNIA: 0
PALPITATIONS: 0

## 2022-12-30 ASSESSMENT — PAIN DESCRIPTION - PAIN TYPE
TYPE: ACUTE PAIN

## 2022-12-30 ASSESSMENT — LIFESTYLE VARIABLES
EVER FELT BAD OR GUILTY ABOUT YOUR DRINKING: NO
ON A TYPICAL DAY WHEN YOU DRINK ALCOHOL HOW MANY DRINKS DO YOU HAVE: 0
TOTAL SCORE: 0
TOTAL SCORE: 0
AVERAGE NUMBER OF DAYS PER WEEK YOU HAVE A DRINK CONTAINING ALCOHOL: 0
DOES PATIENT WANT TO STOP DRINKING: NO
EVER HAD A DRINK FIRST THING IN THE MORNING TO STEADY YOUR NERVES TO GET RID OF A HANGOVER: NO
TOTAL SCORE: 0
HAVE PEOPLE ANNOYED YOU BY CRITICIZING YOUR DRINKING: NO
CONSUMPTION TOTAL: NEGATIVE
SUBSTANCE_ABUSE: 0
HAVE YOU EVER FELT YOU SHOULD CUT DOWN ON YOUR DRINKING: NO
HOW MANY TIMES IN THE PAST YEAR HAVE YOU HAD 5 OR MORE DRINKS IN A DAY: 0
ALCOHOL_USE: NO

## 2022-12-30 ASSESSMENT — PATIENT HEALTH QUESTIONNAIRE - PHQ9
1. LITTLE INTEREST OR PLEASURE IN DOING THINGS: NOT AT ALL
1. LITTLE INTEREST OR PLEASURE IN DOING THINGS: NOT AT ALL
2. FEELING DOWN, DEPRESSED, IRRITABLE, OR HOPELESS: NOT AT ALL
SUM OF ALL RESPONSES TO PHQ9 QUESTIONS 1 AND 2: 0
SUM OF ALL RESPONSES TO PHQ9 QUESTIONS 1 AND 2: 0
2. FEELING DOWN, DEPRESSED, IRRITABLE, OR HOPELESS: NOT AT ALL

## 2022-12-30 NOTE — CARE PLAN
The patient is Stable - Low risk of patient condition declining or worsening    Shift Goals  Clinical Goals: DC 12/30, pain control  Patient Goals: Rest  Family Goals: No fall and less pain    Progress made toward(s) clinical / shift goals:    Problem: Pain - Standard  Goal: Alleviation of pain or a reduction in pain to the patient’s comfort goal  Outcome: Progressing     Problem: Knowledge Deficit - Standard  Goal: Patient and family/care givers will demonstrate understanding of plan of care, disease process/condition, diagnostic tests and medications  Outcome: Progressing     Problem: Skin Integrity  Goal: Skin integrity is maintained or improved  Outcome: Progressing     Problem: Fall Risk  Goal: Patient will remain free from falls  Outcome: Progressing     Problem: Safety - Medical Restraint  Goal: Remains free of injury from restraints (Restraint for Interference with Medical Device)  Outcome: Progressing  Goal: Free from restraint(s) (Restraint for Interference with Medical Device)  Outcome: Progressing  Note: Pt has not been in restraints since beginning of shift. Still impulsive to get out of bed but only because she needs to use the bathroom. Pain controlled   with Tylenol.       Patient is not progressing towards the following goals:

## 2022-12-30 NOTE — DISCHARGE PLANNING
HTH/SCP TCN chart review completed. Collaborated with WINDY Singh. Pt does have accepting SNFs once medically cleared per review/discussion with CM. Noted pt has now been off restraints for > 24 hrs per review. Pt is now medically cleared as well per MD note from 12/30 @11:24. As prior, anticipate dc to SNF and note that pt has been accepted to [facility] (now that she is off restraints) and pending [facility]. Per chart review, [facility] may not have beds until next week and CM is aware and per discussion will be following up for remaining facilities to check bed availability. TCN reached out to post acute TCNs to check bed availability as well (and will monitor CM with updates as occur).  TCN will continue to follow and collaborate with discharge planning team as additional post acute needs arise. Thank you.     Completed:  PT and OT with recommendations for post acute placement on 12/29  Choice obtained: SNF ,DME (02)  [facility] referral sent 12/26

## 2022-12-30 NOTE — PROGRESS NOTES
[facility] Internal Medicine Daily Progress Note    Date of Service  12/30/2022    R Team: R IM Green Team   Attending: Eber Lisa M.d.  Senior Resident: Dr. Levine  Intern:  Dr. Acosta   Contact Number: [phone]    Chief Complaint  Lizzette Burton is a 82 y.o. female admitted 12/26/2022 with dyspnea and cough.    Hospital Course  Lizzette Burton is a 82 y.o. female who presented 12/26/2022 with fall. Patient with history of hypertension, hypothyroidism, osteoarthritis (s/p knee surgery, lumbar spondylosis), asthma, and chronic urinary incontinence. She presented to the ED after 2 GFL, first few days ago and the second one right before this admission, non-syncopal GLF, she was admitted due to dyspnea and thoracic pain. In the ED she was found to be tachypneic, hypertensive, requiring 4lt by NC, hyperglycemia to 167,  elevated trop 32, elevated BNP to 2025. Imaging remarkable for CT chest showing COURTNEY opacity and subsegmental RML atelectasis without acute trauma abnormalities. CT head unremarkable for acute abnormalities and CT C spine also remarkable apart from age related degenerative changes. Given acute hypoxic respiratory failure, along with cough, sputum, elevated procalcitonin and COURTNEY oppacity, initiated management for CAP. RVT negative for covid or influenza. TTE with normal left ventricular systolic function (60%), moderate dilated LA, and grade I diastolic dysfunction. Blood cultures growing Staphylococcus sp, likely contamination. PT evaluated and recommended SNF. Pending placement.     Interval Problem Update    YUAN. Continues to have stable pain to her left ribs from coughing. Denies dyspnea, fever, chills, chest pain, abdominal pain.    Consultants/Specialty  PT/OT  RT    Code Status  Full Code    Disposition  Patient is medically cleared for discharge.   Anticipate discharge to to skilled nursing facility.  I have placed the appropriate orders for post-discharge needs.    Review of Systems  Review of  Systems   Constitutional:  Negative for chills and fever.   HENT:  Negative for congestion and sore throat.    Respiratory:  Positive for cough and sputum production. Negative for shortness of breath and wheezing.    Cardiovascular:  Negative for chest pain, palpitations and leg swelling.   Gastrointestinal:  Negative for abdominal pain, nausea and vomiting.   Genitourinary:  Negative for dysuria and urgency.   Musculoskeletal:  Positive for falls. Negative for back pain.        Rib pain R>L   Neurological:  Positive for dizziness. Negative for sensory change, focal weakness, loss of consciousness and headaches.   Psychiatric/Behavioral:  Negative for substance abuse. The patient does not have insomnia.    All other systems reviewed and are negative.     Physical Exam  Temp:  [36.5 °C (97.7 °F)-37.2 °C (99 °F)] 37.2 °C (99 °F)  Pulse:  [70-95] 70  Resp:  [16-20] 20  BP: (116-182)/() 179/87  SpO2:  [93 %-96 %] 93 %      Physical Exam  Constitutional:       Appearance: She is obese.   HENT:      Head: Normocephalic and atraumatic.      Right Ear: External ear normal.      Left Ear: External ear normal.      Nose: Nose normal. No congestion.      Mouth/Throat:      Mouth: Mucous membranes are moist.      Pharynx: Oropharynx is clear.   Eyes:      Extraocular Movements: Extraocular movements intact.      Pupils: Pupils are equal, round, and reactive to light.   Cardiovascular:      Rate and Rhythm: Normal rate and regular rhythm.      Heart sounds: No murmur heard.  Pulmonary:      Effort: Pulmonary effort is normal. No respiratory distress.      Breath sounds: Rhonchi present.   Abdominal:      General: Abdomen is flat. There is no distension.      Palpations: Abdomen is soft.      Tenderness: There is no abdominal tenderness.   Musculoskeletal:      Right lower leg: No edema.      Left lower leg: No edema.   Skin:     General: Skin is warm and dry.      Capillary Refill: Capillary refill takes less than 2  seconds.   Neurological:      General: No focal deficit present.      Mental Status: She is alert and oriented to person, place, and time. Mental status is at baseline.        Fluids    Intake/Output Summary (Last 24 hours) at 12/30/2022 1124  Last data filed at 12/30/2022 0900  Gross per 24 hour   Intake 960 ml   Output --   Net 960 ml         Laboratory  Recent Labs     12/29/22  0154   WBC 11.9*   RBC 3.81*   HEMOGLOBIN 12.0   HEMATOCRIT 35.4*   MCV 92.9   MCH 31.5   MCHC 33.9   RDW 45.0   PLATELETCT 300   MPV 8.6*       Recent Labs     12/29/22  0154   SODIUM 129*   POTASSIUM 3.8   CHLORIDE 94*   CO2 25   GLUCOSE 138*   BUN 19   CREATININE 0.58   CALCIUM 8.9                       Imaging  EC-ECHOCARDIOGRAM COMPLETE W/O CONT   Final Result      CT-CHEST,ABDOMEN,PELVIS WITH   Final Result      1.  No definite acute traumatic abnormality.   2.  Left upper lobe perihilar opacity suspicious for pneumonitis.   3.  Subsegmental right middle lobe atelectasis.   4.  Additional findings as detailed.      CT-CSPINE WITHOUT PLUS RECONS   Final Result      No acute fracture or dislocation cervical spine.      Degenerative changes.      CT-HEAD W/O   Final Result      1.  Chronic microvascular ischemic type changes.   2.  No acute intracranial abnormality.                Assessment/Plan  Problem Representation:    * Pneumonia due to infectious organism- (present on admission)  Assessment & Plan  Clinical presentation compatible with CAP, image with COURTNEY consolidation and elevated procal. Viral panel was negative. Blood cultures grew Coag-neg staph, likely contaminated. Patient improving with current regimen, no SIRS, weaning O2 requirements, on RA at rest.  -Completed ceftriaxone and doxycycline  -Awaiting placement for rehab    Delirium  Assessment & Plan  Acute transient disorientation and agitation. Patient is now calmed, alert and oriented x 3, improving and verbalized understanding situation and plan. Likely adverse  reaction to oxycodone  - Pending placement    Hyperglycemia  Assessment & Plan  Sustained in the 150s since admission. A1C of 5.7%.   - Monitor     Recurrent falls and weakness  Assessment & Plan  Patient had 2 falls within the last 2 weeks, negative orthostatic vitals. No concern for peripheral neuropathy or focal neurological deficits. More likely she was dehydrated and debilitated in the setting of ongoing illness leading to falls. Improving mobilization.   - PT and OT recommended SNF placement     Rib fractures  Assessment & Plan  Old rib fracture seen in the right side (CT). Patient is improving. Pain is well controlled.   - Lidocaine patch     Hypothyroidism- (present on admission)  Assessment & Plan  TSH ad admission wnl.   - Continue home thyroid supplement      Acute respiratory failure with hypoxemia (HCC)- (present on admission)  Assessment & Plan  Likely secondary to CAP and contusion from fall. Improving.   - O2 protocol, to be continued at SNF  - Incentive spirometry  - Monitor     Hypokalemia- (present on admission)  Assessment & Plan  K 3.5 on presentation, improving.   - Encourage PO, repletion as needed      HTN (hypertension)- (present on admission)  Assessment & Plan  Hypertensive urgency on presentation, likely related to pain and discomfort.  -Continue losartan, amlodipine  -Add HCTZ       VTE prophylaxis: Xarelto 10 mg daily as prophylaxis    I have performed a physical exam and reviewed and updated ROS and Plan today (12/30/2022). In review of yesterday's note (12/29/2022), there are no changes except as documented above.

## 2022-12-30 NOTE — CARE PLAN
The patient is Stable - Low risk of patient condition declining or worsening    Shift Goals  Clinical Goals: Pain control, plan to Discharge to SNF  Patient Goals: Rest  Family Goals: Comfort and rest    Progress made toward(s) clinical / shift goals:  Patient is alert and oriented X 3 today, c/o pain which is well managed with Tylenol and Lidoderm patch.

## 2022-12-30 NOTE — DISCHARGE PLANNING
Agency[facility]ility Name: [facility]  Outcome: DPA left vmail inquiring about bed availability     0941    Agency/Facility Name: [facility]  Spoke To: Radha  Outcome: DPA received call back from facility. Per Radha, facility does not have any beds and possibly will not have any beds until sometime next week      0959    Agency/Facility Name: [facility]  Spoke To: Tala  Outcome: DPA called to check on referral status. Per Tala, she will look at referral and call DPA back

## 2022-12-31 LAB
ANION GAP SERPL CALC-SCNC: 10 MMOL/L (ref 7–16)
BACTERIA BLD CULT: NORMAL
BUN SERPL-MCNC: 14 MG/DL (ref 8–22)
CALCIUM SERPL-MCNC: 9.4 MG/DL (ref 8.5–10.5)
CHLORIDE SERPL-SCNC: 90 MMOL/L (ref 96–112)
CO2 SERPL-SCNC: 29 MMOL/L (ref 20–33)
CREAT SERPL-MCNC: 0.91 MG/DL (ref 0.5–1.4)
GFR SERPLBLD CREATININE-BSD FMLA CKD-EPI: 62 ML/MIN/1.73 M 2
GLUCOSE SERPL-MCNC: 124 MG/DL (ref 65–99)
POTASSIUM SERPL-SCNC: 3.9 MMOL/L (ref 3.6–5.5)
SIGNIFICANT IND 70042: NORMAL
SITE SITE: NORMAL
SODIUM SERPL-SCNC: 129 MMOL/L (ref 135–145)
SOURCE SOURCE: NORMAL

## 2022-12-31 PROCEDURE — 94760 N-INVAS EAR/PLS OXIMETRY 1: CPT

## 2022-12-31 PROCEDURE — 700101 HCHG RX REV CODE 250

## 2022-12-31 PROCEDURE — 36415 COLL VENOUS BLD VENIPUNCTURE: CPT

## 2022-12-31 PROCEDURE — A9270 NON-COVERED ITEM OR SERVICE: HCPCS | Performed by: STUDENT IN AN ORGANIZED HEALTH CARE EDUCATION/TRAINING PROGRAM

## 2022-12-31 PROCEDURE — 700102 HCHG RX REV CODE 250 W/ 637 OVERRIDE(OP): Performed by: STUDENT IN AN ORGANIZED HEALTH CARE EDUCATION/TRAINING PROGRAM

## 2022-12-31 PROCEDURE — 700102 HCHG RX REV CODE 250 W/ 637 OVERRIDE(OP)

## 2022-12-31 PROCEDURE — 94669 MECHANICAL CHEST WALL OSCILL: CPT

## 2022-12-31 PROCEDURE — 80048 BASIC METABOLIC PNL TOTAL CA: CPT

## 2022-12-31 PROCEDURE — 700102 HCHG RX REV CODE 250 W/ 637 OVERRIDE(OP): Performed by: INTERNAL MEDICINE

## 2022-12-31 PROCEDURE — 770001 HCHG ROOM/CARE - MED/SURG/GYN PRIV*

## 2022-12-31 PROCEDURE — 99231 SBSQ HOSP IP/OBS SF/LOW 25: CPT | Mod: GC | Performed by: INTERNAL MEDICINE

## 2022-12-31 PROCEDURE — A9270 NON-COVERED ITEM OR SERVICE: HCPCS | Performed by: INTERNAL MEDICINE

## 2022-12-31 PROCEDURE — A9270 NON-COVERED ITEM OR SERVICE: HCPCS

## 2022-12-31 RX ORDER — CARVEDILOL 6.25 MG/1
3.12 TABLET ORAL 2 TIMES DAILY WITH MEALS
Status: DISCONTINUED | OUTPATIENT
Start: 2022-12-31 | End: 2023-01-01

## 2022-12-31 RX ORDER — LEVOTHYROXINE SODIUM 88 UG/1
88 TABLET ORAL
Status: DISCONTINUED | OUTPATIENT
Start: 2023-01-01 | End: 2023-01-03 | Stop reason: HOSPADM

## 2022-12-31 RX ORDER — LEVOTHYROXINE SODIUM 175 UG/1
85 TABLET ORAL
Status: DISCONTINUED | OUTPATIENT
Start: 2023-01-01 | End: 2022-12-31

## 2022-12-31 RX ORDER — LEVOTHYROXINE SODIUM 0.1 MG/1
100 TABLET ORAL
Status: DISCONTINUED | OUTPATIENT
Start: 2022-12-31 | End: 2022-12-31

## 2022-12-31 RX ORDER — CARVEDILOL 6.25 MG/1
3.12 TABLET ORAL 2 TIMES DAILY WITH MEALS
Status: DISCONTINUED | OUTPATIENT
Start: 2022-12-31 | End: 2022-12-31

## 2022-12-31 RX ORDER — LEVOTHYROXINE SODIUM 0.1 MG/1
100 TABLET ORAL
Status: DISCONTINUED | OUTPATIENT
Start: 2023-01-01 | End: 2022-12-31

## 2022-12-31 RX ADMIN — FAMOTIDINE 20 MG: 20 TABLET, FILM COATED ORAL at 04:54

## 2022-12-31 RX ADMIN — ACETAMINOPHEN 650 MG: 325 TABLET ORAL at 04:54

## 2022-12-31 RX ADMIN — LEVOTHYROXINE SODIUM 100 MCG: 0.1 TABLET ORAL at 04:54

## 2022-12-31 RX ADMIN — LIDOCAINE 1 PATCH: 50 PATCH TOPICAL at 12:01

## 2022-12-31 RX ADMIN — GUAIFENESIN 600 MG: 600 TABLET, EXTENDED RELEASE ORAL at 16:58

## 2022-12-31 RX ADMIN — AMLODIPINE BESYLATE 10 MG: 5 TABLET ORAL at 06:44

## 2022-12-31 RX ADMIN — ACETAMINOPHEN 650 MG: 325 TABLET ORAL at 12:00

## 2022-12-31 RX ADMIN — LOSARTAN POTASSIUM 100 MG: 50 TABLET, FILM COATED ORAL at 04:54

## 2022-12-31 RX ADMIN — HYDROCHLOROTHIAZIDE 12.5 MG: 25 TABLET ORAL at 04:55

## 2022-12-31 RX ADMIN — ACETAMINOPHEN 650 MG: 325 TABLET ORAL at 16:57

## 2022-12-31 RX ADMIN — RIVAROXABAN 10 MG: 10 TABLET, FILM COATED ORAL at 16:58

## 2022-12-31 RX ADMIN — HYDROCODONE BITARTRATE AND HOMATROPINE METHYLBROMIDE 5 ML: 5; 1.5 SOLUTION ORAL at 21:46

## 2022-12-31 RX ADMIN — DOCUSATE SODIUM 50 MG AND SENNOSIDES 8.6 MG 2 TABLET: 8.6; 5 TABLET, FILM COATED ORAL at 18:00

## 2022-12-31 RX ADMIN — CARVEDILOL 3.12 MG: 6.25 TABLET, FILM COATED ORAL at 16:57

## 2022-12-31 RX ADMIN — DOCUSATE SODIUM 50 MG AND SENNOSIDES 8.6 MG 2 TABLET: 8.6; 5 TABLET, FILM COATED ORAL at 04:54

## 2022-12-31 RX ADMIN — GUAIFENESIN 600 MG: 600 TABLET, EXTENDED RELEASE ORAL at 04:54

## 2022-12-31 ASSESSMENT — ENCOUNTER SYMPTOMS
BACK PAIN: 0
SHORTNESS OF BREATH: 0
SORE THROAT: 0
ABDOMINAL PAIN: 0
DIZZINESS: 1
VOMITING: 0
FALLS: 0
FOCAL WEAKNESS: 0
NAUSEA: 0
INSOMNIA: 0
WHEEZING: 0
LOSS OF CONSCIOUSNESS: 0
SPUTUM PRODUCTION: 0
SENSORY CHANGE: 0
HEADACHES: 0
CHILLS: 0
COUGH: 1
FEVER: 0
PALPITATIONS: 0

## 2022-12-31 ASSESSMENT — LIFESTYLE VARIABLES: SUBSTANCE_ABUSE: 0

## 2022-12-31 NOTE — CARE PLAN
The patient is Stable - Low risk of patient condition declining or worsening    Problem: Pain - Standard  Goal: Alleviation of pain or a reduction in pain to the patient’s comfort goal  Outcome: Progressing     Problem: Skin Integrity  Goal: Skin integrity is maintained or improved  Outcome: Progressing  There are no skin issues noted this shift. Moisturizing cream applied       Shift Goals  Clinical Goals: Pain management and discharge to SNF, Monitor BP  Patient Goals: Rest  Family Goals: Comfort rest    Progress made toward(s) clinical / shift goals: Patient declines pain this morning, RN will continue to monitor.

## 2022-12-31 NOTE — DISCHARGE PLANNING
Case Management Discharge Planning    Admission Date: 12/26/2022  GMLOS: 4  ALOS: 4    6-Clicks ADL Score: 21  6-Clicks Mobility Score: 15  PT and/or OT Eval ordered: Yes  Post-acute Referrals Ordered: Yes  Post-acute Choice Obtained: Yes  Has referral(s) been sent to post-acute provider:  Yes      Anticipated Discharge Dispo: Discharge Disposition: D/T to SNF with Medicare cert in anticipation of skilled care (03)    DME Needed: No    Action(s) Taken: Patient is out of restraints and is clear for discharge to SNF.  RN CM re-sent SNF referral to [facility], patient was accepted.  RN CM attempted to call Lisa at [facility] to inquire about bed availability, left a voicemail requesting a call back.  [facility] no beds available until next week.    Escalations Completed: Pending Discharge Destination    Medically Clear: Yes    Next Steps: Care coordination to follow up with SNFs for bed availability.      Barriers to Discharge: SNF bed    Is the patient up for discharge tomorrow: Potentially.

## 2022-12-31 NOTE — PROGRESS NOTES
[facility] Internal Medicine Daily Progress Note    Date of Service  12/31/2022    [facility] Team: R IM Green Team   Attending: Eber Lisa M.d.  Senior Resident: Dr. Levine  Intern:  Dr. Acosta   Contact Number: [phone]    Chief Complaint  Lizzette Burton is a 82 y.o. female admitted 12/26/2022 with dyspnea and cough.    Hospital Course  Lizzette Burton is a 82 y.o. female who presented 12/26/2022 with fall. Patient with history of hypertension, hypothyroidism, osteoarthritis (s/p knee surgery, lumbar spondylosis), asthma, and chronic urinary incontinence. She presented to the ED after 2 GFL, first few days ago and the second one right before this admission, non-syncopal GLF, she was admitted due to dyspnea and thoracic pain. In the ED she was found to be tachypneic, hypertensive, requiring 4lt by NC. Imaging remarkable for CT chest showing COURTNEY opacity and subsegmental RML atelectasis without acute trauma abnormalities. CT head unremarkable for acute abnormalities and CT C spine also remarkable apart from age related degenerative changes. Given acute hypoxic respiratory failure, along with cough, sputum, elevated procalcitonin and COURTNEY oppacity, initiated management for CAP. RVT negative for covid or influenza. TTE with normal left ventricular systolic function (60%), moderate dilated LA, and grade I diastolic dysfunction. Blood cultures grew Staphylococcus sp, likely contamination. Patient completed antibiotic treatment and O2 was weaned to room air. PT evaluated and recommended SNF. Pending placement.     Interval Problem Update  Concern for overnight disorientation. No agitation events.   VS stable, no tachycardia, no fever, mild hypertension stage 2.  BMP with mild hyponatremia and hypochloremia.     Patient reports feeling much better, no dyspnea at rest and very mild on exertion, no lightheadedness and some dizziness when long-standing. Cough with no sputum now, pain over the left chest is improved, mild when  coughing. Good appetite, active BM.     Consultants/Specialty  PT/OT  RT    Code Status  Full Code    Disposition  Patient is not medically cleared for discharge.   Anticipate discharge to to home with organized home healthcare and close outpatient follow-up/SNF.  I have placed the appropriate orders for post-discharge needs.    Review of Systems  Review of Systems   Constitutional:  Negative for chills and fever.   HENT:  Negative for congestion and sore throat.    Respiratory:  Positive for cough. Negative for sputum production, shortness of breath and wheezing.    Cardiovascular:  Negative for chest pain, palpitations and leg swelling.   Gastrointestinal:  Negative for abdominal pain, nausea and vomiting.   Genitourinary:  Negative for dysuria and urgency.   Musculoskeletal:  Negative for back pain and falls.        Rib pain R>L   Neurological:  Positive for dizziness. Negative for sensory change, focal weakness, loss of consciousness and headaches.   Psychiatric/Behavioral:  Negative for substance abuse. The patient does not have insomnia.    All other systems reviewed and are negative.     Physical Exam  Temp:  [36.1 °C (97 °F)-37.2 °C (99 °F)] 36.6 °C (97.9 °F)  Pulse:  [72-97] 97  Resp:  [18-20] 18  BP: (156-179)/(74-90) 178/88  SpO2:  [91 %-100 %] 93 %      General: Awake, alert and oriented. No acute distress.   Head and Neck: NC/AT, EOMI, no scleral icterus or conjunctival pallor, no nasal discharge or oral erythema or exudates. Neck supple, no cervical or supraclavicular LAD,   CV: Rhythmic heart sounds, no murmurs, gallops or rubs. No S3,S4 or JVD. Radial and dorsalis pedis pulses 2+ and equal bilaterally.   Pulm: Chest expansion is symmetrical, crackles only on the left base field, right field with clear auscultation, no wheezing or rhonchi.   GI: Increased abdominal girth. Flat, non distended, soft, nontender, normal bowel sounds.  Skin: Warm, no rashes, no lesions.  MSK: Normal ROM. No lower extremity  edema. No pain, no lesions.   Neuro: Patient is alert and oriented x3. Speech is clear and fluent. Is able to name, repeat and comprehend. Pupils equal, round and reactive to light. Extra ocular movements intact. Facial and body symmetry. Strength 5 out of 5 in upper and lower extremities. Sensitivity intact. Normal deep tendon reflexes. Normal tone. No gait abnormalities.   Psych: Appropriate mood and affect.      Fluids    Intake/Output Summary (Last 24 hours) at 12/31/2022 1151  Last data filed at 12/31/2022 0900  Gross per 24 hour   Intake 720 ml   Output --   Net 720 ml       Laboratory  Recent Labs     12/29/22  0154   WBC 11.9*   RBC 3.81*   HEMOGLOBIN 12.0   HEMATOCRIT 35.4*   MCV 92.9   MCH 31.5   MCHC 33.9   RDW 45.0   PLATELETCT 300   MPV 8.6*     Recent Labs     12/29/22  0154 12/31/22  0955   SODIUM 129* 129*   POTASSIUM 3.8 3.9   CHLORIDE 94* 90*   CO2 25 29   GLUCOSE 138* 124*   BUN 19 14   CREATININE 0.58 0.91   CALCIUM 8.9 9.4                     Imaging  EC-ECHOCARDIOGRAM COMPLETE W/O CONT   Final Result      CT-CHEST,ABDOMEN,PELVIS WITH   Final Result      1.  No definite acute traumatic abnormality.   2.  Left upper lobe perihilar opacity suspicious for pneumonitis.   3.  Subsegmental right middle lobe atelectasis.   4.  Additional findings as detailed.      CT-CSPINE WITHOUT PLUS RECONS   Final Result      No acute fracture or dislocation cervical spine.      Degenerative changes.      CT-HEAD W/O   Final Result      1.  Chronic microvascular ischemic type changes.   2.  No acute intracranial abnormality.              Assessment/Plan  Problem Representation:    * Pneumonia due to infectious organism- (present on admission)  Assessment & Plan  Clinical presentation compatible with CAP, image with COURTNEY consolidation and elevated procal. Viral panel was negative. Blood cultures grew Coag-neg staph, likely contaminated. Patient improved with current regimen, no SIRS, weaning O2 requirements, on RA at  rest.  -Completed ceftriaxone and doxycycline  -Awaiting placement for rehab    Acute respiratory failure with hypoxemia (HCC)- (present on admission)  Assessment & Plan  Likely secondary to CAP and contusion from fall. Resolved.     Delirium  Assessment & Plan  Acute transient disorientation and agitation. Likely hospital-associated, also side effects from opioids among others. Patient is now calmed, alert and oriented x 4 now.   - Discontinued oxybutinin  - Pending placement    Hyperglycemia  Assessment & Plan  Sustained in the 150s since admission. A1C of 5.7%.   - Monitor     Recurrent falls and weakness  Assessment & Plan  Patient had 2 falls within the last 2 weeks, negative orthostatic vitals. No concern for peripheral neuropathy or focal neurological deficits, CT head negative for acute events. More likely she was debilitated in the setting of ongoing illness. Improved mobilization.   - Dc HCTZ (mild hyponatremia)  - Adjusted thyroid medication  - PT and OT recommended SNF placement     Rib fractures  Assessment & Plan  Old rib fracture seen in the right side (CT). Patient is improving. Pain is well controlled.   - Lidocaine patch     Hypothyroidism- (present on admission)  Assessment & Plan  Possibly contributing to dizziness and HTN as TSH might be too suppressed for her age.   - Adjust LVTX to 88mcg   - Will need follow up as outpt      HTN (hypertension)- (present on admission)  Assessment & Plan  Hypertensive urgency on presentation, likely related to pain and discomfort. Now better controlled but still stage II.   - Continue losartan, amlodipine  - Discontinue HCTZ given hyponatremia   - Start low dose carvedilol   - Monitor     Hypokalemia- (present on admission)  Assessment & Plan  From poor PO intake. Resolved.         VTE prophylaxis: Xarelto 10 mg daily as prophylaxis    I have performed a physical exam and reviewed and updated ROS and Plan today (12/31/2022). In review of yesterday's note  (12/30/2022), there are no changes except as documented above.

## 2022-12-31 NOTE — CARE PLAN
The patient is Stable - Low risk of patient condition declining or worsening    Shift Goals  Clinical Goals: DC tomorrow, ambulate to bathroom  Patient Goals: Rest  Family Goals: Discharge tomorrow     Pt had mild pain upon ambulating to bathroom. Tylenol given. On room air. No restraints needed. Pt not impulsive throughout night.    Progress made toward(s) clinical / shift goals:    Problem: Pain - Standard  Goal: Alleviation of pain or a reduction in pain to the patient’s comfort goal  Outcome: Progressing     Problem: Knowledge Deficit - Standard  Goal: Patient and family/care givers will demonstrate understanding of plan of care, disease process/condition, diagnostic tests and medications  Outcome: Progressing     Problem: Skin Integrity  Goal: Skin integrity is maintained or improved  Outcome: Progressing     Problem: Fall Risk  Goal: Patient will remain free from falls  Outcome: Progressing     Problem: Safety - Medical Restraint  Goal: Remains free of injury from restraints (Restraint for Interference with Medical Device)  Outcome: Progressing  Goal: Free from restraint(s) (Restraint for Interference with Medical Device)  Outcome: Progressing       Patient is not progressing towards the following goals:

## 2022-12-31 NOTE — DISCHARGE PLANNING
"TCN following. HTH/SCP chart review completed.  See prior TCN note from yesterday for discharge concerns.  Patient is still medically clear*, accepted to SNF, awaiting beds.  Per WINDY Singh's note, \"RN CM attempted to call Lisa at [facility] to inquire about bed availability, left a voicemail requesting a call back.  [facility] has no beds available until next week.\"  Appreciate CM follow up, TCN will continue to follow up.    Completed:  PT and OT with recommendations for post acute placement on 12/29  Choice obtained: SNF ,DME (02)  [facility] referral sent 12/26     *MD note on 12/31 now reporting patient is not medically cleared.   "

## 2023-01-01 PROBLEM — R41.0 DELIRIUM: Status: RESOLVED | Noted: 2022-12-29 | Resolved: 2023-01-01

## 2023-01-01 PROBLEM — R73.9 HYPERGLYCEMIA: Status: RESOLVED | Noted: 2022-12-28 | Resolved: 2023-01-01

## 2023-01-01 PROCEDURE — 770001 HCHG ROOM/CARE - MED/SURG/GYN PRIV*

## 2023-01-01 PROCEDURE — 700102 HCHG RX REV CODE 250 W/ 637 OVERRIDE(OP): Performed by: INTERNAL MEDICINE

## 2023-01-01 PROCEDURE — A9270 NON-COVERED ITEM OR SERVICE: HCPCS | Performed by: INTERNAL MEDICINE

## 2023-01-01 PROCEDURE — 700101 HCHG RX REV CODE 250

## 2023-01-01 PROCEDURE — 700102 HCHG RX REV CODE 250 W/ 637 OVERRIDE(OP)

## 2023-01-01 PROCEDURE — 700102 HCHG RX REV CODE 250 W/ 637 OVERRIDE(OP): Performed by: STUDENT IN AN ORGANIZED HEALTH CARE EDUCATION/TRAINING PROGRAM

## 2023-01-01 PROCEDURE — A9270 NON-COVERED ITEM OR SERVICE: HCPCS | Performed by: STUDENT IN AN ORGANIZED HEALTH CARE EDUCATION/TRAINING PROGRAM

## 2023-01-01 PROCEDURE — A9270 NON-COVERED ITEM OR SERVICE: HCPCS

## 2023-01-01 PROCEDURE — 99231 SBSQ HOSP IP/OBS SF/LOW 25: CPT | Mod: GC | Performed by: INTERNAL MEDICINE

## 2023-01-01 RX ORDER — CARVEDILOL 6.25 MG/1
6.25 TABLET ORAL 2 TIMES DAILY WITH MEALS
Status: DISCONTINUED | OUTPATIENT
Start: 2023-01-01 | End: 2023-01-03 | Stop reason: HOSPADM

## 2023-01-01 RX ORDER — CARVEDILOL 12.5 MG/1
12.5 TABLET ORAL 2 TIMES DAILY WITH MEALS
Status: DISCONTINUED | OUTPATIENT
Start: 2023-01-01 | End: 2023-01-01

## 2023-01-01 RX ADMIN — ACETAMINOPHEN 650 MG: 325 TABLET ORAL at 01:31

## 2023-01-01 RX ADMIN — RIVAROXABAN 10 MG: 10 TABLET, FILM COATED ORAL at 17:45

## 2023-01-01 RX ADMIN — ACETAMINOPHEN 650 MG: 325 TABLET ORAL at 17:45

## 2023-01-01 RX ADMIN — ACETAMINOPHEN 650 MG: 325 TABLET ORAL at 05:14

## 2023-01-01 RX ADMIN — DOCUSATE SODIUM 50 MG AND SENNOSIDES 8.6 MG 2 TABLET: 8.6; 5 TABLET, FILM COATED ORAL at 05:14

## 2023-01-01 RX ADMIN — FAMOTIDINE 20 MG: 20 TABLET, FILM COATED ORAL at 05:13

## 2023-01-01 RX ADMIN — GUAIFENESIN 600 MG: 600 TABLET, EXTENDED RELEASE ORAL at 05:13

## 2023-01-01 RX ADMIN — LIDOCAINE 1 PATCH: 50 PATCH TOPICAL at 10:00

## 2023-01-01 RX ADMIN — CARVEDILOL 6.25 MG: 6.25 TABLET, FILM COATED ORAL at 10:00

## 2023-01-01 RX ADMIN — GUAIFENESIN 600 MG: 600 TABLET, EXTENDED RELEASE ORAL at 17:45

## 2023-01-01 RX ADMIN — LOSARTAN POTASSIUM 100 MG: 50 TABLET, FILM COATED ORAL at 05:13

## 2023-01-01 RX ADMIN — CARVEDILOL 6.25 MG: 6.25 TABLET, FILM COATED ORAL at 17:45

## 2023-01-01 RX ADMIN — AMLODIPINE BESYLATE 10 MG: 5 TABLET ORAL at 05:13

## 2023-01-01 RX ADMIN — ACETAMINOPHEN 650 MG: 325 TABLET ORAL at 11:52

## 2023-01-01 RX ADMIN — LEVOTHYROXINE SODIUM 88 MCG: 0.09 TABLET ORAL at 05:23

## 2023-01-01 ASSESSMENT — ENCOUNTER SYMPTOMS
DIZZINESS: 1
FEVER: 0
PALPITATIONS: 0
SHORTNESS OF BREATH: 0
LOSS OF CONSCIOUSNESS: 0
INSOMNIA: 0
NAUSEA: 0
CHILLS: 0
ABDOMINAL PAIN: 0
SORE THROAT: 0
WHEEZING: 0
HEADACHES: 0
FALLS: 0
VOMITING: 0
COUGH: 1
SPUTUM PRODUCTION: 0
BACK PAIN: 0
SENSORY CHANGE: 0
FOCAL WEAKNESS: 0

## 2023-01-01 ASSESSMENT — LIFESTYLE VARIABLES: SUBSTANCE_ABUSE: 0

## 2023-01-01 NOTE — PROGRESS NOTES
[facility] Internal Medicine Daily Progress Note    Date of Service  1/1/2023    [facility] Team: R IM Green Team   Attending: Eber Lisa M.d.  Senior Resident: Dr. Levine  Intern:  Dr. Acosta   Contact Number: [phone]    Chief Complaint  Lizzette Burton is a 82 y.o. female admitted 12/26/2022 with dyspnea and cough.    Hospital Course  Lizzette Burton is a 82 y.o. female who presented 12/26/2022 with fall. Patient with history of hypertension, hypothyroidism, osteoarthritis (s/p knee surgery, lumbar spondylosis), asthma, and chronic urinary incontinence. She presented to the ED after 2 GFL, first few days ago and the second one right before this admission, non-syncopal GLF, she was admitted due to dyspnea and thoracic pain. In the ED she was found to be tachypneic, hypertensive, requiring 4lt by NC. Imaging remarkable for CT chest showing COURTNEY opacity and subsegmental RML atelectasis without acute trauma abnormalities. CT head unremarkable for acute abnormalities and CT C spine also remarkable apart from age related degenerative changes. Given acute hypoxic respiratory failure, along with cough, sputum, elevated procalcitonin and COURTNEY oppacity, initiated management for CAP. Viral panel negative for covid or influenza. TTE with normal left ventricular systolic function (60%), moderate dilated LA, and grade I diastolic dysfunction. Blood cultures grew Staphylococcus sp, likely contamination. Patient completed antibiotic treatment and hypoxia resolved. Blood pressure was uncontrolled, adjusted home regimen. PT evaluated and recommended SNF. Pending placement.     Interval Problem Update  BP stage 2, 153-160/80s. Otherwise stable VS and no O2 requirements.   No overnight disorientation or agitation events.     Patient reports feeling much improved, no SOB, no chest pain/discomfort. Performing better when walking around in the room. Good appetite and active BM.     Consultants/Specialty  PT/OT  RT    Code Status  Full  Code    Disposition  Patient is medically cleared for discharge.   Anticipate discharge to to skilled nursing facility.  I have placed the appropriate orders for post-discharge needs.    Review of Systems  Review of Systems   Constitutional:  Negative for chills and fever.   HENT:  Negative for congestion and sore throat.    Respiratory:  Positive for cough. Negative for sputum production, shortness of breath and wheezing.    Cardiovascular:  Negative for chest pain, palpitations and leg swelling.   Gastrointestinal:  Negative for abdominal pain, nausea and vomiting.   Genitourinary:  Negative for dysuria and urgency.   Musculoskeletal:  Negative for back pain and falls.        Rib pain R>L   Neurological:  Positive for dizziness. Negative for sensory change, focal weakness, loss of consciousness and headaches.   Psychiatric/Behavioral:  Negative for substance abuse. The patient does not have insomnia.    All other systems reviewed and are negative.     Physical Exam  Temp:  [36.2 °C (97.1 °F)-36.4 °C (97.6 °F)] 36.3 °C (97.4 °F)  Pulse:  [66-74] 67  Resp:  [18] 18  BP: (142-168)/(75-81) 150/79  SpO2:  [91 %-93 %] 91 %      General: Awake, alert and oriented. No acute distress.   Head and Neck: NC/AT, EOMI, no scleral icterus or conjunctival pallor, no nasal discharge or oral erythema or exudates. Neck supple, no cervical or supraclavicular LAD.   CV: Rhythmic heart sounds, no murmurs, gallops or rubs. No S3,S4 or JVD. Peripheral pulses 2+ and equal bilaterally.   Pulm: Mild pain to palpation bellow the left breast over the rib cage, no ecchymosis. Chest expansion is symmetrical, good air movement, crackles only on the left base field, right field with clear auscultation, no wheezing or rhonchi.   GI: Increased abdominal girth. Flat, non distended, soft, nontender, normal bowel sounds.  Skin: Warm, no rashes, no lesions.  MSK: Normal ROM. No lower extremity edema. No pain, no lesions.   Neuro: Patient is alert and  oriented x3. Speech is clear and fluent. Is able to name, repeat and comprehend. Pupils equal, round and reactive to light. Extra ocular movements intact. Facial and body symmetry. Strength 5 out of 5 in upper and lower extremities. Sensitivity intact. Normal deep tendon reflexes. Normal tone. No gait abnormalities.   Psych: Appropriate mood and affect.      Fluids  No intake or output data in the 24 hours ending 01/01/23 1010    Laboratory        Recent Labs     12/31/22  0955   SODIUM 129*   POTASSIUM 3.9   CHLORIDE 90*   CO2 29   GLUCOSE 124*   BUN 14   CREATININE 0.91   CALCIUM 9.4                     Imaging  EC-ECHOCARDIOGRAM COMPLETE W/O CONT   Final Result      CT-CHEST,ABDOMEN,PELVIS WITH   Final Result      1.  No definite acute traumatic abnormality.   2.  Left upper lobe perihilar opacity suspicious for pneumonitis.   3.  Subsegmental right middle lobe atelectasis.   4.  Additional findings as detailed.      CT-CSPINE WITHOUT PLUS RECONS   Final Result      No acute fracture or dislocation cervical spine.      Degenerative changes.      CT-HEAD W/O   Final Result      1.  Chronic microvascular ischemic type changes.   2.  No acute intracranial abnormality.              Assessment/Plan  Problem Representation:    * Pneumonia due to infectious organism- (present on admission)  Assessment & Plan  Clinical presentation compatible with CAP, image with COURTNEY consolidation and elevated procal. Viral panel was negative. Blood cultures grew Coag-neg staph, likely contaminated. Patient improved with CAP ABx regimen, no SIRS, no O2 requirements.  -Completed ceftriaxone and doxycycline  -Awaiting placement for rehab    Acute respiratory failure with hypoxemia (HCC)- (present on admission)  Assessment & Plan  Likely secondary to CAP and contusion from fall. Resolved.     Delirium  Assessment & Plan  Acute transient disorientation and agitation. Likely hospital-associated, also side effects from opioids among other  medications. Patient is now calmed, alert and oriented x 4.   - Pending placement    Hyperglycemia  Assessment & Plan  Sustained in the 150s since admission. A1C of 5.7%.   - Monitor     Recurrent falls and weakness  Assessment & Plan  Patient had 2 falls within the last 2 weeks before admission, negative orthostatic vitals, no concern for peripheral neuropathy or focal neurological deficits. CT head negative for acute events. More likely she was debilitated in the setting of ongoing illness and some symptomatic hyponatremia. Mobilization is significantly improved, needing WW.   - PT and OT recommended SNF placement     Rib fractures  Assessment & Plan  Old rib fracture seen in the right side (CT). Pain is predominant over the left side of the rib cage. Improving, pain is well controlled.   - Lidocaine patch     Hypothyroidism- (present on admission)  Assessment & Plan  TSH 0.75, possibly contributing to dizziness and HTN, as TSH might be too suppressed for her age. Adjusted this admission.   - Adjust LVTX to 88mcg   - Will need follow up as outpt      HTN (hypertension)- (present on admission)  Assessment & Plan  Hypertensive urgency on presentation, likely related to pain and discomfort. Now better controlled but still stage II.   - Continue losartan, amlodipine  - Increase carvedilol to 6.25 BID   - Avoiding thiazides due to hyponatremia  - Monitor     Hypokalemia- (present on admission)  Assessment & Plan  From poor PO intake. Resolved.         VTE prophylaxis: Xarelto 10 mg daily as prophylaxis    I have performed a physical exam and reviewed and updated ROS and Plan today (1/1/2023). In review of yesterday's note (12/31/2022), there are no changes except as documented above.

## 2023-01-01 NOTE — DISCHARGE INSTRUCTIONS
Por favor note lo siguiente:  Usted fue hospitalizada por neumonia, isabel infeccion en los pulmones que en cantrell jane fue causada por bacterias. Para ello recibio antibiotico y ya completo el tratamiento. Los pulmones gricel un tiempo en volver a cantrell estado normal, no se esfuerce mas de lo que el cuerpo y la respiracion le permiten.   - Le ajustamos la medicina de la presion arterial, ahora necesita 3 medicamentos, por favor siga las instrucciones de las prescripciones.  - Le ajustamos la medicina de la tiroides a 88 mcg, por favor continue tomandola en ayunas y rohan a cantrell doctor en isabel semana para que le umang laboratorios de la funcion tiroidea y josefina nae esta respondiendo.   - Siga haciendo las terapias fisicas que le van a ensenar.   - Rohan a cantrell medico en isabel o dos semanas.

## 2023-01-01 NOTE — CARE PLAN
The patient is Stable - Low risk of patient condition declining or worsening    Shift Goals  Clinical Goals: Pain management and  Patient Goals: rest  Family Goals: Comfort rest    Progress made toward(s) clinical / shift goals:  Patient pain is managed with prn and schelduled medication. Patient ambulated with front wheeled walker and stand by assist and tolerated it well. Patient did complain of pain for her left ribs. She is compliant with medication and treatment orders.

## 2023-01-01 NOTE — DISCHARGE PLANNING
Agency/Facility Name: [facility]  Spoke To: Lisa  Outcome: No beds available today.  Follow up tomorrow on bed availability.

## 2023-01-01 NOTE — DISCHARGE PLANNING
Ongoing: This writer asked DPA to please follow up on [facility] referral for possible admission for pt.

## 2023-01-02 LAB
SARS-COV+SARS-COV-2 AG RESP QL IA.RAPID: NOTDETECTED
SPECIMEN SOURCE: NORMAL

## 2023-01-02 PROCEDURE — 700102 HCHG RX REV CODE 250 W/ 637 OVERRIDE(OP): Performed by: STUDENT IN AN ORGANIZED HEALTH CARE EDUCATION/TRAINING PROGRAM

## 2023-01-02 PROCEDURE — 87426 SARSCOV CORONAVIRUS AG IA: CPT

## 2023-01-02 PROCEDURE — A9270 NON-COVERED ITEM OR SERVICE: HCPCS

## 2023-01-02 PROCEDURE — 770001 HCHG ROOM/CARE - MED/SURG/GYN PRIV*

## 2023-01-02 PROCEDURE — A9270 NON-COVERED ITEM OR SERVICE: HCPCS | Performed by: STUDENT IN AN ORGANIZED HEALTH CARE EDUCATION/TRAINING PROGRAM

## 2023-01-02 PROCEDURE — 99231 SBSQ HOSP IP/OBS SF/LOW 25: CPT | Mod: GC | Performed by: INTERNAL MEDICINE

## 2023-01-02 PROCEDURE — 700102 HCHG RX REV CODE 250 W/ 637 OVERRIDE(OP)

## 2023-01-02 PROCEDURE — A9270 NON-COVERED ITEM OR SERVICE: HCPCS | Performed by: INTERNAL MEDICINE

## 2023-01-02 PROCEDURE — 700101 HCHG RX REV CODE 250

## 2023-01-02 PROCEDURE — 700102 HCHG RX REV CODE 250 W/ 637 OVERRIDE(OP): Performed by: INTERNAL MEDICINE

## 2023-01-02 RX ADMIN — DOCUSATE SODIUM 50 MG AND SENNOSIDES 8.6 MG 2 TABLET: 8.6; 5 TABLET, FILM COATED ORAL at 04:51

## 2023-01-02 RX ADMIN — GUAIFENESIN 600 MG: 600 TABLET, EXTENDED RELEASE ORAL at 04:51

## 2023-01-02 RX ADMIN — ACETAMINOPHEN 650 MG: 325 TABLET ORAL at 13:55

## 2023-01-02 RX ADMIN — CARVEDILOL 6.25 MG: 6.25 TABLET, FILM COATED ORAL at 17:54

## 2023-01-02 RX ADMIN — CARVEDILOL 6.25 MG: 6.25 TABLET, FILM COATED ORAL at 07:50

## 2023-01-02 RX ADMIN — LEVOTHYROXINE SODIUM 88 MCG: 0.09 TABLET ORAL at 04:52

## 2023-01-02 RX ADMIN — AMLODIPINE BESYLATE 10 MG: 5 TABLET ORAL at 04:52

## 2023-01-02 RX ADMIN — FAMOTIDINE 20 MG: 20 TABLET, FILM COATED ORAL at 04:52

## 2023-01-02 RX ADMIN — ACETAMINOPHEN 650 MG: 325 TABLET ORAL at 17:53

## 2023-01-02 RX ADMIN — LIDOCAINE 1 PATCH: 50 PATCH TOPICAL at 07:49

## 2023-01-02 RX ADMIN — RIVAROXABAN 10 MG: 10 TABLET, FILM COATED ORAL at 17:53

## 2023-01-02 RX ADMIN — GUAIFENESIN 600 MG: 600 TABLET, EXTENDED RELEASE ORAL at 17:53

## 2023-01-02 RX ADMIN — ACETAMINOPHEN 650 MG: 325 TABLET ORAL at 04:51

## 2023-01-02 RX ADMIN — LOSARTAN POTASSIUM 100 MG: 50 TABLET, FILM COATED ORAL at 04:51

## 2023-01-02 ASSESSMENT — LIFESTYLE VARIABLES: SUBSTANCE_ABUSE: 0

## 2023-01-02 ASSESSMENT — ENCOUNTER SYMPTOMS
PALPITATIONS: 0
COUGH: 1
ABDOMINAL PAIN: 0
SENSORY CHANGE: 0
SPUTUM PRODUCTION: 0
FOCAL WEAKNESS: 0
LOSS OF CONSCIOUSNESS: 0
SHORTNESS OF BREATH: 0
WHEEZING: 0
SORE THROAT: 0
FEVER: 0
INSOMNIA: 0
FALLS: 0
HEADACHES: 0
VOMITING: 0
NAUSEA: 0
DIZZINESS: 1
CHILLS: 0
BACK PAIN: 0

## 2023-01-02 ASSESSMENT — PATIENT HEALTH QUESTIONNAIRE - PHQ9
1. LITTLE INTEREST OR PLEASURE IN DOING THINGS: NOT AT ALL
2. FEELING DOWN, DEPRESSED, IRRITABLE, OR HOPELESS: NOT AT ALL
SUM OF ALL RESPONSES TO PHQ9 QUESTIONS 1 AND 2: 0

## 2023-01-02 NOTE — CARE PLAN
The patient is Stable - Low risk of patient condition declining or worsening    Shift Goals  Clinical Goals: pain management, monitor vital signs  Patient Goals: rest and uninterupted sleep  Family Goals: Comfort rest    Progress made toward(s) clinical / shift goals:  Patient vital signs were stable and she did not display any signs of confusion. Patients pain was managed with scheduled medication orders.

## 2023-01-02 NOTE — DISCHARGE PLANNING
Case Management Discharge Planning    Admission Date: 12/26/2022  GMLOS: 4  ALOS: 7    6-Clicks ADL Score: 21  6-Clicks Mobility Score: 15  PT and/or OT Eval ordered: Yes  Post-acute Referrals Ordered: Yes  Post-acute Choice Obtained: Yes  Has referral(s) been sent to post-acute provider:  Yes      Anticipated Discharge Dispo: Discharge Disposition: D/T to SNF with Medicare cert in anticipation of skilled care (03)    DME Needed: No    Action(s) Taken: Updated Provider/Nurse on Discharge Plan    Escalations Completed: None    Medically Clear: Yes    Next Steps: awaiting bed avail. In SNF    Barriers to Discharge: None    Is the patient up for discharge tomorrow: if bed available in SNF  Is transport arranged for discharge disposition: No pending bed avail in SNF.  [facility] no beds avail today per DPA checking w/ [facility].    1338 Rec'd word from DPA that [facility] is planning on transfer to their SNF needing COVID rapid swab done and transport arranged @ 1300 tomorrow.   Care Team notified @ this time via voalte. Called placed to Marilou Walker and provided above info including address/ph number of [facility].   DC Plan: SNF tomorrow pending neg rapid covid swab result . CM leadership checking PASSR status.   W/CTransport requested for 1300

## 2023-01-02 NOTE — DISCHARGE PLANNING
Agency/Facility Name: [facility]  Spoke To: Yamilet  Outcome: DPA called to check on bed availability. Per Yamilet, pt will have a bed tomorrow if pt wants to go to that facility.     Agency/Facility Name: [facility]  Outcome: DPA left vmail requesting updated bed availability status     RN CM notified     Agency/Facility Name: [facility]   Spoke To: Lisa   Outcome: Per Lisa, pt has a bed tomorrow. RN CM notified and to request a rapid covid test and set up transport for around 1300. DPA to notify facility with confirmed transport time.     RN CM notified       1530    DPA set up transport via T    GMT via wheelchair  Pickup from [facility] [id number]  [facility] # [id number]  ASF $172.01    RN CM notified

## 2023-01-02 NOTE — PROGRESS NOTES
[facility] Internal Medicine Daily Progress Note    Date of Service  1/2/2023    [facility] Team: R IM Purple Team   Attending: Eber Lisa M.d.  Senior Resident: Dr. Gotti  Intern:  Dr. Quan  Contact Number: [phone]    Chief Complaint  Lizzette Burton is a 82 y.o. female admitted 12/26/2022 with dyspnea and cough.    Hospital Course  Lizzette Burton is a 82 y.o. female who presented 12/26/2022 with fall. Patient with history of hypertension, hypothyroidism, osteoarthritis (s/p knee surgery, lumbar spondylosis), asthma, and chronic urinary incontinence. She presented to the ED after 2 GFL, first few days ago and the second one right before this admission, non-syncopal GLF, she was admitted due to dyspnea and thoracic pain. In the ED she was found to be tachypneic, hypertensive, requiring 4lt by NC. Imaging remarkable for CT chest showing COURTNEY opacity and subsegmental RML atelectasis without acute trauma abnormalities. CT head unremarkable for acute abnormalities and CT C spine also remarkable apart from age related degenerative changes. Given acute hypoxic respiratory failure, along with cough, sputum, elevated procalcitonin and COURTNEY oppacity, initiated management for CAP. Viral panel negative for covid or influenza. TTE with normal left ventricular systolic function (60%), moderate dilated LA, and grade I diastolic dysfunction. Blood cultures grew Staphylococcus sp, likely contamination. Patient completed antibiotic treatment and hypoxia resolved. Blood pressure was uncontrolled, adjusted home regimen. PT evaluated and recommended SNF. Pending placement.     Interval Problem Update  Isolated  systolic overnight in setting of pain, improved with recheck. Otherwise stable VS and no O2 requirements.   No overnight disorientation or agitation events.     Tentative discharge to [facility] tomorrow for rehab, SNF COVID-19 swab ordered.    Patient reports left lateral chest wall pain over area of old rib fracture.  no SOB. Walking around in the room with assistance from daughter. Good appetite and active BM this AM.     Consultants/Specialty  PT/OT  RT    Code Status  Full Code    Disposition  Patient is medically cleared for discharge.   Anticipate discharge to to skilled nursing facility.  I have placed the appropriate orders for post-discharge needs.    Review of Systems  Review of Systems   Constitutional:  Negative for chills and fever.   HENT:  Negative for congestion and sore throat.    Respiratory:  Positive for cough. Negative for sputum production, shortness of breath and wheezing.    Cardiovascular:  Positive for chest pain. Negative for palpitations and leg swelling.   Gastrointestinal:  Negative for abdominal pain, nausea and vomiting.   Genitourinary:  Negative for dysuria and urgency.   Musculoskeletal:  Negative for back pain and falls.        Rib pain R>L   Neurological:  Positive for dizziness. Negative for sensory change, focal weakness, loss of consciousness and headaches.   Psychiatric/Behavioral:  Negative for substance abuse. The patient does not have insomnia.    All other systems reviewed and are negative.     Physical Exam  Temp:  [36.1 °C (97 °F)-36.7 °C (98 °F)] 36.7 °C (98 °F)  Pulse:  [62-67] 62  Resp:  [18] 18  BP: (121-191)/(69-91) 161/85  SpO2:  [93 %-97 %] 93 %      General: Awake, alert and oriented. No acute distress.   Head and Neck: NC/AT, EOMI, no scleral icterus or conjunctival pallor, no nasal discharge or oral erythema or exudates. Neck supple, no cervical or supraclavicular LAD.   CV: Rhythmic heart sounds, no murmurs, gallops or rubs. No S3,S4 or JVD. Peripheral pulses 2+ and equal bilaterally.   Pulm: Mild pain to palpation bellow the left breast over the rib cage, no ecchymosis. Chest expansion is symmetrical, good air movement, crackles only on the left base field, right field with clear auscultation, no wheezing or rhonchi.   GI: Increased abdominal girth. Flat, non distended,  soft, nontender, normal bowel sounds.  Skin: Warm, no rashes, no lesions.  MSK: Normal ROM. No lower extremity edema. No pain, no lesions.   Neuro: Patient is alert and oriented x3. Speech is clear and fluent. Is able to name, repeat and comprehend. Pupils equal, round and reactive to light. Extra ocular movements intact. Facial and body symmetry. Strength 5 out of 5 in upper and lower extremities. Sensitivity intact. Normal deep tendon reflexes. Normal tone. No gait abnormalities.   Psych: Appropriate mood and affect.      Fluids    Intake/Output Summary (Last 24 hours) at 1/2/2023 1145  Last data filed at 1/1/2023 1942  Gross per 24 hour   Intake 360 ml   Output 200 ml   Net 160 ml       Laboratory        Recent Labs     12/31/22  0955   SODIUM 129*   POTASSIUM 3.9   CHLORIDE 90*   CO2 29   GLUCOSE 124*   BUN 14   CREATININE 0.91   CALCIUM 9.4                       Imaging  EC-ECHOCARDIOGRAM COMPLETE W/O CONT   Final Result      CT-CHEST,ABDOMEN,PELVIS WITH   Final Result      1.  No definite acute traumatic abnormality.   2.  Left upper lobe perihilar opacity suspicious for pneumonitis.   3.  Subsegmental right middle lobe atelectasis.   4.  Additional findings as detailed.      CT-CSPINE WITHOUT PLUS RECONS   Final Result      No acute fracture or dislocation cervical spine.      Degenerative changes.      CT-HEAD W/O   Final Result      1.  Chronic microvascular ischemic type changes.   2.  No acute intracranial abnormality.                Assessment/Plan  Problem Representation:    * Pneumonia due to infectious organism- (present on admission)  Assessment & Plan  Clinical presentation compatible with CAP, image with COURTNEY consolidation and elevated procal. Viral panel was negative. Blood cultures grew Coag-neg staph, likely contaminated. Patient improved with CAP ABx regimen, no SIRS, no O2 requirements.  -Completed ceftriaxone and doxycycline  -Awaiting placement for rehab; Covid-19 swab ordered    Recurrent  falls and weakness  Assessment & Plan  Patient had 2 falls within the last 2 weeks before admission, negative orthostatic vitals, no concern for peripheral neuropathy or focal neurological deficits. CT head negative for acute events. More likely she was debilitated in the setting of ongoing illness and some symptomatic hyponatremia. Mobilization is significantly improved, needing WW.   - PT and OT recommended SNF placement     Rib fractures  Assessment & Plan  Old rib fracture seen in the right side (CT). Pain is predominant over the left side of the rib cage. Improving, pain is well controlled.   - Lidocaine patch   - scheduled tylenol 650 mg QID    Hypothyroidism- (present on admission)  Assessment & Plan  TSH 0.75, possibly contributing to dizziness and HTN, as TSH might be too suppressed for her age. Adjusted this admission.   - Adjust LVTX to 88mcg   - Will need follow up as outpt      HTN (hypertension)- (present on admission)  Assessment & Plan  Hypertensive urgency on presentation, likely related to pain and discomfort. Now better controlled but still stage II. Goal Systolic <160 while inpatient with plan for further titration w/ PCP.  - Continue losartan, amlodipine  - maintain carvedilol to 6.25 BID in setting of HR 60-70  - Avoiding thiazides due to hyponatremia  - Monitor        VTE prophylaxis: Xarelto 10 mg daily as prophylaxis    I have performed a physical exam and reviewed and updated ROS and Plan today (1/2/2023). In review of yesterday's note (1/1/2023), there are no changes except as documented above.

## 2023-01-02 NOTE — DISCHARGE PLANNING
HTH/SCP TCN chart review completed. Noted patient with acceptance to SNF level of care at [facility] and [facility]. Collaborated with JOE Crandall. Discussed current discharge plan, which is SNF level of care, currently awaiting bed availability at [facility].    Voalte received at 1:56PM from , notifying TCN that patient is an anticipated discharge to [facility] tomorrow at 1300 as bed will be available. No new TCN needs identified at this time.     TCN will continue to follow and collaborate with discharge planning team should additional post acute needs arise prior to patient anticipated dc tomorrow. Thank you.    Completed:  PT and OT with recommendations for post acute placement on 12/29  Choice obtained: MARIKA LEGER (02). Current plan is discharge to [facility] tomorrow, 1/3/2023, at 1300  [facility] referral sent 12/26

## 2023-01-03 VITALS
SYSTOLIC BLOOD PRESSURE: 140 MMHG | HEART RATE: 72 BPM | DIASTOLIC BLOOD PRESSURE: 60 MMHG | TEMPERATURE: 97.3 F | RESPIRATION RATE: 18 BRPM | WEIGHT: 173.94 LBS | HEIGHT: 62 IN | BODY MASS INDEX: 32.01 KG/M2 | OXYGEN SATURATION: 91 %

## 2023-01-03 PROCEDURE — 700101 HCHG RX REV CODE 250

## 2023-01-03 PROCEDURE — A9270 NON-COVERED ITEM OR SERVICE: HCPCS | Performed by: STUDENT IN AN ORGANIZED HEALTH CARE EDUCATION/TRAINING PROGRAM

## 2023-01-03 PROCEDURE — 99239 HOSP IP/OBS DSCHRG MGMT >30: CPT | Mod: GC | Performed by: STUDENT IN AN ORGANIZED HEALTH CARE EDUCATION/TRAINING PROGRAM

## 2023-01-03 PROCEDURE — A9270 NON-COVERED ITEM OR SERVICE: HCPCS | Performed by: INTERNAL MEDICINE

## 2023-01-03 PROCEDURE — A9270 NON-COVERED ITEM OR SERVICE: HCPCS

## 2023-01-03 PROCEDURE — 700102 HCHG RX REV CODE 250 W/ 637 OVERRIDE(OP): Performed by: STUDENT IN AN ORGANIZED HEALTH CARE EDUCATION/TRAINING PROGRAM

## 2023-01-03 PROCEDURE — 700102 HCHG RX REV CODE 250 W/ 637 OVERRIDE(OP): Performed by: INTERNAL MEDICINE

## 2023-01-03 PROCEDURE — 700102 HCHG RX REV CODE 250 W/ 637 OVERRIDE(OP)

## 2023-01-03 RX ORDER — FAMOTIDINE 20 MG/1
20 TABLET, FILM COATED ORAL DAILY
Qty: 60 TABLET | Refills: 0 | Status: SHIPPED | OUTPATIENT
Start: 2023-01-04 | End: 2023-01-03 | Stop reason: SDUPTHER

## 2023-01-03 RX ORDER — GUAIFENESIN 600 MG/1
600 TABLET, EXTENDED RELEASE ORAL EVERY 12 HOURS
Qty: 28 TABLET | Refills: 0 | Status: SHIPPED | OUTPATIENT
Start: 2023-01-03 | End: 2023-01-03 | Stop reason: SDUPTHER

## 2023-01-03 RX ORDER — CARVEDILOL 6.25 MG/1
6.25 TABLET ORAL 2 TIMES DAILY WITH MEALS
Qty: 60 TABLET | Refills: 0 | Status: SHIPPED
Start: 2023-01-03 | End: 2023-10-11

## 2023-01-03 RX ORDER — FLUTICASONE PROPIONATE 50 MCG
2 SPRAY, SUSPENSION (ML) NASAL
Qty: 16 G | Refills: 0 | Status: SHIPPED
Start: 2023-01-03 | End: 2023-10-11

## 2023-01-03 RX ORDER — LIDOCAINE 50 MG/G
1 PATCH TOPICAL EVERY 24 HOURS
Qty: 10 PATCH | Refills: 0 | Status: SHIPPED | OUTPATIENT
Start: 2023-01-03 | End: 2023-01-03 | Stop reason: SDUPTHER

## 2023-01-03 RX ORDER — LIDOCAINE 50 MG/G
1 PATCH TOPICAL EVERY 24 HOURS
Qty: 10 PATCH | Refills: 0 | Status: SHIPPED
Start: 2023-01-03 | End: 2023-10-11

## 2023-01-03 RX ORDER — LEVOTHYROXINE SODIUM 0.1 MG/1
100 TABLET ORAL
Qty: 30 TABLET | Status: SHIPPED
Start: 2023-01-03

## 2023-01-03 RX ORDER — AMLODIPINE BESYLATE 10 MG/1
5 TABLET ORAL DAILY
Qty: 30 TABLET | Refills: 0 | Status: SHIPPED
Start: 2023-01-04 | End: 2023-10-11

## 2023-01-03 RX ORDER — CARVEDILOL 6.25 MG/1
6.25 TABLET ORAL 2 TIMES DAILY WITH MEALS
Qty: 60 TABLET | Refills: 0 | Status: SHIPPED | OUTPATIENT
Start: 2023-01-03 | End: 2023-01-03 | Stop reason: SDUPTHER

## 2023-01-03 RX ORDER — AMLODIPINE BESYLATE 10 MG/1
5 TABLET ORAL DAILY
Qty: 30 TABLET | Refills: 0 | Status: SHIPPED | OUTPATIENT
Start: 2023-01-04 | End: 2023-01-03 | Stop reason: SDUPTHER

## 2023-01-03 RX ORDER — ACETAMINOPHEN 325 MG/1
650 TABLET ORAL EVERY 6 HOURS
Qty: 30 TABLET | Refills: 0 | Status: ON HOLD
Start: 2023-01-03 | End: 2023-10-18

## 2023-01-03 RX ORDER — MIRABEGRON 25 MG/1
25 TABLET, FILM COATED, EXTENDED RELEASE ORAL DAILY
Qty: 30 TABLET | Status: SHIPPED
Start: 2023-01-03 | End: 2023-10-11

## 2023-01-03 RX ORDER — FLUTICASONE PROPIONATE 50 MCG
2 SPRAY, SUSPENSION (ML) NASAL
Qty: 16 G | Refills: 0 | Status: SHIPPED | OUTPATIENT
Start: 2023-01-03 | End: 2023-01-03 | Stop reason: SDUPTHER

## 2023-01-03 RX ORDER — LOSARTAN POTASSIUM 100 MG/1
100 TABLET ORAL DAILY
Qty: 30 TABLET | Status: SHIPPED
Start: 2023-01-03

## 2023-01-03 RX ORDER — ACETAMINOPHEN 325 MG/1
650 TABLET ORAL EVERY 6 HOURS
Qty: 30 TABLET | Refills: 0 | Status: SHIPPED | OUTPATIENT
Start: 2023-01-03 | End: 2023-01-03 | Stop reason: SDUPTHER

## 2023-01-03 RX ORDER — FAMOTIDINE 20 MG/1
20 TABLET, FILM COATED ORAL DAILY
Qty: 60 TABLET | Refills: 0 | Status: SHIPPED
Start: 2023-01-04 | End: 2023-10-11

## 2023-01-03 RX ORDER — AMLODIPINE BESYLATE 10 MG/1
10 TABLET ORAL DAILY
Qty: 30 TABLET | Refills: 0 | Status: SHIPPED | OUTPATIENT
Start: 2023-01-04 | End: 2023-01-03 | Stop reason: SDUPTHER

## 2023-01-03 RX ORDER — GUAIFENESIN 600 MG/1
600 TABLET, EXTENDED RELEASE ORAL EVERY 12 HOURS
Qty: 28 TABLET | Refills: 0 | Status: SHIPPED
Start: 2023-01-03 | End: 2023-10-11

## 2023-01-03 RX ADMIN — FAMOTIDINE 20 MG: 20 TABLET, FILM COATED ORAL at 04:08

## 2023-01-03 RX ADMIN — LEVOTHYROXINE SODIUM 88 MCG: 0.09 TABLET ORAL at 04:08

## 2023-01-03 RX ADMIN — GUAIFENESIN 600 MG: 600 TABLET, EXTENDED RELEASE ORAL at 04:08

## 2023-01-03 RX ADMIN — LOSARTAN POTASSIUM 100 MG: 50 TABLET, FILM COATED ORAL at 04:08

## 2023-01-03 RX ADMIN — LIDOCAINE 1 PATCH: 50 PATCH TOPICAL at 08:31

## 2023-01-03 RX ADMIN — AMLODIPINE BESYLATE 10 MG: 5 TABLET ORAL at 04:07

## 2023-01-03 RX ADMIN — ACETAMINOPHEN 650 MG: 325 TABLET ORAL at 04:08

## 2023-01-03 RX ADMIN — DOCUSATE SODIUM 50 MG AND SENNOSIDES 8.6 MG 2 TABLET: 8.6; 5 TABLET, FILM COATED ORAL at 04:08

## 2023-01-03 RX ADMIN — CARVEDILOL 6.25 MG: 6.25 TABLET, FILM COATED ORAL at 08:30

## 2023-01-03 NOTE — CARE PLAN
The patient is Stable - Low risk of patient condition declining or worsening    Shift Goals  Clinical Goals: Pain management,r est  Patient Goals: rest  Family Goals: NATACHA    Progress made toward(s) clinical / shift goals:    Problem: Knowledge Deficit - Standard  Goal: Patient and family/care givers will demonstrate understanding of plan of care, disease process/condition, diagnostic tests and medications  Outcome: Progressing  Note: Patient verbalizes understanding of plan of care and barriers to discharge.        Problem: Skin Integrity  Goal: Skin integrity is maintained or improved  Outcome: Progressing  Note: Skin remains intact this shift. No new abrasions or wounds observed.          Patient is not progressing towards the following goals:

## 2023-01-03 NOTE — DISCHARGE SUMMARY
[facility] Internal Medicine Discharge Summary    Attending:  Dr. Bo Peacock  Senior Resident: Dr. Seble Gotti  Intern:  Dr. Fabian Quan  Contact Number: [phone]    CHIEF COMPLAINT ON ADMISSION  Chief Complaint   Patient presents with    T-5000 GLF     Reason for Admission  EMS     Admission Date  12/26/2022    CODE STATUS  Prior    HPI & HOSPITAL COURSE  This is a 82 y.o. female  with PMH hypertension, asthma,  CVA  hypothyroidism, osteoarthritis (s/p knee surgery, lumbar spondylosis), asthma, and chronic urinary incontinence  presented 12/26/2022 with fall and rib pain.  Patient reports she had fall a few days ago and was told she had rib fractures when seen at [facility], she was then discharged home.  She also reported  chills and cough with productive sputum. Patient was hypoxic requiring 4L on admission.   Patient was also significantly hypertensive.     # Community acquired pneumonia  # Acute hypoxic respiratory failure  -  CT on admission revealed COURTNEY opacity and subsegmental RML atelectasis without acute trauma abnormalities and elevated procal on labs. Flu an dCOVID negative.   -TTE with normal left ventricular systolic function (60%), moderate dilated LA, and grade I diastolic dysfunction. Blood cultures grew Staphylococcus sp, likely contamination. Patient completed antibiotic treatment of azithro and ceftriaxone and hypoxia resolved.     # Recurrent falls and weakness  Patient had 2 falls within the last 2 weeks before admission, negative orthostatic vitals, no concern for peripheral neuropathy or focal neurological deficits. CT head negative for acute events. More likely she was debilitated in the setting of ongoing illness and some symptomatic hyponatremia. Mobilization is significantly improved, needing WW.   - PT and OT recommended SNF placement     # Rib pain  Old rib fracture seen in the right side (CT). Pain is predominant over the left side of the rib cage. Improving, pain is well  controlled.   - Lidocaine patch   - scheduled tylenol 650 mg QID    # Hypertension  Hypertensive urgency on presentation, likely related to pain and discomfort. Now better controlled but still stage II. Goal Systolic <160 while inpatient with plan for further titration w/ PCP.  - Continue losartan 100, amlodipine 5  - maintain carvedilol to 6.25 BID in setting of HR 60-70  - Avoiding thiazides due to hyponatremia  - Monitor     # Hypothyroidism  -TSH 0.75, on admission  - Adjust LVTX to 88mcg from 100  - Will need follow up as outpt      Therefore, she is discharged in good and stable condition to skilled nursing facility.    The patient met 2-midnight criteria for an inpatient stay at the time of discharge.    Discharge Date  1/3/66496    Physical Exam on Day of Discharge    General: Awake, alert and oriented. No acute distress.   Head and Neck: NC/AT, EOMI, no scleral icterus or conjunctival pallor, no nasal discharge or oral erythema or exudates. Neck supple, no cervical or supraclavicular LAD.   CV: Rhythmic heart sounds, no murmurs, gallops or rubs. No S3,S4 or JVD. Peripheral pulses 2+ and equal bilaterally.   Pulm: Mild pain to palpation bellow the left breast over the rib cage, no ecchymosis. Chest expansion is symmetrical, good air movement, crackles only on the left base field, right field with clear auscultation, no wheezing or rhonchi.   GI: Increased abdominal girth. Flat, non distended, soft, nontender, normal bowel sounds.  Skin: Warm, no rashes, no lesions.  MSK: Normal ROM. No lower extremity edema. No pain, no lesions.   Neuro: Patient is alert and oriented x3. Speech is clear and fluent. Is able to name, repeat and comprehend. Pupils equal, round and reactive to light. Extra ocular movements intact. Facial and body symmetry. Strength 5 out of 5 in upper and lower extremities. Sensitivity intact. Normal deep tendon reflexes. Normal tone. No gait abnormalities.   Psych: Appropriate mood and affect.       FOLLOW UP ITEMS POST DISCHARGE     Follow up with PCP in 1 week     DISCHARGE DIAGNOSES  Principal Problem:    Pneumonia due to infectious organism POA: Yes  Active Problems:    HTN (hypertension) POA: Yes    Hypothyroidism POA: Yes    Rib fractures POA: Unknown    Recurrent falls and weakness POA: Unknown  Resolved Problems:    Hypokalemia POA: Yes    Acute respiratory failure with hypoxemia (HCC) POA: Yes    Hyperglycemia POA: Unknown    Delirium POA: Unknown      FOLLOW UP  Future Appointments   Date Time Provider Department Center   1/6/2023  1:00 PM [facility] 1 [facility] [address] [place] [address]   1/19/2023  4:00 PM LIBERTY Peters [facility]  [address] [place] [address]  [phone]          MEDICATIONS ON DISCHARGE     Medication List        START taking these medications        Instructions   acetaminophen 325 MG Tabs  Commonly known as: Tylenol   Take 2 Tablets by mouth every 6 hours.  Dose: 650 mg     amLODIPine 10 MG Tabs  Start taking on: January 4, 2023  Commonly known as: NORVASC   Take 0.5 Tablets by mouth every day.  Dose: 5 mg     carvedilol 6.25 MG Tabs  Commonly known as: COREG   Take 1 Tablet by mouth 2 times a day with meals.  Dose: 6.25 mg     famotidine 20 MG Tabs  Start taking on: January 4, 2023  Commonly known as: PEPCID   Take 1 Tablet by mouth every day.  Dose: 20 mg     fluticasone 50 MCG/ACT nasal spray  Commonly known as: FLONASE   Administer 2 Sprays into affected nostril(S) 1 time a day as needed (congestion, watery nose).  Dose: 2 [place]     guaiFENesin  MG Tb12  Commonly known as: MUCINEX   Take 1 Tablet by mouth every 12 hours.  Dose: 600 mg     lidocaine 5 % Ptch  Commonly known as: LIDODERM   Place 1 Patch on the skin every 24 hours.  Dose: 1 Patch            CONTINUE taking these medications        Instructions   levothyroxine 100 MCG Tabs  Commonly known as: SYNTHROID   Take 1 Tablet by mouth every morning before  breakfast.  Dose: 100 mcg     losartan 100 MG Tabs  Commonly known as: COZAAR   Take 1 Tablet by mouth every day.  Dose: 100 mg     Myrbetriq 25 MG Tb24  Generic drug: Mirabegron ER   Take 25 mg by mouth every day.  Dose: 25 mg            STOP taking these medications      ibuprofen 200 MG Tabs  Commonly known as: MOTRIN     methocarbamol 750 MG Tabs  Commonly known as: ROBAXIN     methylPREDNISolone 4 MG Tbpk  Commonly known as: MEDROL DOSEPAK              Allergies  No Known Allergies    DIET  No orders of the defined types were placed in this encounter.      ACTIVITY  As tolerated and directed by skilled nursing.  Weight bearing as tolerated    CONSULTATIONS   NA    PROCEDURES  NA    LABORATORY  Lab Results   Component Value Date    SODIUM 129 (L) 12/31/2022    POTASSIUM 3.9 12/31/2022    CHLORIDE 90 (L) 12/31/2022    CO2 29 12/31/2022    GLUCOSE 124 (H) 12/31/2022    BUN 14 12/31/2022    CREATININE 0.91 12/31/2022    CREATININE 0.9 02/04/2009        Lab Results   Component Value Date    WBC 11.9 (H) 12/29/2022    HEMOGLOBIN 12.0 12/29/2022    HEMATOCRIT 35.4 (L) 12/29/2022    PLATELETCT 300 12/29/2022        Total time of the discharge process exceeds 45 minutes.

## 2023-01-03 NOTE — DISCHARGE PLANNING
Agency/Facility Name: [facility]  Spoke To: Lisa  Outcome: DPA received call from facility. Per Lisa, pt needs a LOC and a discharge summary before transport. DPA notified of confirmed transport time with GMT via wheelchair at [phone]    RN CM notified

## 2023-01-03 NOTE — PROGRESS NOTES
Called to [facility] and gave report to Ekaterina SETHI. All questions answered to nurses satisfaction. Pt states readiness to go

## 2023-01-06 ENCOUNTER — HOSPITAL ENCOUNTER (OUTPATIENT)
Dept: RADIOLOGY | Facility: MEDICAL CENTER | Age: 83
End: 2023-01-06
Attending: NURSE PRACTITIONER
Payer: MEDICARE

## 2023-01-06 DIAGNOSIS — M81.0 OSTEOPOROSIS, UNSPECIFIED OSTEOPOROSIS TYPE, UNSPECIFIED PATHOLOGICAL FRACTURE PRESENCE: ICD-10-CM

## 2023-01-06 PROCEDURE — 77080 DXA BONE DENSITY AXIAL: CPT

## 2023-01-18 ENCOUNTER — HOME HEALTH ADMISSION (OUTPATIENT)
Dept: HOME HEALTH SERVICES | Facility: HOME HEALTHCARE | Age: 83
End: 2023-01-18
Payer: MEDICARE

## 2023-04-21 ENCOUNTER — HOSPITAL ENCOUNTER (OUTPATIENT)
Dept: LAB | Facility: MEDICAL CENTER | Age: 83
End: 2023-04-21
Attending: NURSE PRACTITIONER
Payer: MEDICARE

## 2023-04-21 LAB
ALBUMIN SERPL BCP-MCNC: 3.9 G/DL (ref 3.2–4.9)
ALBUMIN/GLOB SERPL: 1.2 G/DL
ALP SERPL-CCNC: 70 U/L (ref 30–99)
ALT SERPL-CCNC: 15 U/L (ref 2–50)
ANION GAP SERPL CALC-SCNC: 10 MMOL/L (ref 7–16)
AST SERPL-CCNC: 19 U/L (ref 12–45)
BILIRUB SERPL-MCNC: 0.6 MG/DL (ref 0.1–1.5)
BUN SERPL-MCNC: 17 MG/DL (ref 8–22)
CALCIUM ALBUM COR SERPL-MCNC: 9.3 MG/DL (ref 8.5–10.5)
CALCIUM SERPL-MCNC: 9.2 MG/DL (ref 8.5–10.5)
CHLORIDE SERPL-SCNC: 102 MMOL/L (ref 96–112)
CO2 SERPL-SCNC: 27 MMOL/L (ref 20–33)
CREAT SERPL-MCNC: 0.82 MG/DL (ref 0.5–1.4)
GFR SERPLBLD CREATININE-BSD FMLA CKD-EPI: 70 ML/MIN/1.73 M 2
GLOBULIN SER CALC-MCNC: 3.3 G/DL (ref 1.9–3.5)
GLUCOSE SERPL-MCNC: 86 MG/DL (ref 65–99)
POTASSIUM SERPL-SCNC: 4.3 MMOL/L (ref 3.6–5.5)
PROT SERPL-MCNC: 7.2 G/DL (ref 6–8.2)
SODIUM SERPL-SCNC: 139 MMOL/L (ref 135–145)
TSH SERPL DL<=0.005 MIU/L-ACNC: 0.87 UIU/ML (ref 0.38–5.33)

## 2023-04-21 PROCEDURE — 84443 ASSAY THYROID STIM HORMONE: CPT

## 2023-04-21 PROCEDURE — 36415 COLL VENOUS BLD VENIPUNCTURE: CPT

## 2023-04-21 PROCEDURE — 80053 COMPREHEN METABOLIC PANEL: CPT

## 2023-04-29 ENCOUNTER — HOSPITAL ENCOUNTER (OUTPATIENT)
Dept: RADIOLOGY | Facility: MEDICAL CENTER | Age: 83
End: 2023-04-29
Attending: NURSE PRACTITIONER
Payer: MEDICARE

## 2023-04-29 DIAGNOSIS — M54.50 LOW BACK PAIN, UNSPECIFIED BACK PAIN LATERALITY, UNSPECIFIED CHRONICITY, UNSPECIFIED WHETHER SCIATICA PRESENT: ICD-10-CM

## 2023-04-29 DIAGNOSIS — M25.552 LEFT HIP PAIN: ICD-10-CM

## 2023-04-29 PROCEDURE — 73502 X-RAY EXAM HIP UNI 2-3 VIEWS: CPT | Mod: LT

## 2023-04-29 PROCEDURE — 72100 X-RAY EXAM L-S SPINE 2/3 VWS: CPT

## 2023-06-15 ENCOUNTER — PHYSICAL THERAPY (OUTPATIENT)
Dept: PHYSICAL THERAPY | Facility: REHABILITATION | Age: 83
End: 2023-06-15
Attending: NURSE PRACTITIONER
Payer: MEDICARE

## 2023-06-15 DIAGNOSIS — R26.81 UNSTEADINESS ON FEET: ICD-10-CM

## 2023-06-15 PROCEDURE — 97162 PT EVAL MOD COMPLEX 30 MIN: CPT

## 2023-06-15 ASSESSMENT — ENCOUNTER SYMPTOMS
AWAKENINGS PER NIGHT: 10
PAIN SCALE AT HIGHEST: 7
EXACERBATED BY: SITTING
ALLEVIATING FACTORS: ICE
PAIN SCALE: 7
QUALITY: THROBBING
ALLEVIATING FACTORS: PAIN MEDICATION
EXACERBATED BY: ACTIVITY
PAIN TIMING: CONSTANT
PAIN SCALE AT LOWEST: 6

## 2023-06-15 ASSESSMENT — BALANCE ASSESSMENTS
BALANCE - STANDING STATIC: FAIR
BALANCE - STANDING DYNAMIC: FAIR -
BALANCE - SITTING DYNAMIC: FAIR +
BALANCE - SITTING STATIC: FAIR +

## 2023-06-15 ASSESSMENT — ACTIVITIES OF DAILY LIVING (ADL): POOR_BALANCE: 1

## 2023-06-15 NOTE — OP THERAPY EVALUATION
"  Outpatient Physical Therapy  INITIAL NEUROLOGICAL EVALUATION    [facility] [address].  [address]  [facility] [address]  Phone:  [phone]  Fax:  [phone]    Date of Evaluation: 06/15/2023    Patient: Lizzette Burton  YOB: 1940  MRN: [id number]     Referring Provider: LAVINIA Hopson  [facility] [address]  [place] [address]   Referring Diagnosis Unsteadiness on feet [R26.81]     Time Calculation    Start time: 1100  Stop time: 1151 Time Calculation (min): 51 minutes             Chief Complaint: Difficulty Walking, Loss Of Balance, and Fall    Visit Diagnoses     ICD-10-CM   1. Unsteadiness on feet  R26.81       Subjective:   History of Present Illness:     Date of onset:  12/26/2022    Mechanism of injury:  Patient is a pleasant 83 year old female who presents to PT evaluation with her adult granddaughter, who patient requests is used as .   Per granddaughter, patient has had many falls. On 12/26/2022, patient fell and was admitted to hospital. After hospital stay, patient did go to the nursing home and has since returned home. Granddaughter reports patient continues to fall at home with her most recent fall being 1 or 2 days ago. Patient reports she often falls when not using her walker at home. She states the walker is difficult to fit through doorways. Granddaughter states that patient's back pain has bothered her for years and she had an \"epidural\" ~1 month ago.   Granddaughter reports patient's daughter lives with her but patient is left alone while daughter works. Granddaughter is attempting to transition into a paid caregiver position to be able to be with patient.   Patient presents with a FWW.    Patient reports she takes Tylenol for pain, 3 in the morning/3 in the afternoon.     Granddaughter reports to leave the apartment patient has to complete a flight of \"steep stairs\" and walk down a hill.     Prior level of " "function:  Requires assistance for iADLs, ADLs  Headache comments: reports some headaches  Ear problems: reports being \"deaf\" in L ear.  Sleep disturbance:  Non-restful sleep and interrupted sleep  # Times/Night awakened:  10  Pain:     Current pain ratin    At best pain ratin    At worst pain ratin    Location:  L hip, back/buttock pain    Quality:  Throbbing    Pain timing:  Constant    Relieving factors:  Ice and pain medication    Aggravating factors:  Activity and sitting    Progression:  Unchanged  Social Support:     Lives in:  Apartment (has stairs, has to be slow, only does stairs with family)    Lives with:  Adult children  Hand dominance:  Right  Diagnostic Tests:     Diagnostic Tests Comments:  L hip xray, 23:  \"IMPRESSION:  1.  Stable hip series with mild degenerative change in both hip joints.\"    Lumbar spine xray: 23  \"IMPRESSION:  1.  Overall relatively stable diffuse degenerative disc disease with arthropathy predominantly at the L4-5 and L5-S1 levels.  2.  Stable grade 1 anterior spondylolisthesis at the L4-5 and L5-S1 levels with minimal degenerative retrolisthesis in the upper lumbar spine.  3.  Interval healed proximal anterior sacral fracture.\"    Treatments:     Previous treatment:  Injection treatment and physical therapy    Current treatment:  Physical therapy  Activities of Daily Living:     Patient reported ADL status: Requires assistance with dressing and bathing   Patient Goals:     Patient goals for therapy:  Decreased pain, increased strength and improved balance      Past Medical History:   Diagnosis Date    Acute renal failure (HCC) 2014    Anesthesia     PONV    Angina     CONSULT NEG. RESULTS    Arthritis     bilateral knees    Asthma     Bowel habit changes     constipation    Breath shortness     WITH EXERCISE    CATARACT     bilat IOL    Dental disorder     upper/lower partials    Glaucoma     Heart burn     Hypertension     Indigestion     " Pneumonia 03/2018    Sepsis (HCC) 2/8/2014    Stroke ([facility])     denies hx. of stroke    Unspecified disorder of thyroid     'thyroid medicine taken in past'    Unspecified hemorrhagic conditions 2009     treated in er,nose bleed    Urinary incontinence      Past Surgical History:   Procedure Laterality Date    SHOULDER ARTHROPLASTY TOTAL Right 5/2/2018    Procedure: SHOULDER ARTHROPLASTY TOTAL/ REVERSE;  Surgeon: Scott Payton M.D.;  Location: SURGERY SAME DAY [facility];  Service: Orthopedics    ANTERIOR AND POSTERIOR REPAIR  3/20/2017    Procedure: ANTERIOR AND POSTERIOR REPAIR;  Surgeon: Regulo Burton M.D.;  Location: SURGERY SAME DAY [facility];  Service:     ENTEROCELE REPAIR  3/20/2017    Procedure: ENTEROCELE REPAIR - PERINEOPLASTY;  Surgeon: Regulo Burton M.D.;  Location: SURGERY SAME DAY [facility];  Service:     BLADDER SLING FEMALE  3/20/2017    Procedure: BLADDER SLING FEMALE - TOT;  Surgeon: Regulo Burton M.D.;  Location: SURGERY SAME DAY [facility];  Service:     VAGINAL SUSPENSION  3/20/2017    Procedure: VAGINAL SUSPENSION -SACROSPINOUS VAULT;  Surgeon: Regulo Burton M.D.;  Location: SURGERY SAME DAY [facility];  Service:     KNEE ARTHROPLASTY TOTAL Left 8/20/2015    Procedure: KNEE ARTHROPLASTY TOTAL;  Surgeon: Juan Manuel Choi M.D.;  Location: [facility];  Service:     BAERVELDT IMPLANT  5/8/2013    Performed by Miller Fletcher M.D. at SURGERY SAME DAY [facility]    CATARACT PHACO WITH IOL  5/8/2013    Performed by Miller Fletcher M.D. at SURGERY SAME DAY [facility]    BAERVELDT IMPLANT  3/13/2013    Performed by Miller Fletcher M.D. at SURGERY SAME DAY [facility] ORS    BAERVELDT IMPLANT  12/12/2012    Performed by Miller Fletcher M.D. at SURGERY SAME DAY [facility] ORS    TRABECULECTOMY  9/26/2012    Performed by Miller Fletcher M.D. at SURGERY SAME DAY [facility]    CATARACT PHACO WITH IOL  9/26/2012    Performed by Miller Fletcher M.D. at  SURGERY SAME DAY [facility] ORS    KNEE ARTHROPLASTY TOTAL  2009    Performed by GISELA OLSON at SURGERY [facility] ORS    HYSTERECTOMY, TOTAL ABDOMINAL  1989    GYN SURGERY       Social History     Tobacco Use    Smoking status: Former     Packs/day: 0.50     Years: 20.00     Pack years: 10.00     Types: Cigarettes     Quit date: 3/16/2002     Years since quittin.2    Smokeless tobacco: Never   Vaping Use    Vaping Use: Never used   Substance Use Topics    Alcohol use: No     Family and Occupational History     Socioeconomic History    Marital status:      Spouse name: Not on file    Number of children: Not on file    Years of education: Not on file    Highest education level: Not on file   Occupational History    Not on file       Objective:   Active Range of Motion:   Lower extremity (left):     All left lower extremity active range of motion: All within functional limits  Lower extremity (right):     All right lower extremity active range of motion: All within functional limits  Active range of motion comments: B LE AROM WFL      Strength:   Lower extremity (left):     Hip flexion: 3    Hip abduction: 3-    Hip adduction: 4-    Knee flexion: 3+    Knee extension: 3+    Ankle dorsiflexion: 3+    Ankle plantar flexion: 4+  Lower extremity (right):     Hip flexion: 3    Hip abduction: 3+    Hip adduction: 4-    Knee flexion: 3+    Knee extension: 3+    Ankle dorsiflexion: 4    Ankle plantar flexion: 4+    Strength Comments:  Some difficulty understanding directions noted, even with translation from granddaughter     Tone, Sensation and Coordination:   Tone:     Left upper extremity muscle tone: Normal    Right upper extremity muscle tone: Normal    Left lower extremity muscle tone: Normal    Right lower extremity muscle tone: Normal    Sensation   Lower extremity (left):     Light touch: Intact  Lower extremity (right):     Light touch: Intact    Sensation comments:   Light touch reported to be  "intact to B LE; patient reports occasional tingling in her B knees; she does report some numbness to B LE's, generally, \"sometimes\"    Coordination   Lower extremity (left):     Toe tapping: Impaired    Heel to shin: Impaired  Lower extremity (right):     Toe tapping: Impaired    Heel to shin: Impaired      Coordination comments:   Decreased speed noted with coordination; coordination appears generally decreased    Cognition:     Orientation: normal to place, normal to person and normal to situation    Direction following: one step    Cognition Comments:  Some difficulty with directions noted; initially reports year as 2022; able to state month and day of week     Vision/Perception:     Visual tracking: intact    Vision/Perception Comments:   Convergence appears decreased  Patient does report a previous eye surgery, unable to state what surgery was for; also reports using prescribed eye drops     Postural Control (Balance)     Sitting (static): Fair +    Sitting (dynamic): Fair +    Standing (static): Fair    Standing (dynamic): Fair -    Postural control (balance) comments:  With FWW, dynamic gait Fair  Without FWW, dynamic gait Fair minus      Ambulation: Level Surfaces     Additional Level Surfaces Ambulation Details  Demonstrates anterior trunk lean; decreased step length, wide NAVYA; Improved gait dynamics and balance with FWW; reports ambulating small distances at home without FWW, however frequent falls     Patient also demonstrates poor safety awareness with FWW; PT educated patient on improved safety with gait with FWW, ie keeping walker close as patient demonstrates pushing walker too far forward; and with sitting<>standing transitions and appropriate utilization of FWW/positioning to maintain safety    Observational Gait     Walking speed and stride length below functional limits.    Stride width: wide.      Quality of Movement During Gait   Trunk   Forward lean.     Balance/Gait Comments   5x sit<>stand: " 44.35 seconds    TUG: Average: 45.8 seconds; FWW utilized   Trial 1: 46.43 seconds  Trial 2: 49.12 seconds  Trial 3: 41.93 seconds    Activities of Daily Living:     Household Management:   Physical Assessment:   Coordination:     Lower extremity (left):        Heel to shin: Impaired    Lower extremity (right):        Heel to shin: Impaired    Sensation:   Lower extremity (left):    Light touch: Intact  Lower extremity (right):    Light touch: Intact    Balance:     Sitting (static): Fair +    Sitting (dynamic): Fair +    Standing (static): Fair    Standing (dynamic): Fair -          Therapeutic Exercises (CPT 70866):     1. PT evaluation completed. POC discussed with patient and granddaughter.    20. PN due 7/15/23      Time-based treatments/modalities:           Assessment, Response and Plan:   Impairments: abnormal gait, activity intolerance, impaired functional mobility, impaired balance, impaired physical strength, limited ADL's, limited mobility, pain with function and safety issue    Assessment details:  Patient is a pleasant 83 year old female who presents to PT evaluation with granddaughter secondary to balance and strength deficits with a history of falling, including recently falling 1-2 days ago. Patient demonstrates gait with FWW, however decreased safety awareness and utilization of FWW is noted. Patient also demonstrates poor balance and strength deficits to B LE, limiting functional mobility skills. At this time, patient will benefit from skilled PT follow up to promote improved functional mobility skills via strength and balance training for increased safety and decreased risk of falls.   Barriers to therapy:  Comorbidities and age  Prognosis: fair    Goals:   Short Term Goals:   1. Patient will demonstrate independent HEP to promote increased strength and balance for improved functional mobility skills.    2. Patient will demonstrate improved B LE strength to grossly 4-/5 to promote improved  functional mobility skills and decreased risk for falls.    3. Patient will demonstrate improved safety awareness with FWW to promote increased safety with mobility.    Short term goal time span:  4-6 weeks      Long Term Goals:    1. Patient will demonstrate improved B LE strength to grossly 4+/5 to promote improved functional mobility skills.    2. Patient will demonstrate improved TUG score, with LRAD, to below 30 seconds to indicate improving safety with gait.    3. Patient will complete 5x sit<> less than 30 seconds to indicate improving functional mobility skills and strength.     4. Patient will demonstrate improved ABC score to indicate increasing quality of life and decreasing risk for falls.   Long term goal time span:  2-4 months    Plan:   Therapy options:  Physical therapy treatment to continue  Planned therapy interventions:  Neuromuscular Re-education (CPT 63413), Manual Therapy (CPT 28186), Therapeutic Exercise (CPT 28775), Therapeutic Activities (CPT 03078) and Hot or Cold Pack Therapy (CPT 48160)  Frequency: 1-2x/week.  Duration in weeks:  12  Discussed with:  Patient and family  Plan details:  Strength and balance training      Functional Assessment Used  PT Functional Assessment Tool Used: 5x sit<>stand: 4.35 seconds  PT Functional Assessment Score: ABC: 180 (11.25%)  Timed Up and Go (TUG) Test  Time, in seconds (up from chair, walk 3m and return to chair and sit): 45.8 seconds       Referring provider co-signature:  I have reviewed this plan of care and my co-signature certifies the need for services.    Certification Period: 06/15/2023 to  09/13/23    Physician Signature: ________________________________ Date: ______________

## 2023-06-20 ENCOUNTER — PHYSICAL THERAPY (OUTPATIENT)
Dept: PHYSICAL THERAPY | Facility: REHABILITATION | Age: 83
End: 2023-06-20
Attending: NURSE PRACTITIONER
Payer: MEDICARE

## 2023-06-20 DIAGNOSIS — R26.81 UNSTEADINESS ON FEET: ICD-10-CM

## 2023-06-20 DIAGNOSIS — M25.552 BILATERAL HIP PAIN: ICD-10-CM

## 2023-06-20 DIAGNOSIS — M25.551 BILATERAL HIP PAIN: ICD-10-CM

## 2023-06-20 PROCEDURE — 97110 THERAPEUTIC EXERCISES: CPT

## 2023-06-20 NOTE — OP THERAPY DAILY TREATMENT
Outpatient Physical Therapy  DAILY TREATMENT     [address]  [address]  [place] [address]  Phone:  [phone]  Fax:  [phone]    Date: 06/20/2023    Patient: Lizzette Burton  YOB: 1940  MRN: [id number]     Time Calculation    Start time: 1102  Stop time: 1142 Time Calculation (min): 40 minutes         Chief Complaint: Difficulty Walking, Loss Of Balance, Fall, and Back Problem    Visit #: 2    SUBJECTIVE:  Patient presents to PT session with adult granddaughter who translates.     Patient reports she woke up with low back pain today. They report no falls over the last week. Continues to report difficulty with leaving house. Discussed potential for benefit of  PT however patient would need to be considered home bound. Patient reports she would prefer to continue with outpatient services.     OBJECTIVE:  Current objective measures:           Therapeutic Exercises (CPT 19495):     1. sit<>stand, x10 trials, FWW, provided demonstration and verbal cues for increased safety with FWW utilization; requires increased time to explain safety; after max demonstration and verbal cue, patient is able to demonstrate improved safety with sit<>stands    2. seated marches, 2x 10, B, demonstration provided; added to HEP    3. seated hip abduction, 2x 10, B, demonstration provided, added to HEP    5. FWW utilization and safety, gait x150' with FWW; decreased mari, anterior trunk flexion noted; verbal cues throughout for increased safety with FWW, especially with directional change/turning; demonstrates good carry over after education    20. PN due 7/15/23      Therapeutic Exercise Summary: Educated patient and granddaughter to complete HEP with granddaughter present, both verbalize understanding. Increased time for task today due to safety.     Access Code: [email or web address]  URL: [email or web address]  Date: 06/20/2023  Prepared by: Zaynab  Buening    Exercises  - Seated March  - 1 x daily - 1 sets - 10 reps  - Sit to Stand with Counter Support  - 1 x daily - 1 sets - 5 reps  - Seated Hip Abduction  - 1 x daily - 1 sets - 10 reps      Time-based treatments/modalities:    Physical Therapy Timed Treatment Charges  Therapeutic exercise minutes (CPT 42107): 40 minutes      Pain rating (1-10) before treatment:  8; low back pain  Pain rating (1-10) after treatment:  no change in pain reported    ASSESSMENT:   Response to treatment: Patient tolerates PT treatment well today. Significant time spent on safety utilization with FWW for increased safety with transitions and gait. Also initiated HEP with instruction to complete with granddaughter for increased safety. At this time, patient will benefit from skilled PT to promote improved strength and balance for improved quality of life.     PLAN/RECOMMENDATIONS:   Plan for treatment: therapy treatment to continue next visit.  Planned interventions for next visit: continue with current treatment.

## 2023-06-27 ENCOUNTER — PHYSICAL THERAPY (OUTPATIENT)
Dept: PHYSICAL THERAPY | Facility: REHABILITATION | Age: 83
End: 2023-06-27
Attending: NURSE PRACTITIONER
Payer: MEDICARE

## 2023-06-27 DIAGNOSIS — M25.552 BILATERAL HIP PAIN: ICD-10-CM

## 2023-06-27 DIAGNOSIS — M25.551 BILATERAL HIP PAIN: ICD-10-CM

## 2023-06-27 DIAGNOSIS — R26.81 UNSTEADINESS ON FEET: ICD-10-CM

## 2023-06-27 PROCEDURE — 97110 THERAPEUTIC EXERCISES: CPT

## 2023-06-27 NOTE — OP THERAPY DAILY TREATMENT
Outpatient Physical Therapy  DAILY TREATMENT     [facility] Physical [address].  [address]  [place] [address]  Phone:  [phone]  Fax:  [phone]    Date: 06/27/2023    Patient: Lizzette Burton  YOB: 1940  MRN: [id number]     Time Calculation    Start time: 1102  Stop time: 1143 Time Calculation (min): 41 minutes         Chief Complaint: Difficulty Walking, Fall, Loss Of Balance, and Back Problem    Visit #: 3  2  SUBJECTIVE:  Patient with granddaughter who is able to provide interpretation. Patient's granddaughter reports she forgot about last session which is why they missed. She states they have been working on the safety recommendations at home. She reports no new falls.     OBJECTIVE:  Current objective measures:           Therapeutic Exercises (CPT 38701):     1. sit<>stand, x10 trials, FWW, demonstrates improved safety with FWW utilization and hand placement    2. seated marches, x20 B    3. seated hip abduction, 2x 10, B, nt    5. FWW utilization and safety, gait x150' with FWW; decreased mari, anterior trunk flexion noted; verbal cues throughout for increased safety with FWW, especially with directional change/turning; demonstrates good carry over after education, nt    7. supine LTR, x1 minute, added to HEP    8. supine hip adduction squeezes, x10, 3 second holds, pillow squeezes, added to HEP    9. supine clamshells, hook lying, x10, orange theraband, added to HEP    11. // bars, standing marches, x10 B    12. // bars, standing hip abduction, x10 B, demonstrates increased ease on R LE as compared to L LE    13. // bars, standing hip extension, x10 B    14. // bars, lateral side steps, x10 lengths, B directions, UE support    15. // bars, step over small obstacle, next session    20. PN due 7/15/23      Therapeutic Exercise Summary: Educated patient and granddaughter to complete HEP with granddaughter present, both verbalize understanding. Increased  time for task today due to safety and understanding of skills.     Access Code: [email or web address]  URL: [email or web address]  Date: 06/20/2023  Prepared by: Zaynab Buening    Exercises  - Seated March  - 1 x daily - 1 sets - 10 reps  - Sit to Stand with Counter Support  - 1 x daily - 1 sets - 5 reps  - Seated Hip Abduction  - 1 x daily - 1 sets - 10 reps    Access Code: [email or web address]  URL: [email or web address]  Date: 06/27/2023  Prepared by: Zaynab Buening    Exercises  - Hooklying Clamshell with Resistance  - 1 x daily - 1 sets - 10 reps  - Supine Hip Adduction Isometric with Ball  - 1 x daily - 1 sets - 10 reps - 3 seconds hold  - Supine Lower Trunk Rotation  - 1 x daily - 1 sets - 10 reps      Time-based treatments/modalities:    Physical Therapy Timed Treatment Charges  Therapeutic exercise minutes (CPT 24385): 41 minutes      Pain rating (1-10) before treatment:  8, R low back/hip; does not appear in significant distress   Pain rating (1-10) after treatment:  Reports improved pain after activities.     ASSESSMENT:   Response to treatment: Patient tolerates PT treatment well today. Increased B LE strength and balance activities to promote improved safety with patient demonstrating good tolerance to tasks. At this time, patient will benefit from continued skilled PT to promote improved strength and balance for increased functional mobility and safety.     PLAN/RECOMMENDATIONS:   Plan for treatment: therapy treatment to continue next visit.  Planned interventions for next visit: continue with current treatment.

## 2023-06-29 ENCOUNTER — PHYSICAL THERAPY (OUTPATIENT)
Dept: PHYSICAL THERAPY | Facility: REHABILITATION | Age: 83
End: 2023-06-29
Attending: NURSE PRACTITIONER
Payer: MEDICARE

## 2023-06-29 DIAGNOSIS — M25.552 BILATERAL HIP PAIN: ICD-10-CM

## 2023-06-29 DIAGNOSIS — M25.551 BILATERAL HIP PAIN: ICD-10-CM

## 2023-06-29 DIAGNOSIS — R26.81 UNSTEADINESS ON FEET: ICD-10-CM

## 2023-06-29 PROCEDURE — 97110 THERAPEUTIC EXERCISES: CPT

## 2023-06-29 NOTE — OP THERAPY DAILY TREATMENT
Outpatient Physical Therapy  DAILY TREATMENT     [facility] Physical [address].  [address]  [place] [address]  Phone:  [phone]  Fax:  [phone]    Date: 06/29/2023    Patient: Lizzette Burton  YOB: 1940  MRN: [id number]     Time Calculation    Start time: 1101  Stop time: 1144 Time Calculation (min): 43 minutes         Chief Complaint: Difficulty Walking, Fall, Loss Of Balance, and Back Problem    Visit #: 4    SUBJECTIVE:  Patient with granddaughter for . Patient reports some L shoulder pain today, states she thinks she slept on it wrong. Denies new falls.      OBJECTIVE:  Current objective measures:           Therapeutic Exercises (CPT 52272):     1. sit<>stand, x13, improved stability with decreased UE support required    2. seated marches, x20 B, nt    3. seated hip abduction, 2x 10, B, nt    5. FWW utilization and safety, gait x150' with FWW; decreased mari, anterior trunk flexion noted; verbal cues throughout for increased safety with FWW, especially with directional change/turning; demonstrates good carry over after education, nt    7. supine LTR, 2x1 minute, added to HEP    8. supine hip adduction squeezes, x10, 3 second holds, pillow squeezes, added to HEP    9. supine clamshells, hook lying, x10, orange theraband; demonstrates decreased L hip abduction as compared to R, added to HEP    10. TrA bracing, x10; 3 second holds, added to HEP; increased time required to understand task    11. // bars, standing marches, 2x10 B    12. // bars, standing hip abduction, 2x10 B    13. // bars, standing hip extension, 2x10 B    14. // bars, lateral side steps, x10 lengths, B directions, nt    15. // bars, step over small obstacle, nt    20. PN due 7/15/23      Therapeutic Exercise Summary: Educated patient and granddaughter to complete HEP with granddaughter present, both verbalize understanding. Increased time for task today due to safety and  understanding of skills.     Access Code: [email or web address]  URL: [email or web address]  Date: 06/20/2023  Prepared by: Zaynab Buening    Exercises  - Seated March  - 1 x daily - 1 sets - 10 reps  - Sit to Stand with Counter Support  - 1 x daily - 1 sets - 5 reps  - Seated Hip Abduction  - 1 x daily - 1 sets - 10 reps    Access Code: [email or web address]  URL: [email or web address]  Date: 06/29/2023  Prepared by: Zaynab Buening    Exercises  - Supine Transversus Abdominis Bracing - Hands on Stomach  - 1 x daily - 1 sets - 10 reps - 3 seconds hold    Access Code: [email or web address]  URL: [email or web address]  Date: 06/27/2023  Prepared by: Zaynba Buening    Exercises  - Hooklying Clamshell with Resistance  - 1 x daily - 1 sets - 10 reps  - Supine Hip Adduction Isometric with Ball  - 1 x daily - 1 sets - 10 reps - 3 seconds hold  - Supine Lower Trunk Rotation  - 1 x daily - 1 sets - 10 reps      Time-based treatments/modalities:    Physical Therapy Timed Treatment Charges  Therapeutic exercise minutes (CPT 97506): 43 minutes      Pain rating (1-10) before treatment:  8; back pain, no apparent distress noted  Pain rating (1-10) after treatment:  8, reports less pain but then rates pain at 8    ASSESSMENT:   Response to treatment: Patient tolerates PT treatment well today. Demonstrates some improved stability and safety with FWW, requiring minimal cues from therapist. Granddaughter reports she is more safe when granddaughter is present but questions follow through with FWW safety when granddaughter is not present. At this time, patient will benefit from continued skilled PT to promote further strength and balance training for increased quality of life, decreased risk of falls, and improved functional mobility skills.     PLAN/RECOMMENDATIONS:   Plan for treatment: therapy treatment to continue next visit.  Planned interventions for next visit: continue with current treatment.

## 2023-07-06 ENCOUNTER — PHYSICAL THERAPY (OUTPATIENT)
Dept: PHYSICAL THERAPY | Facility: REHABILITATION | Age: 83
End: 2023-07-06
Attending: NURSE PRACTITIONER
Payer: MEDICARE

## 2023-07-06 DIAGNOSIS — M25.552 BILATERAL HIP PAIN: ICD-10-CM

## 2023-07-06 DIAGNOSIS — R26.81 UNSTEADINESS ON FEET: ICD-10-CM

## 2023-07-06 DIAGNOSIS — M25.551 BILATERAL HIP PAIN: ICD-10-CM

## 2023-07-06 PROCEDURE — 97110 THERAPEUTIC EXERCISES: CPT

## 2023-07-06 NOTE — OP THERAPY DAILY TREATMENT
"  Outpatient Physical Therapy  DAILY TREATMENT     [facility] [address].  [address]  [place] [address]  Phone:  [phone]  Fax:  [phone]    Date: 07/06/2023    Patient: Lizzette Burton  YOB: 1940  MRN: [id number]     Time Calculation    Start time: 1100  Stop time: 1141 Time Calculation (min): 41 minutes         Chief Complaint: Difficulty Walking, Loss Of Balance, Fall, and Back Problem    Visit #: 5    SUBJECTIVE:  Patient reports her pain is not too bad today, however continues to rate pain as 8/10. Granddaughter reports she continues to find patient without walker at home.Granddaughter states she feels like patient is getting stronger at home but she continues to have limited follow through with safety recommendations.      Patient also reports she feels like she is getting \"a lot\" stronger. She reports increased ease with getting off the bed in the morning as well.     OBJECTIVE:  Current objective measures:           Therapeutic Exercises (CPT 71952):     1. sit<>stand, x12    2. seated marches, x20 B, nt    3. seated hip abduction, 2x 10, B, nt    5. FWW utilization and safety, gait x150' with FWW; decreased mari, anterior trunk flexion noted; verbal cues throughout for increased safety with FWW, especially with directional change/turning; demonstrates good carry over after education, nt    7. supine LTR, 2x1 minute    8. supine hip adduction squeezes, x15, 3 second holds, pillow squeezes    9. supine clamshells, hook lying, x15, orange theraband    10. TrA bracing, x10; 3 second holds    11. // bars, standing marches, 2x10 B, nt    12. // bars, standing hip abduction, 2x10 B, nt    13. // bars, standing hip extension, 2x10 B, nt    14. // bars, lateral side steps, x6 lengths, B directions    15. // bars, step over small obstacle, x6    17. NuStep, x7 minutes, level 0    20. PN due 7/15/23      Therapeutic Exercise Summary: Educated patient and " granddaughter to complete HEP with granddaughter present, both verbalize understanding. Increased time for task today due to safety and understanding of skills.     Access Code: [email or web address]  URL: [email or web address]  Date: 06/20/2023  Prepared by: Zaynab Buening    Exercises  - Seated March  - 1 x daily - 1 sets - 10 reps  - Sit to Stand with Counter Support  - 1 x daily - 1 sets - 5 reps  - Seated Hip Abduction  - 1 x daily - 1 sets - 10 reps    Access Code: [email or web address]  URL: [email or web address]  Date: 06/29/2023  Prepared by: Zaynab Buening    Exercises  - Supine Transversus Abdominis Bracing - Hands on Stomach  - 1 x daily - 1 sets - 10 reps - 3 seconds hold    Access Code: [email or web address]  URL: [email or web address]  Date: 06/27/2023  Prepared by: Zaynab Buening    Exercises  - Hooklying Clamshell with Resistance  - 1 x daily - 1 sets - 10 reps  - Supine Hip Adduction Isometric with Ball  - 1 x daily - 1 sets - 10 reps - 3 seconds hold  - Supine Lower Trunk Rotation  - 1 x daily - 1 sets - 10 reps      Time-based treatments/modalities:    Physical Therapy Timed Treatment Charges  Therapeutic exercise minutes (CPT 15255): 41 minutes      Pain rating (1-10) before treatment:  8  Pain rating (1-10) after treatment:  8    ASSESSMENT:   Response to treatment: Patient tolerates PT treatment well today. Patient reports she feels like she is getting stronger at home however granddaughter reports she continues to find patient leaving her FWW behind at home instead of using it. Patient does appear to be improving in strength and balance with activities, and seems to be demonstrating improving functional activity tolerance. At this time, patient will continue to benefit from skilled PT to promote improved strength and balance for increased safety, functional mobility skills, and quality of life.     PLAN/RECOMMENDATIONS:   Plan for treatment: therapy treatment to continue next visit.  Planned interventions  for next visit: continue with current treatment.

## 2023-07-10 ENCOUNTER — APPOINTMENT (OUTPATIENT)
Dept: PHYSICAL THERAPY | Facility: REHABILITATION | Age: 83
End: 2023-07-10
Payer: MEDICARE

## 2023-07-18 ENCOUNTER — HOSPITAL ENCOUNTER (OUTPATIENT)
Dept: LAB | Facility: MEDICAL CENTER | Age: 83
End: 2023-07-18
Attending: PSYCHIATRY & NEUROLOGY
Payer: MEDICARE

## 2023-07-18 LAB
BASOPHILS # BLD AUTO: 0.9 % (ref 0–1.8)
BASOPHILS # BLD: 0.07 K/UL (ref 0–0.12)
EOSINOPHIL # BLD AUTO: 0.24 K/UL (ref 0–0.51)
EOSINOPHIL NFR BLD: 3.1 % (ref 0–6.9)
ERYTHROCYTE [DISTWIDTH] IN BLOOD BY AUTOMATED COUNT: 46.5 FL (ref 35.9–50)
ERYTHROCYTE [SEDIMENTATION RATE] IN BLOOD BY WESTERGREN METHOD: 9 MM/HOUR (ref 0–25)
HCT VFR BLD AUTO: 43.1 % (ref 37–47)
HGB BLD-MCNC: 13.8 G/DL (ref 12–16)
IMM GRANULOCYTES # BLD AUTO: 0.05 K/UL (ref 0–0.11)
IMM GRANULOCYTES NFR BLD AUTO: 0.7 % (ref 0–0.9)
LYMPHOCYTES # BLD AUTO: 2.08 K/UL (ref 1–4.8)
LYMPHOCYTES NFR BLD: 27 % (ref 22–41)
MCH RBC QN AUTO: 30.7 PG (ref 27–33)
MCHC RBC AUTO-ENTMCNC: 32 G/DL (ref 32.2–35.5)
MCV RBC AUTO: 95.8 FL (ref 81.4–97.8)
MONOCYTES # BLD AUTO: 0.75 K/UL (ref 0–0.85)
MONOCYTES NFR BLD AUTO: 9.8 % (ref 0–13.4)
NEUTROPHILS # BLD AUTO: 4.5 K/UL (ref 1.82–7.42)
NEUTROPHILS NFR BLD: 58.5 % (ref 44–72)
NRBC # BLD AUTO: 0 K/UL
NRBC BLD-RTO: 0 /100 WBC (ref 0–0.2)
PLATELET # BLD AUTO: 387 K/UL (ref 164–446)
PMV BLD AUTO: 10.8 FL (ref 9–12.9)
RBC # BLD AUTO: 4.5 M/UL (ref 4.2–5.4)
RHEUMATOID FACT SER IA-ACNC: <10 IU/ML (ref 0–14)
T PALLIDUM AB SER QL IA: NORMAL
VIT B12 SERPL-MCNC: 738 PG/ML (ref 211–911)
WBC # BLD AUTO: 7.7 K/UL (ref 4.8–10.8)

## 2023-07-18 PROCEDURE — 84550 ASSAY OF BLOOD/URIC ACID: CPT

## 2023-07-18 PROCEDURE — 86431 RHEUMATOID FACTOR QUANT: CPT

## 2023-07-18 PROCEDURE — 36415 COLL VENOUS BLD VENIPUNCTURE: CPT

## 2023-07-18 PROCEDURE — 82550 ASSAY OF CK (CPK): CPT

## 2023-07-18 PROCEDURE — 85025 COMPLETE CBC W/AUTO DIFF WBC: CPT

## 2023-07-18 PROCEDURE — 86780 TREPONEMA PALLIDUM: CPT

## 2023-07-18 PROCEDURE — 85652 RBC SED RATE AUTOMATED: CPT

## 2023-07-18 PROCEDURE — 82085 ASSAY OF ALDOLASE: CPT

## 2023-07-18 PROCEDURE — 86038 ANTINUCLEAR ANTIBODIES: CPT

## 2023-07-18 PROCEDURE — 82607 VITAMIN B-12: CPT

## 2023-07-19 ENCOUNTER — HOSPITAL ENCOUNTER (EMERGENCY)
Facility: MEDICAL CENTER | Age: 83
End: 2023-07-19
Attending: EMERGENCY MEDICINE
Payer: MEDICARE

## 2023-07-19 ENCOUNTER — PHYSICAL THERAPY (OUTPATIENT)
Dept: PHYSICAL THERAPY | Facility: REHABILITATION | Age: 83
End: 2023-07-19
Attending: NURSE PRACTITIONER
Payer: MEDICARE

## 2023-07-19 ENCOUNTER — APPOINTMENT (OUTPATIENT)
Dept: RADIOLOGY | Facility: MEDICAL CENTER | Age: 83
End: 2023-07-19
Attending: EMERGENCY MEDICINE
Payer: MEDICARE

## 2023-07-19 ENCOUNTER — OFFICE VISIT (OUTPATIENT)
Dept: URGENT CARE | Facility: CLINIC | Age: 83
End: 2023-07-19
Payer: MEDICARE

## 2023-07-19 VITALS
RESPIRATION RATE: 16 BRPM | HEIGHT: 62 IN | SYSTOLIC BLOOD PRESSURE: 144 MMHG | DIASTOLIC BLOOD PRESSURE: 82 MMHG | TEMPERATURE: 97.4 F | HEART RATE: 69 BPM | OXYGEN SATURATION: 95 % | WEIGHT: 173 LBS | BODY MASS INDEX: 31.83 KG/M2

## 2023-07-19 VITALS
DIASTOLIC BLOOD PRESSURE: 72 MMHG | OXYGEN SATURATION: 93 % | HEART RATE: 66 BPM | TEMPERATURE: 98.1 F | SYSTOLIC BLOOD PRESSURE: 164 MMHG | HEIGHT: 62 IN | RESPIRATION RATE: 18 BRPM | BODY MASS INDEX: 33.13 KG/M2 | WEIGHT: 180 LBS

## 2023-07-19 DIAGNOSIS — M25.552 BILATERAL HIP PAIN: ICD-10-CM

## 2023-07-19 DIAGNOSIS — I10 HYPERTENSION, UNSPECIFIED TYPE: ICD-10-CM

## 2023-07-19 DIAGNOSIS — R07.81 RIB PAIN: ICD-10-CM

## 2023-07-19 DIAGNOSIS — I49.8 BIGEMINY: ICD-10-CM

## 2023-07-19 DIAGNOSIS — R11.0 NAUSEA: ICD-10-CM

## 2023-07-19 DIAGNOSIS — R09.89 LABILE BLOOD PRESSURE: ICD-10-CM

## 2023-07-19 DIAGNOSIS — R26.81 UNSTEADINESS ON FEET: ICD-10-CM

## 2023-07-19 DIAGNOSIS — M25.551 BILATERAL HIP PAIN: ICD-10-CM

## 2023-07-19 DIAGNOSIS — I16.0 HYPERTENSIVE URGENCY: ICD-10-CM

## 2023-07-19 DIAGNOSIS — R42 DIZZINESS: ICD-10-CM

## 2023-07-19 DIAGNOSIS — R07.9 CHEST PAIN, UNSPECIFIED TYPE: ICD-10-CM

## 2023-07-19 LAB
ALBUMIN SERPL BCP-MCNC: 4.2 G/DL (ref 3.2–4.9)
ALBUMIN/GLOB SERPL: 1.1 G/DL
ALP SERPL-CCNC: 79 U/L (ref 30–99)
ALT SERPL-CCNC: 14 U/L (ref 2–50)
ANION GAP SERPL CALC-SCNC: 11 MMOL/L (ref 7–16)
AST SERPL-CCNC: 16 U/L (ref 12–45)
BASOPHILS # BLD AUTO: 0.8 % (ref 0–1.8)
BASOPHILS # BLD: 0.06 K/UL (ref 0–0.12)
BILIRUB SERPL-MCNC: 0.5 MG/DL (ref 0.1–1.5)
BUN SERPL-MCNC: 11 MG/DL (ref 8–22)
CALCIUM ALBUM COR SERPL-MCNC: 8.9 MG/DL (ref 8.5–10.5)
CALCIUM SERPL-MCNC: 9.1 MG/DL (ref 8.5–10.5)
CHLORIDE SERPL-SCNC: 100 MMOL/L (ref 96–112)
CK SERPL-CCNC: 47 U/L (ref 0–154)
CO2 SERPL-SCNC: 28 MMOL/L (ref 20–33)
CREAT SERPL-MCNC: 0.79 MG/DL (ref 0.5–1.4)
EKG IMPRESSION: NORMAL
EOSINOPHIL # BLD AUTO: 0.18 K/UL (ref 0–0.51)
EOSINOPHIL NFR BLD: 2.5 % (ref 0–6.9)
ERYTHROCYTE [DISTWIDTH] IN BLOOD BY AUTOMATED COUNT: 43.9 FL (ref 35.9–50)
GFR SERPLBLD CREATININE-BSD FMLA CKD-EPI: 74 ML/MIN/1.73 M 2
GLOBULIN SER CALC-MCNC: 3.8 G/DL (ref 1.9–3.5)
GLUCOSE SERPL-MCNC: 104 MG/DL (ref 65–99)
HCT VFR BLD AUTO: 43.3 % (ref 37–47)
HGB BLD-MCNC: 14.6 G/DL (ref 12–16)
IMM GRANULOCYTES # BLD AUTO: 0.03 K/UL (ref 0–0.11)
IMM GRANULOCYTES NFR BLD AUTO: 0.4 % (ref 0–0.9)
LYMPHOCYTES # BLD AUTO: 2.15 K/UL (ref 1–4.8)
LYMPHOCYTES NFR BLD: 29.6 % (ref 22–41)
MCH RBC QN AUTO: 31.4 PG (ref 27–33)
MCHC RBC AUTO-ENTMCNC: 33.7 G/DL (ref 32.2–35.5)
MCV RBC AUTO: 93.1 FL (ref 81.4–97.8)
MONOCYTES # BLD AUTO: 0.65 K/UL (ref 0–0.85)
MONOCYTES NFR BLD AUTO: 9 % (ref 0–13.4)
NEUTROPHILS # BLD AUTO: 4.19 K/UL (ref 1.82–7.42)
NEUTROPHILS NFR BLD: 57.7 % (ref 44–72)
NRBC # BLD AUTO: 0 K/UL
NRBC BLD-RTO: 0 /100 WBC (ref 0–0.2)
PLATELET # BLD AUTO: 374 K/UL (ref 164–446)
PMV BLD AUTO: 8.6 FL (ref 9–12.9)
POTASSIUM SERPL-SCNC: 3.7 MMOL/L (ref 3.6–5.5)
PROT SERPL-MCNC: 8 G/DL (ref 6–8.2)
RBC # BLD AUTO: 4.65 M/UL (ref 4.2–5.4)
SODIUM SERPL-SCNC: 139 MMOL/L (ref 135–145)
TROPONIN T SERPL-MCNC: 21 NG/L (ref 6–19)
URATE SERPL-MCNC: 6 MG/DL (ref 1.9–8.2)
WBC # BLD AUTO: 7.3 K/UL (ref 4.8–10.8)

## 2023-07-19 PROCEDURE — 93005 ELECTROCARDIOGRAM TRACING: CPT

## 2023-07-19 PROCEDURE — 85025 COMPLETE CBC W/AUTO DIFF WBC: CPT

## 2023-07-19 PROCEDURE — 3079F DIAST BP 80-89 MM HG: CPT | Performed by: PHYSICIAN ASSISTANT

## 2023-07-19 PROCEDURE — 93005 ELECTROCARDIOGRAM TRACING: CPT | Performed by: EMERGENCY MEDICINE

## 2023-07-19 PROCEDURE — 84484 ASSAY OF TROPONIN QUANT: CPT

## 2023-07-19 PROCEDURE — 700111 HCHG RX REV CODE 636 W/ 250 OVERRIDE (IP): Mod: UD | Performed by: EMERGENCY MEDICINE

## 2023-07-19 PROCEDURE — 94760 N-INVAS EAR/PLS OXIMETRY 1: CPT

## 2023-07-19 PROCEDURE — 99214 OFFICE O/P EST MOD 30 MIN: CPT | Performed by: PHYSICIAN ASSISTANT

## 2023-07-19 PROCEDURE — 1170F FXNL STATUS ASSESSED: CPT | Performed by: PHYSICIAN ASSISTANT

## 2023-07-19 PROCEDURE — 36415 COLL VENOUS BLD VENIPUNCTURE: CPT

## 2023-07-19 PROCEDURE — 99285 EMERGENCY DEPT VISIT HI MDM: CPT

## 2023-07-19 PROCEDURE — 700105 HCHG RX REV CODE 258: Mod: UD | Performed by: EMERGENCY MEDICINE

## 2023-07-19 PROCEDURE — 96375 TX/PRO/DX INJ NEW DRUG ADDON: CPT

## 2023-07-19 PROCEDURE — 80053 COMPREHEN METABOLIC PANEL: CPT

## 2023-07-19 PROCEDURE — 96374 THER/PROPH/DIAG INJ IV PUSH: CPT

## 2023-07-19 PROCEDURE — 3077F SYST BP >= 140 MM HG: CPT | Performed by: PHYSICIAN ASSISTANT

## 2023-07-19 PROCEDURE — 71045 X-RAY EXAM CHEST 1 VIEW: CPT

## 2023-07-19 PROCEDURE — 97530 THERAPEUTIC ACTIVITIES: CPT

## 2023-07-19 RX ORDER — LABETALOL HYDROCHLORIDE 5 MG/ML
20 INJECTION, SOLUTION INTRAVENOUS ONCE
Status: COMPLETED | OUTPATIENT
Start: 2023-07-19 | End: 2023-07-19

## 2023-07-19 RX ORDER — SODIUM CHLORIDE 9 MG/ML
1000 INJECTION, SOLUTION INTRAVENOUS ONCE
Status: COMPLETED | OUTPATIENT
Start: 2023-07-19 | End: 2023-07-19

## 2023-07-19 RX ORDER — HYDRALAZINE HYDROCHLORIDE 20 MG/ML
20 INJECTION INTRAMUSCULAR; INTRAVENOUS ONCE
Status: COMPLETED | OUTPATIENT
Start: 2023-07-19 | End: 2023-07-19

## 2023-07-19 RX ADMIN — SODIUM CHLORIDE 1000 ML: 9 INJECTION, SOLUTION INTRAVENOUS at 12:21

## 2023-07-19 RX ADMIN — LABETALOL HYDROCHLORIDE 20 MG: 5 INJECTION INTRAVENOUS at 12:21

## 2023-07-19 RX ADMIN — HYDRALAZINE HYDROCHLORIDE 20 MG: 20 INJECTION, SOLUTION INTRAMUSCULAR; INTRAVENOUS at 13:23

## 2023-07-19 ASSESSMENT — ENCOUNTER SYMPTOMS
MEMORY LOSS: 1
PALPITATIONS: 1
HEADACHES: 1
SHORTNESS OF BREATH: 1
DIZZINESS: 1
WHEEZING: 0
FEVER: 0
SPUTUM PRODUCTION: 0
VOMITING: 0
CHILLS: 0
DIARRHEA: 0
NAUSEA: 1
ABDOMINAL PAIN: 0
COUGH: 0

## 2023-07-19 ASSESSMENT — FIBROSIS 4 INDEX
FIB4 SCORE: 1.06
FIB4 SCORE: 1.06

## 2023-07-19 NOTE — OP THERAPY DAILY TREATMENT
Outpatient Physical Therapy  DAILY TREATMENT     [facility] [address]  [address]  [place] [address]  Phone:  [phone]  Fax:  [phone]    Date: 07/19/2023    Patient: Lizzette Burton  YOB: 1940  MRN: [id number]     Time Calculation    Start time: 0800  Stop time: 0835 Time Calculation (min): 35 minutes         Chief Complaint: Difficulty Walking, Loss Of Balance, Fall, and Back Problem    Visit #: 6    SUBJECTIVE:  Patient presents to PT session with granddaughter who assists with translation. Patient states that she got dizzy this morning and almost fell, catching herself on the dresser, then reports fall may have been last night, unsure. Granddaughter reports she may have forgotten to take her BP.  Recommended patient and granddaughter go to urgent care for further follow up secondary to patient symptoms. Both in agreement.  Vitals as noted below.      Upon gait with FWW, patient continues to demonstrate poor safety awareness, often pushing it off to the side.     OBJECTIVE:  Current objective measures:   HR: 39- 67 bpm; O2 94%  BP: 141/115; discussed with granddaughter that PT would recommend urgent care secondary to various symptoms, and BP; patient reporting inconsistent symptoms of L sided chest v rib pain; also unable to clarify whether her LOB was this morning or last night with dizziness. Patient with some difficulty following simple commands for MMT testing this date as well. Patient and granddaughter in agreement with follow up at urgent care and exit session reporting plans to visit urgent care.     5x sit<>stand: 20 seconds  7x sit<>stand: 30 seconds     Strength: Unable to assess this date (noted per evaluation)  Lower extremity (left):     Hip flexion: 3    Hip abduction: 3-    Hip adduction: 4-    Knee flexion: 3+    Knee extension: 3+    Ankle dorsiflexion: 3+    Ankle plantar flexion: 4+    Lower extremity (right):     Hip flexion: 3     Hip abduction: 3+    Hip adduction: 4-    Knee flexion: 3+    Knee extension: 3+    Ankle dorsiflexion: 4    Ankle plantar flexion: 4+            Therapeutic Exercises (CPT 45492):     1. sit<>stand, x5; x7    2. seated marches, x20 B, nt    3. seated hip abduction, 2x 10, B, nt    5. FWW utilization and safety, gait x150' with FWW; decreased mari, anterior trunk flexion noted; verbal cues throughout for increased safety with FWW, especially with directional change/turning; demonstrates good carry over after education, nt    7. supine LTR, 2x1 minute, nt    8. supine hip adduction squeezes, x15, 3 second holds, pillow squeezes, nt    9. supine clamshells, hook lying, x15, orange theraband, nt    10. TrA bracing, x10; 3 second holds, nt    11. // bars, standing marches, 2x10 B, nt    12. // bars, standing hip abduction, 2x10 B, nt    13. // bars, standing hip extension, 2x10 B, nt    14. // bars, lateral side steps, x6 lengths, B directions, nt    15. // bars, step over small obstacle, x6, nt    17. NuStep, x7 minutes, level 0, nt    20. PN due 7/15/23      Therapeutic Exercise Summary: Educated patient and granddaughter to complete HEP with granddaughter present, both verbalize understanding. Increased time for task today due to safety and understanding of skills.     Access Code: [email or web address]  URL: [email or web address]  Date: 06/20/2023  Prepared by: Zaynab Aranda    Exercises  - Seated March  - 1 x daily - 1 sets - 10 reps  - Sit to Stand with Counter Support  - 1 x daily - 1 sets - 5 reps  - Seated Hip Abduction  - 1 x daily - 1 sets - 10 reps    Access Code: [email or web address]  URL: [email or web address]  Date: 06/29/2023  Prepared by: Zaynab Buening    Exercises  - Supine Transversus Abdominis Bracing - Hands on Stomach  - 1 x daily - 1 sets - 10 reps - 3 seconds hold    Access Code: [email or web address]  URL: [email or web address]  Date: 06/27/2023  Prepared by: Zaynab Becerril  Clamshell with Resistance  - 1 x daily - 1 sets - 10 reps  - Supine Hip Adduction Isometric with Ball  - 1 x daily - 1 sets - 10 reps - 3 seconds hold  - Supine Lower Trunk Rotation  - 1 x daily - 1 sets - 10 reps      Time-based treatments/modalities:    Physical Therapy Timed Treatment Charges  Therapeutic activity minutes (CPT 45435): 35 minutes      Pain rating (1-10) before treatment:  unrated B low back pain   Pain rating (1-10) after treatment:  not addressed on exit as patient left to present to urgent care    Goals:   Short Term Goals:   1. Patient will demonstrate independent HEP to promote increased strength and balance for improved functional mobility skills.-progressing     2. Patient will demonstrate improved B LE strength to grossly 4-/5 to promote improved functional mobility skills and decreased risk for falls.-not addressed     3. Patient will demonstrate improved safety awareness with FWW to promote increased safety with mobility.-progressing; requires frequent encouragement to utilize FWW      Short term goal time span:  4-6 weeks      Long Term Goals:    1. Patient will demonstrate improved B LE strength to grossly 4+/5 to promote improved functional mobility skills.-not addressed     2. Patient will demonstrate improved TUG score, with LRAD, to below 30 seconds to indicate improving safety with gait.-not addressed     3. Patient will complete 5x sit<> less than 30 seconds to indicate improving functional mobility skills and strength. -MET, x5 sit<>stands in 20 seconds     4. Patient will demonstrate improved ABC score to indicate increasing quality of life and decreasing risk for falls. -not addressed     Long term goal time span:  2-4 months    ASSESSMENT:   Response to treatment: Patient presents to PT session and appears to have some potential confusion and reports various symptoms of L sided rib v chest pain, dizziness resulting in LOB (however unable to state if occurred last  evening or this morning), and HR readings from 39-70 bpm with BP of 141/115. PT discussed concern for symptoms with patient and granddaughter with recommendation to present to urgent care. Both in agreement and exited session. Progress note not completed this date secondary to medical concerns and assessment. Will complete progress note upon next session as appropriate.     PLAN/RECOMMENDATIONS:   Plan for treatment: therapy treatment to continue next visit.  Planned interventions for next visit: continue with current treatment.

## 2023-07-19 NOTE — ED PROVIDER NOTES
ED Provider Note    Scribed for Blake Guerra M.d. by Frederick Díaz. 7/19/2023 12:09 PM    Primary Care Provider: Pcp Gabby Zelaya    CHIEF COMPLAINT  Chief Complaint   Patient presents with    Dizziness     This AM, constant today, states intermittent dizziness X months, denies cardiac hx    Headache     X 3 days, hx of HA, no neuro deficits noted in triage    Hypertension     Hx of, pt only takes her rx intermittently as she does not remember well when she has taken it     EXTERNAL RECORDS REVIEWED  Outpatient Notes reviewed the outpatient urgent care note prior to arrival where she was sent in with concerns for headache likely due to hypertensive urgency    HPI/ROS  LIMITATION TO HISTORY   Select: Language Citizen of [place],  Used .  Granddaughter acts as willing   OUTSIDE HISTORIAN(S):  Family granddaughter gives additional history about her chronic left-sided chest discomfort since an accident where she injured some ribs back in December    Lizzette Burton is a 83 y.o. female who presents with a 3-day history of headache that has been constant and affecting her vision and balance at times in spite of compliance with her blood pressure regimen.  Granddaughter does indicate that at times she forgets to take her medicine and the patient is unsure about her compliance over the last 3 days but says that she is generally compliant.  She does not have any new chest pain.  Her left-sided chest discomfort has been going on since December when she had a fall injuring some ribs on that side and is never fully recovered.  Denies any leg pain or swelling.  Says that she is quite hungry as she has not eaten all day.  Denies any fever, chills, sweats, cough or other complaints and arrives hypertensive with initial BP of 220/115.    PAST MEDICAL HISTORY   has a past medical history of Acute renal failure (HCC) (2/11/2014), Anesthesia, Angina, Arthritis, Asthma, Bowel habit changes, Breath shortness,  CATARACT, Dental disorder, Glaucoma, Heart burn, Hypertension, Indigestion, Pneumonia (2018), Sepsis (HCC) (2014), Stroke (), Unspecified disorder of thyroid, Unspecified hemorrhagic conditions (), and Urinary incontinence.    SURGICAL HISTORY   has a past surgical history that includes hysterectomy, total abdominal (); gyn surgery; trabeculectomy (2012); cataract phaco with iol (2012); knee arthroplasty total (2009); baerveldt implant (2012); baerveldt implant (3/13/2013); baerveldt implant (2013); cataract phaco with iol (2013); knee arthroplasty total (Left, 2015); anterior and posterior repair (3/20/2017); enterocele repair (3/20/2017); bladder sling female (3/20/2017); vaginal suspension (3/20/2017); and shoulder arthroplasty total (Right, 2018).    FAMILY HISTORY  No family history noted.    SOCIAL HISTORY  Social History     Tobacco Use    Smoking status: Former     Packs/day: 0.50     Years: 20.00     Pack years: 10.00     Types: Cigarettes     Quit date: 3/16/2002     Years since quittin.3    Smokeless tobacco: Never   Vaping Use    Vaping Use: Never used   Substance and Sexual Activity    Alcohol use: No    Drug use: No    Sexual activity: Not on file     CURRENT MEDICATIONS  Home Medications       Reviewed by Richard Mendoza R.N. (Registered Nurse) on 23 at 1018  Med List Status: Not Addressed     Medication Last Dose Status   acetaminophen (TYLENOL) 325 MG Tab  Active   amLODIPine (NORVASC) 10 MG Tab  Active   carvedilol (COREG) 6.25 MG Tab  Active   famotidine (PEPCID) 20 MG Tab  Active   fluticasone (FLONASE) 50 MCG/ACT nasal spray  Active   guaiFENesin ER (MUCINEX) 600 MG TABLET SR 12 HR  Active   levothyroxine (SYNTHROID) 100 MCG Tab  Active   lidocaine (LIDODERM) 5 % Patch  Active   losartan (COZAAR) 100 MG Tab  Active   MYRBETRIQ 25 MG TABLET SR 24 HR  Active                  ALLERGIES  No Known Allergies    PHYSICAL EXAM  VITAL  "SIGNS: BP (!) 220/115   Pulse 70   Temp 36.7 °C (98 °F) (Temporal)   Resp 16   Ht 1.575 m (5' 2\")   Wt 81.6 kg (180 lb)   SpO2 93%   BMI 32.92 kg/m²    Constitutional: Well developed, Well nourished, No acute distress, Non-toxic appearance.   HENT: Normocephalic, Atraumatic, Bilateral external ears normal, Oropharynx is clear mucous membranes are moist. No oral exudates or nasal discharge.   Eyes: Pupils are equal round and reactive, EOMI, Conjunctiva normal, No discharge.   Neck: Normal range of motion, No tenderness, Supple, No stridor. No meningismus.  Lymphatic: No lymphadenopathy noted.   Cardiovascular: Regular rate and rhythm without murmur rub or gallop.  Thorax & Lungs: Clear breath sounds bilaterally without wheezes, rhonchi or rales. There is no chest wall tenderness.   Abdomen: Soft non-tender non-distended. There is no rebound or guarding. No organomegaly is appreciated. Bowel sounds are normal.  Skin: Normal without rash.   Back: No CVA or spinal tenderness.   Extremities: Intact distal pulses, No edema, No tenderness, No cyanosis, No clubbing. Capillary refill is less than 2 seconds.  Musculoskeletal: Good range of motion in all major joints. No tenderness to palpation or major deformities noted.   Neurologic: Alert & oriented x 3, Normal motor function, Normal sensory function, No focal deficits noted. Reflexes are normal.  Psychiatric: Affect normal, Judgment normal, Mood normal. There is no suicidal ideation or patient reported hallucinations.     DIAGNOSTIC STUDIES / PROCEDURES  EKG  I have independently interpreted this EKG  Results for orders placed or performed during the hospital encounter of 07/19/23   EKG (NOW)   Result Value Ref Range    Report       [facility] Emergency Dept.    Test Date:  2023-07-19  Pt Name:    MONICA TRIMBLE                 Department: ER  MRN:        [id number]                      Room:  Gender:     Female                       Technician: " [id number]  :        1940                   Requested By:ER TRIAGE PROTOCOL  Order #:    [id number]                    Reading MD: DAVID HARTMAN MD    Measurements  Intervals                                Axis  Rate:       86                           P:          36  NY:         187                          QRS:        -26  QRSD:       113                          T:          87  QT:         424  QTc:        508    Interpretive Statements  Sinus rhythm  Ventricular bigeminy  LVH with IVCD and secondary repol abnrm  Compared to ECG 2022 05:59:02  Ventricular premature complex(es) now present  Intraventricular conduction delay now present  Sinus arrhythmia no longer present  Electronically Signed On 2023 12:33:09 PDT by DAVID HARTMAN MD        LABS  Labs Reviewed   CBC WITH DIFFERENTIAL - Abnormal; Notable for the following components:       Result Value    MPV 8.6 (*)     All other components within normal limits   COMP METABOLIC PANEL - Abnormal; Notable for the following components:    Glucose 104 (*)     Globulin 3.8 (*)     All other components within normal limits   TROPONIN - Abnormal; Notable for the following components:    Troponin T 21 (*)     All other components within normal limits   CORRECTED CALCIUM   ESTIMATED GFR   TROPONIN      RADIOLOGY  I have independently interpreted the diagnostic imaging associated with this visit and am waiting the final reading from the radiologist.   My preliminary interpretation is as follows: Possible slight pulmonary edema with cardiomegaly    Radiologist interpretation:   DX-CHEST-PORTABLE (1 VIEW)   Final Result      1.  Features favoring multifocal pneumonia. Interstitial edema could have a similar appearance.   2.  Cardiomegaly.        COURSE & MEDICAL DECISION MAKING    ED Observation Status? Yes; I am placing the patient in to an observation status due to a diagnostic uncertainty as well as therapeutic intensity. Patient placed in observation status  at 12:05 PM, 7/19/2023.     Observation plan is as follows: Determine need for admission based on lab work including serial troponins, hydration, observation to determine if her blood pressure does improve on IV antihypertensives    Upon Reevaluation, the patient's condition has: Improved; and will be discharged.    Patient discharged from ED Observation status at 1:21 PM (Time) 7/19/2023 (Date).     INITIAL ASSESSMENT, COURSE AND PLAN  Care Narrative:     12:09 PM - Patient seen and examined at bedside. Discussed plan of care, including an EKG and labs for evaluation of symptoms. The patient will be resuscitated with 1L NS IV and medicated with Normodyne/Trandate 20 mg injection. Patient agrees to the plan of care. Ordered for an EKG, CMP, Troponin, CBC w/ diff, and DX-Chest to evaluate her symptoms.      I doubt pneumonia or acute coronary syndrome as the patient does not have any cough, congestion, fever and not any new chest discomfort.  She does have an EKG some new bigeminy however she is on multiple cardiac medications and sporadically compliant and I think this is what is driving her blood pressure upwards.    1:16 PM - Patient was reevaluated at bedside. Discussed labs and EKG results with the patient. Patient's headache has substantially improved with 2 different doses of medication. Had a conversation with granddaughter and will assist in administering medication for her at home. Patient had the opportunity to ask any questions. The plan for discharge was discussed with them and they were told to return for any new or worsening symptoms. She was also informed of the plans for follow up. Patient is understanding and agreeable to the plan for discharge.        CRITICAL CARE  The very real possibilty of a deterioration of this patient's condition required the highest level of my preparedness for sudden, emergent intervention.  I provided critical care services, which included medication orders, frequent  reevaluations of the patient's condition and response to treatment, ordering and reviewing test results, and discussing the case with various consultants.  The critical care time associated with the care of the patient was 30 minutes. Review chart for interventions. This time is exclusive of any other billable procedures.      HYDRATION: Based on the patient's presentation of Dehydration the patient was given IV fluids. IV Hydration was used because oral hydration was not adequate alone. Upon recheck following hydration, the patient was improved.        DISPOSITION AND DISCUSSIONS  I have discussed management of the patient with the following physicians and LENO's:  None    Discussion of management with other [facility] or appropriate source(s): None     Escalation of care considered, and ultimately not performed:acute inpatient care management, however at this time, the patient is most appropriate for outpatient management    Barriers to care at this time, including but not limited to:  Patient has a difficulty with education compliance and her granddaughter has given assurances that she will start administering her medication and provide for follow-up medical care through her primary provider .     The patient will return for new or worsening symptoms and is stable at the time of discharge.    The patient is referred to a primary physician for blood pressure management, diabetic screening, and for all other preventative health concerns.    DISPOSITION:  Patient will be discharged home in stable condition.    FOLLOW UP:  Gabby Zelaya A.P.R.NEnedina  [address] [address] [phone]          FINAL DIAGNOSIS  1. Hypertensive urgency    2. Bigeminy    3.  Critical care time, 30 minutes     Electronically signed by: Blake Guerra M.D., 7/19/2023 12:02 PM    Frederick STALEY (Scribe), smitha scribing for, and in the presence of, Blake Guerra M.D..    Blake STALEY M.D. personally performed the services  described in this documentation, as scribed by Frederick Díaz in my presence, and it is both accurate and complete.    The note accurately reflects work and decisions made by me.  Blake Guerra M.D.  7/19/2023  1:42 PM

## 2023-07-19 NOTE — ED TRIAGE NOTES
"Chief Complaint   Patient presents with    Dizziness     This AM, constant today, states intermittent dizziness X months, denies cardiac hx    Headache     X 3 days, hx of HA, no neuro deficits noted in triage    Hypertension     Hx of, pt only takes her rx intermittently as she does not remember well when she has taken it     Pt ambulatory to triage for above complaints, VSS on RA with HTN, GCS 15, NAD.    Pt returned to [facility]. Educated on triage process and to inform staff of any changes.     BP (!) 220/115   Pulse 91   Temp 36.7 °C (98 °F) (Temporal)   Resp 18   Ht 1.575 m (5' 2\")   Wt 81.6 kg (180 lb)   SpO2 96%   BMI 32.92 kg/m²     "

## 2023-07-19 NOTE — DISCHARGE INSTRUCTIONS
Please take your medications as directed and get some help from your family in order to be more compliant with your medication regimen    You may need a family member to give your medicine daily to make sure you do not miss any doses    Stay well-hydrated return if any significant change in symptoms develop

## 2023-07-19 NOTE — PROGRESS NOTES
Subjective:   Lizzette Burton  is a 83 y.o. female who presents for Chest Pain (Fracture on rib cage causing pain. Sent here by PT due to inconsistent BP. Pt experiencing nausea and dizziness )    Visit completed with use of   Chest Pain   This is a new problem. The current episode started in the past 7 days. Associated symptoms include dizziness, headaches, malaise/fatigue, nausea, palpitations and shortness of breath. Pertinent negatives include no abdominal pain, cough, fever, sputum production or vomiting.   Patient presents to urgent care having been referred by her physical therapist who saw her this morning.  Therapist was concerned with patient's increased confusion and complaints of nausea and dizziness.  Patient did have a recent fall with rib injury and was expressing chest pain which is unclear if it is musculoskeletal or cardiac in nature.  She does have a past medical history of pneumonia, cardiac arrhythmia, stage III chronic renal disease and sepsis.    Today she complains of headache and dizziness.  She states she was so dizzy she did have a fall in the home last night.  She states has been taking Tylenol and using topical treatments for headache and has had minimal improvement.  She denies numbness tingling or weakness.  She denies history of CVA.  She notes chest pain both substernal on anterior chest as well as on the left lateral side where she did have a rib fracture 7 months ago.  She notes worsening of all of the symptoms over the last 24 to 48 hours.  Granddaughter who is present with patient does acknowledge increase confusion and forgetfulness over the last 2 days.  She states patient has been less steady on her feet.  Patient is hypertensive in the urgent care today but did not take her pressure medication this morning.  Patient complains of shortness of breath and does endorse a cough but granddaughter states she has not been coughing recently.    Review of Systems  "  Constitutional:  Positive for malaise/fatigue. Negative for chills and fever.   Respiratory:  Positive for shortness of breath. Negative for cough, sputum production and wheezing.    Cardiovascular:  Positive for chest pain and palpitations. Negative for leg swelling.   Gastrointestinal:  Positive for nausea. Negative for abdominal pain, diarrhea and vomiting.   Skin:  Negative for rash.   Neurological:  Positive for dizziness and headaches.   Psychiatric/Behavioral:  Positive for memory loss.        No Known Allergies     Objective:   BP (!) 144/82 (BP Location: Left arm, Patient Position: Sitting, BP Cuff Size: Adult)   Pulse 69   Temp 36.3 °C (97.4 °F) (Temporal)   Resp 16   Ht 1.575 m (5' 2\")   Wt 78.5 kg (173 lb)   SpO2 95%   BMI 31.64 kg/m²     Physical Exam  Vitals and nursing note reviewed.   Constitutional:       General: She is not in acute distress.     Appearance: She is not diaphoretic.   HENT:      Head: Normocephalic and atraumatic.      Right Ear: Tympanic membrane, ear canal and external ear normal.      Left Ear: Tympanic membrane, ear canal and external ear normal.      Nose: Nose normal.      Mouth/Throat:      Pharynx: Posterior oropharyngeal erythema present. No oropharyngeal exudate.   Eyes:      General: Lids are normal. No scleral icterus.        Right eye: No discharge.         Left eye: No discharge.      Conjunctiva/sclera: Conjunctivae normal.   Cardiovascular:      Rate and Rhythm: Normal rate. Rhythm irregular. Frequent Extrasystoles are present.  Pulmonary:      Effort: Pulmonary effort is normal. No respiratory distress.      Breath sounds: Normal breath sounds and air entry.   Musculoskeletal:         General: Normal range of motion.      Cervical back: Neck supple.   Skin:     General: Skin is warm and dry.      Coloration: Skin is not pale.      Findings: No erythema.   Neurological:      Mental Status: She is alert and oriented to person, place, and time. She is not " disoriented.      Cranial Nerves: Cranial nerves 2-12 are intact. No cranial nerve deficit or facial asymmetry.      Gait: Abnormal gait: Uses walker.   Psychiatric:         Speech: Speech normal.         Behavior: Behavior normal.       EKG in the office reveals a sinus rhythm with a rate of 89. There is ventricular bigeminy with LVH and IVCD, no ST elevation.      Assessment/Plan:   1. Chest pain, unspecified type    2. Rib pain    3. Nausea    4. Dizziness    5. Labile blood pressure    6. Hypertension, unspecified type  Patient has been directed to [facility] ER for further management/work up now, today.  Granddaughter is comfortable transporting patient POV to emergency department for further work-up and care today.      I have worn an N95 mask, gloves and eye protection for the entire encounter with this patient.     Differential diagnosis, natural history, supportive care, and indications for immediate follow-up discussed.    My total time spent caring for the patient on the day of the encounter was 32 minutes.   This does not include time spent on separately billable procedures/tests.

## 2023-07-19 NOTE — ED NOTES
Reviewed discharge instructions, patient verbalized understanding. States they will schedule follow up appointment if needed. Isabela states she will start managing medications for Lizzette.     Denies further questions at this time. Pt brought to lobby via w/c where she was taken home by zbigniew

## 2023-07-20 LAB
ALDOLASE SERPL-CCNC: 2.6 U/L (ref 1.2–7.6)
NUCLEAR IGG SER QL IA: NORMAL

## 2023-07-26 ENCOUNTER — PHYSICAL THERAPY (OUTPATIENT)
Dept: PHYSICAL THERAPY | Facility: REHABILITATION | Age: 83
End: 2023-07-26
Attending: NURSE PRACTITIONER
Payer: MEDICARE

## 2023-07-26 DIAGNOSIS — M25.551 BILATERAL HIP PAIN: ICD-10-CM

## 2023-07-26 DIAGNOSIS — R26.81 UNSTEADINESS ON FEET: ICD-10-CM

## 2023-07-26 DIAGNOSIS — M25.552 BILATERAL HIP PAIN: ICD-10-CM

## 2023-07-26 PROCEDURE — 97110 THERAPEUTIC EXERCISES: CPT

## 2023-07-26 NOTE — OP THERAPY DAILY TREATMENT
Outpatient Physical Therapy  DAILY TREATMENT     [facility] Physical [address].  [address]  [place] [address]  Phone:  [phone]  Fax:  [phone]    Date: 07/26/2023    Patient: Lizzette Burton  YOB: 1940  MRN: [id number]     Time Calculation    Start time: 1017  Stop time: 1058 Time Calculation (min): 41 minutes         Chief Complaint: Difficulty Walking, Loss Of Balance, Fall, and Back Problem    Visit #: 7    SUBJECTIVE:  Patient presents with granddaughter who translates. Reports feeling some unrated pain but feeling good overall today. Granddaughter reports they have been helping more with medication to ensure patient takes what she is supposed to.     OBJECTIVE:    TUG: Average: 33.9 seconds; with FWW  Trial 1: 36.97 seconds  Trial 2: 30.27 seconds  Trial 3: 34.45 seconds    Current objective measures:   5x sit<>stand: 25 seconds        Strength:  Requires demonstration and maximal verbal cues for understanding   Lower extremity (left):     Hip flexion: 3+    Hip abduction: 4    Hip adduction: 5    Knee flexion: 4+    Knee extension: 4    Ankle dorsiflexion: 4+    Ankle plantar flexion: 5     Lower extremity (right):     Hip flexion: 3    Hip abduction: 4    Hip adduction: 5    Knee flexion: 4+    Knee extension: 4    Ankle dorsiflexion: 4+    Ankle plantar flexion: 5          Therapeutic Exercises (CPT 80147):     1. sit<>stand, x6, 30 seconds    2. seated marches, x15 B with pink theraband    3. seated hip abduction, x15, B, with pink theraband    5. FWW utilization and safety, gait x150' with FWW; decreased mari, anterior trunk flexion noted; verbal cues throughout for increased safety with FWW, especially with directional change/turning; demonstrates good carry over after education, nt    7. supine LTR, 2x1 minute, nt    8. supine hip adduction squeezes, x15, 3 second holds, pillow squeezes, nt    9. supine clamshells, hook lying, x15, orange  "theraband, nt    10. TrA bracing, x10; 3 second holds, nt    11. // bars, standing marches, 2x10 B, nt    12. // bars, standing hip abduction, 2x10 B, nt    13. // bars, standing hip extension, 2x10 B, nt    14. // bars, lateral side steps, x6 lengths, B directions, nt    15. // bars, step over small obstacle, x6, nt    17. NuStep, x7 minutes, level 0, nt      Therapeutic Exercise Summary: Educated patient and granddaughter to complete HEP with granddaughter present, both verbalize understanding. Increased time for task today due to safety and understanding of skills.     Access Code: [email or web address]  URL: [email or web address]  Date: 06/20/2023  Prepared by: Zaynab Buening    Exercises  - Seated March  - 1 x daily - 1 sets - 10 reps  - Sit to Stand with Counter Support  - 1 x daily - 1 sets - 5 reps  - Seated Hip Abduction  - 1 x daily - 1 sets - 10 reps    Access Code: [email or web address]  URL: [email or web address]  Date: 06/29/2023  Prepared by: Zaynab Buening    Exercises  - Supine Transversus Abdominis Bracing - Hands on Stomach  - 1 x daily - 1 sets - 10 reps - 3 seconds hold    Access Code: [email or web address]  URL: [email or web address]  Date: 06/27/2023  Prepared by: Zaynab Buening    Exercises  - Hooklying Clamshell with Resistance  - 1 x daily - 1 sets - 10 reps  - Supine Hip Adduction Isometric with Ball  - 1 x daily - 1 sets - 10 reps - 3 seconds hold  - Supine Lower Trunk Rotation  - 1 x daily - 1 sets - 10 reps      Time-based treatments/modalities:    Physical Therapy Timed Treatment Charges  Therapeutic exercise minutes (CPT 12492): 41 minutes      Pain rating (1-10) before treatment:  reports some unrated pain but feeling \"pretty good\" overall today   Pain rating (1-10) after treatment:  reports improved pain, unrated     Goals:   Short Term Goals:   1. Patient will demonstrate independent HEP to promote increased strength and balance for improved functional mobility skills.-progressing     2. Patient " will demonstrate improved B LE strength to grossly 4-/5 to promote improved functional mobility skills and decreased risk for falls.-progressing, limited in R hip flexion strength remains      3. Patient will demonstrate improved safety awareness with FWW to promote increased safety with mobility.-progressing; requires frequent encouragement to utilize FWW      Short term goal time span:  4-6 weeks      Long Term Goals:    1. Patient will demonstrate improved B LE strength to grossly 4+/5 to promote improved functional mobility skills.-progressing     2. Patient will demonstrate improved TUG score, with LRAD, to below 30 seconds to indicate improving safety with gait.-progressing     3. Patient will complete 5x sit<> less than 30 seconds to indicate improving functional mobility skills and strength. -MET, x5 sit<>stands in 25 seconds     4. Patient will demonstrate improved ABC score to indicate increasing quality of life and decreasing risk for falls.  -MET, continue     Long term goal time span:  2-4 months    ASSESSMENT:   Response to treatment: Patient tolerates PT treatments well thus far. Demonstrates improving strength and balance but continues to demonstrate decreased safety awareness with FWW requiring frequent verbal cues for safety. At this time, patient will continue to benefit from skilled PT to promote further strength and balance for improved functional mobility skills and quality of life.     PLAN/RECOMMENDATIONS:   Plan for treatment: therapy treatment to continue next visit.  Planned interventions for next visit: continue with current treatment.

## 2023-07-26 NOTE — OP THERAPY PROGRESS SUMMARY
Outpatient Physical Therapy  PROGRESS SUMMARY NOTE      [facility] Physical Therapy [place] [address]  [address]  [place] [address]  Phone:  [phone]  Fax:  [phone]    Date of Visit: 07/26/2023    Patient: Lizzette Burton  YOB: 1940  MRN: [id number]     Referring Provider: LAVINIA Hopson  [facility] [address]  [place] [address]   Referring Diagnosis Unsteadiness on feet [R26.81]     Visit Diagnoses     ICD-10-CM   1. Unsteadiness on feet  R26.81   2. Bilateral hip pain  M25.551    M25.552       Rehab Potential: fair to good    Progress Report Period: 6/15/23-7/26/23    Functional Assessment Used  PT Functional Assessment Tool Used: ABC  PT Functional Assessment Score: 68.125       Objective Findings and Assessment:   Patient progression towards goals: Goals:   Short Term Goals:   1. Patient will demonstrate independent HEP to promote increased strength and balance for improved functional mobility skills.-progressing     2. Patient will demonstrate improved B LE strength to grossly 4-/5 to promote improved functional mobility skills and decreased risk for falls.-progressing, limited in R hip flexion strength remains      3. Patient will demonstrate improved safety awareness with FWW to promote increased safety with mobility.-progressing; requires frequent encouragement to utilize FWW      Short term goal time span:  4-6 weeks      Long Term Goals:    1. Patient will demonstrate improved B LE strength to grossly 4+/5 to promote improved functional mobility skills.-progressing     2. Patient will demonstrate improved TUG score, with LRAD, to below 30 seconds to indicate improving safety with gait.-progressing     3. Patient will complete 5x sit<> less than 30 seconds to indicate improving functional mobility skills and strength. -MET, x5 sit<>stands in 25 seconds     4. Patient will demonstrate improved ABC score to indicate increasing quality of life  and decreasing risk for falls.  -MET, continue     Long term goal time span:  2-4 months    Objective findings and assessment details: TUG: Average: 33.9 seconds; with FWW  Trial 1: 36.97 seconds  Trial 2: 30.27 seconds  Trial 3: 34.45 seconds     Current objective measures:   5x sit<>stand: 25 seconds        Strength:  Requires demonstration and maximal verbal cues for understanding   Lower extremity (left):     Hip flexion: 3+    Hip abduction: 4    Hip adduction: 5    Knee flexion: 4+    Knee extension: 4    Ankle dorsiflexion: 4+    Ankle plantar flexion: 5     Lower extremity (right):     Hip flexion: 3    Hip abduction: 4    Hip adduction: 5    Knee flexion: 4+    Knee extension: 4    Ankle dorsiflexion: 4+    Ankle plantar flexion: 5    ASSESSMENT:   Response to treatment: Patient tolerates PT treatments well thus far. Demonstrates improving strength and balance but continues to demonstrate decreased safety awareness with FWW requiring frequent verbal cues for safety. At this time, patient will continue to benefit from skilled PT to promote further strength and balance for improved functional mobility skills and quality of life.    Goals:   Short Term Goals:   1. Patient will demonstrate independent HEP to promote increased strength and balance for improved functional mobility skills.     2. Patient will demonstrate improved B LE strength to grossly 4-/5 to promote improved functional mobility skills and decreased risk for falls.     3. Patient will demonstrate improved safety awareness with FWW to promote increased safety with mobility.  Short term goal time span:  2-4 weeks      Long Term Goals:    1. Patient will demonstrate improved B LE strength to grossly 4+/5 to promote improved functional mobility skills.     2. Patient will demonstrate improved TUG score, with LRAD, to below 23 seconds to indicate improving safety with gait.     3. Patient will complete 5x sit<> less than 18  seconds to  indicate improving functional mobility skills and strength.    4. Patient will demonstrate improved ABC score to indicate increasing quality of life and decreasing risk for falls.   Long term goal time span:  6-8 weeks    Plan:   Planned therapy interventions:  Neuromuscular Re-education (CPT 46957), Manual Therapy (CPT 43604), Therapeutic Exercise (CPT 83397), Therapeutic Activities (CPT 79925), E Stim Unattended (CPT 31081) and Hot or Cold Pack Therapy (CPT 54838)  Frequency: 1-2x/week.  Duration in weeks:  8      Referring provider co-signature:  I have reviewed this plan of care and my co-signature certifies the need for services.     Certification Period: 07/26/2023 to 09/20/23    Physician Signature: ________________________________ Date: ______________

## 2023-08-01 ENCOUNTER — APPOINTMENT (OUTPATIENT)
Dept: PHYSICAL THERAPY | Facility: REHABILITATION | Age: 83
End: 2023-08-01
Attending: NURSE PRACTITIONER
Payer: MEDICARE

## 2023-08-07 ENCOUNTER — PHYSICAL THERAPY (OUTPATIENT)
Dept: PHYSICAL THERAPY | Facility: REHABILITATION | Age: 83
End: 2023-08-07
Attending: NURSE PRACTITIONER
Payer: MEDICARE

## 2023-08-07 DIAGNOSIS — M25.551 BILATERAL HIP PAIN: ICD-10-CM

## 2023-08-07 DIAGNOSIS — M25.552 BILATERAL HIP PAIN: ICD-10-CM

## 2023-08-07 DIAGNOSIS — R26.81 UNSTEADINESS ON FEET: ICD-10-CM

## 2023-08-07 PROCEDURE — 97110 THERAPEUTIC EXERCISES: CPT

## 2023-08-07 NOTE — OP THERAPY DAILY TREATMENT
Outpatient Physical Therapy  DAILY TREATMENT     [facility] Physical [address].  [address]  [place] [address]  Phone:  [phone]  Fax:  [phone]    Date: 08/07/2023    Patient: Lizzette Burton  YOB: 1940  MRN: [id number]     Time Calculation    Start time: 1100  Stop time: 1143 Time Calculation (min): 43 minutes         Chief Complaint: Difficulty Walking, Loss Of Balance, Fall, and Back Problem    Visit #: 8    SUBJECTIVE:  Patient granddaughter present to assist with translation. Patient reports that she has not felt well the last few weeks. Granddaughter reports she was running a fever but is feeling better now. Patient reports her pain has improved some overall. They did not work on her HEP much as she wasn't feeling well.     OBJECTIVE:  Current objective measures:   Mild labored breathing noted during session: O2 on room air found ~85%, returns to 95% with seated rest  HR from 41 bpm-99 bpm throughout treatment; This PT called and spoke with staff at patient's PCP, Floridalma Sheridan, clinic to apprise of patient's O2/HR findings. Also reached out to Floridalma via [employer] in-basket.           Therapeutic Exercises (CPT 82279):     1. sit<>stand, x10    2. seated marches, x20, B, orange theraband    3. seated hip abduction, x15, B, with pink theraband, nt    5. FWW utilization and safety, gait x150' with FWW; decreased mari, anterior trunk flexion noted; verbal cues throughout for increased safety with FWW, especially with directional change/turning; demonstrates good carry over after education, nt    6. supine hamstring curls, x1 minute    7. supine LTR, 2x1 minute, nt    8. supine hip adduction squeezes, x20, 3 second holds, pillow    9. supine clamshells, hook lying, x20, orange theraband    10. TrA bracing, x5; 3 second holds; seated, nt    11. // bars, standing marches, 2x10 B, nt    12. // bars, standing hip abduction, 2x10 B, nt    13. // bars, standing  hip extension, 2x10 B, nt    14. // bars, lateral side steps, x6 lengths, B directions, nt    15. // bars, step over small obstacle, x6, nt    17. NuStep, x9 minutes, level 1, SPM above 55    20. PN due 8/26; Recert due 9/20      Therapeutic Exercise Summary: Educated patient and granddaughter to complete HEP with granddaughter present, both verbalize understanding. Increased time for task today due to safety and understanding of skills.     Access Code: [email or web address]  URL: [email or web address]  Date: 06/20/2023  Prepared by: Zaynab Buening    Exercises  - Seated March  - 1 x daily - 1 sets - 10 reps  - Sit to Stand with Counter Support  - 1 x daily - 1 sets - 5 reps  - Seated Hip Abduction  - 1 x daily - 1 sets - 10 reps    Access Code: [email or web address]  URL: [email or web address]  Date: 06/29/2023  Prepared by: Zaynab Buening    Exercises  - Supine Transversus Abdominis Bracing - Hands on Stomach  - 1 x daily - 1 sets - 10 reps - 3 seconds hold    Access Code: [email or web address]  URL: [email or web address]  Date: 06/27/2023  Prepared by: Zaynab Buening    Exercises  - Hooklying Clamshell with Resistance  - 1 x daily - 1 sets - 10 reps  - Supine Hip Adduction Isometric with Ball  - 1 x daily - 1 sets - 10 reps - 3 seconds hold  - Supine Lower Trunk Rotation  - 1 x daily - 1 sets - 10 reps      Time-based treatments/modalities:    Physical Therapy Timed Treatment Charges  Therapeutic exercise minutes (CPT 53422): 43 minutes      Pain rating (1-10) before treatment:  7; low back   Pain rating (1-10) after treatment:  no pain reported on exit     ASSESSMENT:   Response to treatment: Patient tolerates PT treatment well today however requires frequent rest breaks as she fatigues quickly. Patient continues to demonstrate decreased safety awareness with FWW, despite cues from PT and granddaughter. Continued with strength and balance exercises to promote decreased risk of fall and improved back pain.      PLAN/RECOMMENDATIONS:   Plan for treatment: therapy treatment to continue next visit.  Planned interventions for next visit: continue with current treatment.

## 2023-08-14 ENCOUNTER — PHYSICAL THERAPY (OUTPATIENT)
Dept: PHYSICAL THERAPY | Facility: REHABILITATION | Age: 83
End: 2023-08-14
Attending: NURSE PRACTITIONER
Payer: MEDICARE

## 2023-08-14 DIAGNOSIS — M25.552 BILATERAL HIP PAIN: ICD-10-CM

## 2023-08-14 DIAGNOSIS — R26.81 UNSTEADINESS ON FEET: ICD-10-CM

## 2023-08-14 DIAGNOSIS — M25.551 BILATERAL HIP PAIN: ICD-10-CM

## 2023-08-14 PROCEDURE — 97110 THERAPEUTIC EXERCISES: CPT

## 2023-08-14 PROCEDURE — 97112 NEUROMUSCULAR REEDUCATION: CPT

## 2023-08-14 NOTE — OP THERAPY DAILY TREATMENT
Outpatient Physical Therapy  DAILY TREATMENT     [facility] Physical [address].  [address]  [place] [address]  Phone:  [phone]  Fax:  [phone]    Date: 08/14/2023    Patient: Lizzette Burton  YOB: 1940  MRN: [id number]     Time Calculation    Start time: 1102  Stop time: 1144 Time Calculation (min): 42 minutes         Chief Complaint: Difficulty Walking, Loss Of Balance, Fall, and Back Problem    Visit #: 9    SUBJECTIVE:  Patient presents to PT session and reports that she is feeling a little better. She also reports that her low back pain has improved some. Reports no recent falls.  Adult granddaughter present to translate.     OBJECTIVE:  Current objective measures:           Therapeutic Exercises (CPT 92951):     1. sit<>stand, x10    2. seated marches, x20, B, orange theraband, nt    3. seated hip abduction, x15, B, with pink theraband, nt    5. FWW utilization and safety, gait x150' with FWW; decreased mari, anterior trunk flexion noted; verbal cues throughout for increased safety with FWW, especially with directional change/turning; demonstrates good carry over after education, nt    6. supine hamstring curls, x1 minute, nt    7. supine LTR, 2x1 minute, nt    8. supine hip adduction squeezes, x20, 3 second holds, pillow, nt    9. supine clamshells, hook lying, x20, orange theraband, nt    10. TrA bracing, x5; 3 second holds; seated, nt    11. // bars, standing marches, 2x10 B    12. // bars, standing hip abduction, 2x10 B    13. // bars, standing hip extension, 2x10 B    14. // bars, lateral side steps    15. // bars, step over small obstacle, 2x8, B UE support as needed    16. // bars, lateral step overs, small obstacle, 2x8, B UE support, requires demonstration for task    17. NuStep, x9 minutes, level 1, SPM above 55/ nt    20. PN due 8/26; Recert due 9/20      Therapeutic Exercise Summary: Educated patient and granddaughter to complete HEP with  granddaughter present, both verbalize understanding. Increased time for task today due to safety and understanding of skills.     Access Code: [email or web address]  URL: [email or web address]  Date: 06/20/2023  Prepared by: Zaynab Buening    Exercises  - Seated March  - 1 x daily - 1 sets - 10 reps  - Sit to Stand with Counter Support  - 1 x daily - 1 sets - 5 reps  - Seated Hip Abduction  - 1 x daily - 1 sets - 10 reps    Access Code: [email or web address]  URL: [email or web address]  Date: 06/29/2023  Prepared by: Zaynab Buening    Exercises  - Supine Transversus Abdominis Bracing - Hands on Stomach  - 1 x daily - 1 sets - 10 reps - 3 seconds hold    Access Code: [email or web address]  URL: [email or web address]  Date: 06/27/2023  Prepared by: Zaynab Buening    Exercises  - Hooklying Clamshell with Resistance  - 1 x daily - 1 sets - 10 reps  - Supine Hip Adduction Isometric with Ball  - 1 x daily - 1 sets - 10 reps - 3 seconds hold  - Supine Lower Trunk Rotation  - 1 x daily - 1 sets - 10 reps    Therapeutic Treatments and Modalities:     1. Neuromuscular Re-education (CPT 74898), static standing, air ex pad, feet shoulder width apart, moderate sway noted without UE support; requires mod tactile cues to maintain balance, in all directions; demonstrates frequent posterior/anterior leaning; with increased trials demonstrates fatigue requiring rest breaks but decreasing sway and anterior/posterior leaning, sit<>stands on airex pad, x2 with B UE support and close SBA from PT demonstrated    Time-based treatments/modalities:    Physical Therapy Timed Treatment Charges  Neuromusc re-ed, balance, coor, post minutes (CPT 32837): 13 minutes  Therapeutic exercise minutes (CPT 57100): 29 minutes      Pain rating (1-10) before treatment:  7; low back  Pain rating (1-10) after treatment:  unrated, reports it feels better     ASSESSMENT:   Response to treatment: Patient tolerates PT treatment well today. Continued with standing strength and  balance activities with patient continuing to fatigue but demonstrating improving form. At this time, patient will benefit from continued skilled PT to promote further strength and balance for increased safety and quality of life.     PLAN/RECOMMENDATIONS:   Plan for treatment: therapy treatment to continue next visit.  Planned interventions for next visit: continue with current treatment.

## 2023-08-20 ENCOUNTER — HOSPITAL ENCOUNTER (OUTPATIENT)
Dept: RADIOLOGY | Facility: MEDICAL CENTER | Age: 83
End: 2023-08-20
Attending: PSYCHIATRY & NEUROLOGY
Payer: MEDICARE

## 2023-08-20 DIAGNOSIS — M54.50 LOW BACK PAIN, UNSPECIFIED BACK PAIN LATERALITY, UNSPECIFIED CHRONICITY, UNSPECIFIED WHETHER SCIATICA PRESENT: ICD-10-CM

## 2023-08-20 DIAGNOSIS — M62.81 MUSCLE WEAKNESS (GENERALIZED): ICD-10-CM

## 2023-08-20 PROCEDURE — 72148 MRI LUMBAR SPINE W/O DYE: CPT

## 2023-08-20 PROCEDURE — 72141 MRI NECK SPINE W/O DYE: CPT

## 2023-08-21 ENCOUNTER — PHYSICAL THERAPY (OUTPATIENT)
Dept: PHYSICAL THERAPY | Facility: REHABILITATION | Age: 83
End: 2023-08-21
Attending: NURSE PRACTITIONER
Payer: MEDICARE

## 2023-08-21 DIAGNOSIS — R26.81 UNSTEADINESS ON FEET: ICD-10-CM

## 2023-08-21 DIAGNOSIS — M25.551 BILATERAL HIP PAIN: ICD-10-CM

## 2023-08-21 DIAGNOSIS — M25.552 BILATERAL HIP PAIN: ICD-10-CM

## 2023-08-21 PROCEDURE — 97110 THERAPEUTIC EXERCISES: CPT

## 2023-08-21 NOTE — OP THERAPY DAILY TREATMENT
Outpatient Physical Therapy  DAILY TREATMENT     [facility] Physical [address].  [address]  [place] [address]  Phone:  [phone]  Fax:  [phone]    Date: 08/21/2023    Patient: Lizzette Burton  YOB: 1940  MRN: [id number]     Time Calculation    Start time: 1100  Stop time: 1145 Time Calculation (min): 45 minutes         Chief Complaint: Difficulty Walking, Loss Of Balance, Fall, and Back Problem    Visit #: 10    SUBJECTIVE:  Granddaughter reports they went to MD yesterday and patient is supposed to follow up with cardiology, [facility], and neurologist. She also had more MRI imaging done, however, they do not have those results at this time. Granddaughter reports that MD requested continued PT as patient's mobility seems to be improving. Granddaughter reports patient does not really want to go back to her back doctor and as patient states her Tylenol helps the pain. Encouraged patient/granddaughter to follow MD recommendations regarding follow ups.     Granddaughter denies recent falls but continues to report poor safety awareness and carry over of safety tasks at home, ie utilization of walker, safety with sit<>stand transitions.     Adult Granddaughter assist with translation.    OBJECTIVE:  Current objective measures:           Therapeutic Exercises (CPT 16349):     1. sit<>stand, x10, verbal cues and demonstration continue to be required for improved safety with hand positioning however patient is completing task with more ease and less difficulty    2. seated marches, x20, B, orange theraband, nt    3. seated hip abduction, x15, B, with pink theraband, nt    5. FWW utilization and safety, gait x150' with FWW; decreased mari, anterior trunk flexion noted; verbal cues throughout for increased safety with FWW, especially with directional change/turning; demonstrates good carry over after education, nt    6. supine hamstring curls, x1 minute, nt    7. supine  LTR, 2x1 minute, nt    8. supine hip adduction squeezes, x20, 3 second holds, pillow, nt    9. supine clamshells, hook lying, x20, orange theraband, nt    10. TrA bracing, x5; 3 second holds; seated, nt    11. // bars, standing marches, 2x10 B    12. // bars, standing hip abduction, 2x10 B    13. // bars, standing hip extension, 2x10 B    14. // bars, lateral side steps    15. // bars, step over small obstacle, x10, B UE support as needed    16. // bars, lateral step overs, small obstacle, x10, B UE support, requires demonstration for task    17. NuStep, x10 minutes, level 2, SPM above 55/ nt    20. PN due 8/26; Recert due 9/20      Therapeutic Exercise Summary: Educated patient and granddaughter to complete HEP with granddaughter present, both verbalize understanding. Increased time for task today due to safety and understanding of skills.     Access Code: [email or web address]  URL: [email or web address]  Date: 06/20/2023  Prepared by: Zaynab Buening    Exercises  - Seated March  - 1 x daily - 1 sets - 10 reps  - Sit to Stand with Counter Support  - 1 x daily - 1 sets - 5 reps  - Seated Hip Abduction  - 1 x daily - 1 sets - 10 reps    Access Code: [email or web address]  URL: [email or web address]  Date: 06/29/2023  Prepared by: Zaynab Buening    Exercises  - Supine Transversus Abdominis Bracing - Hands on Stomach  - 1 x daily - 1 sets - 10 reps - 3 seconds hold    Access Code: [email or web address]  URL: [email or web address]  Date: 06/27/2023  Prepared by: Zaynab Buening    Exercises  - Hooklying Clamshell with Resistance  - 1 x daily - 1 sets - 10 reps  - Supine Hip Adduction Isometric with Ball  - 1 x daily - 1 sets - 10 reps - 3 seconds hold  - Supine Lower Trunk Rotation  - 1 x daily - 1 sets - 10 reps      Time-based treatments/modalities:    Physical Therapy Timed Treatment Charges  Therapeutic exercise minutes (CPT 88703): 45 minutes      Pain rating (1-10) before treatment:  8; back pain  Pain rating (1-10) after  "treatment:  unrated, reports feeling \"a little better\"     ASSESSMENT:    Response to treatment: Patient tolerates PT treatment well today. Continued with standing exercises and balance activities to promote increased safety. Also continued with step over tasks as patient previously reported tripping over objects on floor. At this time, patient will benefit from continued skilled PT for further balance and strength to promote improved functional mobility skills and activity tolerance.     PLAN/RECOMMENDATIONS:   Plan for treatment: therapy treatment to continue next visit.  Planned interventions for next visit: continue with current treatment.         "

## 2023-09-08 ENCOUNTER — PHYSICAL THERAPY (OUTPATIENT)
Dept: PHYSICAL THERAPY | Facility: REHABILITATION | Age: 83
End: 2023-09-08
Attending: NURSE PRACTITIONER
Payer: MEDICARE

## 2023-09-08 DIAGNOSIS — M25.552 BILATERAL HIP PAIN: ICD-10-CM

## 2023-09-08 DIAGNOSIS — R26.81 UNSTEADINESS ON FEET: ICD-10-CM

## 2023-09-08 DIAGNOSIS — M25.551 BILATERAL HIP PAIN: ICD-10-CM

## 2023-09-08 PROCEDURE — 97110 THERAPEUTIC EXERCISES: CPT

## 2023-09-08 PROCEDURE — 97112 NEUROMUSCULAR REEDUCATION: CPT

## 2023-09-08 NOTE — OP THERAPY PROGRESS SUMMARY
Outpatient Physical Therapy  PROGRESS SUMMARY NOTE      [facility] Physical Therapy [place] [address]  [address]  [place] [address]  Phone:  [phone]  Fax:  [phone]    Date of Visit: 09/08/2023    Patient: Lizzette Burton  YOB: 1940  MRN: [id number]     Referring Provider: LAVINIA Hopson  [facility] [address]  [place] [address]   Referring Diagnosis Unsteadiness on feet [R26.81]     Visit Diagnoses     ICD-10-CM   1. Unsteadiness on feet  R26.81   2. Bilateral hip pain  M25.551    M25.552       Rehab Potential: fair    Progress Report Period: 7/26/23-9/8/23    Functional Assessment Used          Objective Findings and Assessment:   Patient progression towards goals: Goals:   Short Term Goals:   1. Patient will demonstrate independent HEP to promote increased strength and balance for improved functional mobility skills.-met, continue      2. Patient will demonstrate improved B LE strength to grossly 4-/5 to promote improved functional mobility skills and decreased risk for falls. - progressing     3. Patient will demonstrate improved safety awareness with FWW to promote increased safety with mobility.-progressing     Short term goal time span:  2-4 weeks      Long Term Goals:    1. Patient will demonstrate improved B LE strength to grossly 4+/5 to promote improved functional mobility skills.-progressing     2. Patient will demonstrate improved TUG score, with LRAD, to below 23 seconds to indicate improving safety with gait.-not met     3. Patient will complete 5x sit<> less than 18  seconds to indicate improving functional mobility skills and strength.-not met     4. Patient will demonstrate improved ABC score to indicate increasing quality of life and decreasing risk for falls. -not met     Long term goal time span:  6-8 weeks    Objective findings and assessment details: Current objective measures:   No significant swelling noted to B knees, no  redness, no warmth. Generalized tenderness to R knee noted.     Scores worse this date, possibly due to patient reporting feeling sore after fall while on vacation.      TUG: with FWW, Average: 40.9 seconds  Trial 1: 42.2 seconds  Trial 2: 41.1 seconds  Trial 3: 39.4 seconds     5x sit<>stand: 29.90 seconds, with FWW      Lower extremity (left):     Hip flexion: 3+    Hip abduction: 5    Hip adduction: 5    Knee flexion: 5    Knee extension: 5    Ankle dorsiflexion: 4-    Ankle plantar flexion: 5     Lower extremity (right):     Hip flexion: 3+    Hip abduction: 5    Hip adduction: 5    Knee flexion: 5    Knee extension: 4-    Ankle dorsiflexion: 4+    Ankle plantar flexion: 5     PT Functional Assessment Tool Used: ABS scale  PT Functional Assessment Score: 18.75%     Response to treatment: Patient tolerates PT treatments well thus far. Patient remains a significant fall risk with poor safety awareness and frequent reminders for FWW utilization and safety. Strength is improving overall however ABC score, TUG, and 5x sit<>stand are all worse this date, possibly due to continued soreness after recent fall while on vacation. At this time, patient will benefit from skilled PT to promote further strength and balance training to promote increased safety and improved functional mobility skills.     Goals:   Short Term Goals:   1. Patient will demonstrate independent HEP to promote increased strength and balance for improved functional mobility skills.     2. Patient will demonstrate improved B LE strength to grossly 4-/5 to promote improved functional mobility skills and decreased risk for falls.      3. Patient will demonstrate improved safety awareness with FWW to promote increased safety with mobility  Short term goal time span:  4-6 weeks      Long Term Goals:    1. Patient will demonstrate improved B LE strength to grossly 4+/5 to promote improved functional mobility skills.     2. Patient will demonstrate improved  TUG score, with LRAD, to below 23 seconds to indicate improving safety with gait.     3. Patient will complete 5x sit<> less than 18  seconds to indicate improving functional mobility skills and strength.     4. Patient will demonstrate improved ABC score to indicate increasing quality of life and decreasing risk for falls.   Long term goal time span:  2-4 months    Plan:   Planned therapy interventions:  Neuromuscular Re-education (CPT 58764), Manual Therapy (CPT 15900), Therapeutic Exercise (CPT 85908), Therapeutic Activities (CPT 55452), E Stim Unattended (CPT 75641) and Hot or Cold Pack Therapy (CPT 28406)  Frequency: 1-2x/week.  Duration in weeks:  8      Referring provider co-signature:  I have reviewed this plan of care and my co-signature certifies the need for services.     Certification Period: 09/08/2023 to 12/10/23    Physician Signature: ________________________________ Date: ______________

## 2023-09-08 NOTE — OP THERAPY DAILY TREATMENT
"  Outpatient Physical Therapy  DAILY TREATMENT     [facility] Physical [address]  [address]  [place] [address]  Phone:  [phone]  Fax:  [phone]    Date: 09/08/2023    Patient: Lizzette Burton  YOB: 1940  MRN: [id number]     Time Calculation    Start time: 1559  Stop time: 1644 Time Calculation (min): 45 minutes         Chief Complaint: Difficulty Walking, Loss Of Balance, Fall, and Back Problem    Visit #: 11    SUBJECTIVE:  Patient reports that she had a good trip. Reports she was \"working out her arms\" yesterday. Granddaughter reports patient fell in [place] and patient reports she has been more sore since to her B knees, R>L, and her back. Patient reports she did not get checked out, discussed with patient and granddaughter if pain persists/worsens, to seek additional care. Granddaughter verbalizes understanding.    Granddaughter present to translate.     OBJECTIVE:  Current objective measures:   No significant swelling noted to B knees, no redness, no warmth. Generalized tenderness to R knee noted, including over quadriceps.     Scores worse this date, possibly due to patient reporting feeling sore after fall while on vacation.     TUG: with FWW, Average: 40.9 seconds  Trial 1: 42.2 seconds  Trial 2: 41.1 seconds  Trial 3: 39.4 seconds    5x sit<>stand: 29.90 seconds, with FWW     Lower extremity (left):     Hip flexion: 3+    Hip abduction: 5    Hip adduction: 5    Knee flexion: 5    Knee extension: 5    Ankle dorsiflexion: 4-    Ankle plantar flexion: 5     Lower extremity (right):     Hip flexion: 3+    Hip abduction: 5    Hip adduction: 5    Knee flexion: 5    Knee extension: 4-    Ankle dorsiflexion: 4+    Ankle plantar flexion: 5    PT Functional Assessment Tool Used: ABS scale  PT Functional Assessment Score: 18.75%       Therapeutic Exercises (CPT 07557):     1. sit<>stand, 5x sit<>stand    2. seated marches, x20, B, orange theraband, nt    3. " seated hip abduction, x15, B, with pink theraband, nt    4. supine marches    5. FWW utilization and safety, gait x150' with FWW; decreased mari, anterior trunk flexion noted; verbal cues throughout for increased safety with FWW, especially with directional change/turning; demonstrates good carry over after education, nt    6. supine hamstring curls, x1 minute, nt    7. supine LTR, x1 minute, pain free ROM    8. supine hip adduction squeezes, x20, 3 second holds, pillow, nt    9. supine clamshells, hook lying, x20, orange theraband, nt    10. TrA bracing, x5; 3 second holds; seated, nt    11. // bars, standing marches, 2x10 B, nt    12. // bars, standing hip abduction, 2x10 B, nt    13. // bars, standing hip extension, 2x10 B, nt    14. // bars, lateral side steps, nt    15. // bars, step over small obstacle, x10, nt    16. // bars, lateral step overs, small obstacle, x10, nt    17. NuStep, x10 minutes, level 2, SPM above 55/ nt    20. PN due 10/8; Recert due 12/7      Therapeutic Exercise Summary: Educated patient and granddaughter to complete HEP with granddaughter present, both verbalize understanding. Increased time for task today due to safety and understanding of skills.     Access Code: [email or web address]  URL: [email or web address]  Date: 06/20/2023  Prepared by: Zaynab Buening    Exercises  - Seated March  - 1 x daily - 1 sets - 10 reps  - Sit to Stand with Counter Support  - 1 x daily - 1 sets - 5 reps  - Seated Hip Abduction  - 1 x daily - 1 sets - 10 reps    Access Code: [email or web address]  URL: [email or web address]  Date: 06/29/2023  Prepared by: Zaynab Buening    Exercises  - Supine Transversus Abdominis Bracing - Hands on Stomach  - 1 x daily - 1 sets - 10 reps - 3 seconds hold    Access Code: [email or web address]  URL: [email or web address]  Date: 06/27/2023  Prepared by: Zaynab Buening    Exercises  - Hooklying Clamshell with Resistance  - 1 x daily - 1 sets - 10 reps  - Supine Hip Adduction Isometric  with Ball  - 1 x daily - 1 sets - 10 reps - 3 seconds hold  - Supine Lower Trunk Rotation  - 1 x daily - 1 sets - 10 reps    Therapeutic Treatments and Modalities:     1. Neuromuscular Re-education (CPT 02696), TUG, increased time to complete skill with demonstration, verbal directions, and rest breaks required., x10 minutes    Time-based treatments/modalities:    Physical Therapy Timed Treatment Charges  Neuromusc re-ed, balance, coor, post minutes (CPT 77271): 10 minutes  Therapeutic exercise minutes (CPT 37386): 35 minutes      Pain rating (1-10) before treatment:  7; back pain  Pain rating (1-10) after treatment:  7    Goals:   Short Term Goals:   1. Patient will demonstrate independent HEP to promote increased strength and balance for improved functional mobility skills.-met, continue      2. Patient will demonstrate improved B LE strength to grossly 4-/5 to promote improved functional mobility skills and decreased risk for falls. - progressing     3. Patient will demonstrate improved safety awareness with FWW to promote increased safety with mobility.-progressing    Short term goal time span:  2-4 weeks      Long Term Goals:    1. Patient will demonstrate improved B LE strength to grossly 4+/5 to promote improved functional mobility skills.-progressing     2. Patient will demonstrate improved TUG score, with LRAD, to below 23 seconds to indicate improving safety with gait.-not met     3. Patient will complete 5x sit<> less than 18  seconds to indicate improving functional mobility skills and strength.-not met     4. Patient will demonstrate improved ABC score to indicate increasing quality of life and decreasing risk for falls. -not met    Long term goal time span:  6-8 weeks    ASSESSMENT:   Response to treatment: Patient tolerates PT treatments well thus far. Patient remains a significant fall risk with poor safety awareness and frequent reminders for FWW utilization and safety. Strength is improving  overall however ABC score, TUG, and 5x sit<>stand are all worse this date, possibly due to continued soreness after recent fall while on vacation. At this time, patient will benefit from skilled PT to promote further strength and balance training to promote increased safety and improved functional mobility skills.     PLAN/RECOMMENDATIONS:   Plan for treatment: therapy treatment to continue next visit.  Planned interventions for next visit: continue with current treatment.

## 2023-09-15 ENCOUNTER — APPOINTMENT (OUTPATIENT)
Dept: PHYSICAL THERAPY | Facility: REHABILITATION | Age: 83
End: 2023-09-15
Attending: NURSE PRACTITIONER
Payer: MEDICARE

## 2023-09-29 ENCOUNTER — PHYSICAL THERAPY (OUTPATIENT)
Dept: PHYSICAL THERAPY | Facility: REHABILITATION | Age: 83
End: 2023-09-29
Attending: NURSE PRACTITIONER
Payer: MEDICARE

## 2023-09-29 DIAGNOSIS — M25.551 BILATERAL HIP PAIN: ICD-10-CM

## 2023-09-29 DIAGNOSIS — R26.81 UNSTEADINESS ON FEET: ICD-10-CM

## 2023-09-29 DIAGNOSIS — M25.552 BILATERAL HIP PAIN: ICD-10-CM

## 2023-09-29 PROCEDURE — 97110 THERAPEUTIC EXERCISES: CPT

## 2023-09-29 PROCEDURE — 97112 NEUROMUSCULAR REEDUCATION: CPT

## 2023-09-29 NOTE — OP THERAPY DAILY TREATMENT
Outpatient Physical Therapy  DAILY TREATMENT     [facility] Physical [address]  [address]  [place] [address]  Phone:  [phone]  Fax:  [phone]    Date: 09/29/2023    Patient: Lizzette Burton  YOB: 1940  MRN: [id number]     Time Calculation    Start time: 1101  Stop time: 1143 Time Calculation (min): 42 minutes         Chief Complaint: Difficulty Walking, Loss Of Balance, Fall, and Back Problem    Visit #: 12    SUBJECTIVE:  Patient presents with granddaughter who assist with translation.  Patient reports she is feeling much better today. She also reports she went camping with her son and did have a fall when on uneven ground. It is not clear if patient was using her walker at that time but her granddaughter reports she is using it more at home. Reports she had some LE numbness with foot swelling noted x1 since last visit, reports it went away and no numbness or swelling is reported/noted today. Discussed that if this reoccurs, granddaughter may want to have her foot assessed by MD/urgent care. Verbalizes understanding.     OBJECTIVE:  Current objective measures:           Therapeutic Exercises (CPT 92441):     1. sit<>stand, x10, no UE support required    2. seated marches, x20, B, pink theraband    3. seated hip abduction, x20, B, with pink theraband    5. FWW utilization and safety, gait x150' with FWW; decreased mari, anterior trunk flexion noted; verbal cues throughout for increased safety with FWW, especially with directional change/turning; demonstrates good carry over after education, nt    6. supine hamstring curls, x1 minute, nt    7. supine LTR, x1 minute, nt    8. supine hip adduction squeezes, x20, 3 second holds, pillow, nt    9. // bars, feet together, 2x 30 seconds, intermittent UE support for safety; increased time for task for cuing    10. // bars, staggered stance, 2 x 30 seconds, mod tactile cues/UE support for safety; increased time for  understanding of task    11. // bars, standing marches, 2x10 B, nt    12. // bars, standing hip abduction, 2x10 B, nt    13. // bars, standing hip extension, 2x10 B, nt    14. // bars, lateral side steps, x8 laps, B UE support    15. // bars, step over small obstacle, x12, B UE support    16. // bars, lateral step overs, small obstacle, x12, over small nancy, B UE support    17. NuStep, x10 minutes, level 2, SPM above 55    18. air ex pad, // bars; require increased time for directions and understanding of task; moderate posterior lean without UE support noted    20. PN due 10/8; Recert due 12/7      Therapeutic Exercise Summary: Educated patient and granddaughter to complete HEP with granddaughter present, both verbalize understanding. Increased time for task today due to safety and understanding of skills.     Access Code: [email or web address]  URL: [email or web address]  Date: 06/20/2023  Prepared by: Zaynab Buening    Exercises  - Seated March  - 1 x daily - 1 sets - 10 reps  - Sit to Stand with Counter Support  - 1 x daily - 1 sets - 5 reps  - Seated Hip Abduction  - 1 x daily - 1 sets - 10 reps    Access Code: [email or web address]  URL: [email or web address]  Date: 06/29/2023  Prepared by: Zaynab Buening    Exercises  - Supine Transversus Abdominis Bracing - Hands on Stomach  - 1 x daily - 1 sets - 10 reps - 3 seconds hold    Access Code: [email or web address]  URL: [email or web address]  Date: 06/27/2023  Prepared by: Zaynab Buening    Exercises  - Hooklying Clamshell with Resistance  - 1 x daily - 1 sets - 10 reps  - Supine Hip Adduction Isometric with Ball  - 1 x daily - 1 sets - 10 reps - 3 seconds hold  - Supine Lower Trunk Rotation  - 1 x daily - 1 sets - 10 reps    Therapeutic Treatments and Modalities:     1. Neuromuscular Re-education (CPT 56438), // bars activitie and air ex pad to promote improved balance as noted above, x18 minutes    Time-based treatments/modalities:    Physical Therapy Timed Treatment  "Charges  Neuromusc re-ed, balance, coor, post minutes (CPT 70680): 18 minutes  Therapeutic exercise minutes (CPT 87367): 24 minutes      Pain rating (1-10) before treatment:  8; however patient reports her pain feels \"better\"   Pain rating (1-10) after treatment:  does not mention pain on exit    ASSESSMENT:   Response to treatment: Patient tolerates PT treatment well today with continuation of strength and balance activities. Patient remains a fall risk with report of recent fall while camping. Patient continues to require education regarding FWW and improved safety. Fatigues quickly with balance activities. At this time, patient will benefit from continued skilled PT to promote further strength and balance for improved safety and increased quality of life.     PLAN/RECOMMENDATIONS:   Plan for treatment: therapy treatment to continue next visit.  Planned interventions for next visit: continue with current treatment.         "

## 2023-10-04 ENCOUNTER — PRE-ADMISSION TESTING (OUTPATIENT)
Dept: ADMISSIONS | Facility: MEDICAL CENTER | Age: 83
End: 2023-10-04
Attending: NEUROLOGICAL SURGERY
Payer: MEDICARE

## 2023-10-05 ENCOUNTER — PHYSICAL THERAPY (OUTPATIENT)
Dept: PHYSICAL THERAPY | Facility: REHABILITATION | Age: 83
End: 2023-10-05
Attending: NURSE PRACTITIONER
Payer: MEDICARE

## 2023-10-05 ENCOUNTER — DOCUMENTATION (OUTPATIENT)
Dept: HEALTH INFORMATION MANAGEMENT | Facility: OTHER | Age: 83
End: 2023-10-05
Payer: MEDICARE

## 2023-10-05 DIAGNOSIS — R26.81 UNSTEADINESS ON FEET: ICD-10-CM

## 2023-10-05 DIAGNOSIS — M25.552 BILATERAL HIP PAIN: ICD-10-CM

## 2023-10-05 DIAGNOSIS — M25.551 BILATERAL HIP PAIN: ICD-10-CM

## 2023-10-05 PROCEDURE — 97530 THERAPEUTIC ACTIVITIES: CPT

## 2023-10-05 PROCEDURE — 97110 THERAPEUTIC EXERCISES: CPT

## 2023-10-05 NOTE — OP THERAPY DAILY TREATMENT
"  Outpatient Physical Therapy  DAILY TREATMENT     [facility] Physical Therapy [address]  [address]  [place] [address]  Phone:  [phone]  Fax:  [phone]    Date: 10/05/2023    Patient: Lizzette Burton  YOB: 1940  MRN: [id number]     Time Calculation    Start time: 1100  Stop time: 1200 Time Calculation (min): 60 minutes         Chief Complaint: Difficulty Walking, Loss Of Balance, Fall, and Back Problem    Visit #: 13    SUBJECTIVE:  Patient reports she is doing okay today. Patient's granddaughter reports she became dizzy after getting up from a table. Her granddaughter reports she had some R UE numbness as well which resolved. They did not have access to take her BP and her granddaughter reports patient refused to go to the hospital to be checked after this issue. PT encouraged patient/ granddaughter that if this occurs again, patient would benefit from getting checked to ensure nothing major is going on. Granddaughter verbalizes understanding of Stroke/Cardiac symptoms. Patient's granddaughter states that patient reports that her arm does go numb occasionally, apparently, however that was the first time her granddaughter was made aware.   Granddaughter reports patient did not have any chest pain, headache, etc and did not have any CVA symptoms such as facial drooping, facial numbness, or slurring of speech during yesterday's reported dizziness. Patient reports she felt \"weak\" and off balance but it went away after some rest.     Granddaughter and patient verbalizes understanding of PT education regarding signs/symptoms of when emergency care is warranted, including PT's recommendation to get further assessed if symptoms return or any s/s of CVA or heart attack present.   Patient denies need to be further assessed at this time but PT highly encourages patient to present to ER if symptoms represent. Patient verbalizes understanding.     Patient is possibly reporting " "intermittent headaches since her fall while camping, ~15 days ago. She reports that her headache is not getting worse but it's not getting better. She then states her headaches are constant, followed by reporting no headache at this time.     Throughout conversation, patient is a poor historian overall and frequently changes symptom report (constant headache verse no headache/prior UE numbness verse no previous complaints). PT and granddaughter discussed presenting to ER or at the very least, speaking with patient's PCP if she refuses to follow up at ER for further assessment as patient reliability of symptom reporting is questionable at this time. Granddaughter verbalizes understanding.    , Chen, [id number] utilized.      Spoke with medical assistant from Flor[facility] Sheridan's office to update on patient report of symptoms during today's PT session. Also told medical assistant that PT recommended follow up in ER v calling PCP for follow up if patient refused to go to ER. The medical assistant reported that she would speak with the provider regarding patient's report for further guidance and \"see what she wants to do.\"     OBJECTIVE:  Current objective measures:     BP: 156/78; L UE  HR: 75 bpm  O2: 93-95% RA           Therapeutic Exercises (CPT 82472):     1. sit<>stand, x10, nt    2. seated marches, x20, B, pink theraband, nt    3. seated hip abduction, x20, B, with pink theraband, nt    5. FWW utilization and safety, gait x150' with FWW; decreased mari, anterior trunk flexion noted; verbal cues throughout for increased safety with FWW, especially with directional change/turning; demonstrates good carry over after education, nt    6. supine hamstring curls, x1 minute, nt    7. supine LTR, x1 minute, nt    8. supine hip adduction squeezes, x20, 3 second holds, pillow, nt    9. // bars, feet together, 2x 30 seconds, nt    10. // bars, staggered stance, 2 x 30 seconds, nt    11. // bars, standing marches, " 2x10 B, nt    12. // bars, standing hip abduction, 2x10 B, nt    13. // bars, standing hip extension, 2x10 B, nt    14. // bars, lateral side steps, x8 laps, nt    15. // bars, step over small obstacle, x12, nt    16. // bars, lateral step overs, small obstacle, x12, over small nancy, nt    17. NuStep, x10 minutes, level 1, at start of session    18. air ex pad, // bars; require increased time for directions and understanding of task; moderate posterior lean without UE support noted, nt    20. PN due 10/8; Recert due 12/7      Therapeutic Exercise Summary: Educated patient and granddaughter to complete HEP with granddaughter present, both verbalize understanding. Increased time for task today due to safety and understanding of skills.     Access Code: [email or web address]  URL: [email or web address]  Date: 06/20/2023  Prepared by: Zaynab Buening    Exercises  - Seated March  - 1 x daily - 1 sets - 10 reps  - Sit to Stand with Counter Support  - 1 x daily - 1 sets - 5 reps  - Seated Hip Abduction  - 1 x daily - 1 sets - 10 reps    Access Code: [email or web address]  URL: [email or web address]  Date: 06/29/2023  Prepared by: Zaynab Buening    Exercises  - Supine Transversus Abdominis Bracing - Hands on Stomach  - 1 x daily - 1 sets - 10 reps - 3 seconds hold    Access Code: [email or web address]  URL: [email or web address]  Date: 06/27/2023  Prepared by: Zaynab Buening    Exercises  - Hooklying Clamshell with Resistance  - 1 x daily - 1 sets - 10 reps  - Supine Hip Adduction Isometric with Ball  - 1 x daily - 1 sets - 10 reps - 3 seconds hold  - Supine Lower Trunk Rotation  - 1 x daily - 1 sets - 10 reps    Therapeutic Treatments and Modalities:     1. Neuromuscular Re-education (CPT 79086), // bars activitie and air ex pad to promote improved balance as noted above, x18 minutes    Time-based treatments/modalities:    Physical Therapy Timed Treatment Charges  Therapeutic activity minutes (CPT 11967): 50 minutes  Therapeutic  "exercise minutes (CPT 61055): 10 minutes      Pain rating (1-10) before treatment:  not reported  Pain rating (1-10) after treatment:  not reported    ASSESSMENT:   Response to treatment: Patient presented to PT treatment and reported she was doing \"so so\" overall. Initiated exercise without difficulty. Upon speaking with patient and granddaughter, patient reports symptoms as noted above. Patient is a poor historian with report of symptoms varying/changing during discussion. Educated patient on s/s of CVA and MI with patient and granddaughter verbalizing understanding. Also recommended to patient that further follow up in ER may be beneficial, however, patient appears hesitant as her symptoms are not currently present. Encouraged patient and granddaughter to present to ER if symptoms worsen or return and if patient refuses to present to ER at this time, to call and speak with PCP for further direction as well. Patient and granddaughter verbalize understanding.     PT also called and spoke with MA from PCP, Floridalma Sheridan's, office after session to update PCP on patient report. MA reports she will pass information on to provider for further guidance.     PLAN/RECOMMENDATIONS:   Plan for treatment: therapy treatment to continue next visit.  Planned interventions for next visit: continue with current treatment.         "

## 2023-10-11 ENCOUNTER — PRE-ADMISSION TESTING (OUTPATIENT)
Dept: ADMISSIONS | Facility: MEDICAL CENTER | Age: 83
End: 2023-10-11
Attending: NEUROLOGICAL SURGERY
Payer: MEDICARE

## 2023-10-11 RX ORDER — MIRABEGRON 25 MG/1
25 TABLET, FILM COATED, EXTENDED RELEASE ORAL DAILY
COMMUNITY

## 2023-10-11 RX ORDER — ALENDRONATE SODIUM 70 MG/1
70 TABLET ORAL
COMMUNITY
Start: 2023-08-07

## 2023-10-11 RX ORDER — M-VIT,TX,IRON,MINS/CALC/FOLIC 27MG-0.4MG
1 TABLET ORAL DAILY
COMMUNITY

## 2023-10-12 ENCOUNTER — PHYSICAL THERAPY (OUTPATIENT)
Dept: PHYSICAL THERAPY | Facility: REHABILITATION | Age: 83
End: 2023-10-12
Attending: NURSE PRACTITIONER
Payer: MEDICARE

## 2023-10-12 ENCOUNTER — HOSPITAL ENCOUNTER (OUTPATIENT)
Dept: RADIOLOGY | Facility: MEDICAL CENTER | Age: 83
End: 2023-10-12
Attending: NEUROLOGICAL SURGERY | Admitting: NEUROLOGICAL SURGERY
Payer: MEDICARE

## 2023-10-12 ENCOUNTER — PRE-ADMISSION TESTING (OUTPATIENT)
Dept: ADMISSIONS | Facility: MEDICAL CENTER | Age: 83
End: 2023-10-12
Attending: NEUROLOGICAL SURGERY
Payer: MEDICARE

## 2023-10-12 DIAGNOSIS — Z01.810 PRE-OPERATIVE CARDIOVASCULAR EXAMINATION: ICD-10-CM

## 2023-10-12 DIAGNOSIS — Z01.811 PRE-OPERATIVE RESPIRATORY EXAMINATION: ICD-10-CM

## 2023-10-12 DIAGNOSIS — Z01.812 PRE-OPERATIVE LABORATORY EXAMINATION: ICD-10-CM

## 2023-10-12 DIAGNOSIS — M25.551 BILATERAL HIP PAIN: ICD-10-CM

## 2023-10-12 DIAGNOSIS — R26.81 UNSTEADINESS ON FEET: ICD-10-CM

## 2023-10-12 DIAGNOSIS — M25.552 BILATERAL HIP PAIN: ICD-10-CM

## 2023-10-12 LAB
ANION GAP SERPL CALC-SCNC: 8 MMOL/L (ref 7–16)
APTT PPP: 32.7 SEC (ref 24.7–36)
BASOPHILS # BLD AUTO: 0.7 % (ref 0–1.8)
BASOPHILS # BLD: 0.05 K/UL (ref 0–0.12)
BUN SERPL-MCNC: 18 MG/DL (ref 8–22)
CALCIUM SERPL-MCNC: 9.6 MG/DL (ref 8.5–10.5)
CHLORIDE SERPL-SCNC: 95 MMOL/L (ref 96–112)
CO2 SERPL-SCNC: 30 MMOL/L (ref 20–33)
CREAT SERPL-MCNC: 0.8 MG/DL (ref 0.5–1.4)
EKG IMPRESSION: NORMAL
EOSINOPHIL # BLD AUTO: 0.13 K/UL (ref 0–0.51)
EOSINOPHIL NFR BLD: 1.7 % (ref 0–6.9)
ERYTHROCYTE [DISTWIDTH] IN BLOOD BY AUTOMATED COUNT: 44 FL (ref 35.9–50)
GFR SERPLBLD CREATININE-BSD FMLA CKD-EPI: 72 ML/MIN/1.73 M 2
GLUCOSE SERPL-MCNC: 103 MG/DL (ref 65–99)
HCT VFR BLD AUTO: 40.7 % (ref 37–47)
HGB BLD-MCNC: 13.5 G/DL (ref 12–16)
IMM GRANULOCYTES # BLD AUTO: 0.02 K/UL (ref 0–0.11)
IMM GRANULOCYTES NFR BLD AUTO: 0.3 % (ref 0–0.9)
INR PPP: 1.07 (ref 0.87–1.13)
LYMPHOCYTES # BLD AUTO: 2.58 K/UL (ref 1–4.8)
LYMPHOCYTES NFR BLD: 34.2 % (ref 22–41)
MCH RBC QN AUTO: 29.8 PG (ref 27–33)
MCHC RBC AUTO-ENTMCNC: 33.2 G/DL (ref 32.2–35.5)
MCV RBC AUTO: 89.8 FL (ref 81.4–97.8)
MONOCYTES # BLD AUTO: 0.78 K/UL (ref 0–0.85)
MONOCYTES NFR BLD AUTO: 10.3 % (ref 0–13.4)
NEUTROPHILS # BLD AUTO: 3.99 K/UL (ref 1.82–7.42)
NEUTROPHILS NFR BLD: 52.8 % (ref 44–72)
NRBC # BLD AUTO: 0 K/UL
NRBC BLD-RTO: 0 /100 WBC (ref 0–0.2)
PLATELET # BLD AUTO: 325 K/UL (ref 164–446)
PMV BLD AUTO: 8.4 FL (ref 9–12.9)
POTASSIUM SERPL-SCNC: 4 MMOL/L (ref 3.6–5.5)
PROTHROMBIN TIME: 14 SEC (ref 12–14.6)
RBC # BLD AUTO: 4.53 M/UL (ref 4.2–5.4)
SODIUM SERPL-SCNC: 133 MMOL/L (ref 135–145)
WBC # BLD AUTO: 7.6 K/UL (ref 4.8–10.8)

## 2023-10-12 PROCEDURE — 85025 COMPLETE CBC W/AUTO DIFF WBC: CPT

## 2023-10-12 PROCEDURE — 97110 THERAPEUTIC EXERCISES: CPT

## 2023-10-12 PROCEDURE — 80048 BASIC METABOLIC PNL TOTAL CA: CPT

## 2023-10-12 PROCEDURE — 85730 THROMBOPLASTIN TIME PARTIAL: CPT

## 2023-10-12 PROCEDURE — 36415 COLL VENOUS BLD VENIPUNCTURE: CPT

## 2023-10-12 PROCEDURE — 93005 ELECTROCARDIOGRAM TRACING: CPT

## 2023-10-12 PROCEDURE — 71046 X-RAY EXAM CHEST 2 VIEWS: CPT

## 2023-10-12 PROCEDURE — 93010 ELECTROCARDIOGRAM REPORT: CPT | Performed by: INTERNAL MEDICINE

## 2023-10-12 PROCEDURE — 85610 PROTHROMBIN TIME: CPT

## 2023-10-12 NOTE — OP THERAPY DAILY TREATMENT
Outpatient Physical Therapy  DAILY TREATMENT     [facility] Physical [address]  [address]  [place] [address]  Phone:  [phone]  Fax:  [phone]    Date: 10/12/2023    Patient: Lizzette Burton  YOB: 1940  MRN: [id number]     Time Calculation    Start time: 1103  Stop time: 1145 Time Calculation (min): 42 minutes         Chief Complaint: Difficulty Walking, Loss Of Balance, Fall, and Back Problem    Visit #: 14    SUBJECTIVE:  Patient presents with adult granddaughter that she utilizes for interpretation.     Patient reports she is feeling a little better today. She reports her headaches have improved since last visit. This PT informed granddaughter and patient that PT spoke with MA at PCP office but patient reports she did not hear any follow up from them. Patient reports she felt like her legs were stronger on the stairs today.     OBJECTIVE:  Current objective measures:     TUG: with FWW, Average: 29.52 seconds  Trial 1: 37.87 seconds  Trial 2: 26.07 seconds  Trial 3: 24.62 seconds     5x sit<>stand: 14.46 seconds, with FWW      Lower extremity (left):     Hip flexion: 3+    Hip abduction: 5    Hip adduction: 5    Knee flexion: 4    Knee extension: 5    Ankle dorsiflexion: 4+    Ankle plantar flexion: 5     Lower extremity (right):     Hip flexion: 3+    Hip abduction: 5    Hip adduction: 5    Knee flexion: 5    Knee extension: 5    Ankle dorsiflexion: 4+    Ankle plantar flexion: 5            Therapeutic Exercises (CPT 52797):     1. sit<>stand, x10    2. seated marches, 2x20, B, pink theraband    3. seated hip abduction, x20, B, with pink theraband    4. seated hamstring curls, x20, B, pink theraband    6. supine hamstring curls, x1 minute, nt    7. supine LTR, x1 minute, nt    8. supine hip adduction squeezes, x20, 3 second holds, pillow, nt    9. // bars, feet together, 2x 30 seconds, nt    10. // bars, staggered stance, 2 x 30 seconds, nt    11. // bars,  standing marches, 2x10 B, nt    12. // bars, standing hip abduction, 2x10 B, nt    13. // bars, standing hip extension, 2x10 B, nt    14. // bars, lateral side steps, x8 laps, nt    15. // bars, step over small obstacle, x12, nt    16. // bars, lateral step overs, small obstacle, x12, over small nancy, nt    17. NuStep, x10 minutes, level 1, nt    18. air ex pad, // bars; require increased time for directions and understanding of task; moderate posterior lean without UE support noted, nt    20. PN due 11/12      Therapeutic Exercise Summary: Educated patient and granddaughter to complete HEP with granddaughter present, both verbalize understanding. Increased time for task today due to safety and understanding of skills.     Access Code: [email or web address]  URL: [email or web address]  Date: 06/20/2023  Prepared by: Zaynab Buening    Exercises  - Seated March  - 1 x daily - 1 sets - 10 reps  - Sit to Stand with Counter Support  - 1 x daily - 1 sets - 5 reps  - Seated Hip Abduction  - 1 x daily - 1 sets - 10 reps    Access Code: [email or web address]  URL: [email or web address]  Date: 06/29/2023  Prepared by: Zaynab Buening    Exercises  - Supine Transversus Abdominis Bracing - Hands on Stomach  - 1 x daily - 1 sets - 10 reps - 3 seconds hold    Access Code: [email or web address]  URL: [email or web address]  Date: 06/27/2023  Prepared by: Zaynab Buening    Exercises  - Hooklying Clamshell with Resistance  - 1 x daily - 1 sets - 10 reps  - Supine Hip Adduction Isometric with Ball  - 1 x daily - 1 sets - 10 reps - 3 seconds hold  - Supine Lower Trunk Rotation  - 1 x daily - 1 sets - 10 reps    Therapeutic Treatments and Modalities:     1. Neuromuscular Re-education (CPT 16559), // bars activitie and air ex pad to promote improved balance as noted above, x18 minutes    Time-based treatments/modalities:    Physical Therapy Timed Treatment Charges  Therapeutic exercise minutes (CPT 54125): 42 minutes      Pain rating (1-10) before  "treatment:  8; low back  Pain rating (1-10) after treatment:  reports \"better\"     Goals:   Short Term Goals:   1. Patient will demonstrate independent HEP to promote increased strength and balance for improved functional mobility skills.-MET, continue     2. Patient will demonstrate improved B LE strength to grossly 4-/5 to promote improved functional mobility skills and decreased risk for falls. -progressing, B hip flexion remains limited     3. Patient will demonstrate improved safety awareness with FWW to promote increased safety with mobility-progressing, granddaughter reports she uses it more now.   Short term goal time span:  4-6 weeks      Long Term Goals:    1. Patient will demonstrate improved B LE strength to grossly 4+/5 to promote improved functional mobility skills.-progressing     2. Patient will demonstrate improved TUG score, with LRAD, to below 23 seconds to indicate improving safety with gait.-progressing     3. Patient will complete 5x sit<> less than 18  seconds to indicate improving functional mobility skills and strength.- MET, 14.46 seconds     4. Patient will demonstrate improved ABC score to indicate increasing quality of life and decreasing risk for falls.-not addressed this date     Long term goal time span:  2-4 months    ASSESSMENT:   Response to treatment: Patient tolerates PT treatment well today and is demonstrating progression toward goals today after reports of recent falls. B LE strength appears to be improving as well as general balance with gait and FWW. B hip flexion strength remains limited, but TUG and 5x sit<>stand is improved. At this time, patient will benefit from continued skilled PT to promote further strength and balance for improved functional mobility skills and increased quality of life.     PLAN/RECOMMENDATIONS:   Plan for treatment: therapy treatment to continue next visit.  Planned interventions for next visit: continue with current treatment.         "

## 2023-10-12 NOTE — OP THERAPY PROGRESS SUMMARY
Outpatient Physical Therapy  PROGRESS SUMMARY NOTE      [facility] Physical Therapy [address].  [address]  [place] [address]  Phone:  [phone]  Fax:  [phone]    Date of Visit: 10/12/2023    Patient: Lizzette Burton  YOB: 1940  MRN: [id number]     Referring Provider: [address]  [place] [address]   Referring Diagnosis Unsteadiness on feet [R26.81]     Visit Diagnoses     ICD-10-CM   1. Bilateral hip pain  M25.551    M25.552   2. Unsteadiness on feet  R26.81       Rehab Potential: fair-good    Progress Report Period: 9/8/23-10/12/23              Objective Findings and Assessment:   Patient progression towards goals: Goals:   Short Term Goals:   1. Patient will demonstrate independent HEP to promote increased strength and balance for improved functional mobility skills.-MET, continue     2. Patient will demonstrate improved B LE strength to grossly 4-/5 to promote improved functional mobility skills and decreased risk for falls. -progressing, B hip flexion remains limited     3. Patient will demonstrate improved safety awareness with FWW to promote increased safety with mobility-progressing, granddaughter reports she uses it more now.   Short term goal time span:  4-6 weeks      Long Term Goals:    1. Patient will demonstrate improved B LE strength to grossly 4+/5 to promote improved functional mobility skills.-progressing     2. Patient will demonstrate improved TUG score, with LRAD, to below 23 seconds to indicate improving safety with gait.-progressing     3. Patient will complete 5x sit<> less than 18  seconds to indicate improving functional mobility skills and strength.- MET, 14.46 seconds     4. Patient will demonstrate improved ABC score to indicate increasing quality of life and decreasing risk for falls.-not addressed this date     Long term goal time span:  2-4 months    Objective findings and assessment  details: Current objective measures:      TUG: with FWW, Average: 29.52 seconds  Trial 1: 37.87 seconds  Trial 2: 26.07 seconds  Trial 3: 24.62 seconds     5x sit<>stand: 14.46 seconds, with FWW      Lower extremity (left):     Hip flexion: 3+    Hip abduction: 5    Hip adduction: 5    Knee flexion: 4    Knee extension: 5    Ankle dorsiflexion: 4+    Ankle plantar flexion: 5     Lower extremity (right):     Hip flexion: 3+    Hip abduction: 5    Hip adduction: 5    Knee flexion: 5    Knee extension: 5    Ankle dorsiflexion: 4+    Ankle plantar flexion: 5    ASSESSMENT:   Response to treatment: Patient tolerates PT treatment well today and is demonstrating progression toward goals today after reports of recent falls. B LE strength appears to be improving as well as general balance with gait and FWW. B hip flexion strength remains limited, but TUG and 5x sit<>stand is improved. At this time, patient will benefit from continued skilled PT to promote further strength and balance for improved functional mobility skills and increased quality of life.     Goals:   Short Term Goals:   1. Patient will demonstrate independent HEP to promote increased strength and balance for improved functional mobility skills.     2. Patient will demonstrate improved B LE strength to grossly 4-/5 to promote improved functional mobility skills and decreased risk for falls.      3. Patient will demonstrate improved safety awareness with FWW to promote increased safety with mobility  Short term goal time span:  4-6 weeks      Long Term Goals:    1. Patient will demonstrate improved B LE strength to grossly 4+/5 to promote improved functional mobility skills.     2. Patient will demonstrate improved TUG score, with LRAD, to below 23 seconds to indicate improving safety with gait.      3. Patient will demonstrate improved ABC score to indicate increasing quality of life and decreasing risk for falls  Long term goal time span:  6-8 weeks    Plan:    Planned therapy interventions:  Neuromuscular Re-education (CPT 52434), Therapeutic Exercise (CPT 34439), Therapeutic Activities (CPT 34514), Gait Training (CPT 03247), Manual Therapy (CPT 15960), Hot or Cold Pack Therapy (CPT 60285) and E Stim Unattended (CPT 31255)  Frequency: 1-2x/week.  Duration in weeks:  8      Referring provider co-signature:  I have reviewed this plan of care and my co-signature certifies the need for services.     Certification Period: 10/12/2023 to 12/11/23    Physician Signature: ________________________________ Date: ______________

## 2023-10-18 ENCOUNTER — PHARMACY VISIT (OUTPATIENT)
Dept: PHARMACY | Facility: MEDICAL CENTER | Age: 83
End: 2023-10-18
Payer: COMMERCIAL

## 2023-10-18 ENCOUNTER — HOSPITAL ENCOUNTER (OUTPATIENT)
Facility: MEDICAL CENTER | Age: 83
End: 2023-10-18
Attending: NEUROLOGICAL SURGERY | Admitting: NEUROLOGICAL SURGERY
Payer: MEDICARE

## 2023-10-18 ENCOUNTER — ANESTHESIA (OUTPATIENT)
Dept: SURGERY | Facility: MEDICAL CENTER | Age: 83
End: 2023-10-18
Payer: MEDICARE

## 2023-10-18 ENCOUNTER — ANESTHESIA EVENT (OUTPATIENT)
Dept: SURGERY | Facility: MEDICAL CENTER | Age: 83
End: 2023-10-18
Payer: MEDICARE

## 2023-10-18 VITALS
DIASTOLIC BLOOD PRESSURE: 87 MMHG | WEIGHT: 177.25 LBS | TEMPERATURE: 97 F | BODY MASS INDEX: 31.41 KG/M2 | HEART RATE: 53 BPM | RESPIRATION RATE: 20 BRPM | OXYGEN SATURATION: 92 % | HEIGHT: 63 IN | SYSTOLIC BLOOD PRESSURE: 195 MMHG

## 2023-10-18 LAB
EKG IMPRESSION: NORMAL
PATHOLOGY CONSULT NOTE: NORMAL

## 2023-10-18 PROCEDURE — 160027 HCHG SURGERY MINUTES - 1ST 30 MINS LEVEL 2: Performed by: NEUROLOGICAL SURGERY

## 2023-10-18 PROCEDURE — 88348 ELECTRON MICROSCOPY DX: CPT

## 2023-10-18 PROCEDURE — 88314 HISTOCHEMICAL STAINS ADD-ON: CPT

## 2023-10-18 PROCEDURE — 160038 HCHG SURGERY MINUTES - EA ADDL 1 MIN LEVEL 2: Performed by: NEUROLOGICAL SURGERY

## 2023-10-18 PROCEDURE — 93010 ELECTROCARDIOGRAM REPORT: CPT | Performed by: INTERNAL MEDICINE

## 2023-10-18 PROCEDURE — 160048 HCHG OR STATISTICAL LEVEL 1-5: Performed by: NEUROLOGICAL SURGERY

## 2023-10-18 PROCEDURE — 88342 IMHCHEM/IMCYTCHM 1ST ANTB: CPT

## 2023-10-18 PROCEDURE — 160025 RECOVERY II MINUTES (STATS): Performed by: NEUROLOGICAL SURGERY

## 2023-10-18 PROCEDURE — 160046 HCHG PACU - 1ST 60 MINS PHASE II: Performed by: NEUROLOGICAL SURGERY

## 2023-10-18 PROCEDURE — 160009 HCHG ANES TIME/MIN: Performed by: NEUROLOGICAL SURGERY

## 2023-10-18 PROCEDURE — 700101 HCHG RX REV CODE 250: Performed by: NEUROLOGICAL SURGERY

## 2023-10-18 PROCEDURE — 88341 IMHCHEM/IMCYTCHM EA ADD ANTB: CPT | Mod: 91

## 2023-10-18 PROCEDURE — 160035 HCHG PACU - 1ST 60 MINS PHASE I: Performed by: NEUROLOGICAL SURGERY

## 2023-10-18 PROCEDURE — 110454 HCHG SHELL REV 250: Performed by: NEUROLOGICAL SURGERY

## 2023-10-18 PROCEDURE — 88300 SURGICAL PATH GROSS: CPT

## 2023-10-18 PROCEDURE — 700105 HCHG RX REV CODE 258: Mod: UD | Performed by: NEUROLOGICAL SURGERY

## 2023-10-18 PROCEDURE — 700102 HCHG RX REV CODE 250 W/ 637 OVERRIDE(OP): Mod: UD | Performed by: ANESTHESIOLOGY

## 2023-10-18 PROCEDURE — 160002 HCHG RECOVERY MINUTES (STAT): Performed by: NEUROLOGICAL SURGERY

## 2023-10-18 PROCEDURE — 88319 ENZYME HISTOCHEMISTRY: CPT | Mod: 91

## 2023-10-18 PROCEDURE — 88305 TISSUE EXAM BY PATHOLOGIST: CPT

## 2023-10-18 PROCEDURE — 700111 HCHG RX REV CODE 636 W/ 250 OVERRIDE (IP): Mod: UD | Performed by: ANESTHESIOLOGY

## 2023-10-18 PROCEDURE — A9270 NON-COVERED ITEM OR SERVICE: HCPCS | Mod: UD | Performed by: ANESTHESIOLOGY

## 2023-10-18 PROCEDURE — 93005 ELECTROCARDIOGRAM TRACING: CPT | Performed by: NEUROLOGICAL SURGERY

## 2023-10-18 PROCEDURE — RXMED WILLOW AMBULATORY MEDICATION CHARGE: Performed by: PHYSICIAN ASSISTANT

## 2023-10-18 RX ORDER — MIDAZOLAM HYDROCHLORIDE 1 MG/ML
INJECTION INTRAMUSCULAR; INTRAVENOUS PRN
Status: DISCONTINUED | OUTPATIENT
Start: 2023-10-18 | End: 2023-10-18 | Stop reason: SURG

## 2023-10-18 RX ORDER — SODIUM CHLORIDE, SODIUM LACTATE, POTASSIUM CHLORIDE, CALCIUM CHLORIDE 600; 310; 30; 20 MG/100ML; MG/100ML; MG/100ML; MG/100ML
INJECTION, SOLUTION INTRAVENOUS CONTINUOUS
Status: DISCONTINUED | OUTPATIENT
Start: 2023-10-18 | End: 2023-10-18 | Stop reason: HOSPADM

## 2023-10-18 RX ORDER — HALOPERIDOL 5 MG/ML
1 INJECTION INTRAMUSCULAR
Status: DISCONTINUED | OUTPATIENT
Start: 2023-10-18 | End: 2023-10-18 | Stop reason: HOSPADM

## 2023-10-18 RX ORDER — BUPIVACAINE HYDROCHLORIDE AND EPINEPHRINE 5; 5 MG/ML; UG/ML
INJECTION, SOLUTION EPIDURAL; INTRACAUDAL; PERINEURAL
Status: DISCONTINUED | OUTPATIENT
Start: 2023-10-18 | End: 2023-10-18 | Stop reason: HOSPADM

## 2023-10-18 RX ORDER — IPRATROPIUM BROMIDE AND ALBUTEROL SULFATE 2.5; .5 MG/3ML; MG/3ML
3 SOLUTION RESPIRATORY (INHALATION)
Status: DISCONTINUED | OUTPATIENT
Start: 2023-10-18 | End: 2023-10-18 | Stop reason: HOSPADM

## 2023-10-18 RX ORDER — OXYCODONE HYDROCHLORIDE AND ACETAMINOPHEN 5; 325 MG/1; MG/1
TABLET ORAL
Qty: 24 TABLET | Refills: 0 | OUTPATIENT
Start: 2023-10-18 | End: 2023-11-13

## 2023-10-18 RX ORDER — ACETAMINOPHEN 325 MG/1
650 TABLET ORAL EVERY 6 HOURS PRN
COMMUNITY

## 2023-10-18 RX ORDER — DIPHENHYDRAMINE HYDROCHLORIDE 50 MG/ML
12.5 INJECTION INTRAMUSCULAR; INTRAVENOUS
Status: DISCONTINUED | OUTPATIENT
Start: 2023-10-18 | End: 2023-10-18 | Stop reason: HOSPADM

## 2023-10-18 RX ORDER — LOSARTAN POTASSIUM 50 MG/1
100 TABLET ORAL
Status: DISCONTINUED | OUTPATIENT
Start: 2023-10-18 | End: 2023-10-18 | Stop reason: HOSPADM

## 2023-10-18 RX ADMIN — SODIUM CHLORIDE, POTASSIUM CHLORIDE, SODIUM LACTATE AND CALCIUM CHLORIDE: 600; 310; 30; 20 INJECTION, SOLUTION INTRAVENOUS at 06:48

## 2023-10-18 RX ADMIN — PROPOFOL 50 MCG/KG/MIN: 10 INJECTION, EMULSION INTRAVENOUS at 07:35

## 2023-10-18 RX ADMIN — LOSARTAN POTASSIUM 100 MG: 50 TABLET, FILM COATED ORAL at 08:27

## 2023-10-18 RX ADMIN — MIDAZOLAM 2 MG: 1 INJECTION, SOLUTION INTRAMUSCULAR; INTRAVENOUS at 07:30

## 2023-10-18 RX ADMIN — FENTANYL CITRATE 25 MCG: 50 INJECTION, SOLUTION INTRAMUSCULAR; INTRAVENOUS at 07:30

## 2023-10-18 ASSESSMENT — PAIN DESCRIPTION - PAIN TYPE
TYPE: SURGICAL PAIN

## 2023-10-18 ASSESSMENT — FIBROSIS 4 INDEX: FIB4 SCORE: 1.11

## 2023-10-18 NOTE — DISCHARGE INSTRUCTIONS
HOME CARE INSTRUCTIONS    ACTIVITY: Rest and take it easy for the first 24 hours.  A responsible adult is recommended to remain with you during that time.  It is normal to feel sleepy.  We encourage you to not do anything that requires balance, judgment or coordination.    FOR 24 HOURS DO NOT:  Drive, operate machinery or run household appliances.  Drink beer or alcoholic beverages.  Make important decisions or sign legal documents.    SPECIAL INSTRUCTIONS:  Muscle Biopsy, Care After  The following information offers guidance on how to care for yourself after your procedure. Your health care provider may also give you more specific instructions. If you have problems or questions, contact your health care provider.  What can I expect after the procedure?  After your procedure, it is common to have soreness and tenderness at the site of the biopsy. This may last for a few days.    Follow these instructions at home:  Biopsy site care  Follow instructions from your health care provider about how to take care of your biopsy site. Make sure you:  Wash your hands with soap and water for at least 20 seconds before and after you change your bandage (dressing). If soap and water are not available, use hand .  Change your dressing as told by your health care provider.  Leave stitches (sutures), skin glue, or adhesive strips in place. These skin closures may need to stay in place for 2 weeks or longer. If adhesive strip edges start to loosen and curl up, you may trim the loose edges. Do not remove adhesive strips completely unless your health care provider tells you to do that.  Check your biopsy site every day for signs of infection. Check for:  Redness, swelling, or pain.  Fluid or blood.  Warmth.  Pus or a bad smell.  Activity  If you were given a sedative during the procedure, it can affect you for several hours. Do not drive or operate machinery until your health care provider says that it is safe.  Return to your  normal activities as told by your health care provider. Ask your health care provider what activities are safe for you.  General instructions  Take over-the-counter and prescription medicines only as told by your health care provider.  Do not take baths, swim, or use a hot tub until your health care provider approves. Ask your health care provider if you may take showers. You may only be allowed to take sponge baths.  Keep all follow-up visits. This is important.  Contact a health care provider if:  You have redness, swelling, or pain around your biopsy site.  You have fluid or blood coming from your biopsy site.  Your biopsy site feels warm to the touch.  You have pus or a bad smell coming from your biopsy site.  You have a fever.  You are light-headed, or you feel like you will faint.  Get help right away if:  You have trouble breathing.  You have numbness or tingling going down the arm or leg of your biopsy site.  These symptoms may be an emergency. Get help right away. Call 911.  Do not wait to see if the symptoms will go away.  Do not drive yourself to the hospital.  Summary  After your procedure, it is common to have soreness and tenderness at the site of the biopsy.  Wash your hands before and after you change your dressing. Leave stitches, skin glue, or adhesive strips in place.  Return to your normal activities as told by your health care provider.  Contact a health care provider if you have redness, swelling, or pain around your biopsy site.  Get help right away if you have trouble breathing or have numbness and tingling down your arm or leg.  This information is not intended to replace advice given to you by your health care provider. Make sure you discuss any questions you have with your health care provider.  Document Revised: 11/08/2022 Document Reviewed: 11/08/2022  [employer] Patient Education © 2023 [employer]      DIET: To avoid nausea, slowly advance diet as tolerated, avoiding spicy or greasy  foods for the first day.  Add more substantial food to your diet according to your physician's instructions.  Babies can be fed formula or breast milk as soon as they are hungry.  INCREASE FLUIDS AND FIBER TO AVOID CONSTIPATION.    SURGICAL DRESSING/BATHING: Keep area clean and dry, no submerging until cleared by physician    MEDICATIONS: Resume taking daily medication.  Take prescribed pain medication with food.  If no medication is prescribed, you may take non-aspirin pain medication if needed.  PAIN MEDICATION CAN BE VERY CONSTIPATING.  Take a stool softener or laxative such as senokot, pericolace, or milk of magnesia if needed.    Prescription given for Percocet  Last pain medication given at n/a    A follow-up appointment should be arranged with your doctor in 2 weeks; call to schedule.    You should CALL YOUR PHYSICIAN if you develop:  Fever greater than 101 degrees F.  Pain not relieved by medication, or persistent nausea or vomiting.  Excessive bleeding (blood soaking through dressing) or unexpected drainage from the wound.  Extreme redness or swelling around the incision site, drainage of pus or foul smelling drainage.  Inability to urinate or empty your bladder within 8 hours.  Problems with breathing or chest pain.    You should call 911 if you develop problems with breathing or chest pain.  If you are unable to contact your doctor or surgical center, you should go to the nearest emergency room or urgent care center.  Physician's telephone #: [phone]    MILD FLU-LIKE SYMPTOMS ARE NORMAL.  YOU MAY EXPERIENCE GENERALIZED MUSCLE ACHES, THROAT IRRITATION, HEADACHE AND/OR SOME NAUSEA.    If any questions arise, call your doctor.  If your doctor is not available, please feel free to call the Surgical Center at [phone].  The Center is open Monday through Friday from 7AM to 7PM.      A registered nurse may call you a few days after your surgery to see how you are doing after your procedure.    You may  also receive a survey in the mail within the next two weeks and we ask that you take a few moments to complete the survey and return it to us.  Our goal is to provide you with very good care and we value your comments.     Depression / Suicide Risk    As you are discharged from this [facility] facility, it is important to learn how to keep safe from harming yourself.    Recognize the warning signs:  Abrupt changes in personality, positive or negative- including increase in energy   Giving away possessions  Change in eating patterns- significant weight changes-  positive or negative  Change in sleeping patterns- unable to sleep or sleeping all the time   Unwillingness or inability to communicate  Depression  Unusual sadness, discouragement and loneliness  Talk of wanting to die  Neglect of personal appearance   Rebelliousness- reckless behavior  Withdrawal from people/activities they love  Confusion- inability to concentrate     If you or a loved one observes any of these behaviors or has concerns about self-harm, here's what you can do:  Talk about it- your feelings and reasons for harming yourself  Remove any means that you might use to hurt yourself (examples: pills, rope, extension cords, firearm)  Get professional help from the community (Mental Health, Substance Abuse, psychological counseling)  Do not be alone:Call your Safe Contact- someone whom you trust who will be there for you.  Call your local CRISIS HOTLINE 039-0161 or 788-635-4856  Call your local Children's Mobile Crisis Response Team [place] (300) 408-0839 or www.[employer] [place]  Call the toll free National Suicide Prevention Hotlines   National Suicide Prevention Lifeline 727-753-YKMJ (5297)  [place] Hope Line Network 800-SUICIDE (872-7078)    I acknowledge receipt and understanding of these Home Care instructions.

## 2023-10-18 NOTE — OP REPORT
Surgeon Miller Hernandez  First assist Missy Hidalgo    Preoperative diagnosis myopathy  Postoperative diagnosis same    Procedure left deltoid biopsy anterior head  Specimen left deltoid biopsy muscle specimen x2    Complications none  Consent was obtained from the patient and her granddaughter present risk complication occasion of surgery which included but not limited to need for more surgery she agreed and consented.  Brief HPI see HPI same day    Procedure in detail patient brought into the operating room was placed under conscious sedation she was not intubated or placed under general send anesthetic secondary to her malignant hypertension she  Remained stable throughout the case after she was sleepy we then did a Marcaine with epinephrine block or fan block above the area where her incision was going to be made we then allowed this to sit for approximately a minute to 2 minutes and then made a incision over the anterior head of the deltoid and then Bovie through the patient's fat pad over her deltoid until we fascia we then cut through the fascia and then isolated using a snap to segments approximately 1 cm in diameter of muscle tissue and then dissected this superiorly and inferiorly until we had approximately 2 cm of the tissue isolated in terms of the length of the muscle biopsy we then placed Vicryl's around the upper and lower aspect of her isolated 1 cm bundles and then cut these at the edge to avoid thermal injury this was then passed off for specimen for pathology we then inspected for hemostasis placed used bipolar on the edges of the muscle that had retracted and then used 2-0 Vicryl's on the dermis followed by Monocryl and Dermabond as a closure.    All counts were correct x2 the patient tolerated the procedure without any difficulty was taken the PACU in stable condition  My self and my nurse practitioner/physician assistant were both necessary as for assisting opening closure and isolation and  retraction of the muscle.

## 2023-10-18 NOTE — PROGRESS NOTES
Medication history reviewed with PT's granddaughter    Med rec is complete per [facility]    Allergies reviewed.     Patient denies any outpatient antibiotics in the last 30 days.     Patient is not taking anticoagulants.    Preferred pharmacy for this visit - [facility] in [place] [address] [facility]

## 2023-10-18 NOTE — OR NURSING
Patient expresses readiness for discharge. Awake, alert, oriented. Tolerating food and water without difficulty. Voided in bathroom without difficulty. Discharge instructions reviewed with granddaughter. Questions answered. Family expresses readiness for discharge. All belongings with family. PIV removed.

## 2023-10-18 NOTE — ANESTHESIA TIME REPORT
Anesthesia Start and Stop Event Times     Date Time Event    10/18/2023 0712 Ready for Procedure     0723 Anesthesia Start     0811 Anesthesia Stop        Responsible Staff  10/18/23    Name Role Begin End    Billy Weaver M.D. Anesth 0723 0811        Overtime Reason:  no overtime (within assigned shift)    Comments:

## 2023-10-18 NOTE — ANESTHESIA PREPROCEDURE EVALUATION
Case: [id number] Date/Time: 10/18/23 0715    Procedure: LEFT DELTOID BIOPSY    Pre-op diagnosis: DISORDER OF MUSCLE    Location: [facility] OR 04 / SURGERY [facility]    Surgeons: Miller Hernandez M.D.          Relevant Problems   PULMONARY   (positive) CAP (community acquired pneumonia)   (positive) Pneumonia   (positive) Pneumonia due to infectious organism      CARDIAC   (positive) HTN (hypertension)   (positive) Hypertension      GI   (positive) Gastroesophageal reflux disease         (positive) Stage 3 chronic kidney disease (HCC)      ENDO   (positive) Hypothyroidism      Other   (positive) Overweight     Anes H&P:  PAST MEDICAL HISTORY:   83 y.o. female who presents for Procedure(s):  LEFT DELTOID BIOPSY.  She has current and past medical problems significant for:    Past Medical History:   Diagnosis Date   • Acute renal failure (HCC) 2014   • Anesthesia     PONV   • Angina     CONSULT NEG. RESULTS   • Arthritis     bilateral knees   • Asthma    • Breath shortness     WITH EXERCISE   • CATARACT     bilat IOL   • Dental disorder     upper/lower partials   • Glaucoma    • Heart burn    • Hypertension    • Indigestion    • Pneumonia 2018   • Sepsis ([facility]) 2014   • Stroke ([facility])     denies hx. of stroke   • Unspecified disorder of thyroid     'thyroid medicine taken in past'   • Unspecified hemorrhagic conditions 2009     treated in er,nose bleed   • Urinary bladder disorder    • Urinary incontinence     wears pads       SMOKING/ALCOHOL/RECREATIONAL DRUG USE:  Social History     Tobacco Use   • Smoking status: Former     Current packs/day: 0.00     Average packs/day: 0.5 packs/day for 20.0 years (10.0 ttl pk-yrs)     Types: Cigarettes     Start date: 3/16/1982     Quit date: 3/16/2002     Years since quittin.6   • Smokeless tobacco: Never   Vaping Use   • Vaping Use: Never used   Substance Use Topics   • Alcohol use: No   • Drug use: No     Social History     Substance and Sexual Activity   Drug Use  No       PAST SURGICAL HISTORY:  Past Surgical History:   Procedure Laterality Date   • SHOULDER ARTHROPLASTY TOTAL Right 05/02/2018    Procedure: SHOULDER ARTHROPLASTY TOTAL/ REVERSE;  Surgeon: Scott Payton M.D.;  Location: SURGERY SAME DAY [facility];  Service: Orthopedics   • ANTERIOR AND POSTERIOR REPAIR  03/20/2017    Procedure: ANTERIOR AND POSTERIOR REPAIR;  Surgeon: Regulo Burton M.D.;  Location: SURGERY SAME DAY [facility];  Service:    • ENTEROCELE REPAIR  03/20/2017    Procedure: ENTEROCELE REPAIR - PERINEOPLASTY;  Surgeon: Regulo Burton M.D.;  Location: SURGERY SAME DAY [facility];  Service:    • BLADDER SLING FEMALE  03/20/2017    Procedure: BLADDER SLING FEMALE - TOT;  Surgeon: Regulo Burton M.D.;  Location: SURGERY SAME DAY [facility];  Service:    • VAGINAL SUSPENSION  03/20/2017    Procedure: VAGINAL SUSPENSION -SACROSPINOUS VAULT;  Surgeon: Regulo Burton M.D.;  Location: SURGERY SAME DAY [facility];  Service:    • KNEE ARTHROPLASTY TOTAL Left 08/20/2015    Procedure: KNEE ARTHROPLASTY TOTAL;  Surgeon: Juan Manuel Choi M.D.;  Location: [facility];  Service:    • BAERVELDT IMPLANT  05/08/2013    Performed by Miller Fletcher M.D. at SURGERY SAME DAY [facility]   • CATARACT PHACO WITH IOL  05/08/2013    Performed by Miller Fletcher M.D. at SURGERY SAME DAY [facility]   • BAERVELDT IMPLANT  03/13/2013    Performed by Miller Fletcher M.D. at SURGERY SAME DAY [facility]   • BAERVELDT IMPLANT  12/12/2012    Performed by Miller Fletcher M.D. at SURGERY SAME DAY [facility]   • TRABECULECTOMY  09/26/2012    Performed by Miller Fletcher M.D. at SURGERY SAME DAY [facility]   • CATARACT PHACO WITH IOL  09/26/2012    Performed by Miller Fletcher M.D. at SURGERY SAME DAY [facility]   • KNEE ARTHROPLASTY TOTAL  02/05/2009    Performed by GISELA OLSON at [facility]   • HYSTERECTOMY, TOTAL ABDOMINAL  01/01/1989   • GYN SURGERY     •  OTHER      R shoulder       ALLERGIES:   No Known Allergies    MEDICATIONS:  No current facility-administered medications on file prior to encounter.     Current Outpatient Medications on File Prior to Encounter   Medication Sig Dispense Refill   • acetaminophen (TYLENOL) 325 MG Tab Take 650 mg by mouth every 6 hours as needed for Moderate Pain.     • levothyroxine (SYNTHROID) 100 MCG Tab Take 1 Tablet by mouth every morning before breakfast. 30 Tablet    • losartan (COZAAR) 100 MG Tab Take 1 Tablet by mouth every day. 30 Tablet        LABS:  Lab Results   Component Value Date/Time    HEMOGLOBIN 13.5 10/12/2023 1514    HEMATOCRIT 40.7 10/12/2023 1514    WBC 7.6 10/12/2023 1514     Lab Results   Component Value Date/Time    SODIUM 133 (L) 10/12/2023 1514    POTASSIUM 4.0 10/12/2023 1514    CHLORIDE 95 (L) 10/12/2023 1514    CO2 30 10/12/2023 1514    GLUCOSE 103 (H) 10/12/2023 1514    BUN 18 10/12/2023 1514    CALCIUM 9.6 10/12/2023 1514         PREVIOUS ANESTHETICS: See EMR  __________________________________________      Physical Exam    Airway   Mallampati: II  TM distance: >3 FB  Neck ROM: full       Cardiovascular - normal exam  Rhythm: regular  Rate: normal  (-) murmur     Dental - normal exam  (+) upper dentures, lower dentures           Pulmonary - normal exam  Breath sounds clear to auscultation     Abdominal   (+) obese     Neurological - normal exam                 Anesthesia Plan    ASA 3   ASA physical status 3 criteria: hypertension - poorly controlled    Plan - MAC               Induction: intravenous      Pertinent diagnostic labs and testing reviewed    Informed Consent:    Anesthetic plan and risks discussed with patient.

## 2023-10-18 NOTE — ANESTHESIA POSTPROCEDURE EVALUATION
Patient: Lizzette Burton    Procedure Summary     Date: 10/18/23 Room / Location: [facility] 04 / SURGERY [facility]    Anesthesia Start: 0723 Anesthesia Stop: 0811    Procedure: LEFT DELTOID BIOPSY (Left: Arm Upper) Diagnosis: (DISORDER OF MUSCLE)    Surgeons: Miller Hernandez M.D. Responsible Provider: Billy Weaver M.D.    Anesthesia Type: MAC ASA Status: 3          Final Anesthesia Type: MAC  Last vitals  BP   Blood Pressure : (!) 195/87    Temp   36.1 °C (97 °F)    Pulse   (!) 53   Resp   20    SpO2   92 %      Anesthesia Post Evaluation    Patient location during evaluation: PACU  Patient participation: complete - patient participated  Level of consciousness: sleepy but conscious    Airway patency: patent  Anesthetic complications: no  Cardiovascular status: hemodynamically stable  Respiratory status: acceptable  Hydration status: balanced    PONV: none          No notable events documented.     Nurse Pain Score: 0 (NPRS)

## 2023-10-20 ENCOUNTER — APPOINTMENT (OUTPATIENT)
Dept: PHYSICAL THERAPY | Facility: REHABILITATION | Age: 83
End: 2023-10-20
Attending: NURSE PRACTITIONER
Payer: MEDICARE

## 2023-10-27 ENCOUNTER — PHYSICAL THERAPY (OUTPATIENT)
Dept: PHYSICAL THERAPY | Facility: REHABILITATION | Age: 83
End: 2023-10-27
Attending: NURSE PRACTITIONER
Payer: MEDICARE

## 2023-10-27 DIAGNOSIS — M25.552 BILATERAL HIP PAIN: ICD-10-CM

## 2023-10-27 DIAGNOSIS — M25.551 BILATERAL HIP PAIN: ICD-10-CM

## 2023-10-27 DIAGNOSIS — R26.81 UNSTEADINESS ON FEET: ICD-10-CM

## 2023-10-27 PROCEDURE — 97110 THERAPEUTIC EXERCISES: CPT

## 2023-10-27 NOTE — OP THERAPY DAILY TREATMENT
"  Outpatient Physical Therapy  DAILY TREATMENT     [facility] [address].  [address]  [place] [address]  Phone:  [phone]  Fax:  [phone]    Date: 10/27/2023    Patient: Lizzette Burton  YOB: 1940  MRN: [id number]     Time Calculation    Start time: 1015  Stop time: 1058 Time Calculation (min): 43 minutes         Chief Complaint: Back Problem    Visit #: 15    SUBJECTIVE:  Patient presents with granddaughter for . Patient's granddaughter reports that she had some restrictions for 3-4 days after procedure on the 18th. She reports patient struggled with maintaining her restrictions but no longer has any at this time.    Patient reports she feels \"so so\" today. She states her pain is a 7-8. L UE incision appears to be healing. Patient does have bruising noted to R wrist which she states is from the \"needle pokes.\"     Granddaughter reports no falls since last visit.     OBJECTIVE:  Current objective measures:           Therapeutic Exercises (CPT 74084):     1. sit<>stand, x10, minimal B UE support required    2. seated marches, 2x20, B, pink theraband    3. seated hip abduction, 2x20, B, with pink theraband    4. seated hamstring curls, 2x20, B, pink theraband    5. standing heel raises, x20, B; B UE support    6. supine hamstring curls, x1 minute, nt    7. supine LTR, x1 minute, nt    8. supine hip adduction squeezes, x20, 3 second holds, pillow, nt    9. static stance, feet together, 2x 1 minute; minimal sway, no UE support, close SBA from therapist    10. static staggered stance, x40 seconds, no UE support, L LE lead, close SBA/CGA from therapist; fatigues quickly    11. // bars, standing marches, 2x10 B, nt    12. // bars, standing hip abduction, 2x10 B, nt    13. // bars, standing hip extension, 2x10 B, nt    14. // bars, lateral side steps, x8 laps, nt    15. // bars, step over small obstacle, x12, nt    16. // bars, lateral step overs, small " "obstacle, x12, over small nancy, nt    17. NuStep, x10 minutes, level 1, nt    18. air ex pad, // bars; require increased time for directions and understanding of task; moderate posterior lean without UE support noted, nt    19. toe taps, 6\" step; x20, B LE with B UE support    20. PN due 11/12      Therapeutic Exercise Summary: Educated patient and granddaughter to complete HEP with granddaughter present, both verbalize understanding. Increased time for task today due to safety and understanding of skills.     Access Code: [email or web address]  URL: [email or web address]  Date: 06/20/2023  Prepared by: Zaynab Buening    Exercises  - Seated March  - 1 x daily - 1 sets - 10 reps  - Sit to Stand with Counter Support  - 1 x daily - 1 sets - 5 reps  - Seated Hip Abduction  - 1 x daily - 1 sets - 10 reps    Access Code: [email or web address]  URL: [email or web address]  Date: 06/29/2023  Prepared by: Zaynab Buening    Exercises  - Supine Transversus Abdominis Bracing - Hands on Stomach  - 1 x daily - 1 sets - 10 reps - 3 seconds hold    Access Code: [email or web address]  URL: [email or web address]  Date: 06/27/2023  Prepared by: Zaynab Buening    Exercises  - Hooklying Clamshell with Resistance  - 1 x daily - 1 sets - 10 reps  - Supine Hip Adduction Isometric with Ball  - 1 x daily - 1 sets - 10 reps - 3 seconds hold  - Supine Lower Trunk Rotation  - 1 x daily - 1 sets - 10 reps    Therapeutic Treatments and Modalities:     1. Neuromuscular Re-education (CPT 22666), // bars activitie and air ex pad to promote improved balance as noted above, nt    Time-based treatments/modalities:    Physical Therapy Timed Treatment Charges  Therapeutic exercise minutes (CPT 76865): 43 minutes      Pain rating (1-10) before treatment:  7-8; back pain  Pain rating (1-10) after treatment:  Reports pain is \"better\" on exit    ASSESSMENT:   Response to treatment: Patient tolerates PT treatment well today. Patient demonstrates improved tolerance to TE " this date for B LE strengthening. Fatigues quickly with staggered stance. Patient reports improved pain after exercises. Continues to remain a fall risk with frequent cues for FWW utilization required. At this time, patient will benefit from continued skilled PT for strength and balance to promote increased safety, functional mobility skills, and quality of life.     PLAN/RECOMMENDATIONS:   Plan for treatment: therapy treatment to continue next visit.  Planned interventions for next visit: continue with current treatment.

## 2023-11-01 ENCOUNTER — TELEPHONE (OUTPATIENT)
Dept: CARDIOLOGY | Facility: MEDICAL CENTER | Age: 83
End: 2023-11-01

## 2023-11-01 NOTE — TELEPHONE ENCOUNTER
Spoke to patient in regards to records for NP appointment with TARI.     Patient has seen a cardiologist before?  Yes   If yes, where?: [facility]    Any recent cardiac testing outside of [facility]?  No   What testing:   Where was it completed?:    Were any records requested?  No   Fax:

## 2023-11-02 ENCOUNTER — APPOINTMENT (OUTPATIENT)
Dept: PHYSICAL THERAPY | Facility: REHABILITATION | Age: 83
End: 2023-11-02
Attending: NURSE PRACTITIONER
Payer: MEDICARE

## 2023-11-09 ENCOUNTER — PHYSICAL THERAPY (OUTPATIENT)
Dept: PHYSICAL THERAPY | Facility: REHABILITATION | Age: 83
End: 2023-11-09
Attending: NURSE PRACTITIONER
Payer: MEDICARE

## 2023-11-09 DIAGNOSIS — R26.81 UNSTEADINESS ON FEET: ICD-10-CM

## 2023-11-09 DIAGNOSIS — M25.551 BILATERAL HIP PAIN: ICD-10-CM

## 2023-11-09 DIAGNOSIS — M25.552 BILATERAL HIP PAIN: ICD-10-CM

## 2023-11-09 PROCEDURE — 97110 THERAPEUTIC EXERCISES: CPT

## 2023-11-09 NOTE — OP THERAPY DAILY TREATMENT
"  Outpatient Physical Therapy  DAILY TREATMENT     [facility] Physical [address]  [address]  [place] [address]  Phone:  [phone]  Fax:  [phone]    Date: 11/09/2023    Patient: Lizzette Burton  YOB: 1940  MRN: [id number]     Time Calculation    Start time: 1015  Stop time: 1059 Time Calculation (min): 44 minutes         Chief Complaint: Back Problem    Visit #: 16    SUBJECTIVE:  Patient presents to PT session and reports she feels pretty good today. Granddaughter present to translate. Reports no falls. Assessed goals this date with minimal progress noted and patient appearing to be at plateau. Discussed D/C secondary to progress with patient and granddaughter, who are in agreement. Encouraged to continue with HEP and when to retrieve new referral if change in status and skilled PT needs arise. Patient/granddaughter in agreement and verbalize understanding. Patient encouraged to continue to utilize FWW secondary to remaining fall risk. Granddaughter reports she quit her job and stays with patient to help. She reports patient is \"never alone now.\"     OBJECTIVE:  Current objective measures:     TUG: with FWW  Average:34.62 seconds  Trial 1: 38.58 seconds  Trial 2: 34.62 seconds  Trial 3: 30.66 seconds    5x sit<>stand: 19.31 seconds, with FWW      Lower extremity (left):     Hip flexion: 3+    Hip abduction: 5    Hip adduction: 5    Knee flexion: 4    Knee extension: 5    Ankle dorsiflexion: 4+    Ankle plantar flexion: 5     Lower extremity (right):     Hip flexion: 3+    Hip abduction: 5    Hip adduction: 5    Knee flexion: 5    Knee extension: 5    Ankle dorsiflexion: 4+    Ankle plantar flexion: 5          Therapeutic Exercises (CPT 81329):     1. sit<>stand, x5    2. seated marches, x20, B, green theraband    3. seated hip abduction, x20, B, green theraband    4. seated hamstring curls, x20, B, green theraband    5. standing heel raises, x20, B; B UE support, " nt    6. supine hamstring curls, x1 minute, nt    7. supine LTR, x1 minute, nt    8. supine hip adduction squeezes, x20, 3 second holds, pillow, nt    11. // bars, standing marches, 2x10 B, nt    12. // bars, standing hip abduction, 2x10 B, nt    13. // bars, standing hip extension, 2x10 B, nt    14. // bars, lateral side steps, x8 laps, nt    15. // bars, step over small obstacle, x12, nt    16. // bars, lateral step overs, small obstacle, x12, over small nancy, nt    20. PN due 11/12      Therapeutic Exercise Summary: Educated patient and granddaughter to complete HEP with granddaughter present, both verbalize understanding. Increased time for task today due to safety and understanding of skills.     Access Code: [email or web address]  URL: [email or web address]  Date: 06/20/2023  Prepared by: Zaynab Buening    Exercises  - Seated March  - 1 x daily - 1 sets - 10 reps  - Sit to Stand with Counter Support  - 1 x daily - 1 sets - 5 reps  - Seated Hip Abduction  - 1 x daily - 1 sets - 10 reps    Access Code: [email or web address]  URL: [email or web address]  Date: 06/29/2023  Prepared by: Zaynab Buening    Exercises  - Supine Transversus Abdominis Bracing - Hands on Stomach  - 1 x daily - 1 sets - 10 reps - 3 seconds hold    Access Code: [email or web address]  URL: [email or web address]  Date: 06/27/2023  Prepared by: Zaynab Buening    Exercises  - Hooklying Clamshell with Resistance  - 1 x daily - 1 sets - 10 reps  - Supine Hip Adduction Isometric with Ball  - 1 x daily - 1 sets - 10 reps - 3 seconds hold  - Supine Lower Trunk Rotation  - 1 x daily - 1 sets - 10 reps    Therapeutic Treatments and Modalities:     1. Neuromuscular Re-education (CPT 84641), // bars activitie and air ex pad to promote improved balance as noted above, nt    Time-based treatments/modalities:    Physical Therapy Timed Treatment Charges  Therapeutic exercise minutes (CPT 61487): 44 minutes      Pain rating (1-10) before treatment:  6  Pain rating (1-10)  "after treatment:  pain not addressed on exit    Goals:   Short Term Goals:   1. Patient will demonstrate independent HEP to promote increased strength and balance for improved functional mobility skills.-progressing, continue     2. Patient will demonstrate improved B LE strength to grossly 4-/5 to promote improved functional mobility skills and decreased risk for falls. -not met, hip flexion remains 3+/5 B     3. Patient will demonstrate improved safety awareness with FWW to promote increased safety with mobility-not met, continues to require frequent cuing   Short term goal time span:  4-6 weeks      Long Term Goals:    1. Patient will demonstrate improved B LE strength to grossly 4+/5 to promote improved functional mobility skills.-not met     2. Patient will demonstrate improved TUG score, with LRAD, to below 23 seconds to indicate improving safety with gait.-not met      3. Patient will demonstrate improved ABC score to indicate increasing quality of life and decreasing risk for falls-not addressed    Long term goal time span:  6-8 weeks    ASSESSMENT:   Response to treatment: Patient tolerates PT treatments well thus far however minimal progression toward goals has been made at this time and patient appears to be at plateau. Discussed D/C from skilled PT with patient and granddaughter, who is with patient most of the time and reports patient is \"never alone now.\" Encouraged patient to continue with HEP and follow safety education, including FWW utilization and patient remains a fall risk with granddaughter verbalizing understanding. Also educated patient and granddaughter on when to retrieve new referral to return to PT services if change in status occurs. Granddaughter verbalizes understanding and will D/C skilled PT at this time with patient and granddaughter in agreement.     PLAN/RECOMMENDATIONS:   Plan for treatment:  D/C skilled PT secondary to progress plateau at this time. Educated patient to continue " with HEP and when to retrieve new referral for skilled PT, especially if status changes .  Planned interventions for next visit:  D/C skilled PT at this time. .

## 2023-11-09 NOTE — OP THERAPY DISCHARGE SUMMARY
Outpatient Physical Therapy  DISCHARGE SUMMARY NOTE      [facility] Physical Therapy [address]  [address]  [place] [address]  Phone:  [phone]  Fax:  [phone]    Date of Visit: 11/09/2023    Patient: Lizzette Burton  YOB: 1940  MRN: [id number]     Referring Provider: LIBERTY [address]  [place] [address]   Referring Diagnosis Unsteadiness on feet [R26.81]               Your patient is being discharged from Physical Therapy with the following comments:   Progress plateau  Discussed current progress with goals and plateau. Encouraged patient to complete HEP and utilize FWW for increased safety. Educated on when to retrieve new referral for return to services, as needed.     Comments:    Current objective measures:      TUG: with FWW  Average:34.62 seconds  Trial 1: 38.58 seconds  Trial 2: 34.62 seconds  Trial 3: 30.66 seconds     5x sit<>stand: 19.31 seconds, with FWW      Lower extremity (left):     Hip flexion: 3+    Hip abduction: 5    Hip adduction: 5    Knee flexion: 4    Knee extension: 5    Ankle dorsiflexion: 4+    Ankle plantar flexion: 5     Lower extremity (right):     Hip flexion: 3+    Hip abduction: 5    Hip adduction: 5    Knee flexion: 5    Knee extension: 5    Ankle dorsiflexion: 4+    Ankle plantar flexion: 5    Goals:   Short Term Goals:   1. Patient will demonstrate independent HEP to promote increased strength and balance for improved functional mobility skills.-progressing, continue     2. Patient will demonstrate improved B LE strength to grossly 4-/5 to promote improved functional mobility skills and decreased risk for falls. -not met, hip flexion remains 3+/5 B     3. Patient will demonstrate improved safety awareness with FWW to promote increased safety with mobility-not met, continues to require frequent cuing   Short term goal time span:  4-6 weeks      Long Term Goals:    1. Patient will demonstrate improved  "B LE strength to grossly 4+/5 to promote improved functional mobility skills.-not met     2. Patient will demonstrate improved TUG score, with LRAD, to below 23 seconds to indicate improving safety with gait.-not met      3. Patient will demonstrate improved ABC score to indicate increasing quality of life and decreasing risk for falls-not addressed     Long term goal time span:  6-8 weeks     ASSESSMENT:   Response to treatment: Patient tolerates PT treatments well thus far however minimal progression toward goals has been made at this time and patient appears to be at plateau. Discussed D/C from skilled PT with patient and granddaughter, who is with patient most of the time and reports patient is \"never alone now.\" Encouraged patient to continue with HEP and follow safety education, including FWW utilization and patient remains a fall risk with granddaughter verbalizing understanding. Also educated patient and granddaughter on when to retrieve new referral to return to PT services if change in status occurs. Granddaughter verbalizes understanding and will D/C skilled PT at this time with patient and granddaughter in agreement.      Limitations Remaining:  Strength and balance deficits remain. Encouraged patient to utilize FWW for increased safety.     Recommendations:  Continue with HEP. Follow up with MD. Utilize FWW for increased safety secondary to remaining fall risk. Retrieve new referral as needed.     Thank you for allowing me to participate in this patient's care.     Zaynab Aranda, PT    Date: 11/9/2023         "

## 2023-11-13 ENCOUNTER — TELEPHONE (OUTPATIENT)
Dept: CARDIOLOGY | Facility: MEDICAL CENTER | Age: 83
End: 2023-11-13

## 2023-11-13 ENCOUNTER — OFFICE VISIT (OUTPATIENT)
Dept: CARDIOLOGY | Facility: MEDICAL CENTER | Age: 83
End: 2023-11-13
Attending: INTERNAL MEDICINE
Payer: MEDICARE

## 2023-11-13 VITALS
OXYGEN SATURATION: 95 % | WEIGHT: 180 LBS | HEART RATE: 72 BPM | BODY MASS INDEX: 31.89 KG/M2 | SYSTOLIC BLOOD PRESSURE: 152 MMHG | DIASTOLIC BLOOD PRESSURE: 78 MMHG | RESPIRATION RATE: 16 BRPM | HEIGHT: 63 IN

## 2023-11-13 DIAGNOSIS — I10 PRIMARY HYPERTENSION: ICD-10-CM

## 2023-11-13 DIAGNOSIS — R01.1 HEART MURMUR: ICD-10-CM

## 2023-11-13 DIAGNOSIS — R07.9 CHEST PAIN, UNSPECIFIED TYPE: ICD-10-CM

## 2023-11-13 PROCEDURE — 3077F SYST BP >= 140 MM HG: CPT | Performed by: INTERNAL MEDICINE

## 2023-11-13 PROCEDURE — 1170F FXNL STATUS ASSESSED: CPT | Performed by: INTERNAL MEDICINE

## 2023-11-13 PROCEDURE — 3078F DIAST BP <80 MM HG: CPT | Performed by: INTERNAL MEDICINE

## 2023-11-13 PROCEDURE — 99204 OFFICE O/P NEW MOD 45 MIN: CPT | Performed by: INTERNAL MEDICINE

## 2023-11-13 PROCEDURE — 93005 ELECTROCARDIOGRAM TRACING: CPT | Performed by: INTERNAL MEDICINE

## 2023-11-13 PROCEDURE — 99212 OFFICE O/P EST SF 10 MIN: CPT | Performed by: INTERNAL MEDICINE

## 2023-11-13 RX ORDER — AMLODIPINE BESYLATE 5 MG/1
5 TABLET ORAL DAILY
Qty: 90 TABLET | Refills: 3 | Status: SHIPPED | OUTPATIENT
Start: 2023-11-13

## 2023-11-13 RX ORDER — LATANOPROST 50 UG/ML
SOLUTION/ DROPS OPHTHALMIC
COMMUNITY
Start: 2023-11-03

## 2023-11-13 RX ORDER — BRIMONIDINE TARTRATE AND TIMOLOL MALEATE 2; 5 MG/ML; MG/ML
SOLUTION OPHTHALMIC
COMMUNITY
Start: 2023-10-12

## 2023-11-13 ASSESSMENT — FIBROSIS 4 INDEX: FIB4 SCORE: 1.11

## 2023-11-13 NOTE — ASSESSMENT & PLAN NOTE
Blood pressure is poorly controlled in the office.  At this time we will start amlodipine 5 mg daily.  If patient is still not at goal in 3 months would recommend adding chlorthalidone 25 mg daily.  Patient amenable to her current course of care

## 2023-11-13 NOTE — PROGRESS NOTES
[facility]    PatientName:Lizzette Gunn ChaconDate: 2023  :1940    83 y.o.PCP:LIBERTY Dyer  MRN:[id number]          Problems and Plans    HTN (hypertension)  Blood pressure is poorly controlled in the office.  At this time we will start amlodipine 5 mg daily.  If patient is still not at goal in 3 months would recommend adding chlorthalidone 25 mg daily.  Patient amenable to her current course of care    Chest pain  Regarding her chest pain I suspect that this is largely still traumatic from her fall approximately 1 year ago and have a lower clinical suspicion for an overt ischemic etiology.  We will check a nuclear medicine stress test as well as a echocardiogram to assess overall coronary perfusion as well as valvular architecture and LV systolic function.  Further recommendations will follow pending evaluation    No follow-ups on file.      Encounter    Reason for Visit / Chief Complaint: Chest pain    HPI    83-year-old female with known history of hypertension, dyslipidemia, hypothyroidism, no diabetes mellitus, osteoporosis, gait instability presents in consultation for evaluation of chest pain.    She been her usual state of health but had a traumatic fall back in 2022 and has since had ongoing chest pain that is variable in nature and variable in duration.  She is fairly limited in terms of her overall activities largely just tending to her home.  She is accompanied by her granddaughter who is also acting as her  in addition to the current  services who notes that she is fairly inactive.    Echocardiogram was performed back on 2022 which demonstrated mild concentric left ventricular perjury with preserved ejection fraction grade 1 diastolic dysfunction moderate dilatation of the left atrium.  Past Medical History  Past Medical History:   Diagnosis Date    Acute renal failure (HCC) 2014    Anesthesia      PONV    Angina     CONSULT NEG. RESULTS    Arthritis     bilateral knees    Asthma     Breath shortness     WITH EXERCISE    CATARACT     bilat IOL    Dental disorder     upper/lower partials    Glaucoma     Heart burn     Hypertension     Indigestion     Pneumonia 03/2018    Sepsis (HCC) 02/08/2014    Stroke ([facility])     denies hx. of stroke    Unspecified disorder of thyroid     'thyroid medicine taken in past'    Unspecified hemorrhagic conditions 2009     treated in er,nose bleed    Urinary bladder disorder     Urinary incontinence     wears pads     Past Surgical History  Past Surgical History:   Procedure Laterality Date    MUSCLE BIOPSY Left 10/18/2023    Procedure: LEFT DELTOID BIOPSY;  Surgeon: Miller Hernandez M.D.;  Location: [facility];  Service: Neurosurgery    SHOULDER ARTHROPLASTY TOTAL Right 05/02/2018    Procedure: SHOULDER ARTHROPLASTY TOTAL/ REVERSE;  Surgeon: Scott Payton M.D.;  Location: SURGERY SAME DAY [facility];  Service: Orthopedics    ANTERIOR AND POSTERIOR REPAIR  03/20/2017    Procedure: ANTERIOR AND POSTERIOR REPAIR;  Surgeon: Regulo Burton M.D.;  Location: SURGERY SAME DAY [facility];  Service:     ENTEROCELE REPAIR  03/20/2017    Procedure: ENTEROCELE REPAIR - PERINEOPLASTY;  Surgeon: Regulo Burton M.D.;  Location: SURGERY SAME DAY [facility];  Service:     BLADDER SLING FEMALE  03/20/2017    Procedure: BLADDER SLING FEMALE - TOT;  Surgeon: Regulo Burton M.D.;  Location: SURGERY SAME DAY [facility];  Service:     VAGINAL SUSPENSION  03/20/2017    Procedure: VAGINAL SUSPENSION -SACROSPINOUS VAULT;  Surgeon: Regulo Burton M.D.;  Location: SURGERY SAME DAY [facility];  Service:     KNEE ARTHROPLASTY TOTAL Left 08/20/2015    Procedure: KNEE ARTHROPLASTY TOTAL;  Surgeon: Juan Manuel Choi M.D.;  Location: SURGERY [facility];  Service:     BAERVELDT IMPLANT  05/08/2013    Performed by Miller Fletcher M.D. at SURGERY SAME DAY [facility]    CATARACT  PHACO WITH IOL  2013    Performed by Miller Fletcher M.D. at SURGERY SAME DAY [facility]    BAERVELDT IMPLANT  2013    Performed by Miller Fletcher M.D. at SURGERY SAME DAY [facility]    BAERVELDT IMPLANT  2012    Performed by Miller Fletcher M.D. at SURGERY SAME DAY [facility]    TRABECULECTOMY  2012    Performed by Miller Fletcher M.D. at SURGERY SAME DAY [facility]    CATARACT PHACO WITH IOL  2012    [facility] Miller Fletcher [facility]    KNEE ARTHROPLASTY TOTAL  2009    Performed by GISELA OLSON at SURGERY [facility]    HYSTERECTOMY, TOTAL ABDOMINAL  1989    GYN SURGERY      OTHER      R shoulder     Social History  Social History     Socioeconomic History    Marital status:      Spouse name: Not on file    Number of children: Not on file    Years of education: Not on file    Highest education level: Not on file   Occupational History    Not on file   Tobacco Use    Smoking status: Former     Current packs/day: 0.00     Average packs/day: 0.5 packs/day for 20.0 years (10.0 ttl pk-yrs)     Types: Cigarettes     Start date: 3/16/1982     Quit date: 3/16/2002     Years since quittin.6    Smokeless tobacco: Never   Vaping Use    Vaping Use: Never used   Substance and Sexual Activity    Alcohol use: No    Drug use: No    Sexual activity: Not on file   Other Topics Concern    Not on file   Social History Narrative    Not on file     Social Determinants of Health     Financial Resource Strain: Not on file   Food Insecurity: Not on file   Transportation Needs: Not on file   Physical Activity: Not on file   Stress: Not on file   Social Connections: Not on file   Intimate Partner Violence: Not on file   Housing Stability: Not on file     Past Family History  No family history on file.  Medication(s)  [unfilled]  Allergies  Patient has no known allergies.    Review of Systems    A comprehensive 10 system  "review was conducted and is negative except as noted above in the HPI or here.      Vital Signs  BP (!) 152/78 (BP Location: Left arm, Patient Position: Sitting, BP Cuff Size: Adult)   Pulse 72   Resp 16   Ht 1.6 m (5' 3\")   Wt 81.6 kg (180 lb)   SpO2 95%   BMI 31.89 kg/m²     Physical Exam  Vitals and nursing note reviewed.   Constitutional:       Appearance: Normal appearance.   HENT:      Head: Normocephalic and atraumatic.      Nose: Nose normal.      Mouth/Throat:      Mouth: Mucous membranes are moist.      Pharynx: Oropharynx is clear.   Eyes:      Pupils: Pupils are equal, round, and reactive to light.   Cardiovascular:      Rate and Rhythm: Normal rate and regular rhythm.      Heart sounds: No murmur heard.     No friction rub. No gallop.   Pulmonary:      Effort: Pulmonary effort is normal.      Breath sounds: Normal breath sounds.   Abdominal:      General: Bowel sounds are normal.      Palpations: Abdomen is soft.   Musculoskeletal:         General: Normal range of motion.      Cervical back: Normal range of motion and neck supple.   Skin:     General: Skin is warm and dry.   Neurological:      General: No focal deficit present.      Mental Status: She is alert and oriented to person, place, and time. Mental status is at baseline.   Psychiatric:         Mood and Affect: Mood normal.         Behavior: Behavior normal.         Thought Content: Thought content normal.         Judgment: Judgment normal.         Lab Results   Component Value Date/Time    TSHULTRASEN 0.870 04/21/2023 1019      No results found for: \"FREET4\"     Lab Results   Component Value Date/Time    HBA1C 5.6 12/27/2022 12:18 AM       Lab Results   Component Value Date/Time    CHOLSTRLTOT 214 (H) 11/05/2022 09:01 AM     (H) 11/05/2022 09:01 AM    HDL 65 11/05/2022 09:01 AM    TRIGLYCERIDE 108 11/05/2022 09:01 AM         Lab Results   Component Value Date/Time    SODIUM 133 (L) 10/12/2023 03:14 PM    POTASSIUM 4.0 10/12/2023 " 03:14 PM    CHLORIDE 95 (L) 10/12/2023 03:14 PM    CO2 30 10/12/2023 03:14 PM    GLUCOSE 103 (H) 10/12/2023 03:14 PM    BUN 18 10/12/2023 03:14 PM    CREATININE 0.80 10/12/2023 03:14 PM    CREATININE 0.9 02/04/2009 10:28 AM       Lab Results   Component Value Date/Time    ALKPHOSPHAT 79 07/19/2023 12:14 PM    ASTSGOT 16 07/19/2023 12:14 PM    ALTSGPT 14 07/19/2023 12:14 PM    TBILIRUBIN 0.5 07/19/2023 12:14 PM         Imaging  EKG demonstrates sinus rhythm with intermittent ventricular bigeminy and borderline left ventricular perjury.  I reviewed interpreted EKG.  Findings are discussed with the patient    Echocardiogram  As noted above    Total patient time was estimated to be 45 minutes consisting of chart review, direct patient interaction, medication renewal, plan development and overall communication with the cardiovascular team.        Electronically signed by:   Chepe Weathers DO, MPH  [facility]    Portions of this note were completed using voice recognition software (Dragon Naturally speaking software) . Occasional transcription errors may have escaped proof reading. I have made every reasonable attempt to correct obvious errors, but I expect that there are errors of grammar and possibly content that I did not discover before finalizing the note.

## 2023-11-13 NOTE — ASSESSMENT & PLAN NOTE
Regarding her chest pain I suspect that this is largely still traumatic from her fall approximately 1 year ago and have a lower clinical suspicion for an overt ischemic etiology.  We will check a nuclear medicine stress test as well as a echocardiogram to assess overall coronary perfusion as well as valvular architecture and LV systolic function.  Further recommendations will follow pending evaluation

## 2023-11-13 NOTE — TELEPHONE ENCOUNTER
BD    Caller: - Stephenie (grand-daughter)    Topic/issue: Would like a call back, they have questions on:  amLODIPine (NORVASC) 5 MG Tab (Order #[id number]) on 11/13/23     Callback Number: [phone]    Thank you,    Janis SPRINGER

## 2023-11-14 LAB — EKG IMPRESSION: NORMAL

## 2023-11-14 PROCEDURE — 93010 ELECTROCARDIOGRAM REPORT: CPT | Performed by: INTERNAL MEDICINE

## 2023-11-15 NOTE — TELEPHONE ENCOUNTER
Returned call and spoke to Stephenie. All questions and concerns answered. She will also keep log of blood pressures and reach out with any questions.

## 2023-11-16 ENCOUNTER — APPOINTMENT (OUTPATIENT)
Dept: PHYSICAL THERAPY | Facility: REHABILITATION | Age: 83
End: 2023-11-16
Attending: NURSE PRACTITIONER
Payer: MEDICARE

## 2023-11-17 ENCOUNTER — HOSPITAL ENCOUNTER (OUTPATIENT)
Dept: CARDIOLOGY | Facility: MEDICAL CENTER | Age: 83
End: 2023-11-17
Attending: INTERNAL MEDICINE
Payer: MEDICARE

## 2023-11-17 DIAGNOSIS — R07.9 CHEST PAIN, UNSPECIFIED TYPE: ICD-10-CM

## 2023-11-17 LAB
LV EJECT FRACT MOD 2C 99903: 62.78
LV EJECT FRACT MOD 4C 99902: 51.98
LV EJECT FRACT MOD BP 99901: 57.48

## 2023-11-17 PROCEDURE — 93306 TTE W/DOPPLER COMPLETE: CPT | Mod: 26 | Performed by: INTERNAL MEDICINE

## 2023-11-17 PROCEDURE — 93306 TTE W/DOPPLER COMPLETE: CPT

## 2023-11-21 ENCOUNTER — APPOINTMENT (OUTPATIENT)
Dept: PHYSICAL THERAPY | Facility: REHABILITATION | Age: 83
End: 2023-11-21
Attending: NURSE PRACTITIONER
Payer: MEDICARE

## 2023-11-21 ENCOUNTER — HOSPITAL ENCOUNTER (OUTPATIENT)
Dept: RADIOLOGY | Facility: MEDICAL CENTER | Age: 83
End: 2023-11-21
Attending: INTERNAL MEDICINE
Payer: MEDICARE

## 2023-11-21 DIAGNOSIS — R07.9 CHEST PAIN, UNSPECIFIED TYPE: ICD-10-CM

## 2023-11-21 PROCEDURE — 700111 HCHG RX REV CODE 636 W/ 250 OVERRIDE (IP): Mod: UD | Performed by: INTERNAL MEDICINE

## 2023-11-21 PROCEDURE — 78452 HT MUSCLE IMAGE SPECT MULT: CPT

## 2023-11-21 PROCEDURE — 93018 CV STRESS TEST I&R ONLY: CPT | Performed by: INTERNAL MEDICINE

## 2023-11-21 PROCEDURE — 78452 HT MUSCLE IMAGE SPECT MULT: CPT | Mod: 26 | Performed by: INTERNAL MEDICINE

## 2023-11-21 RX ORDER — REGADENOSON 0.08 MG/ML
0.4 INJECTION, SOLUTION INTRAVENOUS ONCE
Status: COMPLETED | OUTPATIENT
Start: 2023-11-21 | End: 2023-11-21

## 2023-11-21 RX ORDER — REGADENOSON 0.08 MG/ML
INJECTION, SOLUTION INTRAVENOUS
Status: DISPENSED
Start: 2023-11-21 | End: 2023-11-22

## 2023-11-21 RX ORDER — AMINOPHYLLINE 25 MG/ML
100 INJECTION, SOLUTION INTRAVENOUS
Status: DISCONTINUED | OUTPATIENT
Start: 2023-11-21 | End: 2023-11-22 | Stop reason: HOSPADM

## 2023-11-21 RX ADMIN — REGADENOSON 0.4 MG: 0.08 INJECTION, SOLUTION INTRAVENOUS at 12:15

## 2023-11-22 ENCOUNTER — TELEPHONE (OUTPATIENT)
Dept: CARDIOLOGY | Facility: MEDICAL CENTER | Age: 83
End: 2023-11-22
Payer: MEDICARE

## 2023-11-22 ENCOUNTER — HOSPITAL ENCOUNTER (OUTPATIENT)
Dept: RADIOLOGY | Facility: MEDICAL CENTER | Age: 83
End: 2023-11-22
Attending: NURSE PRACTITIONER
Payer: MEDICARE

## 2023-11-22 DIAGNOSIS — G44.319 ACUTE POST-TRAUMATIC HEADACHE, NOT INTRACTABLE: ICD-10-CM

## 2023-11-22 PROCEDURE — 70450 CT HEAD/BRAIN W/O DYE: CPT

## 2023-11-22 NOTE — RESULT ENCOUNTER NOTE
Her stress test is abnormal and it shows a reduction of the EF to 38% as compared to her recent echo.  I would recommend patient to be sent for coronary angiogram to evaluate further as I do not see a history of MI, but pt not likely to be scheduled for Cath by Next week, Dr. Weathers to make final recommendations next week.    Please call patient and give her ER precautions for worsening symptoms.

## 2023-11-23 NOTE — TELEPHONE ENCOUNTER
Phone Number Called: [phone]     Call outcome: Spoke to patient's granddaugther - Dianna     Message: Called to inform patient of DYANA recommendations. Per patient's granddaughter, patient is not currently having any chest pain, shortness of breath, dizziness, etc. ER precautions advised. Patient's grand daughter verbalized understanding. No further questions at this time.         ----- Message from LIBERTY Pedraza sent at 11/22/2023  2:19 PM PST -----  Her stress test is abnormal and it shows a reduction of the EF to 38% as compared to her recent echo.  I would recommend patient to be sent for coronary angiogram to evaluate further as I do not see a history of MI, but pt not likely to be scheduled   for Cath by Next week, Dr. Weathers to make final recommendations next week.    Please call patient and give her ER precautions for worsening symptoms.

## 2024-02-20 ENCOUNTER — TELEPHONE (OUTPATIENT)
Dept: CARDIOLOGY | Facility: MEDICAL CENTER | Age: 84
End: 2024-02-20
Payer: MEDICARE

## 2024-02-20 NOTE — TELEPHONE ENCOUNTER
PT NS on 2/13. Called granddaughter who stated she will call scheduling back when she is ready to ada appt. [place]

## 2024-04-29 ENCOUNTER — APPOINTMENT (OUTPATIENT)
Dept: RADIOLOGY | Facility: MEDICAL CENTER | Age: 84
DRG: 305 | End: 2024-04-29
Attending: EMERGENCY MEDICINE
Payer: MEDICARE

## 2024-04-29 ENCOUNTER — HOSPITAL ENCOUNTER (INPATIENT)
Facility: MEDICAL CENTER | Age: 84
DRG: 305 | End: 2024-04-29
Attending: EMERGENCY MEDICINE | Admitting: STUDENT IN AN ORGANIZED HEALTH CARE EDUCATION/TRAINING PROGRAM
Payer: MEDICARE

## 2024-04-29 DIAGNOSIS — I16.0 HYPERTENSIVE URGENCY: ICD-10-CM

## 2024-04-29 DIAGNOSIS — R29.6 RECURRENT FALLS: ICD-10-CM

## 2024-04-29 DIAGNOSIS — S09.90XA CLOSED HEAD INJURY, INITIAL ENCOUNTER: ICD-10-CM

## 2024-04-29 DIAGNOSIS — G89.29 CHRONIC LOW BACK PAIN WITHOUT SCIATICA, UNSPECIFIED BACK PAIN LATERALITY: ICD-10-CM

## 2024-04-29 DIAGNOSIS — I16.1 HYPERTENSIVE EMERGENCY: ICD-10-CM

## 2024-04-29 DIAGNOSIS — R26.81 GAIT INSTABILITY: ICD-10-CM

## 2024-04-29 DIAGNOSIS — M54.50 CHRONIC LOW BACK PAIN WITHOUT SCIATICA, UNSPECIFIED BACK PAIN LATERALITY: ICD-10-CM

## 2024-04-29 DIAGNOSIS — R07.9 CHEST PAIN, UNSPECIFIED TYPE: ICD-10-CM

## 2024-04-29 DIAGNOSIS — R55 SYNCOPE, UNSPECIFIED SYNCOPE TYPE: ICD-10-CM

## 2024-04-29 PROBLEM — R42 DIZZINESS: Status: ACTIVE | Noted: 2024-04-29

## 2024-04-29 PROBLEM — I27.20 PULMONARY HYPERTENSION (HCC): Status: ACTIVE | Noted: 2024-04-29

## 2024-04-29 PROBLEM — D72.823 LEUKEMOID REACTION: Status: ACTIVE | Noted: 2024-04-29

## 2024-04-29 PROBLEM — I10 ORTHOSTATIC HYPERTENSION: Status: ACTIVE | Noted: 2024-04-29

## 2024-04-29 PROBLEM — Z78.9 FULL CODE STATUS: Status: ACTIVE | Noted: 2024-04-29

## 2024-04-29 PROBLEM — I95.1 ORTHOSTATIC HYPOTENSION: Status: ACTIVE | Noted: 2024-04-29

## 2024-04-29 LAB
ALBUMIN SERPL BCP-MCNC: 4 G/DL (ref 3.2–4.9)
ALBUMIN/GLOB SERPL: 1.3 G/DL
ALP SERPL-CCNC: 91 U/L (ref 30–99)
ALT SERPL-CCNC: 13 U/L (ref 2–50)
ANION GAP SERPL CALC-SCNC: 12 MMOL/L (ref 7–16)
APPEARANCE UR: CLEAR
AST SERPL-CCNC: 15 U/L (ref 12–45)
BASOPHILS # BLD AUTO: 0.2 % (ref 0–1.8)
BASOPHILS # BLD: 0.03 K/UL (ref 0–0.12)
BILIRUB SERPL-MCNC: 0.4 MG/DL (ref 0.1–1.5)
BILIRUB UR QL STRIP.AUTO: NEGATIVE
BUN SERPL-MCNC: 34 MG/DL (ref 8–22)
CALCIUM ALBUM COR SERPL-MCNC: 9.2 MG/DL (ref 8.5–10.5)
CALCIUM SERPL-MCNC: 9.2 MG/DL (ref 8.5–10.5)
CHLORIDE SERPL-SCNC: 100 MMOL/L (ref 96–112)
CO2 SERPL-SCNC: 26 MMOL/L (ref 20–33)
COLOR UR: YELLOW
CREAT SERPL-MCNC: 0.84 MG/DL (ref 0.5–1.4)
EKG IMPRESSION: NORMAL
EOSINOPHIL # BLD AUTO: 0.01 K/UL (ref 0–0.51)
EOSINOPHIL NFR BLD: 0.1 % (ref 0–6.9)
ERYTHROCYTE [DISTWIDTH] IN BLOOD BY AUTOMATED COUNT: 44.8 FL (ref 35.9–50)
GFR SERPLBLD CREATININE-BSD FMLA CKD-EPI: 68 ML/MIN/1.73 M 2
GLOBULIN SER CALC-MCNC: 3 G/DL (ref 1.9–3.5)
GLUCOSE SERPL-MCNC: 145 MG/DL (ref 65–99)
GLUCOSE UR STRIP.AUTO-MCNC: NEGATIVE MG/DL
HCT VFR BLD AUTO: 43.6 % (ref 37–47)
HGB BLD-MCNC: 14.9 G/DL (ref 12–16)
IMM GRANULOCYTES # BLD AUTO: 0.08 K/UL (ref 0–0.11)
IMM GRANULOCYTES NFR BLD AUTO: 0.6 % (ref 0–0.9)
KETONES UR STRIP.AUTO-MCNC: NEGATIVE MG/DL
LEUKOCYTE ESTERASE UR QL STRIP.AUTO: NEGATIVE
LYMPHOCYTES # BLD AUTO: 1.66 K/UL (ref 1–4.8)
LYMPHOCYTES NFR BLD: 13.2 % (ref 22–41)
MCH RBC QN AUTO: 30.8 PG (ref 27–33)
MCHC RBC AUTO-ENTMCNC: 34.2 G/DL (ref 32.2–35.5)
MCV RBC AUTO: 90.3 FL (ref 81.4–97.8)
MICRO URNS: NORMAL
MONOCYTES # BLD AUTO: 0.4 K/UL (ref 0–0.85)
MONOCYTES NFR BLD AUTO: 3.2 % (ref 0–13.4)
NEUTROPHILS # BLD AUTO: 10.36 K/UL (ref 1.82–7.42)
NEUTROPHILS NFR BLD: 82.7 % (ref 44–72)
NITRITE UR QL STRIP.AUTO: NEGATIVE
NRBC # BLD AUTO: 0 K/UL
NRBC BLD-RTO: 0 /100 WBC (ref 0–0.2)
NT-PROBNP SERPL IA-MCNC: 520 PG/ML (ref 0–125)
PH UR STRIP.AUTO: 7 [PH] (ref 5–8)
PLATELET # BLD AUTO: 326 K/UL (ref 164–446)
PMV BLD AUTO: 8 FL (ref 9–12.9)
POTASSIUM SERPL-SCNC: 3.9 MMOL/L (ref 3.6–5.5)
PROT SERPL-MCNC: 7 G/DL (ref 6–8.2)
PROT UR QL STRIP: NEGATIVE MG/DL
RBC # BLD AUTO: 4.83 M/UL (ref 4.2–5.4)
RBC UR QL AUTO: NEGATIVE
SODIUM SERPL-SCNC: 138 MMOL/L (ref 135–145)
SP GR UR STRIP.AUTO: 1.01
T4 FREE SERPL-MCNC: 1.74 NG/DL (ref 0.93–1.7)
TROPONIN T SERPL-MCNC: 14 NG/L (ref 6–19)
TROPONIN T SERPL-MCNC: 18 NG/L (ref 6–19)
TSH SERPL DL<=0.005 MIU/L-ACNC: 0.57 UIU/ML (ref 0.38–5.33)
UROBILINOGEN UR STRIP.AUTO-MCNC: 0.2 MG/DL
WBC # BLD AUTO: 12.5 K/UL (ref 4.8–10.8)

## 2024-04-29 PROCEDURE — A9270 NON-COVERED ITEM OR SERVICE: HCPCS | Performed by: STUDENT IN AN ORGANIZED HEALTH CARE EDUCATION/TRAINING PROGRAM

## 2024-04-29 PROCEDURE — G0378 HOSPITAL OBSERVATION PER HR: HCPCS

## 2024-04-29 PROCEDURE — 700111 HCHG RX REV CODE 636 W/ 250 OVERRIDE (IP): Performed by: STUDENT IN AN ORGANIZED HEALTH CARE EDUCATION/TRAINING PROGRAM

## 2024-04-29 PROCEDURE — 700105 HCHG RX REV CODE 258: Performed by: EMERGENCY MEDICINE

## 2024-04-29 PROCEDURE — 83880 ASSAY OF NATRIURETIC PEPTIDE: CPT

## 2024-04-29 PROCEDURE — 700101 HCHG RX REV CODE 250: Performed by: STUDENT IN AN ORGANIZED HEALTH CARE EDUCATION/TRAINING PROGRAM

## 2024-04-29 PROCEDURE — 700102 HCHG RX REV CODE 250 W/ 637 OVERRIDE(OP): Performed by: STUDENT IN AN ORGANIZED HEALTH CARE EDUCATION/TRAINING PROGRAM

## 2024-04-29 PROCEDURE — 84443 ASSAY THYROID STIM HORMONE: CPT

## 2024-04-29 PROCEDURE — 93005 ELECTROCARDIOGRAM TRACING: CPT | Performed by: EMERGENCY MEDICINE

## 2024-04-29 PROCEDURE — 36415 COLL VENOUS BLD VENIPUNCTURE: CPT

## 2024-04-29 PROCEDURE — 84439 ASSAY OF FREE THYROXINE: CPT

## 2024-04-29 PROCEDURE — 96376 TX/PRO/DX INJ SAME DRUG ADON: CPT

## 2024-04-29 PROCEDURE — 81003 URINALYSIS AUTO W/O SCOPE: CPT

## 2024-04-29 PROCEDURE — 84484 ASSAY OF TROPONIN QUANT: CPT

## 2024-04-29 PROCEDURE — 85025 COMPLETE CBC W/AUTO DIFF WBC: CPT

## 2024-04-29 PROCEDURE — 99285 EMERGENCY DEPT VISIT HI MDM: CPT

## 2024-04-29 PROCEDURE — 96374 THER/PROPH/DIAG INJ IV PUSH: CPT

## 2024-04-29 PROCEDURE — 80053 COMPREHEN METABOLIC PANEL: CPT

## 2024-04-29 RX ORDER — LABETALOL HYDROCHLORIDE 5 MG/ML
10 INJECTION, SOLUTION INTRAVENOUS ONCE
Status: COMPLETED | OUTPATIENT
Start: 2024-04-29 | End: 2024-04-29

## 2024-04-29 RX ORDER — LEVOTHYROXINE SODIUM 0.05 MG/1
100 TABLET ORAL
Status: DISCONTINUED | OUTPATIENT
Start: 2024-04-30 | End: 2024-04-29

## 2024-04-29 RX ORDER — AMLODIPINE BESYLATE 5 MG/1
5 TABLET ORAL ONCE
Status: COMPLETED | OUTPATIENT
Start: 2024-04-29 | End: 2024-04-29

## 2024-04-29 RX ORDER — LEVOTHYROXINE SODIUM 0.05 MG/1
50 TABLET ORAL
Status: DISCONTINUED | OUTPATIENT
Start: 2024-04-30 | End: 2024-05-01 | Stop reason: HOSPADM

## 2024-04-29 RX ORDER — BRIMONIDINE TARTRATE AND TIMOLOL MALEATE 2; 5 MG/ML; MG/ML
1 SOLUTION OPHTHALMIC 3 TIMES DAILY
Status: DISCONTINUED | OUTPATIENT
Start: 2024-04-29 | End: 2024-04-29

## 2024-04-29 RX ORDER — POLYETHYLENE GLYCOL 3350 17 G/17G
1 POWDER, FOR SOLUTION ORAL
Status: DISCONTINUED | OUTPATIENT
Start: 2024-04-29 | End: 2024-05-01 | Stop reason: HOSPADM

## 2024-04-29 RX ORDER — HYDRALAZINE HYDROCHLORIDE 20 MG/ML
20 INJECTION INTRAMUSCULAR; INTRAVENOUS EVERY 6 HOURS PRN
Status: DISCONTINUED | OUTPATIENT
Start: 2024-04-29 | End: 2024-05-01 | Stop reason: HOSPADM

## 2024-04-29 RX ORDER — ACETAMINOPHEN 325 MG/1
650 TABLET ORAL EVERY 6 HOURS PRN
Status: DISCONTINUED | OUTPATIENT
Start: 2024-04-29 | End: 2024-05-01 | Stop reason: HOSPADM

## 2024-04-29 RX ORDER — CARVEDILOL 3.12 MG/1
3.12 TABLET ORAL 2 TIMES DAILY WITH MEALS
Status: DISCONTINUED | OUTPATIENT
Start: 2024-04-29 | End: 2024-05-01 | Stop reason: HOSPADM

## 2024-04-29 RX ORDER — AMOXICILLIN 250 MG
2 CAPSULE ORAL 2 TIMES DAILY
Status: DISCONTINUED | OUTPATIENT
Start: 2024-04-29 | End: 2024-05-01 | Stop reason: HOSPADM

## 2024-04-29 RX ORDER — AMLODIPINE BESYLATE 5 MG/1
5 TABLET ORAL DAILY
Status: DISCONTINUED | OUTPATIENT
Start: 2024-04-29 | End: 2024-04-29

## 2024-04-29 RX ORDER — BRIMONIDINE TARTRATE 2 MG/ML
1 SOLUTION/ DROPS OPHTHALMIC EVERY 8 HOURS
Status: DISCONTINUED | OUTPATIENT
Start: 2024-04-29 | End: 2024-05-01 | Stop reason: HOSPADM

## 2024-04-29 RX ORDER — AMLODIPINE BESYLATE 10 MG/1
10 TABLET ORAL DAILY
Status: DISCONTINUED | OUTPATIENT
Start: 2024-04-30 | End: 2024-05-01 | Stop reason: HOSPADM

## 2024-04-29 RX ORDER — M-VIT,TX,IRON,MINS/CALC/FOLIC 27MG-0.4MG
1 TABLET ORAL DAILY
Status: DISCONTINUED | OUTPATIENT
Start: 2024-04-30 | End: 2024-05-01 | Stop reason: HOSPADM

## 2024-04-29 RX ORDER — LATANOPROST 50 UG/ML
1 SOLUTION/ DROPS OPHTHALMIC NIGHTLY
Status: DISCONTINUED | OUTPATIENT
Start: 2024-04-29 | End: 2024-05-01 | Stop reason: HOSPADM

## 2024-04-29 RX ORDER — SPIRONOLACTONE 25 MG/1
25 TABLET ORAL
Status: DISCONTINUED | OUTPATIENT
Start: 2024-04-29 | End: 2024-04-30

## 2024-04-29 RX ORDER — LOSARTAN POTASSIUM 50 MG/1
100 TABLET ORAL DAILY
Status: DISCONTINUED | OUTPATIENT
Start: 2024-04-30 | End: 2024-05-01 | Stop reason: HOSPADM

## 2024-04-29 RX ORDER — SODIUM CHLORIDE, SODIUM LACTATE, POTASSIUM CHLORIDE, CALCIUM CHLORIDE 600; 310; 30; 20 MG/100ML; MG/100ML; MG/100ML; MG/100ML
1000 INJECTION, SOLUTION INTRAVENOUS ONCE
Status: COMPLETED | OUTPATIENT
Start: 2024-04-29 | End: 2024-04-29

## 2024-04-29 RX ORDER — LABETALOL HYDROCHLORIDE 5 MG/ML
10 INJECTION, SOLUTION INTRAVENOUS EVERY 4 HOURS PRN
Status: DISCONTINUED | OUTPATIENT
Start: 2024-04-29 | End: 2024-05-01 | Stop reason: HOSPADM

## 2024-04-29 RX ORDER — TIMOLOL MALEATE 5 MG/ML
1 SOLUTION/ DROPS OPHTHALMIC EVERY 8 HOURS
Status: DISCONTINUED | OUTPATIENT
Start: 2024-04-29 | End: 2024-05-01 | Stop reason: HOSPADM

## 2024-04-29 RX ADMIN — SODIUM CHLORIDE, POTASSIUM CHLORIDE, SODIUM LACTATE AND CALCIUM CHLORIDE 1000 ML: 600; 310; 30; 20 INJECTION, SOLUTION INTRAVENOUS at 12:50

## 2024-04-29 RX ADMIN — BRIMONIDINE TARTRATE 1 DROP: 2 SOLUTION OPHTHALMIC at 21:52

## 2024-04-29 RX ADMIN — CARVEDILOL 3.12 MG: 3.12 TABLET, FILM COATED ORAL at 22:21

## 2024-04-29 RX ADMIN — LABETALOL HYDROCHLORIDE 10 MG: 5 INJECTION INTRAVENOUS at 16:06

## 2024-04-29 RX ADMIN — LABETALOL HYDROCHLORIDE 10 MG: 5 INJECTION INTRAVENOUS at 19:14

## 2024-04-29 RX ADMIN — TIMOLOL MALEATE 1 DROP: 5 SOLUTION OPHTHALMIC at 21:52

## 2024-04-29 RX ADMIN — SPIRONOLACTONE 25 MG: 25 TABLET ORAL at 19:47

## 2024-04-29 RX ADMIN — RIVAROXABAN 10 MG: 10 TABLET, FILM COATED ORAL at 18:20

## 2024-04-29 RX ADMIN — AMLODIPINE BESYLATE 5 MG: 5 TABLET ORAL at 18:20

## 2024-04-29 RX ADMIN — LATANOPROST 1 DROP: 50 SOLUTION OPHTHALMIC at 21:52

## 2024-04-29 ASSESSMENT — ENCOUNTER SYMPTOMS
PALPITATIONS: 0
ABDOMINAL PAIN: 0
NECK PAIN: 1
HEADACHES: 1
SHORTNESS OF BREATH: 0
NAUSEA: 0
DIZZINESS: 1
COUGH: 0
FEVER: 0
CHILLS: 0
MYALGIAS: 0
VOMITING: 0

## 2024-04-29 ASSESSMENT — FIBROSIS 4 INDEX
FIB4 SCORE: 1.12
FIB4 SCORE: 1.08

## 2024-04-29 ASSESSMENT — PAIN DESCRIPTION - PAIN TYPE
TYPE: ACUTE PAIN
TYPE: ACUTE PAIN

## 2024-04-29 NOTE — ED TRIAGE NOTES
Chief Complaint   Patient presents with    Dizziness     BIB family.  Patient reports dizziness resulting in fall over the past several months. Patient states she almost syncopal, but never fully loses consciousness. Today patient experienced a fall due to her dizziness. Patient states she hit the front and back of her head during the fall.

## 2024-04-29 NOTE — ED NOTES
Med rec is complete per family at bedside.     Per family Pt does not take Metoprolol.    Allergies reviewed.    Has patient had any outside antibiotics in the last 30 days? N    Any Anticoagulants (rivaroxaban, apixaban, edoxaban, dabigatran, warfarin, enoxaparin) taken in the last 14 days? N       Pharmacy/Pharmacies Pt utilizes : [facility] [phone]

## 2024-04-29 NOTE — H&P
[facility] Medicine History & Physical Note    Date of Service  4/29/2024    Primary Care Physician  LIBERTY Dyer    Consultants       Code Status  Full Code    Chief Complaint  Chief Complaint   Patient presents with    Dizziness     BIB family.  Patient reports dizziness resulting in fall over the past several months. Patient states she almost syncopal, but never fully loses consciousness. Today patient experienced a fall due to her dizziness. Patient states she hit the front and back of her head during the fall.     Hospital  was offered to the patient and granddaughter but they declined.      History of Presenting Illness  Lizzette Burton is a 84 y.o. female who presented 4/29/2024 with dizziness and ground-level fall.  This is a pleasant woman with a history of hypertension, GERD, asthma, urinary incontinence on Myrbetriq, pulmonary hypertension, previous stroke.  She is accompanied by her granddaughter, who provided ancillary history.  Patient had been feeling intermittently dizzy.  She has orthostatic hypotension symptoms such as darkening of vision when she gets up too quickly from the toilet.  The patient has been complaining of intermittent dizziness for several months.  Patient was going to the bathroom when she started to feel dizzy falling backwards and hitting her head on the ground.  She did not lose any consciousness.  No nausea or vomiting.  She was complaining of headache and neck pain, for which underwent a CT scan of the head as well as neck, which was negative for acute process.    Daughter reported that the patient's blood pressures were usually in the 150s to 180s/90s to 100s.  In the emergency room, blood pressure increased to 225/102.  She did receive labetalol 10 mg IV with transient decrease to 179/87 but then increased back up to 211/113.  NT-proBNP mildly elevated at 520.    I discussed the plan of care with patient, family, bedside RN, , and ER  physician Dr. Castañeda .    Review of Systems  Review of Systems   Constitutional:  Negative for chills and fever.   Respiratory:  Negative for cough and shortness of breath.    Cardiovascular:  Negative for chest pain and palpitations.   Gastrointestinal:  Negative for abdominal pain, nausea and vomiting.   Musculoskeletal:  Positive for neck pain. Negative for joint pain and myalgias.   Neurological:  Positive for dizziness and headaches.       Past Medical History   has a past medical history of Acute renal failure (HCC) (02/11/2014), Anesthesia, Angina, Arthritis, Asthma, Breath shortness, CATARACT, Dental disorder, Glaucoma, Heart burn, Hypertension, Indigestion, Pneumonia (03/2018), Sepsis (HCC) (02/08/2014), Stroke (HCC), Unspecified disorder of thyroid, Unspecified hemorrhagic conditions (2009), Urinary bladder disorder, and Urinary incontinence.    Surgical History   has a past surgical history that includes hysterectomy, total abdominal (01/01/1989); gyn surgery; trabeculectomy (09/26/2012); cataract phaco with iol (09/26/2012); knee arthroplasty total (02/05/2009); baerveldt implant (12/12/2012); baerveldt implant (03/13/2013); baerveldt implant (05/08/2013); cataract phaco with iol (05/08/2013); knee arthroplasty total (Left, 08/20/2015); anterior and posterior repair (03/20/2017); enterocele repair (03/20/2017); bladder sling female (03/20/2017); vaginal suspension (03/20/2017); shoulder arthroplasty total (Right, 05/02/2018); other; and muscle biopsy (Left, 10/18/2023).     Family History  family history is not on file.   Family history reviewed with patient. There is no family history that is pertinent to the chief complaint.     Social History   reports that she quit smoking about 22 years ago. Her smoking use included cigarettes. She started smoking about 42 years ago. She has a 10 pack-year smoking history. She has never used smokeless tobacco. She reports that she does not drink alcohol and does  not use drugs.    Allergies  No Known Allergies    Medications  Prior to Admission Medications   Prescriptions Last Dose Informant Patient Reported? Taking?   Brimonidine Tartrate-Timolol 0.2-0.5 % Solution 4/28/2024 at pm Family Member Yes Yes   Sig: Administer 1 Drop into both eyes in the morning, at noon, and at bedtime.   acetaminophen (TYLENOL) 325 MG Tab 4/29/2024 at 0600 Family Member Yes Yes   Sig: Take 650 mg by mouth every 6 hours as needed for Moderate Pain.   alendronate (FOSAMAX) 70 MG Tab 4/29/2024 at 0600 Family Member Yes Yes   Sig: Take 70 mg by mouth every 7 days.   amLODIPine (NORVASC) 5 MG Tab 4/29/2024 at 0600 Family Member No Yes   Sig: Take 1 Tablet by mouth every day.   latanoprost (XALATAN) 0.005 % Solution 4/28/2024 at pm Family Member Yes Yes   Sig: Administer 1 Drop into both eyes every evening.   levothyroxine (SYNTHROID) 100 MCG Tab 4/29/2024 at 0600 Family Member No Yes   Sig: Take 1 Tablet by mouth every morning before breakfast.   losartan (COZAAR) 100 MG Tab 4/29/2024 at 0600 Family Member No Yes   Sig: Take 1 Tablet by mouth every day.   therapeutic multivitamin-minerals (THERAGRAN-M) Tab 4/28/2024 at am Family Member Yes Yes   Sig: Take 1 Tablet by mouth every day.      Facility-Administered Medications: None       Physical Exam  Temp:  [36.4 °C (97.6 °F)-36.6 °C (97.9 °F)] 36.4 °C (97.6 °F)  Pulse:  [58-76] 65  Resp:  [16-20] 18  BP: (132-240)/() 171/86  SpO2:  [91 %-96 %] 93 %  Blood Pressure : (!) 195/88   Temperature: 36.6 °C (97.8 °F)   Pulse: (!) 58   Respiration: 20   Pulse Oximetry: 91 %       Physical Exam  Vitals and nursing note reviewed. Exam conducted with a chaperone present.   Constitutional:       General: She is not in acute distress.     Appearance: She is well-developed. She is obese. She is not diaphoretic.   HENT:      Head: Normocephalic and atraumatic.      Right Ear: External ear normal.      Left Ear: External ear normal.      Nose: Nose normal.       Mouth/Throat:      Pharynx: Oropharynx is clear. No oropharyngeal exudate or posterior oropharyngeal erythema.   Eyes:      General: No scleral icterus.        Right eye: No discharge.         Left eye: No discharge.      Conjunctiva/sclera: Conjunctivae normal.      Pupils: Pupils are equal, round, and reactive to light.   Neck:      Thyroid: No thyromegaly.      Vascular: No JVD.      Trachea: No tracheal deviation.   Cardiovascular:      Rate and Rhythm: Normal rate and regular rhythm.      Pulses: Normal pulses.      Heart sounds: Normal heart sounds. No murmur heard.     No friction rub. No gallop.   Pulmonary:      Effort: Pulmonary effort is normal. No respiratory distress.      Breath sounds: Normal breath sounds. No stridor. No wheezing or rales.   Abdominal:      General: Bowel sounds are normal. There is no distension.      Palpations: Abdomen is soft. There is no mass.      Tenderness: There is no abdominal tenderness. There is no guarding or rebound.   Musculoskeletal:         General: No tenderness or deformity.      Cervical back: Neck supple.      Right lower leg: No edema.      Left lower leg: No edema.   Lymphadenopathy:      Cervical: No cervical adenopathy.   Skin:     General: Skin is warm and dry.      Coloration: Skin is not pale.      Findings: No erythema or rash.   Neurological:      Mental Status: She is alert and oriented to person, place, and time.      Sensory: No sensory deficit.      Motor: No weakness or abnormal muscle tone.   Psychiatric:         Behavior: Behavior normal.         Thought Content: Thought content normal.         Judgment: Judgment normal.         Laboratory:  Recent Labs     04/29/24  1145   WBC 12.5*   RBC 4.83   HEMOGLOBIN 14.9   HEMATOCRIT 43.6   MCV 90.3   MCH 30.8   MCHC 34.2   RDW 44.8   PLATELETCT 326   MPV 8.0*     Recent Labs     04/29/24  1145   SODIUM 138   POTASSIUM 3.9   CHLORIDE 100   CO2 26   GLUCOSE 145*   BUN 34*   CREATININE 0.84   CALCIUM 9.2      Recent Labs     04/29/24  1145   ALTSGPT 13   ASTSGOT 15   ALKPHOSPHAT 91   TBILIRUBIN 0.4   GLUCOSE 145*         Recent Labs     04/29/24  1352   NTPROBNP 520*         Recent Labs     04/29/24  1145 04/29/24  1352   TROPONINT 18 14       Imaging:  DX-CHEST-PORTABLE (1 VIEW)   Final Result      1.  Perihilar predominant interstitial thickening, likely edema. Multifocal pneumonia can have a similar appearance.   2.  Mild cardiomegaly.      CT-CSPINE WITHOUT PLUS RECONS   Final Result      1.  Alignment is unchanged from prior exam.   2.  No fracture or subluxation   3.  Moderate degenerative disc disease again seen primarily at the C5-6 level.      CT-HEAD W/O   Final Result      1. Cerebral atrophy.   2. White matter lucencies most consistent with small vessel ischemic change versus demyelination or gliosis.   3. Otherwise, Head CT without contrast with no acute findings. No evidence of acute cerebral infarction, hemorrhage or mass lesion.   4. Trace right mastoid effusion.         MR-BRAIN-W/O    (Results Pending)     Chest x-ray per my review shows interstitial prominence concerning for pulmonary edema.  Sharp costophrenic angles bilaterally.  Calcifications in the aorta.      EKG per my read shows normal sinus rhythm heart rate of 69, QTc 440, no significant ST elevation or depression.    Assessment/Plan:  Justification for Admission Status  I anticipate this patient is appropriate for observation status at this time because patient presents with hypertensive urgency, dizziness, and ground-level fall    Patient will need a Telemetry bed on MEDICAL service .  The need is secondary to hypertensive urgency.    * Hypertensive urgency- (present on admission)  Assessment & Plan  Blood pressure is high as 225/102.  Persistently in the 200s despite IV labetalol in the emergency room.    Admit to telemetry level observational status  Continue labetalol IV and hydralazine IV for SBP greater than 180.  Continuous  telemetry monitoring during IV antihypertensive medication use    Per record review, patient was previously on carvedilol 6.25 mg twice daily.  Uncertain why patient is no longer on this.  I am resuming carvedilol 3.125 mg twice daily with addition of spironolactone 25 mg daily in setting of her pulmonary hypertension    Orthostatic hypotension- (present on admission)  Assessment & Plan  Vision changes with getting up too quickly from the toilet consistent with orthostatic hypotension.    I have recommended discontinuation of Myrbetriq due to anticholinergic effects    Dizziness- (present on admission)  Assessment & Plan  Sounds like partially orthostatic in nature.  Reportedly history of stroke.  Head CT and CT scan of the C-spine with no acute findings.  Moderate degenerative disc disease seen at C5-6.    I have ordered MRI of the brain without contrast  Discontinue Myrbetriq due to anticholinergic effects  Continuous cardiac monitoring    Leukemoid reaction- (present on admission)  Assessment & Plan  Elevated white count likely reactive.  No signs of infection    Pulmonary hypertension (HCC)- (present on admission)  Assessment & Plan  Echocardiogram in November 2023 showing RVSP of 46 mmHg.    Start spironolactone 25 mg daily    Hypothyroidism- (present on admission)  Assessment & Plan  As per history.  TSH is low at 0.57.  Free T4 is elevated at 1.74.  Goal of 4-6 given patient's age group.    Decrease levothyroxine to 50 mcg daily    Full code status- (present on admission)  Assessment & Plan  I discussed the CODE STATUS with the patient.  She wishes to be full code.        VTE prophylaxis: SCDs/TEDs and Xarelto 10 mg daily as prophylaxis

## 2024-04-29 NOTE — ED PROVIDER NOTES
ED Provider Note    CHIEF COMPLAINT  No chief complaint on file.  Syncope, chest pain.    EXTERNAL RECORDS REVIEWED  Reviewed based on labs and previous similar workups for comparison.    HPI/ROS  LIMITATION TO HISTORY   Select: Language  ,  Used   OUTSIDE HISTORIAN(S):  Family bedside provide some additional history.    Lizzette Burton is a 84 y.o. female who presents to the emergency department for evaluation of closed head injury.  Was called to see this patient emergently because she had a syncopal episode.  Patient was at home felt dizzy and hit her head against a wall and fell backwards hitting her head on the ground.  She comes in complaining head pain and neck pain.  She also some chest tightness and some shortness of breath.    PAST MEDICAL HISTORY   has a past medical history of Acute renal failure (HCC) (02/11/2014), Anesthesia, Angina, Arthritis, Asthma, Breath shortness, CATARACT, Dental disorder, Glaucoma, Heart burn, Hypertension, Indigestion, Pneumonia (03/2018), Sepsis (HCC) (02/08/2014), Stroke (HCC), Unspecified disorder of thyroid, Unspecified hemorrhagic conditions (2009), Urinary bladder disorder, and Urinary incontinence.    SURGICAL HISTORY   has a past surgical history that includes hysterectomy, total abdominal (01/01/1989); gyn surgery; trabeculectomy (09/26/2012); cataract phaco with iol (09/26/2012); knee arthroplasty total (02/05/2009); baerveldt implant (12/12/2012); baerveldt implant (03/13/2013); baerveldt implant (05/08/2013); cataract phaco with iol (05/08/2013); knee arthroplasty total (Left, 08/20/2015); anterior and posterior repair (03/20/2017); enterocele repair (03/20/2017); bladder sling female (03/20/2017); vaginal suspension (03/20/2017); shoulder arthroplasty total (Right, 05/02/2018); other; and muscle biopsy (Left, 10/18/2023).    FAMILY HISTORY  No family history on file.    SOCIAL HISTORY  Social History     Tobacco Use    Smoking status: Former      Current packs/day: 0.00     Average packs/day: 0.5 packs/day for 20.0 years (10.0 ttl pk-yrs)     Types: Cigarettes     Start date: 3/16/1982     Quit date: 3/16/2002     Years since quittin.1    Smokeless tobacco: Never   Vaping Use    Vaping Use: Never used   Substance and Sexual Activity    Alcohol use: No    Drug use: No    Sexual activity: Not on file       CURRENT MEDICATIONS  Home Medications       Reviewed by Perla Borja (Pharmacy Tech) on 24 at 1240  Med List Status: Complete     Medication Last Dose Status   acetaminophen (TYLENOL) 325 MG Tab 2024 Active   alendronate (FOSAMAX) 70 MG Tab 2024 Active   amLODIPine (NORVASC) 5 MG Tab 2024 Active   Brimonidine Tartrate-Timolol 0.2-0.5 % Solution 2024 Active   latanoprost (XALATAN) 0.005 % Solution 2024 Active   levothyroxine (SYNTHROID) 100 MCG Tab 2024 Active   losartan (COZAAR) 100 MG Tab 2024 Active   mirabegron ER (MYRBETRIQ) 25 MG TABLET SR 24 HR 2024 Active   therapeutic multivitamin-minerals (THERAGRAN-M) Tab 2024 Active                    ALLERGIES  No Known Allergies    PHYSICAL EXAM  VITAL SIGNS: BP (!) 187/98   Pulse 76   Temp 36.6 °C (97.8 °F) (Temporal)   Resp 18   SpO2 96%    Constitutional: Awake alert anxious mildly ill-appearing.  HENT: Normocephalic, scalp tenderness posterior without hematoma.  Eyes: PERRL, EOMI, Conjunctiva normal, No discharge.   Neck: Positive midline tenderness no step-offs.  Cardiovascular: Normal heart rate, Normal rhythm, No murmurs, No rubs, No gallops.   Thorax & Lungs: Normal breath sounds, No respiratory distress, No wheezing, No chest tenderness.   Abdomen:Soft, No tenderness  Skin: Warm, Dry, No erythema, No rash.   Back: No tenderness, No CVA tenderness.   Musculoskeletal: Good range of motion in all major joints.   Neurologic: Alert, No focal deficits noted.         EKG/LABS    Results for orders placed or performed during the hospital  encounter of 04/29/24   CBC with Differential   Result Value Ref Range    WBC 12.5 (H) 4.8 - 10.8 K/uL    RBC 4.83 4.20 - 5.40 M/uL    Hemoglobin 14.9 12.0 - 16.0 g/dL    Hematocrit 43.6 37.0 - 47.0 %    MCV 90.3 81.4 - 97.8 fL    MCH 30.8 27.0 - 33.0 pg    MCHC 34.2 32.2 - 35.5 g/dL    RDW 44.8 35.9 - 50.0 fL    Platelet Count 326 164 - 446 K/uL    MPV 8.0 (L) 9.0 - 12.9 fL    Neutrophils-Polys 82.70 (H) 44.00 - 72.00 %    Lymphocytes 13.20 (L) 22.00 - 41.00 %    Monocytes 3.20 0.00 - 13.40 %    Eosinophils 0.10 0.00 - 6.90 %    Basophils 0.20 0.00 - 1.80 %    Immature Granulocytes 0.60 0.00 - 0.90 %    Nucleated RBC 0.00 0.00 - 0.20 /100 WBC    Neutrophils (Absolute) 10.36 (H) 1.82 - 7.42 K/uL    Lymphs (Absolute) 1.66 1.00 - 4.80 K/uL    Monos (Absolute) 0.40 0.00 - 0.85 K/uL    Eos (Absolute) 0.01 0.00 - 0.51 K/uL    Baso (Absolute) 0.03 0.00 - 0.12 K/uL    Immature Granulocytes (abs) 0.08 0.00 - 0.11 K/uL    NRBC (Absolute) 0.00 K/uL   Complete Metabolic Panel (CMP)   Result Value Ref Range    Sodium 138 135 - 145 mmol/L    Potassium 3.9 3.6 - 5.5 mmol/L    Chloride 100 96 - 112 mmol/L    Co2 26 20 - 33 mmol/L    Anion Gap 12.0 7.0 - 16.0    Glucose 145 (H) 65 - 99 mg/dL    Bun 34 (H) 8 - 22 mg/dL    Creatinine 0.84 0.50 - 1.40 mg/dL    Calcium 9.2 8.5 - 10.5 mg/dL    Correct Calcium 9.2 8.5 - 10.5 mg/dL    AST(SGOT) 15 12 - 45 U/L    ALT(SGPT) 13 2 - 50 U/L    Alkaline Phosphatase 91 30 - 99 U/L    Total Bilirubin 0.4 0.1 - 1.5 mg/dL    Albumin 4.0 3.2 - 4.9 g/dL    Total Protein 7.0 6.0 - 8.2 g/dL    Globulin 3.0 1.9 - 3.5 g/dL    A-G Ratio 1.3 g/dL   Troponins NOW   Result Value Ref Range    Troponin T 18 6 - 19 ng/L   Troponins in two (2) hours   Result Value Ref Range    Troponin T 14 6 - 19 ng/L   URINALYSIS CULTURE, IF INDICATED    Specimen: Urine, Clean Catch   Result Value Ref Range    Color Yellow     Character Clear     Specific Gravity 1.013 <1.035    Ph 7.0 5.0 - 8.0    Glucose Negative Negative  mg/dL    Ketones Negative Negative mg/dL    Protein Negative Negative mg/dL    Bilirubin Negative Negative    Urobilinogen, Urine 0.2 Negative    Nitrite Negative Negative    Leukocyte Esterase Negative Negative    Occult Blood Negative Negative    Micro Urine Req see below    ESTIMATED GFR   Result Value Ref Range    GFR (CKD-EPI) 68 >60 mL/min/1.73 m 2   TSH   Result Value Ref Range    TSH 0.570 0.380 - 5.330 uIU/mL   FREE THYROXINE   Result Value Ref Range    Free T-4 1.74 (H) 0.93 - 1.70 ng/dL   proBrain Natriuretic Peptide, NT   Result Value Ref Range    NT-proBNP 520 (H) 0 - 125 pg/mL   EKG   Result Value Ref Range    Report       [facility]    Test Date:  2024  Pt Name:    MONICA TRIMBLE                 Department: ER  MRN:        [id number]                      Room:        14 H  Gender:     Female                       Technician: [id number]  :        [id number]                   Requested By:KODY WARD  Order #:    [id number]                    Reading MD: KODY WADR. ARELY    Measurements  Intervals                                Axis  Rate:       69                           P:          3  FL:         201                          QRS:        -30  QRSD:       112                          T:          30  QT:         410  QTc:        440    Interpretive Statements  Sinus rhythm  Consider left atrial enlargement  RSR' in V1 or V2, probably normal variant  Left ventricular hypertrophy  Compared to ECG 2023 10:02:59  RSR' in V1 or V2 now present  Ventricular premature complex(es) no longer present  Electronically Signed On 2024 12:26:57 PDT by KODY GAGNON AMD        I have independently interpreted this EKG    RADIOLOGY/PROCEDURES   I have independently interpreted the diagnostic imaging associated with this visit and am waiting the final reading from the radiologist.   My preliminary interpretation is as follows: I reviewed the chest x-ray and  agree with radiologist interpretation.    Radiologist interpretation:  DX-CHEST-PORTABLE (1 VIEW)   Final Result      1.  Perihilar predominant interstitial thickening, likely edema. Multifocal pneumonia can have a similar appearance.   2.  Mild cardiomegaly.      CT-CSPINE WITHOUT PLUS RECONS   Final Result      1.  Alignment is unchanged from prior exam.   2.  No fracture or subluxation   3.  Moderate degenerative disc disease again seen primarily at the C5-6 level.      CT-HEAD W/O   Final Result      1. Cerebral atrophy.   2. White matter lucencies most consistent with small vessel ischemic change versus demyelination or gliosis.   3. Otherwise, Head CT without contrast with no acute findings. No evidence of acute cerebral infarction, hemorrhage or mass lesion.   4. Trace right mastoid effusion.             COURSE & MEDICAL DECISION MAKING    ASSESSMENT, COURSE AND PLAN  Care Narrative:     This 84-year-old female presents to the emergency department after syncopal episode.  Got lightheaded fell forward hit her head on the wall and then fell backwards and hit her head.  Did have a loss of consciousness.  She complains of head and neck pain.    Head and neck are not clinically clearable by Nexus criteria or Pana C-spine rules therefore imaging is obtained this is negative for acute pathology.    The patient also had a dizzy spell or near syncope she worked up with labs and EKG.  Differential diagnose includes anemia, electrolyte abnormality.      The patient was worked up with labs and imaging all this was unremarkable.  The cause of her symptoms were not identified.  Her blood pressure initially quite elevated and normalized and then it went up again.  Then it became somewhat improved.  Is somewhat labile.    Patient has any headache and she actually feels okay.  She is not lightheaded.  Try to ambulate the patient with a walker has fairly significant difficulty and for that reason I think she should be  hospitalized because of her fall, dizziness, difficulty walking, and syncope.  Case discussed with hospitalist medicine the patient should be hospitalized for further workup.              ADDITIONAL PROBLEMS MANAGED  Hypertension    DISPOSITION AND DISCUSSIONS  I have discussed management of the patient with the following physicians and LENO's: discussed with the [facility] hospitalist care transfer at that time.      FINAL DIAGNOSIS  1. Syncope, unspecified syncope type    2. Closed head injury, initial encounter    3. Hypertensive emergency    4. Gait instability           Electronically signed by: Chato Castañeda M.D., 4/29/2024 11:47 AM

## 2024-04-29 NOTE — ED NOTES
Patient to bathroom with granddaughter. Can sit to stand with walker assistance. She remains slightly unstable on her feet. Patient can stand and pivot to wheelchair.

## 2024-04-30 ENCOUNTER — APPOINTMENT (OUTPATIENT)
Dept: RADIOLOGY | Facility: MEDICAL CENTER | Age: 84
DRG: 305 | End: 2024-04-30
Attending: STUDENT IN AN ORGANIZED HEALTH CARE EDUCATION/TRAINING PROGRAM
Payer: MEDICARE

## 2024-04-30 ENCOUNTER — HOME HEALTH ADMISSION (OUTPATIENT)
Dept: HOME HEALTH SERVICES | Facility: HOME HEALTHCARE | Age: 84
End: 2024-04-30
Payer: MEDICARE

## 2024-04-30 VITALS
WEIGHT: 181.22 LBS | DIASTOLIC BLOOD PRESSURE: 65 MMHG | HEIGHT: 62 IN | RESPIRATION RATE: 18 BRPM | OXYGEN SATURATION: 95 % | TEMPERATURE: 97.5 F | BODY MASS INDEX: 33.35 KG/M2 | SYSTOLIC BLOOD PRESSURE: 132 MMHG | HEART RATE: 63 BPM

## 2024-04-30 LAB
ALBUMIN SERPL BCP-MCNC: 3.7 G/DL (ref 3.2–4.9)
ALBUMIN/GLOB SERPL: 1.5 G/DL
ALP SERPL-CCNC: 69 U/L (ref 30–99)
ALT SERPL-CCNC: 14 U/L (ref 2–50)
ANION GAP SERPL CALC-SCNC: 11 MMOL/L (ref 7–16)
AST SERPL-CCNC: 15 U/L (ref 12–45)
BASOPHILS # BLD AUTO: 0.3 % (ref 0–1.8)
BASOPHILS # BLD: 0.03 K/UL (ref 0–0.12)
BILIRUB SERPL-MCNC: 0.5 MG/DL (ref 0.1–1.5)
BUN SERPL-MCNC: 25 MG/DL (ref 8–22)
CALCIUM ALBUM COR SERPL-MCNC: 8.9 MG/DL (ref 8.5–10.5)
CALCIUM SERPL-MCNC: 8.7 MG/DL (ref 8.5–10.5)
CHLORIDE SERPL-SCNC: 99 MMOL/L (ref 96–112)
CO2 SERPL-SCNC: 26 MMOL/L (ref 20–33)
CREAT SERPL-MCNC: 0.71 MG/DL (ref 0.5–1.4)
EOSINOPHIL # BLD AUTO: 0.02 K/UL (ref 0–0.51)
EOSINOPHIL NFR BLD: 0.2 % (ref 0–6.9)
ERYTHROCYTE [DISTWIDTH] IN BLOOD BY AUTOMATED COUNT: 45.2 FL (ref 35.9–50)
GFR SERPLBLD CREATININE-BSD FMLA CKD-EPI: 83 ML/MIN/1.73 M 2
GLOBULIN SER CALC-MCNC: 2.5 G/DL (ref 1.9–3.5)
GLUCOSE SERPL-MCNC: 110 MG/DL (ref 65–99)
HCT VFR BLD AUTO: 38.1 % (ref 37–47)
HGB BLD-MCNC: 13.6 G/DL (ref 12–16)
IMM GRANULOCYTES # BLD AUTO: 0.07 K/UL (ref 0–0.11)
IMM GRANULOCYTES NFR BLD AUTO: 0.6 % (ref 0–0.9)
LYMPHOCYTES # BLD AUTO: 2.67 K/UL (ref 1–4.8)
LYMPHOCYTES NFR BLD: 23.6 % (ref 22–41)
MAGNESIUM SERPL-MCNC: 1.9 MG/DL (ref 1.5–2.5)
MCH RBC QN AUTO: 32.1 PG (ref 27–33)
MCHC RBC AUTO-ENTMCNC: 35.7 G/DL (ref 32.2–35.5)
MCV RBC AUTO: 89.9 FL (ref 81.4–97.8)
MONOCYTES # BLD AUTO: 0.67 K/UL (ref 0–0.85)
MONOCYTES NFR BLD AUTO: 5.9 % (ref 0–13.4)
NEUTROPHILS # BLD AUTO: 7.87 K/UL (ref 1.82–7.42)
NEUTROPHILS NFR BLD: 69.4 % (ref 44–72)
NRBC # BLD AUTO: 0 K/UL
NRBC BLD-RTO: 0 /100 WBC (ref 0–0.2)
PLATELET # BLD AUTO: 304 K/UL (ref 164–446)
PMV BLD AUTO: 8.1 FL (ref 9–12.9)
POTASSIUM SERPL-SCNC: 4.1 MMOL/L (ref 3.6–5.5)
PROT SERPL-MCNC: 6.2 G/DL (ref 6–8.2)
RBC # BLD AUTO: 4.24 M/UL (ref 4.2–5.4)
SODIUM SERPL-SCNC: 136 MMOL/L (ref 135–145)
WBC # BLD AUTO: 11.3 K/UL (ref 4.8–10.8)

## 2024-04-30 PROCEDURE — 99232 SBSQ HOSP IP/OBS MODERATE 35: CPT | Performed by: STUDENT IN AN ORGANIZED HEALTH CARE EDUCATION/TRAINING PROGRAM

## 2024-04-30 PROCEDURE — 700102 HCHG RX REV CODE 250 W/ 637 OVERRIDE(OP): Performed by: STUDENT IN AN ORGANIZED HEALTH CARE EDUCATION/TRAINING PROGRAM

## 2024-04-30 PROCEDURE — 700111 HCHG RX REV CODE 636 W/ 250 OVERRIDE (IP): Performed by: STUDENT IN AN ORGANIZED HEALTH CARE EDUCATION/TRAINING PROGRAM

## 2024-04-30 PROCEDURE — 97162 PT EVAL MOD COMPLEX 30 MIN: CPT

## 2024-04-30 PROCEDURE — 96375 TX/PRO/DX INJ NEW DRUG ADDON: CPT

## 2024-04-30 PROCEDURE — 97535 SELF CARE MNGMENT TRAINING: CPT

## 2024-04-30 PROCEDURE — 770020 HCHG ROOM/CARE - TELE (206)

## 2024-04-30 PROCEDURE — A9270 NON-COVERED ITEM OR SERVICE: HCPCS | Performed by: STUDENT IN AN ORGANIZED HEALTH CARE EDUCATION/TRAINING PROGRAM

## 2024-04-30 RX ORDER — SPIRONOLACTONE 25 MG/1
25 TABLET ORAL
Status: DISCONTINUED | OUTPATIENT
Start: 2024-04-30 | End: 2024-05-01 | Stop reason: HOSPADM

## 2024-04-30 RX ADMIN — BRIMONIDINE TARTRATE 1 DROP: 2 SOLUTION OPHTHALMIC at 14:07

## 2024-04-30 RX ADMIN — SPIRONOLACTONE 25 MG: 25 TABLET ORAL at 09:34

## 2024-04-30 RX ADMIN — TIMOLOL MALEATE 1 DROP: 5 SOLUTION OPHTHALMIC at 22:00

## 2024-04-30 RX ADMIN — CARVEDILOL 3.12 MG: 3.12 TABLET, FILM COATED ORAL at 07:25

## 2024-04-30 RX ADMIN — AMLODIPINE BESYLATE 10 MG: 10 TABLET ORAL at 05:28

## 2024-04-30 RX ADMIN — HYDRALAZINE HYDROCHLORIDE 20 MG: 20 INJECTION, SOLUTION INTRAMUSCULAR; INTRAVENOUS at 05:17

## 2024-04-30 RX ADMIN — CARVEDILOL 3.12 MG: 3.12 TABLET, FILM COATED ORAL at 17:45

## 2024-04-30 RX ADMIN — Medication 1 TABLET: at 05:27

## 2024-04-30 RX ADMIN — LATANOPROST 1 DROP: 50 SOLUTION OPHTHALMIC at 22:00

## 2024-04-30 RX ADMIN — TIMOLOL MALEATE 1 DROP: 5 SOLUTION OPHTHALMIC at 14:07

## 2024-04-30 RX ADMIN — BRIMONIDINE TARTRATE 1 DROP: 2 SOLUTION OPHTHALMIC at 21:59

## 2024-04-30 RX ADMIN — TIMOLOL MALEATE 1 DROP: 5 SOLUTION OPHTHALMIC at 05:28

## 2024-04-30 RX ADMIN — ACETAMINOPHEN 650 MG: 325 TABLET, FILM COATED ORAL at 09:34

## 2024-04-30 RX ADMIN — LOSARTAN POTASSIUM 100 MG: 50 TABLET, FILM COATED ORAL at 05:27

## 2024-04-30 RX ADMIN — LEVOTHYROXINE SODIUM 50 MCG: 0.05 TABLET ORAL at 07:25

## 2024-04-30 RX ADMIN — BRIMONIDINE TARTRATE 1 DROP: 2 SOLUTION OPHTHALMIC at 05:28

## 2024-04-30 ASSESSMENT — COGNITIVE AND FUNCTIONAL STATUS - GENERAL
SUGGESTED CMS G CODE MODIFIER MOBILITY: CK
TURNING FROM BACK TO SIDE WHILE IN FLAT BAD: A LITTLE
STANDING UP FROM CHAIR USING ARMS: A LITTLE
MOVING FROM LYING ON BACK TO SITTING ON SIDE OF FLAT BED: A LITTLE
WALKING IN HOSPITAL ROOM: A LITTLE
MOVING TO AND FROM BED TO CHAIR: A LITTLE
MOBILITY SCORE: 17
CLIMB 3 TO 5 STEPS WITH RAILING: A LOT

## 2024-04-30 ASSESSMENT — PATIENT HEALTH QUESTIONNAIRE - PHQ9
SUM OF ALL RESPONSES TO PHQ9 QUESTIONS 1 AND 2: 0
1. LITTLE INTEREST OR PLEASURE IN DOING THINGS: NOT AT ALL
2. FEELING DOWN, DEPRESSED, IRRITABLE, OR HOPELESS: NOT AT ALL

## 2024-04-30 ASSESSMENT — GAIT ASSESSMENTS
DEVIATION: BRADYKINETIC
ASSISTIVE DEVICE: FRONT WHEEL WALKER
DISTANCE (FEET): 25
GAIT LEVEL OF ASSIST: CONTACT GUARD ASSIST

## 2024-04-30 ASSESSMENT — ENCOUNTER SYMPTOMS
ABDOMINAL PAIN: 0
FEVER: 0
MEMORY LOSS: 1
SHORTNESS OF BREATH: 0
HEARTBURN: 0
ORTHOPNEA: 0
HEADACHES: 0
COUGH: 0
MYALGIAS: 1
FOCAL WEAKNESS: 0
DOUBLE VISION: 0
WEAKNESS: 1
NECK PAIN: 0
BLURRED VISION: 0
SPUTUM PRODUCTION: 0
DIZZINESS: 1
PALPITATIONS: 0
CHILLS: 0
NAUSEA: 0
DEPRESSION: 0
BACK PAIN: 1
SORE THROAT: 0
VOMITING: 0

## 2024-04-30 ASSESSMENT — LIFESTYLE VARIABLES
CONSUMPTION TOTAL: NEGATIVE
TOTAL SCORE: 0
EVER FELT BAD OR GUILTY ABOUT YOUR DRINKING: NO
TOTAL SCORE: 0
EVER HAD A DRINK FIRST THING IN THE MORNING TO STEADY YOUR NERVES TO GET RID OF A HANGOVER: NO
HAVE YOU EVER FELT YOU SHOULD CUT DOWN ON YOUR DRINKING: NO
HOW MANY TIMES IN THE PAST YEAR HAVE YOU HAD 5 OR MORE DRINKS IN A DAY: 0
ALCOHOL_USE: NO
DOES PATIENT WANT TO STOP DRINKING: NO
AVERAGE NUMBER OF DAYS PER WEEK YOU HAVE A DRINK CONTAINING ALCOHOL: 0
TOTAL SCORE: 0
HAVE PEOPLE ANNOYED YOU BY CRITICIZING YOUR DRINKING: NO
ON A TYPICAL DAY WHEN YOU DRINK ALCOHOL HOW MANY DRINKS DO YOU HAVE: 0

## 2024-04-30 ASSESSMENT — PAIN DESCRIPTION - PAIN TYPE: TYPE: ACUTE PAIN

## 2024-04-30 ASSESSMENT — FIBROSIS 4 INDEX: FIB4 SCORE: 1.12

## 2024-04-30 NOTE — PROGRESS NOTES
Assumed pt care. Updated pt and family on POC. Pt resting in bed with call light and belongings within reach. Family present at the bedside.

## 2024-04-30 NOTE — DISCHARGE PLANNING
ATTN: Case Management  RE: Referral for Home Health    As of 4.30.24, we have accepted the Home Health referral for the patient listed above.    A [facility] clinician will be out to see the patient within 48 hours. If you have any questions or concerns regarding the patient’s transition to Home Health, please do not hesitate to contact us at [phone].      We look forward to collaborating with you,  [facility] Health Team

## 2024-04-30 NOTE — DISCHARGE PLANNING
HTH/SCP TCN chart review completed. Collaborated with WINDY Desir prior to meeting with the pt. The most current review of medical record, knowledge of pt's PLOF and social support, LACE+ score of 73 was considered.  No 6 clicks scores.       Pt seen at bedside. Introduced TCN program. Provided education regarding post acute levels of care. Education provided regarding case management policy for blanket SNF referrals. Discussed HTH/SCP plan benefits. Pt verbalizes understanding.     Patient seen at bedside with granddaughter Dianna present.   offered but patient refused  and requested granddaughter to translate.  Patient lives with her daughter Denise in a 2nd floor apartment no elevator.  Her granddaughter stays in the evening when patients daughter Denise is at work.  Family assists patient with stairs, bathing/tub transfers, medication management and all IADL's, min A with dressing tasks, and provides all transportation at baseline.  Granddaughter does meal preparation.  She has a FWW, W/C, 3-in-1 commode (uses next to bed and also for a shower chair) and has a grab bar in shower/tub.  She is on RA  She states she is not at her baseline level of function and is in agreement with Home Health services.      CM previously obtained HH choice.  TCN will continue to follow and collaborate with discharge planning team as additional post acute needs arise. Thank you.     Completed today:  PT recommends HH via Voalte message by PT.    OT recommendations pending.    Choice obtained: HH ([facility]) obtained by WINDY.    SCP with [facility] PCP.  Patient requests granddaughter Troy to make Follow up appointment.

## 2024-04-30 NOTE — ASSESSMENT & PLAN NOTE
Vision changes with getting up too quickly from the toilet consistent with orthostatic hypotension.  I have recommended discontinuation of Myrbetriq due to anticholinergic effects    4/30: orthostatic BP neg

## 2024-04-30 NOTE — PROGRESS NOTES
Report received from night shift RN. Patient A&O, in bed resting comfortably. VSS, denies pain, bed in lowest position and locked, call light in reach.     Granddaughter at bedside.    MRI planned for this AM

## 2024-04-30 NOTE — ASSESSMENT & PLAN NOTE
Elevated white count likely reactive.  No signs of infection  4/30: slight downtrend; will continue to monitor

## 2024-04-30 NOTE — DISCHARGE PLANNING
Message sent to Aydin CASTELLON to update that [facility] order is in.

## 2024-04-30 NOTE — ASSESSMENT & PLAN NOTE
Sounds like partially orthostatic in nature.  Reportedly history of stroke.  Head CT and CT scan of the C-spine with no acute findings.  Moderate degenerative disc disease seen at C5-6.    MRI of the brain without contrast ordered  Discontinue Myrbetriq due to anticholinergic effects  Continuous cardiac monitoring    Orthostatic BP neg this AM

## 2024-04-30 NOTE — ASSESSMENT & PLAN NOTE
As per history.  TSH is low at 0.57.  Free T4 is elevated at 1.74.  Goal of 4-6 given patient's age group.  Decrease levothyroxine to 50 mcg daily

## 2024-04-30 NOTE — DISCHARGE PLANNING
Received Choice Form @: 5174  Agency/Facility Name: [facility]  Referral Sent Per Choice Form @: 5495

## 2024-04-30 NOTE — PROGRESS NOTES
[facility] Medicine Daily Progress Note    Date of Service  4/30/2024    Chief Complaint  Lizzette Burton is a 84 y.o. female admitted 4/29/2024 with dizziness and GLF    Hospital Course  Lizzette Burton is a 84 y.o. female who presented 4/29/2024 with dizziness and ground-level fall.  This is a pleasant woman with a history of hypertension, GERD, asthma, urinary incontinence on Myrbetriq, pulmonary hypertension, previous stroke.  She is accompanied by her granddaughter, who provided ancillary history.  Patient had been feeling intermittently dizzy.  She has orthostatic hypotension symptoms such as darkening of vision when she gets up too quickly from the toilet.  The patient has been complaining of intermittent dizziness for several months.  Patient was going to the bathroom when she started to feel dizzy falling backwards and hitting her head on the ground.  She did not lose any consciousness.  No nausea or vomiting.  She was complaining of headache and neck pain, for which underwent a CT scan of the head as well as neck, which was negative for acute process.     Daughter reported that the patient's blood pressures were usually in the 150s to 180s/90s to 100s.  In the emergency room, blood pressure increased to 225/102.  She did receive labetalol 10 mg IV with transient decrease to 179/87 but then increased back up to 211/113.  NT-proBNP mildly elevated at 520.    Interval Problem Update  4/30  Afebrile, BP still elevated but improved this AM  Coreg and Spironolactone added on admission (I switched spironolactone to AM dosing)  Pt was seen by PT - significant declined in function from baseline     At bedside, translation was provided by granddaughter upon Pt's request. Pt is quite pleasant - reported hip pain (L>R) since the fall. Otherwise, no acute complain.     Plan to monitor BP, continued PT/OT, follow up MRI brain and hip X-ray today discussed    Labs reviewed   Mildly leukocytosis - non-specific;  downtrending  CMP wnl  Mg 1.9    I have discussed this patient's plan of care and discharge plan at IDT rounds today with Case Management, Nursing, Nursing leadership, and other members of the IDT team.    Consultants/Specialty  N/A    Code Status  Full Code    Disposition  The patient is not medically cleared for discharge to home or a post-acute facility.  Anticipate discharge to: home with organized home healthcare and close outpatient follow-up    I have placed the appropriate orders for post-discharge needs.    Review of Systems  Review of Systems   Constitutional:  Positive for malaise/fatigue. Negative for chills and fever.   HENT:  Negative for congestion and sore throat.    Eyes:  Negative for blurred vision and double vision.   Respiratory:  Negative for cough, sputum production and shortness of breath.    Cardiovascular:  Negative for chest pain, palpitations, orthopnea and leg swelling.   Gastrointestinal:  Negative for abdominal pain, heartburn, nausea and vomiting.   Genitourinary:  Negative for dysuria, frequency and urgency.   Musculoskeletal:  Positive for back pain and myalgias. Negative for neck pain.   Neurological:  Positive for dizziness and weakness. Negative for focal weakness and headaches.   Psychiatric/Behavioral:  Positive for memory loss. Negative for depression.         Physical Exam  Temp:  [36.3 °C (97.4 °F)-36.6 °C (97.9 °F)] 36.3 °C (97.4 °F)  Pulse:  [54-76] 54  Resp:  [16-20] 16  BP: (132-240)/() 152/78  SpO2:  [91 %-96 %] 92 %    Physical Exam  Vitals and nursing note reviewed.   Constitutional:       General: She is not in acute distress.     Appearance: Normal appearance. She is not ill-appearing.   HENT:      Head: Normocephalic and atraumatic.      Mouth/Throat:      Mouth: Mucous membranes are moist.      Pharynx: Oropharynx is clear.   Eyes:      General: No scleral icterus.     Conjunctiva/sclera: Conjunctivae normal.   Cardiovascular:      Rate and Rhythm: Normal  rate and regular rhythm.      Pulses: Normal pulses.      Heart sounds: Normal heart sounds.   Pulmonary:      Effort: Pulmonary effort is normal. No respiratory distress.      Breath sounds: Normal breath sounds. No wheezing.   Abdominal:      General: Bowel sounds are normal. There is no distension.      Palpations: Abdomen is soft.      Tenderness: There is no abdominal tenderness.   Musculoskeletal:         General: No swelling or tenderness. Normal range of motion.      Comments: ROM normal in LE but reported mild left hip tenderness   Skin:     General: Skin is warm and dry.      Capillary Refill: Capillary refill takes less than 2 seconds.   Neurological:      General: No focal deficit present.      Mental Status: She is alert and oriented to person, place, and time. Mental status is at baseline.   Psychiatric:         Mood and Affect: Mood normal.         Fluids  No intake or output data in the 24 hours ending 04/30/24 1000    Laboratory  Recent Labs     04/29/24  1145 04/30/24  0221   WBC 12.5* 11.3*   RBC 4.83 4.24   HEMOGLOBIN 14.9 13.6   HEMATOCRIT 43.6 38.1   MCV 90.3 89.9   MCH 30.8 32.1   MCHC 34.2 35.7*   RDW 44.8 45.2   PLATELETCT 326 304   MPV 8.0* 8.1*     Recent Labs     04/29/24  1145 04/30/24  0221   SODIUM 138 136   POTASSIUM 3.9 4.1   CHLORIDE 100 99   CO2 26 26   GLUCOSE 145* 110*   BUN 34* 25*   CREATININE 0.84 0.71   CALCIUM 9.2 8.7                   Imaging  DX-CHEST-PORTABLE (1 VIEW)   Final Result      1.  Perihilar predominant interstitial thickening, likely edema. Multifocal pneumonia can have a similar appearance.   2.  Mild cardiomegaly.      CT-CSPINE WITHOUT PLUS RECONS   Final Result      1.  Alignment is unchanged from prior exam.   2.  No fracture or subluxation   3.  Moderate degenerative disc disease again seen primarily at the C5-6 level.      CT-HEAD W/O   Final Result      1. Cerebral atrophy.   2. White matter lucencies most consistent with small vessel ischemic change  versus demyelination or gliosis.   3. Otherwise, Head CT without contrast with no acute findings. No evidence of acute cerebral infarction, hemorrhage or mass lesion.   4. Trace right mastoid effusion.         MR-BRAIN-W/O    (Results Pending)        Assessment/Plan  * Hypertensive urgency- (present on admission)  Assessment & Plan  Blood pressure is high as 225/102.  Persistently in the 200s despite IV labetalol in the emergency room.    Admit to telemetry level observational status  Continue labetalol IV and hydralazine IV for SBP greater than 180.  Continuous telemetry monitoring during IV antihypertensive medication use    Per record review, patient was previously on carvedilol 6.25 mg twice daily.  Uncertain why patient is no longer on this.  Restarted carvedilol 3.125 mg twice daily with addition of spironolactone 25 mg daily in setting of her pulmonary hypertension      4/30: BP overall improved but still have quite variability. Spironolactone adjusted to AM dosing.  Plan is to monitor  Daughter and granddaughter will help assist with meds at home.     Leukemoid reaction- (present on admission)  Assessment & Plan  Elevated white count likely reactive.  No signs of infection  4/30: slight downtrend; will continue to monitor    Full code status- (present on admission)  Assessment & Plan  Discussed the CODE STATUS with the patient.  She wishes to be full code.    Pulmonary hypertension (HCC)- (present on admission)  Assessment & Plan  Echocardiogram in November 2023 showing RVSP of 46 mmHg.    Started spironolactone 25 mg daily    Orthostatic hypotension- (present on admission)  Assessment & Plan  Vision changes with getting up too quickly from the toilet consistent with orthostatic hypotension.  I have recommended discontinuation of Myrbetriq due to anticholinergic effects    4/30: orthostatic BP neg    Dizziness- (present on admission)  Assessment & Plan  Sounds like partially orthostatic in nature.   Reportedly history of stroke.  Head CT and CT scan of the C-spine with no acute findings.  Moderate degenerative disc disease seen at C5-6.    MRI of the brain without contrast ordered  Discontinue Myrbetriq due to anticholinergic effects  Continuous cardiac monitoring    Orthostatic BP neg this AM    Hypothyroidism- (present on admission)  Assessment & Plan  As per history.  TSH is low at 0.57.  Free T4 is elevated at 1.74.  Goal of 4-6 given patient's age group.  Decrease levothyroxine to 50 mcg daily         VTE prophylaxis:    Xarelto 10mg daily as prophylaxis      I have performed a physical exam and reviewed and updated ROS and Plan today (4/30/2024).

## 2024-04-30 NOTE — ASSESSMENT & PLAN NOTE
Blood pressure is high as 225/102.  Persistently in the 200s despite IV labetalol in the emergency room.    Admit to telemetry level observational status  Continue labetalol IV and hydralazine IV for SBP greater than 180.  Continuous telemetry monitoring during IV antihypertensive medication use    Per record review, patient was previously on carvedilol 6.25 mg twice daily.  Uncertain why patient is no longer on this.  Restarted carvedilol 3.125 mg twice daily with addition of spironolactone 25 mg daily in setting of her pulmonary hypertension      4/30: BP overall improved but still have quite variability. Spironolactone adjusted to AM dosing.  Plan is to monitor  Daughter and granddaughter will help assist with meds at home.

## 2024-04-30 NOTE — DISCHARGE PLANNING
Case Management Discharge Planning    Admission Date: 4/29/2024  GMLOS:    ALOS: 0    6-Clicks ADL Score:    6-Clicks Mobility Score:    PT and/or OT Eval ordered: Yes  Post-acute Referrals Ordered: Yes  Post-acute Choice Obtained: Yes  Has referral(s) been sent to post-acute provider:  Yes      Anticipated Discharge Dispo: Discharge Disposition: D/T to home under care of home health w/planned hosp IP readmit (86)    DME Needed: No    Action(s) Taken: Home health choice obtained from patient and granddaughter at bedside. Faxed to [facility].     Escalations Completed: None    Medically Clear: No    Next Steps: follow up on HH acceptance    Barriers to Discharge: Medical clearance

## 2024-04-30 NOTE — THERAPY
Physical Therapy   Initial Evaluation     Patient Name: Lizzette Burton  Age:  84 y.o., Sex:  female  Medical Record #: [id number]  Today's Date: 4/30/2024     Precautions  Precautions: Fall Risk    Assessment  Patient is 84 y.o. female presenting with dizziness and GLF. CT head and neck negative, pending MRI results. Pt with PMH including HTN, GERD, asthma, urinary incontinence on Myrbetriq, pulmonary HTN, previous stroke. Pt requires assist from family with functional mobility at baseline using FWW. Lives with dtr in 2nd floor apartment with assist from both dtr and granddtr, no elevator.     During current session, pt presents with left buttock/back pain, dizziness, generalized weakness, and decreased endurance requiring overall min A-SBA for mobility as detailed below. Able to walk only 25 ft before becoming dizzy and fatigued, BP slightly high but WNL. Unable to practice stairs today d/t these safety concerns. Encouraged pt to continue mobilizing to the BSC with nursing as well as get into the chair for meals to build strength. Pt expressed that she strongly prefers to go home rather than SNF d/t prior poor experience. Anticipate that pt will progress to d/c home with HHPT once dizziness is better controlled. Pt needs to demo FOS with level of assist that family is comfortable providing prior to d/c. If pt is unable to navigate FOS in-house, then recommend placement. Pt would benefit from continued acute PT services to improve functional mobility prior to d/c.    Plan    Physical Therapy Initial Treatment Plan   Treatment Plan : Bed Mobility, Equipment, Family / Caregiver Training, Gait Training, Manual Therapy, Neuro Re-Education / Balance, Self Care / Home Evaluation, Stair Training, Therapeutic Activities, Therapeutic Exercise  Treatment Frequency: 5 Times per Week  Duration: Until Therapy Goals Met    DC Equipment Recommendations: None (pt owns FWW and wc)  Discharge Recommendations: Recommend home  health for continued physical therapy services (anticipate that pt will progress to d/c home with HHPT once dizziness is better controlled. Pt needs to demo FOS with level of assist that family is comfortable providing prior to d/c. If pt is unable to navigate FOS in-house, then recommend placement)       Subjective    Pt received resting in bed, agreeable to participate.      Objective       04/30/24 0924    Services   Is patient using  services for this encounter? Yes   Language Interpreted Palestinian    Name ij Stephenie    Mode Other   Other  Mode granddtr at bedside   Refusal signed by patient? No   Content of Interpretation (select all) Other  (PT eval)   Initial Contact Note    Initial Contact Note Order Received and Verified, Physical Therapy Evaluation in Progress with Full Report to Follow.   Precautions   Precautions Fall Risk   Vitals   Patient BP Position Supine   Blood Pressure  132/61   O2 Delivery Device None - Room Air   Vitals Comments BP taken after mobility   Pain 0 - 10 Group   Therapist Pain Assessment During Activity;Nurse Notified  (c/o left buttock/back pain with mobility)   Prior Living Situation   Prior Services Intermittent Physical Support for ADL Per Family   Housing / Facility 1 Story Apartment / Condo  (2nd floor)   Steps Into Home   (FOS)   Steps In Home 0   Rail Both Rail (Steps into Home)   Elevator No   Equipment Owned Front-Wheel Walker;Wheelchair   Lives with - Patient's Self Care Capacity Adult Children   Comments Lives in [place] with adult dtr. Dtr works days but granddtr comes to stay with pt while dtr is gone. Granddtr reports that pt is home alone from 3-6pm during which time pt naps. Family performs IADLs at baseline. Granddtr denied having anywhere else that pt could stay without stairs   Prior Level of Functional Mobility   Bed Mobility Required Assist   Transfer Status Independent   Ambulation Independent    Ambulation Distance household   Assistive Devices Used Front-Wheel Walker   Wheelchair Dependent   Stairs Required Assist   Comments granddtr reported that pt rarely left apartment and required assist from family on FOS. Pt uses FWW at home and wc in the community   History of Falls   History of Falls Yes   Date of Last Fall   (reason for admit, granddtr at bedside endorsed 3 falls in last 6 months)   Cognition    Cognition / Consciousness X   Level of Consciousness Alert   Comments pleasant and cooperative, limited by dizziness, strongly prefers to go home rather than SNF d/t prior poor experience   Passive ROM Lower Body   Passive ROM Lower Body WDL   Comments assessed functionally   Active ROM Lower Body    Active ROM Lower Body  WDL   Comments assessed functionally   Strength Lower Body   Lower Body Strength  X   Gross Strength Generalized Weakness, Equal Bilaterally   Comments short distance ambulation only   Sensation Lower Body   Comments no c/o altered sensation BLE   Coordination Lower Body    Coordination Lower Body  X   Comments slow and weak   Balance Assessment   Sitting Balance (Static) Fair +   Sitting Balance (Dynamic) Fair   Standing Balance (Static) Fair -   Standing Balance (Dynamic) Fair -   Weight Shift Sitting Fair   Weight Shift Standing Fair   Comments FWW   Bed Mobility    Supine to Sit Standby Assist   Sit to Supine Minimal Assist   Scooting Standby Assist   Comments HOBE (granddtr endorsed propping pt's trunk up with pillows at home so adjusted HOB to match home setup), granddtr provided min A for BLE during sit>supine   Gait Analysis   Gait Level Of Assist Contact Guard Assist   Assistive Device Front Wheel Walker   Distance (Feet) 25   # of Times Distance was Traveled 2   Deviation Bradykinetic   # of Stairs Climbed 0   Comments distance limited by dizziness and weakness   Functional Mobility   Sit to Stand Contact Guard Assist   Bed, Chair, Wheelchair Transfer Contact Guard Assist    Transfer Method Stand Step   Mobility bed>up in hallway FWW>bed   6 Clicks Assessment - How much HELP from from another person do you currently need... (If the patient hasn't done an activity recently, how much help from another person do you think he/she would need if he/she tried?)   Turning from your back to your side while in a flat bed without using bedrails? 3   Moving from lying on your back to sitting on the side of a flat bed without using bedrails? 3   Moving to and from a bed to a chair (including a wheelchair)? 3   Standing up from a chair using your arms (e.g., wheelchair, or bedside chair)? 3   Walking in hospital room? 3   Climbing 3-5 steps with a railing? 2   6 clicks Mobility Score 17   Patient / Family Goals    Patient / Family Goal #1 to go home   Short Term Goals    Short Term Goal # 1 Pt will perform sit<>stand and stand step txfer with FWW and SPV to progress OOB mobility in 6 visits.   Short Term Goal # 2 Pt will ambulate 100 ft with FWW and SPV to access home in 6 visits.   Short Term Goal # 3 Pt will navigate FOS with CGA to access home in 6 visits.   Education Group   Education Provided Role of Physical Therapist   Role of Physical Therapist Patient Response Patient;Acceptance;Explanation;Demonstration;Verbal Demonstration;Action Demonstration   Additional Comments discussed barriers to d/c with pt and granddtr, ie FOS to 2nd story [facility]   Physical Therapy Initial Treatment Plan    Treatment Plan  Bed Mobility;Equipment;Family / Caregiver Training;Gait Training;Manual Therapy;Neuro Re-Education / Balance;Self Care / Home Evaluation;Stair Training;Therapeutic Activities;Therapeutic Exercise   Treatment Frequency 5 Times per Week   Duration Until Therapy Goals Met   Problem List    Problems Pain;Impaired Bed Mobility;Impaired Transfers;Impaired Ambulation;Functional Strength Deficit;Impaired Balance;Decreased Activity Tolerance   Anticipated Discharge Equipment and Recommendations   DC  Equipment Recommendations None  (pt owns FWW and wc)   Discharge Recommendations Recommend home health for continued physical therapy services  (anticipate that pt will progress to d/c home with HHPT once dizziness is better controlled. Pt needs to demo FOS with level of assist that family is comfortable providing prior to d/c. If pt is unable to navigate FOS in-house, then recommend placement)   Interdisciplinary Plan of Care Collaboration   IDT Collaboration with  Nursing;Occupational Therapist;Family / Caregiver   Patient Position at End of Therapy In Bed;Call Light within Reach;Tray Table within Reach;Phone within Reach;Family / Friend in Room   Collaboration Comments RN and OT updated, granddtr at bedside   Session Information   Date / Session Number  4/30- 1(1/5, 5/6)

## 2024-04-30 NOTE — PROGRESS NOTES
Monitor summary:  SB-SR rhythm  rate 50-63  Rare PVCs, Occasional PACs, Rare PACs, Rare Couplet PAC, and Rare Blocked PACs ectopy   0.23/0.10/0.40

## 2024-04-30 NOTE — PROGRESS NOTES
Pt arrived to unit via wheelchair at 1710. Pt oriented to room, unit, and plan of care. Tele-monitor placed and monitor room notified. All questions answered at this time. Call light within reach; fall precautions in place.   Granddaughter at bedside.     Patient taken to the bathroom on the way from the ER to CDU. 2x assist.

## 2024-05-01 ENCOUNTER — PHARMACY VISIT (OUTPATIENT)
Dept: PHARMACY | Facility: MEDICAL CENTER | Age: 84
End: 2024-05-01
Payer: COMMERCIAL

## 2024-05-01 ENCOUNTER — PATIENT OUTREACH (OUTPATIENT)
Dept: SCHEDULING | Facility: IMAGING CENTER | Age: 84
End: 2024-05-01
Payer: MEDICARE

## 2024-05-01 VITALS
DIASTOLIC BLOOD PRESSURE: 67 MMHG | HEIGHT: 62 IN | BODY MASS INDEX: 33.06 KG/M2 | HEART RATE: 57 BPM | WEIGHT: 179.68 LBS | SYSTOLIC BLOOD PRESSURE: 125 MMHG | OXYGEN SATURATION: 95 % | RESPIRATION RATE: 16 BRPM | TEMPERATURE: 97.3 F

## 2024-05-01 LAB
BASOPHILS # BLD AUTO: 0.6 % (ref 0–1.8)
BASOPHILS # BLD: 0.07 K/UL (ref 0–0.12)
EOSINOPHIL # BLD AUTO: 0.12 K/UL (ref 0–0.51)
EOSINOPHIL NFR BLD: 1.1 % (ref 0–6.9)
ERYTHROCYTE [DISTWIDTH] IN BLOOD BY AUTOMATED COUNT: 47.9 FL (ref 35.9–50)
HCT VFR BLD AUTO: 42 % (ref 37–47)
HGB BLD-MCNC: 14 G/DL (ref 12–16)
IMM GRANULOCYTES # BLD AUTO: 0.1 K/UL (ref 0–0.11)
IMM GRANULOCYTES NFR BLD AUTO: 0.9 % (ref 0–0.9)
INR PPP: 0.96 (ref 0.87–1.13)
LYMPHOCYTES # BLD AUTO: 3.22 K/UL (ref 1–4.8)
LYMPHOCYTES NFR BLD: 29.6 % (ref 22–41)
MCH RBC QN AUTO: 30.7 PG (ref 27–33)
MCHC RBC AUTO-ENTMCNC: 33.3 G/DL (ref 32.2–35.5)
MCV RBC AUTO: 92.1 FL (ref 81.4–97.8)
MONOCYTES # BLD AUTO: 0.94 K/UL (ref 0–0.85)
MONOCYTES NFR BLD AUTO: 8.6 % (ref 0–13.4)
NEUTROPHILS # BLD AUTO: 6.44 K/UL (ref 1.82–7.42)
NEUTROPHILS NFR BLD: 59.2 % (ref 44–72)
NRBC # BLD AUTO: 0 K/UL
NRBC BLD-RTO: 0 /100 WBC (ref 0–0.2)
PLATELET # BLD AUTO: 330 K/UL (ref 164–446)
PMV BLD AUTO: 8.3 FL (ref 9–12.9)
PROTHROMBIN TIME: 12.9 SEC (ref 12–14.6)
RBC # BLD AUTO: 4.56 M/UL (ref 4.2–5.4)
WBC # BLD AUTO: 10.9 K/UL (ref 4.8–10.8)

## 2024-05-01 PROCEDURE — 99239 HOSP IP/OBS DSCHRG MGMT >30: CPT | Performed by: INTERNAL MEDICINE

## 2024-05-01 PROCEDURE — RXMED WILLOW AMBULATORY MEDICATION CHARGE: Performed by: INTERNAL MEDICINE

## 2024-05-01 RX ORDER — CARVEDILOL 3.12 MG/1
3.12 TABLET ORAL 2 TIMES DAILY WITH MEALS
Qty: 60 TABLET | Refills: 0 | Status: ON HOLD | OUTPATIENT
Start: 2024-05-01 | End: 2024-05-19

## 2024-05-01 RX ORDER — TRAMADOL HYDROCHLORIDE 50 MG/1
50 TABLET ORAL EVERY 8 HOURS PRN
Qty: 20 TABLET | Refills: 0 | Status: SHIPPED | OUTPATIENT
Start: 2024-05-01 | End: 2024-05-08

## 2024-05-01 RX ORDER — TRAMADOL HYDROCHLORIDE 50 MG/1
50 TABLET ORAL EVERY 6 HOURS PRN
Status: DISCONTINUED | OUTPATIENT
Start: 2024-05-01 | End: 2024-05-01 | Stop reason: HOSPADM

## 2024-05-01 RX ORDER — AMLODIPINE BESYLATE 10 MG/1
10 TABLET ORAL DAILY
Qty: 30 TABLET | Refills: 0 | Status: SHIPPED | OUTPATIENT
Start: 2024-05-01

## 2024-05-01 RX ORDER — SPIRONOLACTONE 25 MG/1
25 TABLET ORAL DAILY
Qty: 30 TABLET | Refills: 3 | Status: SHIPPED | OUTPATIENT
Start: 2024-05-02

## 2024-05-01 RX ORDER — LIDOCAINE 4 G/G
2 PATCH TOPICAL EVERY 24 HOURS
Qty: 60 PATCH | Refills: 0 | Status: ON HOLD | OUTPATIENT
Start: 2024-05-02 | End: 2024-05-19

## 2024-05-01 RX ORDER — OXYCODONE HYDROCHLORIDE 5 MG/1
5 TABLET ORAL
Status: DISCONTINUED | OUTPATIENT
Start: 2024-05-01 | End: 2024-05-01 | Stop reason: HOSPADM

## 2024-05-01 RX ORDER — ONDANSETRON 2 MG/ML
4 INJECTION INTRAMUSCULAR; INTRAVENOUS EVERY 4 HOURS PRN
Status: DISCONTINUED | OUTPATIENT
Start: 2024-05-01 | End: 2024-05-01 | Stop reason: HOSPADM

## 2024-05-01 RX ORDER — LIDOCAINE 4 G/G
2 PATCH TOPICAL EVERY 24 HOURS
Status: DISCONTINUED | OUTPATIENT
Start: 2024-05-01 | End: 2024-05-01 | Stop reason: HOSPADM

## 2024-05-01 RX ORDER — OXYCODONE HYDROCHLORIDE 5 MG/1
2.5 TABLET ORAL
Status: DISCONTINUED | OUTPATIENT
Start: 2024-05-01 | End: 2024-05-01 | Stop reason: HOSPADM

## 2024-05-01 RX ADMIN — LEVOTHYROXINE SODIUM 50 MCG: 0.05 TABLET ORAL at 05:54

## 2024-05-01 RX ADMIN — OXYCODONE HYDROCHLORIDE 5 MG: 5 TABLET ORAL at 09:11

## 2024-05-01 RX ADMIN — TRAMADOL HYDROCHLORIDE 50 MG: 50 TABLET ORAL at 11:48

## 2024-05-01 RX ADMIN — AMLODIPINE BESYLATE 10 MG: 10 TABLET ORAL at 05:53

## 2024-05-01 RX ADMIN — BRIMONIDINE TARTRATE 1 DROP: 2 SOLUTION OPHTHALMIC at 05:55

## 2024-05-01 RX ADMIN — OXYCODONE HYDROCHLORIDE 5 MG: 5 TABLET ORAL at 06:00

## 2024-05-01 RX ADMIN — SENNOSIDES AND DOCUSATE SODIUM 2 TABLET: 50; 8.6 TABLET ORAL at 05:52

## 2024-05-01 RX ADMIN — LIDOCAINE 2 PATCH: 4 PATCH TOPICAL at 05:58

## 2024-05-01 RX ADMIN — LOSARTAN POTASSIUM 100 MG: 50 TABLET, FILM COATED ORAL at 05:53

## 2024-05-01 RX ADMIN — ACETAMINOPHEN 650 MG: 325 TABLET, FILM COATED ORAL at 03:38

## 2024-05-01 RX ADMIN — CARVEDILOL 3.12 MG: 3.12 TABLET, FILM COATED ORAL at 09:11

## 2024-05-01 RX ADMIN — SPIRONOLACTONE 25 MG: 25 TABLET ORAL at 05:53

## 2024-05-01 RX ADMIN — Medication 1 TABLET: at 05:54

## 2024-05-01 RX ADMIN — TIMOLOL MALEATE 1 DROP: 5 SOLUTION OPHTHALMIC at 05:55

## 2024-05-01 ASSESSMENT — PAIN DESCRIPTION - PAIN TYPE
TYPE: ACUTE PAIN
TYPE: ACUTE PAIN

## 2024-05-01 ASSESSMENT — COGNITIVE AND FUNCTIONAL STATUS - GENERAL
CLIMB 3 TO 5 STEPS WITH RAILING: A LOT
MOVING FROM LYING ON BACK TO SITTING ON SIDE OF FLAT BED: A LITTLE
SUGGESTED CMS G CODE MODIFIER MOBILITY: CK
WALKING IN HOSPITAL ROOM: A LITTLE
TURNING FROM BACK TO SIDE WHILE IN FLAT BAD: A LITTLE
STANDING UP FROM CHAIR USING ARMS: A LITTLE
MOVING TO AND FROM BED TO CHAIR: A LITTLE
MOBILITY SCORE: 17

## 2024-05-01 ASSESSMENT — GAIT ASSESSMENTS: GAIT LEVEL OF ASSIST: UNABLE TO PARTICIPATE

## 2024-05-01 ASSESSMENT — FIBROSIS 4 INDEX: FIB4 SCORE: 1.03

## 2024-05-01 NOTE — DISCHARGE PLANNING
[facility] TCN chart review completed. Collaborated with WINDY Boss. Current discharge considerations are for Home with Home Health and close outpatient f/u when medically cleared.  Per chart review, [facility] has accepted.  Patient seen at bedside and stated she vomited and is having back pain.  She stated she did not feel ready to be discharged because her pain medication is making her nauseated and she requested a different pain medication.  This TCN notified WINDY and MD Dr. Triplett during rounds of patients complaints.  Family able to provide transportation home at discharge.  Patient owns a FWW and W/C.  TCN will continue to follow and collaborate with discharge planning team as additional post acute needs arise. Thank you.    Completed:  PT recommends home health on 4/30/24 for continued physical therapy services (anticipate that pt will progress to d/c home with HHPT once dizziness is better controlled. Pt needs to demo FOS with level of assist that family is comfortable providing prior to d/c. If pt is unable to navigate FOS in-house, then recommend placement).     OT recommendations pending.    Choice obtained: HH ([facility]) obtained by WINDY.    SCP with [facility] PCP.  Patient requests granddaughter Troy to make Follow up appointment.

## 2024-05-01 NOTE — PROGRESS NOTES
4 Eyes Skin Assessment Completed by Mindy, RN and JOSSIE Rosales.    Head WDL  Ears WDL  Nose WDL  Mouth WDL  Neck WDL  Breast/Chest WDL  Shoulder Blades WDL  Spine WDL  (R) Arm/Elbow/Hand WDL  (L) Arm/Elbow/Hand WDL  Abdomen WDL  Groin WDL  Scrotum/Coccyx/Buttocks WDL  (R) Leg WDL  (L) Leg WDL  (R) Heel/Foot/Toe WDL, callous on top toes  (L) Heel/Foot/Toe WDL          Devices In Places Tele Box      Interventions In Place Pillows    Possible Skin Injury No    Pictures Uploaded Into Epic N/A  Wound Consult Placed N/A  RN Wound Prevention Protocol Ordered No

## 2024-05-01 NOTE — DISCHARGE SUMMARY
Discharge Summary    CHIEF COMPLAINT ON ADMISSION  Chief Complaint   Patient presents with    Dizziness     BIB family.  Patient reports dizziness resulting in fall over the past several months. Patient states she almost syncopal, but never fully loses consciousness. Today patient experienced a fall due to her dizziness. Patient states she hit the front and back of her head during the fall.       Reason for Admission  TBI     Admission Date  4/29/2024    CODE STATUS  Full Code    HPI & HOSPITAL COURSE  Lizzette Burton is a 84 y.o. female with hypertension, GERD, asthma, urinary incontinence on Myrbetriq, pulmonary hypertension, and history of previous stroke, who presented 4/29/2024 with intermittent dizziness and orthostatic symptoms for several months.  She had a fall when going to the bathroom, falling backwards, and hitting her head on the ground without loss of consciousness.  On evaluation, head and neck CT were negative for acute process.  Her blood pressure was significantly elevated in the 220s. NT-proBNP was mildly elevated at 520.  She had nonspecific mild leukocytosis.  CMP was within normal limits.  She was given IV labetalol, and was started on Coreg and Aldactone.  Hip x-ray showed no acute or subacute fracture or bowel knee pelvic fracture.  Brain MRI was obtained showed moderate dilation of the lateral and third ventricles, with prominent sylvian fissure with tight frontoparietal sulci, with ventricular dilatation more prominent than previous MRI in 2021, along with multifocal chronic microhemorrhages, and chronic lacunar infarct in the right basal ganglia.    With adjustment in blood pressure medications, she had improved blood pressure trend.  Her WBC count has quickly normalized.  She maintained sinus rhythm on telemetry.  Her troponins remain negative.  Her urinalysis was clean.  Her presenting symptoms were felt to be due to hypertensive urgency, with resolution of her dizziness with  improved blood pressure control.  She was evaluated by PT who recommended home health services which was pursued.    I have personally seen and examined the patient on the day of discharge. With her clinical improvement, she was deemed ready to discharge from the hospital as she did not have any further hospitalization needs. Patient felt comfortable going home. The discharge plan was discussed with the patient, with which she was agreeable to.     Therefore, she is discharged in good and stable condition to home with organized home healthcare and close outpatient follow-up.    The patient met 2-midnight criteria for an inpatient stay at the time of discharge.    Discharge Date  5/1/2024      FOLLOW UP ITEMS POST DISCHARGE  -She will continue on Coreg and Aldactone in addition to her Cozaar and increased dose of Norvasc.  She will follow-up with her PCP for ongoing outpatient blood pressure management.  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  - counseled to seek immediate medical attention, or return to the ED for recurrent or worsening symptoms.      DISCHARGE DIAGNOSES  Principal Problem:    Hypertensive urgency (POA: Yes)  Active Problems:    Hypothyroidism (POA: Yes)    Dizziness (POA: Yes)    Orthostatic hypotension (POA: Yes)    Pulmonary hypertension (HCC) (POA: Yes)    Full code status (POA: Yes)    Leukemoid reaction (POA: Yes)  Resolved Problems:    * No resolved hospital problems. *      FOLLOW UP  No future appointments.  LIBERTY Dyer  [address]  [facility] [address]  [place] [id number] [phone]    Schedule an appointment as soon as possible for a visit in 1 week(s)  Hospital follow-up    Miller Bojorquez M.D.  [address]  [facility] [address]  [place] [id number] [phone]            MEDICATIONS ON DISCHARGE     Medication List        START taking these medications        Instructions    carvedilol 3.125 MG Tabs  Commonly known as: Coreg   Take 1 Tablet by mouth 2 times a day with meals.  Dose: 3.125 mg     lidocaine 4 % Ptch  Start taking on: May 2, 2024  Commonly known as: Asperflex   Place 2 Patches on the skin every 24 hours. Apply on the back  Dose: 2 Patch     spironolactone 25 MG Tabs  Start taking on: May 2, 2024  Commonly known as: Aldactone   Take 1 Tablet by mouth every day.  Dose: 25 mg     traMADol 50 MG Tabs  Commonly known as: Ultram   Take 1 Tablet by mouth every 8 hours as needed for Moderate Pain or Severe Pain for up to 7 days.  Dose: 50 mg            CHANGE how you take these medications        Instructions   amLODIPine 10 MG Tabs  What changed:   medication strength  how much to take  Commonly known as: Norvasc   Take 1 Tablet by mouth every day.  Dose: 10 mg            CONTINUE taking these medications        Instructions   acetaminophen 325 MG Tabs  Commonly known as: Tylenol   Take 650 mg by mouth every 6 hours as needed for Moderate Pain.  Dose: 650 mg     alendronate 70 MG Tabs  Commonly known as: Fosamax   Take 70 mg by mouth every 7 days.  Dose: 70 mg     Brimonidine Tartrate-Timolol 0.2-0.5 % Soln   Administer 1 Drop into both eyes in the morning, at noon, and at bedtime.  Dose: 1 Drop     latanoprost 0.005 % Soln  Commonly known as: Xalatan   Administer 1 Drop into both eyes every evening.  Dose: 1 Drop     levothyroxine 100 MCG Tabs  Commonly known as: Synthroid   Take 1 Tablet by mouth every morning before breakfast.  Dose: 100 mcg     losartan 100 MG Tabs  Commonly known as: Cozaar   Take 1 Tablet by mouth every day.  Dose: 100 mg     therapeutic multivitamin-minerals Tabs   Take 1 Tablet by mouth every day.  Dose: 1 Tablet              Allergies  No Known Allergies    DIET  Orders Placed This Encounter   Procedures    Diet Order Diet: Regular     Standing Status:   Standing     Number of Occurrences:   1     Order Specific Question:   Diet:     Answer:   Regular  [1]       ACTIVITY  As tolerated.  Weight bearing as tolerated    CONSULTATIONS  None    PROCEDURES  None    LABORATORY  Lab Results   Component Value Date    SODIUM 136 04/30/2024    POTASSIUM 4.1 04/30/2024    CHLORIDE 99 04/30/2024    CO2 26 04/30/2024    GLUCOSE 110 (H) 04/30/2024    BUN 25 (H) 04/30/2024    CREATININE 0.71 04/30/2024    CREATININE 0.9 02/04/2009        Lab Results   Component Value Date    WBC 10.9 (H) 05/01/2024    HEMOGLOBIN 14.0 05/01/2024    HEMATOCRIT 42.0 05/01/2024    PLATELETCT 330 05/01/2024        Total time of the discharge process = 51 minutes.

## 2024-05-01 NOTE — PROGRESS NOTES
Discharge instructions reviewed and signed, all questions answered, PIV removed on unit, Meds to Beds delivered, patient refused Language Line and had family interpret for her.

## 2024-05-01 NOTE — PROGRESS NOTES
Bedside report received from off going RN/tech: assumed care of patient.     Fall Risk Score: HIGH RISK  Fall risk interventions in place: Place yellow fall risk ID band on patient, Provide patient/family education based on risk assessment, Educate patient/family to call staff for assistance when getting out of bed, Place fall precaution signage outside patient door, Place patient in room close to nursing station, Utilize bed/chair fall alarm, Notify charge of high risk for huddle, and Refuses - escalate to charge  Bed type: Regular (Rory Score less than 17 interventions in place)  Patient on cardiac monitor: Yes  IVF/IV medications: Not Applicable   Oxygen: Room Air  Bedside sitter: Not Applicable   Isolation: Not applicable

## 2024-05-01 NOTE — DISCHARGE PLANNING
Addendum:  5-1-24/1313  RN CCM provided patient and granddaughter with copy of IMM that patient signed this a.m.  I explained it was the notice indicating she has the right to appeal the dc.  Pt and granddaughter now ok with dc.  Granddaughter assisting patient with dressing.        Case Management Discharge Planning    Admission Date: 4/29/2024  GMLOS: 2.1  ALOS: 1    6-Clicks ADL Score:    6-Clicks Mobility Score: 17  PT and/or OT Eval ordered: Yes  Post-acute Referrals Ordered: Yes  Post-acute Choice Obtained: Yes  Has referral(s) been sent to post-acute provider:  Yes      Anticipated Discharge Dispo: Discharge Disposition: D/T to home under HHA care in anticipation of covered skilled care (06)    DME Needed: No    Action(s) Taken:   Pt accepted by [facility].  RN CCM met with patient and granddaughter at bedside.  Pt c/o dizziness when out of bed, nauseated, severe pain left hip.  Pt and granddaughter would like for pt to stay another night.  Granddaughter stated that Clint Maldonado had pt sign a form and said insurance would cover another night.  I informed them that the doctor has discharged pt as she is medically stable.  The pain medication was changed.  I informed them they can appeal the dc and reviewed the information on how to appeal.  Granddaughter asking for a copy of the form that was signed indicating that insurance would cover another night.  I looked through media and did a chart review.  I was unable to find such a paper or any information from said person of Clint Maldonado providing such information.  Granddaughter stated she would speak to her mother and grandmother.    Medically Clear: Yes    Next Steps: possibly appealing dc      Barriers to Discharge: possible Medicare appeal

## 2024-05-01 NOTE — THERAPY
"Physical Therapy   Daily Treatment     Patient Name: Lizzette Burton  Age:  84 y.o., Sex:  female  Medical Record #: [id number]  Today's Date: 5/1/2024     Precautions  Precautions: Fall Risk  Comments: dizziness    Assessment    Pt session attempted, pt received in chair with granddaughter complaining of significant dizziness. BP stable, see vitals below. Pt transferred back to bed requiring Radha, became nauseous and threw up. Rn aware and present at end of session. Unable to ambulate or attempt stairs negotiation this session. If pt continues to have difficulty with mobility due to dizziness, will likely require placement. Pt would benefit from continued acute IP PT services to address her pain, weakness, impaired balance, activity tolerance, endurance, and overall mobility.    Plan    Treatment Plan Status: Continue Current Treatment Plan  Type of Treatment: Bed Mobility, Equipment, Family / Caregiver Training, Gait Training, Manual Therapy, Neuro Re-Education / Balance, Self Care / Home Evaluation, Stair Training, Therapeutic Activities, Therapeutic Exercise  Treatment Frequency: 5 Times per Week  Treatment Duration: Until Therapy Goals Met    DC Equipment Recommendations: None  Discharge Recommendations: Recommend home health for continued physical therapy services (once able to ambulate household distances and negotiate 1 flight of stairs)      Subjective    \"I feel like I'm going to throw up.\" Translated via granddaughter at bedside     Objective     05/01/24 0949    Services   Is patient using  services for this encounter? Yes   Language Interpreted Hungarian    Name nanetteirasema Stephenie   Other  Mode granddtr at bedside   Refusal signed by patient? No   Content of Interpretation (select all)   (PT session)   Precautions   Precautions Fall Risk   Comments dizziness   Vitals   O2 Delivery Device None - Room Air   Vitals Comments pt with c/o significant dizziness when " received up in chair. /75. Pt nauseous, ultimately with emesis at EOB, RN aware   Pain 0 - 10 Group   Therapist Pain Assessment Post Activity Pain Same as Prior to Activity;Nurse Notified  (back pain not rated)   Cognition    Cognition / Consciousness X   Level of Consciousness Alert   Comments pleasant and cooperative. Limited by dizziness   Active ROM Lower Body    Active ROM Lower Body  WDL   Strength Lower Body   Lower Body Strength  X   Gross Strength Generalized Weakness, Equal Bilaterally   Comments BLE grossly 3+ to 4/5   Balance   Sitting Balance (Static) Fair +   Sitting Balance (Dynamic) Fair   Standing Balance (Static) Fair -   Standing Balance (Dynamic) Poor +   Weight Shift Sitting Fair   Weight Shift Standing Poor   Skilled Intervention Verbal Cuing;Tactile Cuing;Sequencing;Compensatory Strategies   Comments FWW & HHA during transfer   Bed Mobility    Comments EOB at end of session with Granddaughter and RN   Gait Analysis   Gait Level Of Assist Unable to Participate   Comments 2/2 dizziness, emesis   Functional Mobility   Sit to Stand Minimal Assist   Bed, Chair, Wheelchair Transfer Minimal Assist   Transfer Method Stand Step   Mobility w/ FWW, pt pushing walker to the side during transfer and transitioning to HHA   Skilled Intervention Verbal Cuing;Tactile Cuing;Sequencing;Compensatory Strategies   Comments mod verbal and tactile cues for hand placement, safety, sequencing   6 Clicks Assessment - How much HELP from from another person do you currently need... (If the patient hasn't done an activity recently, how much help from another person do you think he/she would need if he/she tried?)   Turning from your back to your side while in a flat bed without using bedrails? 3   Moving from lying on your back to sitting on the side of a flat bed without using bedrails? 3   Moving to and from a bed to a chair (including a wheelchair)? 3   Standing up from a chair using your arms (e.g., wheelchair,  or bedside chair)? 3   Walking in hospital room? 3   Climbing 3-5 steps with a railing? 2   6 clicks Mobility Score 17   Activity Tolerance   Comments limited 2/2 dizziness and emesis   Patient / Family Goals    Patient / Family Goal #1 to go home   Goal #1 Outcome Goal not met   Short Term Goals    Short Term Goal # 1 Pt will perform sit<>stand and stand step txfer with FWW and SBA to progress OOB mobility in 6 visits.   Goal Outcome # 1 goal not met   Short Term Goal # 2 Pt will ambulate 100 ft with FWW and CGA to access home in 6 visits.   Goal Outcome # 2 Goal not met   Short Term Goal # 3 Pt will ascend/descend 12 steps with BUE support and Radha to access home in 6 visits.   Goal Outcome # 3 Goal not met   Education Group   Education Provided Role of Physical Therapist;Transfer Status   Role of Physical Therapist Patient Response Patient;Acceptance;Explanation;Demonstration;Verbal Demonstration;Action Demonstration   Transfer Status Patient Response Patient;Acceptance;Family;Explanation;Action Demonstration;Reinforcement Needed   Physical Therapy Treatment Plan   Physical Therapy Treatment Plan Continue Current Treatment Plan   Anticipated Discharge Equipment and Recommendations   DC Equipment Recommendations None   Discharge Recommendations Recommend home health for continued physical therapy services  (once able to ambulate household distances and negotiate 1 flight of stairs)   Interdisciplinary Plan of Care Collaboration   IDT Collaboration with  Nursing;Family / Caregiver   Patient Position at End of Therapy Edge of Bed  (with RN and granddaughter)   Collaboration Comments Rn present at end of session   Session Information   Date / Session Number  5/1- 2 (2/5, 5/6)

## 2024-05-01 NOTE — PROGRESS NOTES
Bedside report received from off going RN/tech: Bobby, assumed care of patient.     Fall Risk Score: MODERATE RISK  Fall risk interventions in place: Place yellow fall risk ID band on patient, Provide patient/family education based on risk assessment, Educate patient/family to call staff for assistance when getting out of bed, Place fall precaution signage outside patient door, Utilize bed/chair fall alarm, Notify charge of high risk for huddle, and Bed alarm connected correctly  Bed type: Regular (Rory Score less than 17 interventions in place)  Patient on cardiac monitor: Yes  IVF/IV medications: Not Applicable   Oxygen: Room Air  Bedside sitter: Not Applicable   Isolation: Not applicable

## 2024-05-01 NOTE — PROGRESS NOTES
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

## 2024-05-03 ENCOUNTER — HOME CARE VISIT (OUTPATIENT)
Dept: HOME HEALTH SERVICES | Facility: HOME HEALTHCARE | Age: 84
End: 2024-05-03
Payer: MEDICARE

## 2024-05-03 ENCOUNTER — DOCUMENTATION (OUTPATIENT)
Dept: MEDICAL GROUP | Facility: PHYSICIAN GROUP | Age: 84
End: 2024-05-03
Payer: MEDICARE

## 2024-05-03 VITALS
HEIGHT: 62 IN | OXYGEN SATURATION: 94 % | HEART RATE: 63 BPM | WEIGHT: 184.25 LBS | TEMPERATURE: 97 F | SYSTOLIC BLOOD PRESSURE: 116 MMHG | BODY MASS INDEX: 33.9 KG/M2 | RESPIRATION RATE: 16 BRPM | DIASTOLIC BLOOD PRESSURE: 78 MMHG

## 2024-05-03 PROCEDURE — 665005 NO-PAY RAP - HOME HEALTH

## 2024-05-03 ASSESSMENT — ENCOUNTER SYMPTOMS
SUBJECTIVE PAIN PROGRESSION: UNCHANGED
VOMITING: DENIES
DYSPNEA ON EXERTION: 1
SEVERE DYSPNEA: 1
PAIN LOCATION: R SHOULDER
DIZZINESS: 1
LOWEST PAIN SEVERITY IN PAST 24 HOURS: 4/10
DESCRIPTION OF MEMORY LOSS: SHORT TERM
DYSPNEA ACTIVITY LEVEL: AFTER AMBULATING MORE THAN 20 FT
BOWEL PATTERN NORMAL: 1
PAIN LOCATION - PAIN FREQUENCY: CONSTANT
PAIN LOCATION - PAIN SEVERITY: 6/10
HIGHEST PAIN SEVERITY IN PAST 24 HOURS: 6/10
PAIN LOCATION - EXACERBATING FACTORS: MOVEMENT
PAIN LOCATION - PAIN QUALITY: ACHE, SHARP
STOOL FREQUENCY: LESS THAN DAILY
SHORTNESS OF BREATH: 1
PAIN SEVERITY GOAL: 3/10
PAIN: 1
NAUSEA: DENIES
LAST BOWEL MOVEMENT: 66962
FATIGUES EASILY: 1

## 2024-05-03 ASSESSMENT — ACTIVITIES OF DAILY LIVING (ADL): OASIS_M1830: 03

## 2024-05-03 ASSESSMENT — FIBROSIS 4 INDEX: FIB4 SCORE: 1.03

## 2024-05-03 NOTE — PROGRESS NOTES
Medication chart review for [facility] services    Received referral from [facility].   Medications reviewed  compared with discharge summary if available.  Discharge summary date, if applicable:   5/1/24    Current medication list per [facility]     Medication list one, patient is currently taking    Current Outpatient Medications:     Naproxen Sodium, 2 Capsule, Oral, TID PRN    Salonpas Pain Relief Patch, 1 Application, Topical, QDAY PRN    Osteo Bi-Flex Adv Triple St, 2 Tablet, Oral, DAILY AT 1800    Omega-3 Fatty Acids (OMEGA 3 PO), 2 Tablet, Oral, DAILY AT 1800    amLODIPine, 10 mg, Oral, DAILY    carvedilol, 3.125 mg, Oral, BID WITH MEALS    lidocaine, 2 Patch, Transdermal, Q24HRS    spironolactone, 25 mg, Oral, DAILY    traMADol, 50 mg, Oral, Q8HRS PRN    latanoprost, 1 Drop, Both Eyes, Nightly    Brimonidine Tartrate-Timolol, 1 Drop, Both Eyes, TID    acetaminophen, 650 mg, Oral, Q6HRS PRN    alendronate, 70 mg, Oral, Q7 DAYS    therapeutic multivitamin-minerals, 1 Tablet, Oral, DAILY    levothyroxine, 100 mcg, Oral, QAM AC    losartan, 100 mg, Oral, DAILY      Medication list two, drugs that the patient has been prescribed or recommended to take by their healthcare provider on discharge summary  Medication List          START taking these medications         Instructions   carvedilol 3.125 MG Tabs  Commonly known as: Coreg    Take 1 Tablet by mouth 2 times a day with meals.  Dose: 3.125 mg      lidocaine 4 % Ptch  Start taking on: May 2, 2024  Commonly known as: Asperflex    Place 2 Patches on the skin every 24 hours. Apply on the back  Dose: 2 Patch      spironolactone 25 MG Tabs  Start taking on: May 2, 2024  Commonly known as: Aldactone    Take 1 Tablet by mouth every day.  Dose: 25 mg      traMADol 50 MG Tabs  Commonly known as: Ultram    Take 1 Tablet by mouth every 8 hours as needed for Moderate Pain or Severe Pain for up to 7 days.  Dose: 50 mg                CHANGE how you take these  medications         Instructions   amLODIPine 10 MG Tabs  What changed:   medication strength  how much to take  Commonly known as: Norvasc    Take 1 Tablet by mouth every day.  Dose: 10 mg                CONTINUE taking these medications         Instructions   acetaminophen 325 MG Tabs  Commonly known as: Tylenol    Take 650 mg by mouth every 6 hours as needed for Moderate Pain.  Dose: 650 mg      alendronate 70 MG Tabs  Commonly known as: Fosamax    Take 70 mg by mouth every 7 days.  Dose: 70 mg      Brimonidine Tartrate-Timolol 0.2-0.5 % Soln    Administer 1 Drop into both eyes in the morning, at noon, and at bedtime.  Dose: 1 Drop      latanoprost 0.005 % Soln  Commonly known as: Xalatan    Administer 1 Drop into both eyes every evening.  Dose: 1 Drop      levothyroxine 100 MCG Tabs  Commonly known as: Synthroid    Take 1 Tablet by mouth every morning before breakfast.  Dose: 100 mcg      losartan 100 MG Tabs  Commonly known as: Cozaar    Take 1 Tablet by mouth every day.  Dose: 100 mg      therapeutic multivitamin-minerals Tabs    Take 1 Tablet by mouth every day.  Dose: 1 Tablet           No Known Allergies    Labs     Lab Results   Component Value Date/Time    SODIUM 136 04/30/2024 02:21 AM    POTASSIUM 4.1 04/30/2024 02:21 AM    CHLORIDE 99 04/30/2024 02:21 AM    CO2 26 04/30/2024 02:21 AM    GLUCOSE 110 (H) 04/30/2024 02:21 AM    BUN 25 (H) 04/30/2024 02:21 AM    CREATININE 0.71 04/30/2024 02:21 AM    CREATININE 0.9 02/04/2009 10:28 AM     Lab Results   Component Value Date/Time    ALKPHOSPHAT 69 04/30/2024 02:21 AM    ASTSGOT 15 04/30/2024 02:21 AM    ALTSGPT 14 04/30/2024 02:21 AM    TBILIRUBIN 0.5 04/30/2024 02:21 AM    INR 0.96 05/01/2024 01:17 AM    ALBUMIN 3.7 04/30/2024 02:21 AM        Assessment for clinically significant drug interactions, drug omissions/additions, duplicative therapies.            CC   Gabby Zelaya, JN.P.R.N.  [address] [facility] [address]  [facility] [address]  Fax:  [phone]    [facility]  Phone [phone] fax [phone]    This note was created using voice recognition software (Dragon). The accuracy of the dictation is limited by the abilities of the software. I have reviewed the note prior to signing, however some errors in grammar and context are still possible. If you have any questions related to this note please do not hesitate to contact our office.

## 2024-05-07 ENCOUNTER — HOME CARE VISIT (OUTPATIENT)
Dept: HOME HEALTH SERVICES | Facility: HOME HEALTHCARE | Age: 84
End: 2024-05-07
Payer: MEDICARE

## 2024-05-07 VITALS
TEMPERATURE: 98.2 F | SYSTOLIC BLOOD PRESSURE: 138 MMHG | HEART RATE: 67 BPM | OXYGEN SATURATION: 94 % | DIASTOLIC BLOOD PRESSURE: 82 MMHG | RESPIRATION RATE: 16 BRPM

## 2024-05-07 ASSESSMENT — ENCOUNTER SYMPTOMS
POOR JUDGMENT: 1
TROUBLE SWALLOWING: 1
DIFFICULTY THINKING: 1
CHANGE IN APPETITE: UNCHANGED
APPETITE LEVEL: GOOD
DEBILITATING PAIN: 1

## 2024-05-08 ENCOUNTER — TELEPHONE (OUTPATIENT)
Dept: HEALTH INFORMATION MANAGEMENT | Facility: OTHER | Age: 84
End: 2024-05-08
Payer: MEDICARE

## 2024-05-08 ENCOUNTER — HOME CARE VISIT (OUTPATIENT)
Dept: HOME HEALTH SERVICES | Facility: HOME HEALTHCARE | Age: 84
End: 2024-05-08
Payer: MEDICARE

## 2024-05-08 VITALS
OXYGEN SATURATION: 94 % | SYSTOLIC BLOOD PRESSURE: 115 MMHG | RESPIRATION RATE: 16 BRPM | DIASTOLIC BLOOD PRESSURE: 75 MMHG | TEMPERATURE: 98.2 F | HEART RATE: 67 BPM

## 2024-05-08 VITALS
OXYGEN SATURATION: 94 % | HEART RATE: 65 BPM | SYSTOLIC BLOOD PRESSURE: 132 MMHG | RESPIRATION RATE: 16 BRPM | TEMPERATURE: 97.7 F | DIASTOLIC BLOOD PRESSURE: 72 MMHG

## 2024-05-08 ASSESSMENT — ACTIVITIES OF DAILY LIVING (ADL)
PHYSICAL TRANSFERS ASSESSED: 1
AMBULATION ASSISTANCE ON FLAT SURFACES: 1
AMBULATION ASSISTANCE: 1
CURRENT_FUNCTION: SUPERVISION
AMBULATION ASSISTANCE: STAND BY ASSIST

## 2024-05-08 ASSESSMENT — ENCOUNTER SYMPTOMS
HIGHEST PAIN SEVERITY IN PAST 24 HOURS: 8/10
PAIN: 1
DEBILITATING PAIN: 1
PERSON REPORTING PAIN: PATIENT
MUSCLE WEAKNESS: 1
PAIN LOCATION - RELIEVING FACTORS: REST
PAIN LOCATION - PAIN QUALITY: SORE, ACHY
PAIN LOCATION - PAIN SEVERITY: 7/10
ARTHRALGIAS: 1
PAIN LOCATION: LEFT HIP
PAIN LOCATION - PAIN FREQUENCY: FREQUENT
PAIN LOCATION - EXACERBATING FACTORS: WALKING
LOWEST PAIN SEVERITY IN PAST 24 HOURS: 7/10
DEBILITATING PAIN: 1

## 2024-05-09 ASSESSMENT — ENCOUNTER SYMPTOMS
PAIN LOCATION - EXACERBATING FACTORS: WALKING, MOVEMENT
PAIN LOCATION - PAIN QUALITY: ACHY
PAIN LOCATION - PAIN DURATION: 10 YEARS
NAUSEA: DENIES ANY
PAIN LOCATION - PAIN FREQUENCY: CONSTANT
HYPERTENSION: 1
PAIN SEVERITY GOAL: 0/10
PAIN: 1
PAIN LOCATION: LEFT HIP
BOWEL PATTERN NORMAL: 1
LAST BOWEL MOVEMENT: 66966
LOWEST PAIN SEVERITY IN PAST 24 HOURS: 6/10
MUSCLE WEAKNESS: 1
PAIN LOCATION - PAIN SEVERITY: 6/10
PERSON REPORTING PAIN: PATIENT
STOOL FREQUENCY: DAILY
SUBJECTIVE PAIN PROGRESSION: UNCHANGED
HIGHEST PAIN SEVERITY IN PAST 24 HOURS: 6/10

## 2024-05-09 ASSESSMENT — PAIN SCALES - PAIN ASSESSMENT IN ADVANCED DEMENTIA (PAINAD)
BODYLANGUAGE: 0 - RELAXED.
TOTALSCORE: 0
NEGVOCALIZATION: 0 - NONE.
FACIALEXPRESSION: 0 - SMILING OR INEXPRESSIVE.
CONSOLABILITY: 0 - NO NEED TO CONSOLE.

## 2024-05-09 ASSESSMENT — PATIENT HEALTH QUESTIONNAIRE - PHQ9: CLINICAL INTERPRETATION OF PHQ2 SCORE: 0

## 2024-05-10 ENCOUNTER — HOME CARE VISIT (OUTPATIENT)
Dept: HOME HEALTH SERVICES | Facility: HOME HEALTHCARE | Age: 84
End: 2024-05-10
Payer: MEDICARE

## 2024-05-10 ENCOUNTER — TELEPHONE (OUTPATIENT)
Dept: CARDIOLOGY | Facility: MEDICAL CENTER | Age: 84
End: 2024-05-10
Payer: MEDICARE

## 2024-05-10 ENCOUNTER — TELEPHONE (OUTPATIENT)
Dept: NEUROLOGY | Facility: MEDICAL CENTER | Age: 84
End: 2024-05-10
Payer: MEDICARE

## 2024-05-10 VITALS
RESPIRATION RATE: 16 BRPM | TEMPERATURE: 97 F | SYSTOLIC BLOOD PRESSURE: 130 MMHG | HEART RATE: 50 BPM | OXYGEN SATURATION: 97 % | DIASTOLIC BLOOD PRESSURE: 64 MMHG

## 2024-05-10 ASSESSMENT — ENCOUNTER SYMPTOMS
PAIN LOCATION: LEFT HIP
PAIN LOCATION - RELIEVING FACTORS: REST, MEDS, ICE
PAIN: 1
NAUSEA: DENIES
PAIN LOCATION - PAIN SEVERITY: 8/10
VOMITING: DENIES
PAIN LOCATION: LEFT LEG
SUBJECTIVE PAIN PROGRESSION: UNCHANGED
LOWEST PAIN SEVERITY IN PAST 24 HOURS: 4/10
PAIN LOCATION - EXACERBATING FACTORS: WALKING
PERSON REPORTING PAIN: PATIENT
LAST BOWEL MOVEMENT: 66970
PAIN LOCATION - PAIN FREQUENCY: FREQUENT
LOWEST PAIN SEVERITY IN PAST 24 HOURS: 4/10
PAIN LOCATION - PAIN FREQUENCY: CONSTANT
PAIN: 1
STOOL FREQUENCY: DAILY
HIGHEST PAIN SEVERITY IN PAST 24 HOURS: 4/10
PAIN SEVERITY GOAL: 2/10
DEBILITATING PAIN: 1
PAIN LOCATION - PAIN SEVERITY: 4/10
BOWEL PATTERN NORMAL: 1
PAIN LOCATION - PAIN QUALITY: SORE, ACHY
PAIN LOCATION - EXACERBATING FACTORS: WALKING, EXERCISES
PAIN LOCATION - RELIEVING FACTORS: MEDICATIONS, REST
HIGHEST PAIN SEVERITY IN PAST 24 HOURS: 6/10
FATIGUES EASILY: 1
DIZZINESS: 1
PERSON REPORTING PAIN: PATIENT
PAIN LOCATION - PAIN QUALITY: SHARP

## 2024-05-10 NOTE — TELEPHONE ENCOUNTER
Phone number called: [phone] (home)      Call outcome: Left message for patient to call back.

## 2024-05-10 NOTE — TELEPHONE ENCOUNTER
PHIL    Caller: [facility]      Topic/issue: [facility] nurse calling concerned about patients low heart rate. States that patient was put on new medications while in the hospital and is unsure if that is what is causing the low heart rate. Would like us to call and speak with the patient and advise on the different medications.     Callback Number: [phone]    Thank you,  Amarilis DURÁN

## 2024-05-13 ENCOUNTER — HOME CARE VISIT (OUTPATIENT)
Dept: HOME HEALTH SERVICES | Facility: HOME HEALTHCARE | Age: 84
End: 2024-05-13
Payer: MEDICARE

## 2024-05-13 VITALS
HEART RATE: 50 BPM | RESPIRATION RATE: 16 BRPM | OXYGEN SATURATION: 97 % | DIASTOLIC BLOOD PRESSURE: 64 MMHG | SYSTOLIC BLOOD PRESSURE: 130 MMHG | TEMPERATURE: 97 F

## 2024-05-13 VITALS
DIASTOLIC BLOOD PRESSURE: 62 MMHG | TEMPERATURE: 97.5 F | SYSTOLIC BLOOD PRESSURE: 134 MMHG | OXYGEN SATURATION: 96 % | HEART RATE: 73 BPM | RESPIRATION RATE: 17 BRPM

## 2024-05-13 ASSESSMENT — ENCOUNTER SYMPTOMS
PERSON REPORTING PAIN: PATIENT
PERSON REPORTING PAIN: PATIENT
PAIN LOCATION: BACK
DEBILITATING PAIN: 1
HIGHEST PAIN SEVERITY IN PAST 24 HOURS: 8/10
LOWEST PAIN SEVERITY IN PAST 24 HOURS: 4/10
LOWEST PAIN SEVERITY IN PAST 24 HOURS: 6/10
HIGHEST PAIN SEVERITY IN PAST 24 HOURS: 8/10
PAIN LOCATION - PAIN QUALITY: SORE, ACHY
PAIN LOCATION - PAIN FREQUENCY: WITH ACTIVITY
PAIN LOCATION: LEFT HIP
PAIN LOCATION - PAIN SEVERITY: 6/10
PAIN: 1
PAIN: 1

## 2024-05-13 NOTE — CASE COMMUNICATION
Quality Review for SOC OASIS by JOE Woodall, JOSSIE on  May 13, 2024    Edits completed by JOE Woodall RN:  1.  and  diagnosis coding updated per chart review.  2. Chagned Flu vaccine to NA as there were no dates prior to March 31  3. Chgned  to 3 per narrative that patient needs moderate assistance and a walker for safe ambulation  4. Changed  to 3 per functional limitations assessment stating dyspnea with minima l exertion  5. Completed F2F information  6.  is 14 per care plan therapy sets.  7. Changed  to 2 per PT note dated 5/8/24, making  5/8/24 per the collaboration convention

## 2024-05-13 NOTE — TELEPHONE ENCOUNTER
Phone number called: [phone] (home)      Call outcome: I spoke to pt's granddaughter Dianna, she reports that pt had an appointment with PCP and addressed the issue. Pt is already scheduled to see BD on 06/06/24. Pt's granddaughter told to go to nearest ER/UC if pt experience any worsening symptoms. She verbalized understanding.

## 2024-05-13 NOTE — Clinical Note
I agree with these changes.   ----- Message -----  From: Caitlin Woodall R.N.  Sent: 5/13/2024   1:31 PM PDT  To: Arabella Villatoro R.N.      Quality Review for SOC OASIS by JOE Woodall, RN on  May 13, 2024    Edits completed by JOE Woodall RN:  1.  and  diagnosis coding updated per chart review.  2. Chagned Flu vaccine to NA as there were no dates prior to March 31  3. Chgned  to 3 per narrative that patient needs moderate assistance and a walker for safe ambulation  4. Changed  to 3 per functional limitations assessment stating dyspnea with minimal exertion  5. Completed F2F information  6.  is 14 per care plan therapy sets.  7. Changed  to 2 per PT note dated 5/8/24, making  5/8/24 per the collaboration convention

## 2024-05-14 ENCOUNTER — HOME CARE VISIT (OUTPATIENT)
Dept: HOME HEALTH SERVICES | Facility: HOME HEALTHCARE | Age: 84
End: 2024-05-14
Payer: MEDICARE

## 2024-05-14 VITALS
OXYGEN SATURATION: 96 % | SYSTOLIC BLOOD PRESSURE: 134 MMHG | DIASTOLIC BLOOD PRESSURE: 62 MMHG | HEART RATE: 73 BPM | RESPIRATION RATE: 17 BRPM | TEMPERATURE: 97.5 F

## 2024-05-14 VITALS
SYSTOLIC BLOOD PRESSURE: 116 MMHG | DIASTOLIC BLOOD PRESSURE: 64 MMHG | RESPIRATION RATE: 16 BRPM | HEART RATE: 70 BPM | TEMPERATURE: 97.9 F | OXYGEN SATURATION: 94 %

## 2024-05-14 ASSESSMENT — ACTIVITIES OF DAILY LIVING (ADL)
USING THE TELPHONE: NEEDS ASSISTANCE
FEEDING: INDEPENDENT
BATHING_CURRENT_FUNCTION: MODERATE ASSIST
GROOMING ASSESSED: 1
CURRENT_FUNCTION: STAND BY ASSIST
DRESSING_UB_CURRENT_FUNCTION: MINIMUM ASSIST
PHYSICAL TRANSFERS ASSESSED: 1
TELEPHONE USE ASSESSED: 1
SHOPPING ASSESSED: 1
LAUNDRY ASSESSED: 1
LIGHT HOUSEKEEPING: DEPENDENT
DRESSING_LB_CURRENT_FUNCTION: MODERATE ASSIST
FEEDING ASSESSED: 1
TOILETING: STAND BY ASSIST
CURRENT_FUNCTION: STAND BY ASSIST
TRANSPORTATION: DEPENDENT
PREPARING MEALS: DEPENDENT
SHOPPING: DEPENDENT
TOILETING: 1
AMBULATION ASSISTANCE: STAND BY ASSIST
BATHING ASSESSED: 1
LAUNDRY: DEPENDENT
GROOMING_CURRENT_FUNCTION: SUPERVISION
ORAL_CARE_ASSESSED: 1
TRANSPORTATION ASSESSED: 1
HOUSEKEEPING ASSESSED: 1
ORAL_CARE_CURRENT_FUNCTION: NEEDS ASSISTANCE
AMBULATION ASSISTANCE: STAND BY ASSIST
BATHING_CURRENT_FUNCTION: MAXIMUM ASSIST
AMBULATION ASSISTANCE: 1

## 2024-05-14 ASSESSMENT — ENCOUNTER SYMPTOMS
SUBJECTIVE PAIN PROGRESSION: UNCHANGED
STOOL FREQUENCY: DAILY
HYPERTENSION: 1
DESCRIPTION OF MEMORY LOSS: SHORT TERM
FATIGUES EASILY: 1
LIMITED RANGE OF MOTION: 1
PAIN LOCATION - RELIEVING FACTORS: REST, MEDS
PAIN LOCATION - PAIN SEVERITY: 6/10
PAIN: 1
NAUSEA: PT DENIES NAUSEA AT THIS TIME.
PAIN LOCATION: LOW BACK
PAIN LOCATION - PAIN FREQUENCY: INTERMITTENT
DEBILITATING PAIN: 1
BOWEL PATTERN NORMAL: 1
LOWEST PAIN SEVERITY IN PAST 24 HOURS: 4/10
SKIN LESIONS: 1
MUSCLE WEAKNESS: 1
HIGHEST PAIN SEVERITY IN PAST 24 HOURS: 6/10
PAIN SEVERITY GOAL: 3/10
PAIN LOCATION - EXACERBATING FACTORS: ACTIVITY
PAIN LOCATION - PAIN QUALITY: ACHING
PERSON REPORTING PAIN: PATIENT
LAST BOWEL MOVEMENT: 66974
VOMITING: PT DENIES EMESIS AT THIS TIME.

## 2024-05-15 ENCOUNTER — HOME CARE VISIT (OUTPATIENT)
Dept: HOME HEALTH SERVICES | Facility: HOME HEALTHCARE | Age: 84
End: 2024-05-15
Payer: MEDICARE

## 2024-05-15 VITALS
TEMPERATURE: 97.8 F | HEART RATE: 72 BPM | DIASTOLIC BLOOD PRESSURE: 64 MMHG | RESPIRATION RATE: 16 BRPM | OXYGEN SATURATION: 97 % | SYSTOLIC BLOOD PRESSURE: 122 MMHG

## 2024-05-15 ASSESSMENT — ENCOUNTER SYMPTOMS
LOWEST PAIN SEVERITY IN PAST 24 HOURS: 6/10
PAIN LOCATION - PAIN SEVERITY: 6/10
PAIN LOCATION - PAIN FREQUENCY: WITH ACTIVITY
HIGHEST PAIN SEVERITY IN PAST 24 HOURS: 8/10
PAIN: 1
PAIN LOCATION - PAIN QUALITY: ACHY, SORE
PERSON REPORTING PAIN: PATIENT
PAIN LOCATION: LEFT HIP

## 2024-05-16 ENCOUNTER — OFFICE VISIT (OUTPATIENT)
Dept: NEUROLOGY | Facility: MEDICAL CENTER | Age: 84
DRG: 204 | End: 2024-05-16
Attending: INTERNAL MEDICINE
Payer: MEDICARE

## 2024-05-16 VITALS
HEART RATE: 70 BPM | TEMPERATURE: 98.2 F | WEIGHT: 170.42 LBS | OXYGEN SATURATION: 96 % | DIASTOLIC BLOOD PRESSURE: 88 MMHG | RESPIRATION RATE: 18 BRPM | HEIGHT: 62 IN | SYSTOLIC BLOOD PRESSURE: 132 MMHG | BODY MASS INDEX: 31.36 KG/M2

## 2024-05-16 DIAGNOSIS — R13.10 DYSPHAGIA, UNSPECIFIED TYPE: ICD-10-CM

## 2024-05-16 PROCEDURE — 3075F SYST BP GE 130 - 139MM HG: CPT | Performed by: INTERNAL MEDICINE

## 2024-05-16 PROCEDURE — G2212 PROLONG OUTPT/OFFICE VIS: HCPCS | Performed by: INTERNAL MEDICINE

## 2024-05-16 PROCEDURE — 3079F DIAST BP 80-89 MM HG: CPT | Performed by: INTERNAL MEDICINE

## 2024-05-16 PROCEDURE — 1170F FXNL STATUS ASSESSED: CPT | Performed by: INTERNAL MEDICINE

## 2024-05-16 PROCEDURE — 99205 OFFICE O/P NEW HI 60 MIN: CPT | Performed by: INTERNAL MEDICINE

## 2024-05-16 RX ORDER — TRAMADOL HYDROCHLORIDE 50 MG/1
50 TABLET ORAL EVERY 8 HOURS PRN
COMMUNITY

## 2024-05-16 ASSESSMENT — FIBROSIS 4 INDEX: FIB4 SCORE: 1.03

## 2024-05-16 NOTE — PATIENT INSTRUCTIONS
Cognitive changes  Likely post-concussive as immediately worsened after hitting head    Insomnia, sleep initiation  - try melatonin 5mg tablets. Max dose is 15mg per night. Take 1 hour before bedtime, at a consistent time each night. Look for label with USP certification.   If not staying asleep through the whole night, look for extended-release melatonin (REMfresh brand) online.     Dizziness  - check blood pressure and heart rate sitting, then again when standing. Do this a few times a week and bring to cardiologist.   - Cardiology follow up    Gait instability  - work on strengthening legs    Nausea  - try taking less Naproxen. Tylenol is OK  - avoid spicy food, or caffeine

## 2024-05-16 NOTE — PROGRESS NOTES
[facility]  [address] [place] [address], [address]. [place] [address]  Phone: [phone] Patient Name: Lizzette Burton  : 1940  MRN: [id number]     ASSESSMENT / PLAN       Lizzette Burton is a 84 y.o. female presenting for dizziness    Dizziness  Possible contribution from blood pressure medication and post-concussive syndrome. Negative tanner-hallpike, no ataxia noted in arms but leg weakness limits examination. Unclear if she is having diplopia as history is challenging to obtain.   - check blood pressure and heart rate sitting, then again when standing. Do this a few times a week and bring to cardiologist.   - consider CTA Head and Neck if not improving  - offered meclizine for severe dizziness but she doesn't want to take more medications    Gait instability  No evidence of neuropathy in legs. Reflexes diffusely diminished  MRI Lumbar  shows L5S1 foraminal stenosis but having more hip flexion weakness.   - work on strengthening legs  - consider NCS/EMG if not improving.    Cognitive changes  Likely post-concussive as immediately worsened after hitting head  - CTM    Insomnia, sleep initiation  - try melatonin 5mg tablets. Max dose is 15mg per night. Take 1 hour before bedtime, at a consistent time each night. Look for label with USP certification.   If not staying asleep through the whole night, look for extended-release melatonin (REMfresh brand) online.     Nausea  Suspected GERD since it worsens after meals, with acidic sensation and frequent burping  - try taking less Naproxen. Tylenol is OK  - avoid spicy food, or caffeine  - MBS referral per SLP recs    Orders Placed This Encounter    DX-ESOPHAGUS - [id number]    traMADol (ULTRAM) 50 MG Tab     - Return in about 4 months (around 2024).    Nikunj Hills DO  Neurology, Movement Disorders Specialist    BILLING DOCUMENTATION:   I spent 85 minutes reviewing the medical record, interviewing and examining the patient,  "discussing diagnosis and treatment, and coordinating care.    HISTORY OF PRESENT ILLNESS      Lizzette Burton is a 84 y.o. female presenting for dizziness  hypertension, GERD, asthma, urinary incontinence on Myrbetriq, pulmonary hypertension, and history of previous stroke     Granddaughter provided interpretation, declined interpretation services.    Initial HPI 05/16/24  Dizziness  4/29/2024: felt dizzy, fell, hit head on ground. [facility] admission showed blood pressure was significantly elevated in the 220s. presenting symptoms were felt to be due to hypertensive urgency. PT recommended  PT. Blood pressures were usually in the 150s to 180s/90s     Dizziness and fatigue worsened 2 weeks ago after starting new BP meds. BP at home now around 120-130/70-80. Still having dizziness despite normalization of BP.   Turning head fast makes it worse. Walking can feel dizzy as well. Standing up quickly can feel dizzy. Sitting down, more mild. Described as spinning sensation with nausea, not feeling faint. Just feels tired and disoriented.     Feel cognitively worsened after hitting head 4/29. Only having mild headaches now, much worse initially.     Sleep: yelling out in her sleep. Ongoing for many years. Possible PTSD. Trouble falling asleep, but not worse after TBI. Melatonin tried in past, but unclear dose.   +constipation 2 years, BM every 2 days  -anosmia      Dysphagia  Having sense of something stuck in her throat, even with soup. No choking or cough. Speech therapist at home, pending MBS.  +nausea after meals. \"Like it doesn't go down right\".   +heart burn symptoms, not on medications      MRI Brain w/o 5/2024  1.  There is moderate dilatation of the lateral and third ventricles. There is prominent sylvian fissure with tight frontoparietal sulci. This findings can be seen in the patients with normal pressure hydrocephalus. The ventricular dilatation is more   prominent than the previous MRI dated 7/19/2021.  2.  " Multifocal chronic microhemorrhages.  3.  Chronic lacunar infarct in the RIGHT basal ganglia.  4.  Severe chronic microvascular ischemic disease.    MRI Cervical 8/2023  Multifocal degenerative disease in the cervical spine as described above.     MRI Lumbar w/o 8/2023  1.  Multifocal degenerative disease in the lumbar spine as described above.  2.  Grade 1 spondylolisthesis of L5 on S1 and L4 on 5.  3.  Bilateral L4 and L5 spondylolysis.  4.  Severe lateral recess stenosis at L4-5 and L5-S1 causing impingement of the exiting L5 and S1 nerve roots respectively.  5.  Moderate central canal stenosis at L4-5  6.  There has been no significant interval change.      Cardiac stress test 11/2023   NUCLEAR IMAGING INTERPRETATION   Small, predominately fixed mid to apical inferior, inferolateral segments    suggestive of prior myocardial infarction.   Globally reduced LV systolic function estimated at 38%.    TTE 11/2023  Compared to the prior study on 12/26/22.  No significant changes.  Normal left ventricular size and systolic function. Moderate concentric   left ventricular hypertrophy. The left ventricular ejection fraction is   visually estimated to be 55-60%. Normal regional wall motion. No   regional wall motion abnormalities. Grade I diastolic dysfunction  Mildly dilated right ventricle  Mildly dilated left atrium  Aortic valve sclerosis without significant stenosis  Mild tricuspid regurgitation with estimated RV systolic pressure of 46   mmHg suggestive of moderate pulmonary hypertension    Past Medical History:   Diagnosis Date    Acute renal failure (Formerly [facility]) 02/11/2014    Anesthesia     PONV    Angina     CONSULT NEG. RESULTS    Arthritis     bilateral knees    Asthma     Breath shortness     WITH EXERCISE    CATARACT     bilat IOL    Dental disorder     upper/lower partials    Glaucoma     Heart burn     Hypertension     Indigestion     Pneumonia 03/2018    Sepsis (Formerly [facility]) 02/08/2014    Stroke (Formerly [facility])     denies hx. of  stroke    Unspecified disorder of thyroid     'thyroid medicine taken in past'    Unspecified hemorrhagic conditions 2009     treated in er,nose bleed    Urinary bladder disorder     Urinary incontinence     wears pads       Past Surgical History:   Procedure Laterality Date    MUSCLE BIOPSY Left 10/18/2023    Procedure: LEFT DELTOID BIOPSY;  Surgeon: Miller Hernandez M.D.;  Location: SURGERY [facility];  Service: Neurosurgery    SHOULDER ARTHROPLASTY TOTAL Right 05/02/2018    Procedure: SHOULDER ARTHROPLASTY TOTAL/ REVERSE;  Surgeon: Scott Payton M.D.;  Location: SURGERY SAME DAY [facility];  Service: Orthopedics    ANTERIOR AND POSTERIOR REPAIR  03/20/2017    Procedure: ANTERIOR AND POSTERIOR REPAIR;  Surgeon: Regulo Burton M.D.;  Location: SURGERY SAME DAY [facility];  Service:     ENTEROCELE REPAIR  03/20/2017    Procedure: ENTEROCELE REPAIR - PERINEOPLASTY;  Surgeon: Regulo Burton M.D.;  Location: SURGERY SAME DAY [facility];  Service:     BLADDER SLING FEMALE  03/20/2017    Procedure: BLADDER SLING FEMALE - TOT;  Surgeon: Regulo Burton M.D.;  Location: SURGERY SAME DAY [facility];  Service:     VAGINAL SUSPENSION  03/20/2017    Procedure: VAGINAL SUSPENSION -SACROSPINOUS VAULT;  Surgeon: Regulo Burton M.D.;  Location: SURGERY [facility] DAY [facility];  Service:     KNEE ARTHROPLASTY TOTAL Left 08/20/2015    Procedure: KNEE ARTHROPLASTY TOTAL;  Surgeon: Juan Manuel Choi M.D.;  Location: [facility];  Service:     BAERVELDT IMPLANT  05/08/2013    Performed by Miller Fletcher M.D. at SURGERY SAME DAY [facility]    CATARACT PHACO WITH IOL  05/08/2013    Performed by Miller Fletcher M.D. at SURGERY SAME DAY [facility]    BAERVELDT IMPLANT  03/13/2013    Performed by Miller Fletcher M.D. at SURGERY SAME DAY [facility]    BAERVELDT IMPLANT  12/12/2012    Performed by Miller Fletcher M.D. at SURGERY SAME DAY [facility]    TRABECULECTOMY  09/26/2012    Performed by  Miller Fletcher M.D. at SURGERY SAME DAY [facility]S    CATARACT PHACO WITH IOL  2012    Performed by Miller Fletcher M.D. at SURGERY SAME DAY [facility]S    KNEE ARTHROPLASTY TOTAL  2009    Performed by GISELA OLSON at SURGERY [facility]S    HYSTERECTOMY, TOTAL ABDOMINAL  1989    GYN SURGERY      OTHER      R shoulder       No family history on file.    Social History     Socioeconomic History    Marital status:      Spouse name: Not on file    Number of children: Not on file    Years of education: Not on file    Highest education level: Not on file   Occupational History    Not on file   Tobacco Use    Smoking status: Former     Current packs/day: 0.00     Average packs/day: 0.5 packs/day for 20.0 years (10.0 ttl pk-yrs)     Types: Cigarettes     Start date: 3/16/1982     Quit date: 3/16/2002     Years since quittin.1    Smokeless tobacco: Never   Vaping Use    Vaping status: Never Used   Substance and Sexual Activity    Alcohol use: No    Drug use: No    Sexual activity: Not on file   Other Topics Concern    Not on file   Social History Narrative    Not on file     Social Determinants of Health     Financial Resource Strain: Not on file   Food Insecurity: Not on file   Transportation Needs: Not on file   Physical Activity: Not on file   Stress: Not on file   Social Connections: Feeling Socially Integrated (5/3/2024)    OASIS : Social Isolation     Frequency of experiencing loneliness or isolation: Rarely   Intimate Partner Violence: Not on file   Housing Stability: Not on file       Current Outpatient Medications   Medication    traMADol (ULTRAM) 50 MG Tab    Naproxen Sodium (ALEVE) 220 MG Cap    Menthol-Methyl Salicylate (SALONPAS PAIN RELIEF PATCH) Patch    Misc Natural Products (OSTEO BI-FLEX ADV TRIPLE ST) Tab    Omega-3 Fatty Acids (OMEGA 3 PO)    amLODIPine (NORVASC) 10 MG Tab    carvedilol (COREG) 3.125 MG Tab    lidocaine (ASPERFLEX) 4 % Patch     spironolactone (ALDACTONE) 25 MG Tab    latanoprost (XALATAN) 0.005 % Solution    Brimonidine Tartrate-Timolol 0.2-0.5 % Solution    acetaminophen (TYLENOL) 325 MG Tab    alendronate (FOSAMAX) 70 MG Tab    therapeutic multivitamin-minerals (THERAGRAN-M) Tab    levothyroxine (SYNTHROID) 100 MCG Tab    losartan (COZAAR) 100 MG Tab     No current facility-administered medications for this visit.       No Known Allergies    DATA / RESULTS      25-Hydroxy   Vitamin D 25   Date Value Ref Range Status   06/29/2021 28 (L) 30 - 100 ng/mL Final     Comment:     Adult Ranges:   <20 ng/mL - Deficiency  20-29 ng/mL - Insufficiency   ng/mL - Sufficiency  Effective 3/18/2020, this electrochemiluminescence binding assay is  performed using Roche monica e immunoassay analyzer.  The [employer] Vitamin D  total II assay is intended for the quantitative determination of total 25  hydroxyvitamin D in human serum and plasma. This assay is to be used as an  aid in the assessment of vitamin D sufficiency in adults.       Vitamin B12 -True Cobalamin   Date Value Ref Range Status   07/18/2023 738 211 - 911 pg/mL Final     TSH   Date Value Ref Range Status   04/29/2024 0.570 0.380 - 5.330 uIU/mL Final     Comment:     The 2011 American Thyroid Association (LALI) guidelines  recommended that the interpretation of thyroid function in  pregnancy be based on trimester specific reference ranges.    1st Trimester  0.100-2.500 mIU/L  2nd Trimester  0.200-3.000 mIU/L  3rd Trimester  0.300-3.500 mIU/L    These established reference ranges have not been validated  at [facility].       Glycohemoglobin   Date Value Ref Range Status   12/27/2022 5.6 4.0 - 5.6 % Final     Comment:     Increased risk for diabetes:  5.7 -6.4%  Diabetes:  >6.4%  Glycemic control for adults with diabetes:  <7.0%    The above interpretations are per ADA guidelines.  Diagnosis  of diabetes mellitus on the basis of elevated Hemoglobin A1c  should be confirmed by  "repeating the Hb A1c test.       LDL   Date Value Ref Range Status   11/05/2022 127 (H) <100 mg/dL Final          OBJECTIVE      Vitals:    05/16/24 1056   BP: 132/88   BP Location: Right arm   Patient Position: Sitting   BP Cuff Size: Large adult   Pulse: 70   Resp: 18   Temp: 36.8 °C (98.2 °F)   TempSrc: Temporal   SpO2: 96%   Weight: 77.3 kg (170 lb 6.7 oz)   Height: 1.575 m (5' 2\")     Physical Exam     General: NAD, appears stated age.      Mental status: Speech clear and fluent without tremor. Fund of knowledge is good.      Cranial Nerves:  CN2: Right eye less reactive to light 2/2 surgery.   CN3/4/6: EOMI. There is no nystagmus.   CN5: V1-V3 intact to light touch    CN7: Symmetric face.   CN8: Hearing grossly intact. Negative tanner-hallpike. Negative head impulse test.  CN9/10/12: Soft palate and uvula rise symmetrically. Tongue midline.   CN11: Shoulder shrug intact bilaterally.     Strength  Right Left   Shoulder Abduction  5 5   Elbow Flexion 5 5   Elbow Extension  5 5   Wrist Extension  5 5   Finger Extension  5 5   Hip Flexion  4 4   Knee Extension 5 5   Ankle Dorsiflexion  5 5     Deep Tendon Reflexes: 0 biceps, brachioradialis, patella and ankles. Plantar reflexes in flexion.     Sensory: normal to sharp, temperature.    Quantitative tuning fork Right Left   Hands 8 8   Feet 7 7       Cerebellar: No dysmetria with FTN    Gait:   Struggles to stand from wheelchair. +romberg      PROCEDURE   N/A     "

## 2024-05-17 ENCOUNTER — HOSPITAL ENCOUNTER (INPATIENT)
Facility: MEDICAL CENTER | Age: 84
LOS: 2 days | DRG: 204 | End: 2024-05-19
Attending: EMERGENCY MEDICINE | Admitting: HOSPITALIST
Payer: MEDICARE

## 2024-05-17 ENCOUNTER — HOME CARE VISIT (OUTPATIENT)
Dept: HOME HEALTH SERVICES | Facility: HOME HEALTHCARE | Age: 84
End: 2024-05-17
Payer: MEDICARE

## 2024-05-17 ENCOUNTER — APPOINTMENT (OUTPATIENT)
Dept: RADIOLOGY | Facility: MEDICAL CENTER | Age: 84
DRG: 204 | End: 2024-05-17
Attending: EMERGENCY MEDICINE
Payer: MEDICARE

## 2024-05-17 VITALS
RESPIRATION RATE: 16 BRPM | OXYGEN SATURATION: 95 % | TEMPERATURE: 98.5 F | HEART RATE: 35 BPM | SYSTOLIC BLOOD PRESSURE: 100 MMHG | DIASTOLIC BLOOD PRESSURE: 62 MMHG

## 2024-05-17 DIAGNOSIS — R53.1 GENERALIZED WEAKNESS: ICD-10-CM

## 2024-05-17 DIAGNOSIS — R06.09 DYSPNEA ON EXERTION: ICD-10-CM

## 2024-05-17 DIAGNOSIS — R00.1 BRADYCARDIA: ICD-10-CM

## 2024-05-17 DIAGNOSIS — R42 DIZZINESS: ICD-10-CM

## 2024-05-17 DIAGNOSIS — I27.20 PULMONARY HYPERTENSION (HCC): ICD-10-CM

## 2024-05-17 LAB
ALBUMIN SERPL BCP-MCNC: 3.6 G/DL (ref 3.2–4.9)
ALBUMIN/GLOB SERPL: 1.4 G/DL
ALP SERPL-CCNC: 65 U/L (ref 30–99)
ALT SERPL-CCNC: 20 U/L (ref 2–50)
ANION GAP SERPL CALC-SCNC: 10 MMOL/L (ref 7–16)
APPEARANCE UR: CLEAR
AST SERPL-CCNC: 15 U/L (ref 12–45)
BASOPHILS # BLD AUTO: 0.3 % (ref 0–1.8)
BASOPHILS # BLD: 0.04 K/UL (ref 0–0.12)
BILIRUB SERPL-MCNC: 0.5 MG/DL (ref 0.1–1.5)
BILIRUB UR QL STRIP.AUTO: NEGATIVE
BUN SERPL-MCNC: 34 MG/DL (ref 8–22)
CALCIUM ALBUM COR SERPL-MCNC: 9 MG/DL (ref 8.5–10.5)
CALCIUM SERPL-MCNC: 8.7 MG/DL (ref 8.5–10.5)
CHLORIDE SERPL-SCNC: 101 MMOL/L (ref 96–112)
CO2 SERPL-SCNC: 22 MMOL/L (ref 20–33)
COLOR UR: YELLOW
CREAT SERPL-MCNC: 0.89 MG/DL (ref 0.5–1.4)
EKG IMPRESSION: NORMAL
EOSINOPHIL # BLD AUTO: 0.02 K/UL (ref 0–0.51)
EOSINOPHIL NFR BLD: 0.1 % (ref 0–6.9)
ERYTHROCYTE [DISTWIDTH] IN BLOOD BY AUTOMATED COUNT: 48.2 FL (ref 35.9–50)
GFR SERPLBLD CREATININE-BSD FMLA CKD-EPI: 63 ML/MIN/1.73 M 2
GLOBULIN SER CALC-MCNC: 2.5 G/DL (ref 1.9–3.5)
GLUCOSE SERPL-MCNC: 99 MG/DL (ref 65–99)
GLUCOSE UR STRIP.AUTO-MCNC: NEGATIVE MG/DL
HCT VFR BLD AUTO: 40.9 % (ref 37–47)
HGB BLD-MCNC: 13.7 G/DL (ref 12–16)
IMM GRANULOCYTES # BLD AUTO: 0.1 K/UL (ref 0–0.11)
IMM GRANULOCYTES NFR BLD AUTO: 0.7 % (ref 0–0.9)
KETONES UR STRIP.AUTO-MCNC: NEGATIVE MG/DL
LEUKOCYTE ESTERASE UR QL STRIP.AUTO: NEGATIVE
LYMPHOCYTES # BLD AUTO: 1.97 K/UL (ref 1–4.8)
LYMPHOCYTES NFR BLD: 13.9 % (ref 22–41)
MAGNESIUM SERPL-MCNC: 2 MG/DL (ref 1.5–2.5)
MCH RBC QN AUTO: 30.8 PG (ref 27–33)
MCHC RBC AUTO-ENTMCNC: 33.5 G/DL (ref 32.2–35.5)
MCV RBC AUTO: 91.9 FL (ref 81.4–97.8)
MICRO URNS: NORMAL
MONOCYTES # BLD AUTO: 0.75 K/UL (ref 0–0.85)
MONOCYTES NFR BLD AUTO: 5.3 % (ref 0–13.4)
NEUTROPHILS # BLD AUTO: 11.27 K/UL (ref 1.82–7.42)
NEUTROPHILS NFR BLD: 79.7 % (ref 44–72)
NITRITE UR QL STRIP.AUTO: NEGATIVE
NRBC # BLD AUTO: 0 K/UL
NRBC BLD-RTO: 0 /100 WBC (ref 0–0.2)
NT-PROBNP SERPL IA-MCNC: 993 PG/ML (ref 0–125)
PH UR STRIP.AUTO: 6.5 [PH] (ref 5–8)
PLATELET # BLD AUTO: 333 K/UL (ref 164–446)
PMV BLD AUTO: 8.5 FL (ref 9–12.9)
POTASSIUM SERPL-SCNC: 4.5 MMOL/L (ref 3.6–5.5)
PROT SERPL-MCNC: 6.1 G/DL (ref 6–8.2)
PROT UR QL STRIP: NEGATIVE MG/DL
RBC # BLD AUTO: 4.45 M/UL (ref 4.2–5.4)
RBC UR QL AUTO: NEGATIVE
SODIUM SERPL-SCNC: 133 MMOL/L (ref 135–145)
SP GR UR STRIP.AUTO: 1.01
TROPONIN T SERPL-MCNC: 23 NG/L (ref 6–19)
UROBILINOGEN UR STRIP.AUTO-MCNC: 0.2 MG/DL
WBC # BLD AUTO: 14.2 K/UL (ref 4.8–10.8)

## 2024-05-17 PROCEDURE — 700101 HCHG RX REV CODE 250: Performed by: HOSPITALIST

## 2024-05-17 PROCEDURE — 80053 COMPREHEN METABOLIC PANEL: CPT

## 2024-05-17 PROCEDURE — 81003 URINALYSIS AUTO W/O SCOPE: CPT

## 2024-05-17 PROCEDURE — 71045 X-RAY EXAM CHEST 1 VIEW: CPT

## 2024-05-17 PROCEDURE — 700102 HCHG RX REV CODE 250 W/ 637 OVERRIDE(OP): Performed by: HOSPITALIST

## 2024-05-17 PROCEDURE — 93005 ELECTROCARDIOGRAM TRACING: CPT | Performed by: EMERGENCY MEDICINE

## 2024-05-17 PROCEDURE — 700111 HCHG RX REV CODE 636 W/ 250 OVERRIDE (IP): Mod: JZ | Performed by: HOSPITALIST

## 2024-05-17 PROCEDURE — 770020 HCHG ROOM/CARE - TELE (206)

## 2024-05-17 PROCEDURE — 99223 1ST HOSP IP/OBS HIGH 75: CPT | Mod: AI | Performed by: HOSPITALIST

## 2024-05-17 PROCEDURE — 93005 ELECTROCARDIOGRAM TRACING: CPT

## 2024-05-17 PROCEDURE — 85025 COMPLETE CBC W/AUTO DIFF WBC: CPT

## 2024-05-17 PROCEDURE — 83735 ASSAY OF MAGNESIUM: CPT

## 2024-05-17 PROCEDURE — 84484 ASSAY OF TROPONIN QUANT: CPT

## 2024-05-17 PROCEDURE — 99285 EMERGENCY DEPT VISIT HI MDM: CPT

## 2024-05-17 PROCEDURE — 83880 ASSAY OF NATRIURETIC PEPTIDE: CPT

## 2024-05-17 PROCEDURE — A9270 NON-COVERED ITEM OR SERVICE: HCPCS | Performed by: HOSPITALIST

## 2024-05-17 PROCEDURE — 36415 COLL VENOUS BLD VENIPUNCTURE: CPT

## 2024-05-17 RX ORDER — AMLODIPINE BESYLATE 5 MG/1
10 TABLET ORAL DAILY
Status: DISCONTINUED | OUTPATIENT
Start: 2024-05-18 | End: 2024-05-19 | Stop reason: HOSPADM

## 2024-05-17 RX ORDER — BRIMONIDINE TARTRATE AND TIMOLOL MALEATE 2; 5 MG/ML; MG/ML
1 SOLUTION OPHTHALMIC 3 TIMES DAILY
Status: DISCONTINUED | OUTPATIENT
Start: 2024-05-17 | End: 2024-05-17

## 2024-05-17 RX ORDER — METOPROLOL SUCCINATE 25 MG/1
25 TABLET, EXTENDED RELEASE ORAL DAILY
Status: ON HOLD | COMMUNITY
End: 2024-05-19

## 2024-05-17 RX ORDER — LOSARTAN POTASSIUM 50 MG/1
100 TABLET ORAL DAILY
Status: DISCONTINUED | OUTPATIENT
Start: 2024-05-18 | End: 2024-05-19 | Stop reason: HOSPADM

## 2024-05-17 RX ORDER — ONDANSETRON 4 MG/1
4 TABLET, ORALLY DISINTEGRATING ORAL EVERY 4 HOURS PRN
Status: DISCONTINUED | OUTPATIENT
Start: 2024-05-17 | End: 2024-05-19 | Stop reason: HOSPADM

## 2024-05-17 RX ORDER — ENOXAPARIN SODIUM 100 MG/ML
40 INJECTION SUBCUTANEOUS DAILY
Status: DISCONTINUED | OUTPATIENT
Start: 2024-05-17 | End: 2024-05-19 | Stop reason: HOSPADM

## 2024-05-17 RX ORDER — ONDANSETRON 2 MG/ML
4 INJECTION INTRAMUSCULAR; INTRAVENOUS EVERY 4 HOURS PRN
Status: DISCONTINUED | OUTPATIENT
Start: 2024-05-17 | End: 2024-05-19 | Stop reason: HOSPADM

## 2024-05-17 RX ORDER — M-VIT,TX,IRON,MINS/CALC/FOLIC 27MG-0.4MG
1 TABLET ORAL DAILY
Status: DISCONTINUED | OUTPATIENT
Start: 2024-05-17 | End: 2024-05-17

## 2024-05-17 RX ORDER — BRIMONIDINE TARTRATE 2 MG/ML
1 SOLUTION/ DROPS OPHTHALMIC EVERY 8 HOURS
Status: DISCONTINUED | OUTPATIENT
Start: 2024-05-17 | End: 2024-05-19 | Stop reason: HOSPADM

## 2024-05-17 RX ORDER — LATANOPROST 50 UG/ML
1 SOLUTION/ DROPS OPHTHALMIC NIGHTLY
Status: DISCONTINUED | OUTPATIENT
Start: 2024-05-17 | End: 2024-05-19 | Stop reason: HOSPADM

## 2024-05-17 RX ORDER — LIDOCAINE 4 G/G
2 PATCH TOPICAL EVERY 24 HOURS
Status: DISCONTINUED | OUTPATIENT
Start: 2024-05-17 | End: 2024-05-17

## 2024-05-17 RX ORDER — TRAMADOL HYDROCHLORIDE 50 MG/1
50 TABLET ORAL EVERY EVENING
Status: DISCONTINUED | OUTPATIENT
Start: 2024-05-17 | End: 2024-05-19 | Stop reason: HOSPADM

## 2024-05-17 RX ORDER — ACETAMINOPHEN 325 MG/1
650 TABLET ORAL EVERY 6 HOURS PRN
Status: DISCONTINUED | OUTPATIENT
Start: 2024-05-17 | End: 2024-05-19 | Stop reason: HOSPADM

## 2024-05-17 RX ORDER — LEVOTHYROXINE SODIUM 0.1 MG/1
100 TABLET ORAL
Status: DISCONTINUED | OUTPATIENT
Start: 2024-05-18 | End: 2024-05-19 | Stop reason: HOSPADM

## 2024-05-17 RX ORDER — TIMOLOL MALEATE 5 MG/ML
1 SOLUTION/ DROPS OPHTHALMIC EVERY 8 HOURS
Status: DISCONTINUED | OUTPATIENT
Start: 2024-05-17 | End: 2024-05-19 | Stop reason: HOSPADM

## 2024-05-17 RX ORDER — SPIRONOLACTONE 25 MG/1
25 TABLET ORAL DAILY
Status: DISCONTINUED | OUTPATIENT
Start: 2024-05-18 | End: 2024-05-19 | Stop reason: HOSPADM

## 2024-05-17 RX ADMIN — ENOXAPARIN SODIUM 40 MG: 100 INJECTION SUBCUTANEOUS at 18:16

## 2024-05-17 RX ADMIN — BRIMONIDINE TARTRATE 1 DROP: 2 SOLUTION OPHTHALMIC at 21:52

## 2024-05-17 RX ADMIN — TIMOLOL MALEATE 1 DROP: 5 SOLUTION OPHTHALMIC at 21:52

## 2024-05-17 RX ADMIN — LATANOPROST 1 DROP: 50 SOLUTION OPHTHALMIC at 21:51

## 2024-05-17 SDOH — ECONOMIC STABILITY: TRANSPORTATION INSECURITY
IN THE PAST 12 MONTHS, HAS LACK OF RELIABLE TRANSPORTATION KEPT YOU FROM MEDICAL APPOINTMENTS, MEETINGS, WORK OR FROM GETTING THINGS NEEDED FOR DAILY LIVING?: NO

## 2024-05-17 SDOH — ECONOMIC STABILITY: TRANSPORTATION INSECURITY
IN THE PAST 12 MONTHS, HAS THE LACK OF TRANSPORTATION KEPT YOU FROM MEDICAL APPOINTMENTS OR FROM GETTING MEDICATIONS?: NO

## 2024-05-17 ASSESSMENT — SOCIAL DETERMINANTS OF HEALTH (SDOH)
WITHIN THE LAST YEAR, HAVE YOU BEEN HUMILIATED OR EMOTIONALLY ABUSED IN OTHER WAYS BY YOUR PARTNER OR EX-PARTNER?: NO
WITHIN THE LAST YEAR, HAVE YOU BEEN KICKED, HIT, SLAPPED, OR OTHERWISE PHYSICALLY HURT BY YOUR PARTNER OR EX-PARTNER?: NO
IN THE PAST 12 MONTHS, HAS THE ELECTRIC, GAS, OIL, OR WATER COMPANY THREATENED TO SHUT OFF SERVICE IN YOUR HOME?: NO
WITHIN THE LAST YEAR, HAVE TO BEEN RAPED OR FORCED TO HAVE ANY KIND OF SEXUAL ACTIVITY BY YOUR PARTNER OR EX-PARTNER?: NO
WITHIN THE PAST 12 MONTHS, THE FOOD YOU BOUGHT JUST DIDN'T LAST AND YOU DIDN'T HAVE MONEY TO GET MORE: NEVER TRUE
WITHIN THE LAST YEAR, HAVE YOU BEEN AFRAID OF YOUR PARTNER OR EX-PARTNER?: NO
WITHIN THE PAST 12 MONTHS, YOU WORRIED THAT YOUR FOOD WOULD RUN OUT BEFORE YOU GOT THE MONEY TO BUY MORE: NEVER TRUE

## 2024-05-17 ASSESSMENT — PATIENT HEALTH QUESTIONNAIRE - PHQ9
3. TROUBLE FALLING OR STAYING ASLEEP OR SLEEPING TOO MUCH: SEVERAL DAYS
7. TROUBLE CONCENTRATING ON THINGS, SUCH AS READING THE NEWSPAPER OR WATCHING TELEVISION: NOT AT ALL
SUM OF ALL RESPONSES TO PHQ QUESTIONS 1-9: 4
SUM OF ALL RESPONSES TO PHQ9 QUESTIONS 1 AND 2: 1
2. FEELING DOWN, DEPRESSED, IRRITABLE, OR HOPELESS: SEVERAL DAYS
6. FEELING BAD ABOUT YOURSELF - OR THAT YOU ARE A FAILURE OR HAVE LET YOURSELF OR YOUR FAMILY DOWN: NOT AL ALL
8. MOVING OR SPEAKING SO SLOWLY THAT OTHER PEOPLE COULD HAVE NOTICED. OR THE OPPOSITE, BEING SO FIGETY OR RESTLESS THAT YOU HAVE BEEN MOVING AROUND A LOT MORE THAN USUAL: SEVERAL DAYS
1. LITTLE INTEREST OR PLEASURE IN DOING THINGS: NOT AT ALL
9. THOUGHTS THAT YOU WOULD BE BETTER OFF DEAD, OR OF HURTING YOURSELF: NOT AT ALL
5. POOR APPETITE OR OVEREATING: NOT AT ALL
4. FEELING TIRED OR HAVING LITTLE ENERGY: SEVERAL DAYS

## 2024-05-17 ASSESSMENT — COGNITIVE AND FUNCTIONAL STATUS - GENERAL
MOVING TO AND FROM BED TO CHAIR: A LITTLE
SUGGESTED CMS G CODE MODIFIER MOBILITY: CK
CLIMB 3 TO 5 STEPS WITH RAILING: A LITTLE
MOVING FROM LYING ON BACK TO SITTING ON SIDE OF FLAT BED: A LITTLE
MOBILITY SCORE: 19
DAILY ACTIVITIY SCORE: 23
STANDING UP FROM CHAIR USING ARMS: A LITTLE
DRESSING REGULAR LOWER BODY CLOTHING: A LITTLE
WALKING IN HOSPITAL ROOM: A LITTLE
SUGGESTED CMS G CODE MODIFIER DAILY ACTIVITY: CI

## 2024-05-17 ASSESSMENT — ENCOUNTER SYMPTOMS
NAUSEA: 0
BOWEL PATTERN NORMAL: 1
PAIN LOCATION - EXACERBATING FACTORS: ACTIVITY
WEAKNESS: 1
PAIN SEVERITY GOAL: 3/10
COUGH: 0
PAIN LOCATION - PAIN FREQUENCY: INTERMITTENT
BACK PAIN: 0
VOMITING: 0
HIGHEST PAIN SEVERITY IN PAST 24 HOURS: 7/10
LAST BOWEL MOVEMENT: 66977
FEVER: 0
LOWEST PAIN SEVERITY IN PAST 24 HOURS: 6/10
MUSCLE WEAKNESS: 1
SHORTNESS OF BREATH: 1
NAUSEA: PT DENIES NAUSEA AT THIS TIME.
PERSON REPORTING PAIN: PATIENT
PAIN: 1
PAIN LOCATION - PAIN QUALITY: ACHING
PAIN LOCATION: LOW BACK
PAIN LOCATION - PAIN SEVERITY: 6/10
ABDOMINAL PAIN: 0
PAIN LOCATION - RELIEVING FACTORS: REST, MEDICATION
DIARRHEA: 0
LIMITED RANGE OF MOTION: 1
SUBJECTIVE PAIN PROGRESSION: UNCHANGED
CHILLS: 0
FATIGUES EASILY: 1
LOSS OF CONSCIOUSNESS: 0
HEADACHES: 0
VOMITING: PT DENIES EMESIS AT THIS TIME.
PALPITATIONS: 0
DIZZINESS: 0

## 2024-05-17 ASSESSMENT — FIBROSIS 4 INDEX
FIB4 SCORE: 1.03
FIB4 SCORE: 0.86

## 2024-05-17 ASSESSMENT — LIFESTYLE VARIABLES
TOTAL SCORE: 0
EVER HAD A DRINK FIRST THING IN THE MORNING TO STEADY YOUR NERVES TO GET RID OF A HANGOVER: NO
TOTAL SCORE: 0
AVERAGE NUMBER OF DAYS PER WEEK YOU HAVE A DRINK CONTAINING ALCOHOL: 1
EVER FELT BAD OR GUILTY ABOUT YOUR DRINKING: NO
ALCOHOL_USE: NO
HAVE YOU EVER FELT YOU SHOULD CUT DOWN ON YOUR DRINKING: NO
TOTAL SCORE: 0
HOW MANY TIMES IN THE PAST YEAR HAVE YOU HAD 5 OR MORE DRINKS IN A DAY: 0
ON A TYPICAL DAY WHEN YOU DRINK ALCOHOL HOW MANY DRINKS DO YOU HAVE: 1
CONSUMPTION TOTAL: NEGATIVE
HAVE PEOPLE ANNOYED YOU BY CRITICIZING YOUR DRINKING: NO

## 2024-05-17 NOTE — ED PROVIDER NOTES
ER Provider Note    Scribed for Dorene Valencia M.d. by Helen Mercer. 5/17/2024  11:29 AM    Primary Care Provider: Charles Ospina M.D.    CHIEF COMPLAINT   Chief Complaint   Patient presents with    Weakness     BIB [facility] from home after home health called due to patient's HR being in 30s. She has had bradycardia since leaving hospital on carvedilol instead of metoprolol. She saw her PCP on 5/9 and has cards appointment on 6/6 for possible pacer. Patient has been feeling generally weak but denies pain     EXTERNAL RECORDS REVIEWED  Hospital records reviewed showed that the patient was here in the hospital and hospitalized from 4/29-5/1 for dizziness. The patient was sent home on carvedilol. She was seen yesterday by neurology where she was still complaining of dizziness despite improvement in her blood pressure. Her last stress test in November showed a globally reduced LV systolic function of 38% and her last echo showed an EF of 55%. There was a phone call yesterday from [facility] office which stated that the patient has been having low heart rate. There is home health documentation from a home health nurse which states that the patient's heart rate has been in the 70s over the past few days. Her heart rate was 51 at her doctor's office on 5/9/24.     HPI/ROS  LIMITATION TO HISTORY   Language: Vietnamese, Daughter translated for patient  OUTSIDE HISTORIAN(S):  Daughter at bedside who endorsed the patient's symptoms and provided additional history as seen below.      Lizzette Burton is a 84 y.o. female who presents to the ED via EMS complaining of weakness onset 4/29/24. Daughter reports having a low heart rate  in the 30-40s after being placed on carvedilol at the last hospital visit. Home health nurse today found that the patient's heart rate was in the 30s and called EMS. The patient is scheduled to see cardiology on 6/6 for a possible pacemaker. Today, the patient reports that she is feeling tired  but denies any pain. Denies any current dizziness, but daughter adds that she is very tired after trying to walk with her walker. She does not have any shortness of breath or chest pain at this time.  Daughter reports she seems short of breath when she walks.  The patient has not had any nausea or vomiting. She has some swelling in her legs but daughter reports that the swelling is normal for the patient. Patient has some pain in her lower back and abdomen. The patient is unable to walk without her walker.     PAST MEDICAL HISTORY  Past Medical History:   Diagnosis Date    Acute renal failure ([facility]) 02/11/2014    Anesthesia     PONV    Angina     CONSULT NEG. RESULTS    Arthritis     bilateral knees    Asthma     Breath shortness     WITH EXERCISE    CATARACT     bilat IOL    Dental disorder     upper/lower partials    Glaucoma     Heart burn     Hypertension     Indigestion     Pneumonia 03/2018    Sepsis ([facility]) 02/08/2014    Stroke ([facility])     denies hx. of stroke    Unspecified disorder of thyroid     'thyroid medicine taken in past'    Unspecified hemorrhagic conditions 2009     treated in er,nose bleed    Urinary bladder disorder     Urinary incontinence     wears pads       SURGICAL HISTORY  Past Surgical History:   Procedure Laterality Date    MUSCLE BIOPSY Left 10/18/2023    Procedure: LEFT DELTOID BIOPSY;  Surgeon: Miller Hernandez M.D.;  Location: SURGERY [facility];  Service: Neurosurgery    SHOULDER ARTHROPLASTY TOTAL Right 05/02/2018    Procedure: SHOULDER ARTHROPLASTY TOTAL/ REVERSE;  Surgeon: Scott Payton M.D.;  Location: SURGERY SAME DAY [facility];  Service: Orthopedics    ANTERIOR AND POSTERIOR REPAIR  03/20/2017    Procedure: ANTERIOR AND POSTERIOR REPAIR;  Surgeon: Regulo Burton M.D.;  Location: SURGERY SAME DAY [facility] ORS;  Service:     ENTEROCELE REPAIR  03/20/2017    Procedure: ENTEROCELE REPAIR - PERINEOPLASTY;  Surgeon: Regulo Burton M.D.;  Location: SURGERY SAME DAY [facility]  ORS;  Service:     BLADDER SLING FEMALE  03/20/2017    Procedure: BLADDER SLING FEMALE - TOT;  Surgeon: Regulo Burton M.D.;  Location: SURGERY SAME DAY [facility];  Service:     VAGINAL SUSPENSION  03/20/2017    Procedure: VAGINAL SUSPENSION -SACROSPINOUS VAULT;  Surgeon: Regulo Burton M.D.;  Location: SURGERY SAME DAY [facility];  Service:     KNEE ARTHROPLASTY TOTAL Left 08/20/2015    Procedure: KNEE ARTHROPLASTY TOTAL;  Surgeon: Juan Manuel Choi M.D.;  Location: SURGERY [facility];  Service:     BAERVELDT IMPLANT  05/08/2013    Performed by Miller Fletcher M.D. at SURGERY SAME DAY [facility] ORS    CATARACT PHACO WITH IOL  05/08/2013    Performed by Miller Fletcher M.D. at SURGERY SAME DAY [facility] ORS    BAERVELDT IMPLANT  03/13/2013    Performed by Miller Fletcher M.D. at SURGERY SAME DAY [facility] ORS    BAERVELDT IMPLANT  12/12/2012    Performed by Miller Fletcher M.D. at SURGERY SAME DAY [facility] ORS    TRABECULECTOMY  09/26/2012    Performed by Miller Fletcher M.D. at SURGERY SAME DAY [facility]    CATARACT PHACO WITH IOL  09/26/2012    Performed by Miller Fletcher M.D. at SURGERY SAME DAY [facility] ORS    KNEE ARTHROPLASTY TOTAL  02/05/2009    Performed by GISELA OLSON at SURGERY [facility]    HYSTERECTOMY, TOTAL ABDOMINAL  01/01/1989    GYN SURGERY      OTHER      R shoulder       FAMILY HISTORY  History reviewed. No pertinent family history.    SOCIAL HISTORY   reports that she quit smoking about 22 years ago. Her smoking use included cigarettes. She started smoking about 42 years ago. She has a 10 pack-year smoking history. She has never used smokeless tobacco. She reports that she does not drink alcohol and does not use drugs.    CURRENT MEDICATIONS  Previous Medications    ACETAMINOPHEN (TYLENOL) 325 MG TAB    Take 650 mg by mouth every 6 hours as needed for Moderate Pain.    ALENDRONATE (FOSAMAX) 70 MG TAB    Take 70 mg by mouth every 7 days. Indications: Osteoporosis  "   AMLODIPINE (NORVASC) 10 MG TAB    Take 1 Tablet by mouth every day.    BRIMONIDINE TARTRATE-TIMOLOL 0.2-0.5 % SOLUTION    Administer 1 Drop into both eyes in the morning, at noon, and at bedtime. Indications: Glaucoma    CARVEDILOL (COREG) 3.125 MG TAB    Take 1 Tablet by mouth 2 times a day with meals.    LATANOPROST (XALATAN) 0.005 % SOLUTION    Administer 1 Drop into both eyes every evening. Indications: Wide-Angle Glaucoma    LEVOTHYROXINE (SYNTHROID) 100 MCG TAB    Take 1 Tablet by mouth every morning before breakfast.    LIDOCAINE (ASPERFLEX) 4 % PATCH    Place 2 Patches on the skin every 24 hours. Apply on the back    LOSARTAN (COZAAR) 100 MG TAB    Take 1 Tablet by mouth every day.    MENTHOL-METHYL SALICYLATE (SALONPAS PAIN RELIEF PATCH) PATCH    Apply 1 Application topically 1 time a day as needed (for back pain). Indications: back pain    MISC NATURAL PRODUCTS (OSTEO BI-FLEX ADV TRIPLE ST) TAB    Take 2 Tablets by mouth every day at 6 PM. Indications: arthritis    NAPROXEN SODIUM (ALEVE) 220 MG CAP    Take 2 Capsules by mouth 3 times a day as needed (pain). Indications: Pain    OMEGA-3 FATTY ACIDS (OMEGA 3 PO)    Take 2 Tablets by mouth every day at 6 PM. dontains glucoasmine, condroitin, collagen, vitamin c, curcumin, omega 3, MSM   Indications: supplement    SPIRONOLACTONE (ALDACTONE) 25 MG TAB    Take 1 Tablet by mouth every day.    THERAPEUTIC MULTIVITAMIN-MINERALS (THERAGRAN-M) TAB    Take 1 Tablet by mouth every day. Indications: supplement    TRAMADOL (ULTRAM) 50 MG TAB    Take 50 mg by mouth every day. Takes it at night       ALLERGIES  Patient has no known allergies.    PHYSICAL EXAM  BP (!) 160/81   Pulse 65   Temp 36.6 °C (97.9 °F) (Oral)   Resp 20   Ht 1.6 m (5' 3\")   Wt 83.5 kg (184 lb)   SpO2 94%   BMI 32.59 kg/m²   Constitutional: Well developed, well nourished; No acute distress; Non-toxic appearance, Appears weak.   HENT: Normocephalic, atraumatic; Bilateral external ears " normal; slightly dry mucous membranes  Eyes: PERRL, EOMI, Conjunctiva normal. No discharge.   Neck:  Supple, nontender midline; No stridor; No nuchal rigidity.   Lymphatic: No cervical lymphadenopathy noted.   Cardiovascular: Regular rate without murmurs, rubs, or gallop, frequent PVCs that do not perfuse.   Thorax & Lungs: No respiratory distress, No wheezing, rales or rhonchi. Nontender chest wall. Crackles at bilateral bases, No crepitus or subcutaneous air  Abdomen: Soft, Mild inconsistent tenderness in the left mid abdomen, bowel sounds normal. No obvious masses; No pulsatile masses; no rebound, guarding, or peritoneal signs.   Skin: Good color; warm and dry without rash or petechia.  Back: Nontender, No CVA tenderness.   Extremities: Distal radial, dorsalis pedis, posterior tibial pulses are equal bilaterally; No edema; Nontender calves or saphenous, No cyanosis, No clubbing.   Musculoskeletal: Good range of motion in all major joints. No tenderness to palpation or major deformities noted.   Neurologic: Alert & oriented x 4, clear speech,    DIAGNOSTIC STUDIES    EKG/LABS  Results for orders placed or performed during the hospital encounter of 05/17/24   CBC WITH DIFFERENTIAL   Result Value Ref Range    WBC 14.2 (H) 4.8 - 10.8 K/uL    RBC 4.45 4.20 - 5.40 M/uL    Hemoglobin 13.7 12.0 - 16.0 g/dL    Hematocrit 40.9 37.0 - 47.0 %    MCV 91.9 81.4 - 97.8 fL    MCH 30.8 27.0 - 33.0 pg    MCHC 33.5 32.2 - 35.5 g/dL    RDW 48.2 35.9 - 50.0 fL    Platelet Count 333 164 - 446 K/uL    MPV 8.5 (L) 9.0 - 12.9 fL    Neutrophils-Polys 79.70 (H) 44.00 - 72.00 %    Lymphocytes 13.90 (L) 22.00 - 41.00 %    Monocytes 5.30 0.00 - 13.40 %    Eosinophils 0.10 0.00 - 6.90 %    Basophils 0.30 0.00 - 1.80 %    Immature Granulocytes 0.70 0.00 - 0.90 %    Nucleated RBC 0.00 0.00 - 0.20 /100 WBC    Neutrophils (Absolute) 11.27 (H) 1.82 - 7.42 K/uL    Lymphs (Absolute) 1.97 1.00 - 4.80 K/uL    Monos (Absolute) 0.75 0.00 - 0.85 K/uL     Eos (Absolute) 0.02 0.00 - 0.51 K/uL    Baso (Absolute) 0.04 0.00 - 0.12 K/uL    Immature Granulocytes (abs) 0.10 0.00 - 0.11 K/uL    NRBC (Absolute) 0.00 K/uL   COMP METABOLIC PANEL   Result Value Ref Range    Sodium 133 (L) 135 - 145 mmol/L    Potassium 4.5 3.6 - 5.5 mmol/L    Chloride 101 96 - 112 mmol/L    Co2 22 20 - 33 mmol/L    Anion Gap 10.0 7.0 - 16.0    Glucose 99 65 - 99 mg/dL    Bun 34 (H) 8 - 22 mg/dL    Creatinine 0.89 0.50 - 1.40 mg/dL    Calcium 8.7 8.5 - 10.5 mg/dL    Correct Calcium 9.0 8.5 - 10.5 mg/dL    AST(SGOT) 15 12 - 45 U/L    ALT(SGPT) 20 2 - 50 U/L    Alkaline Phosphatase 65 30 - 99 U/L    Total Bilirubin 0.5 0.1 - 1.5 mg/dL    Albumin 3.6 3.2 - 4.9 g/dL    Total Protein 6.1 6.0 - 8.2 g/dL    Globulin 2.5 1.9 - 3.5 g/dL    A-G Ratio 1.4 g/dL   TROPONIN   Result Value Ref Range    Troponin T 23 (H) 6 - 19 ng/L   MAGNESIUM   Result Value Ref Range    Magnesium 2.0 1.5 - 2.5 mg/dL   ESTIMATED GFR   Result Value Ref Range    GFR (CKD-EPI) 63 >60 mL/min/1.73 m 2   proBrain Natriuretic Peptide, NT   Result Value Ref Range    NT-proBNP 993 (H) 0 - 125 pg/mL   EKG   Result Value Ref Range    Report       [facility] Emergency Dept.    Test Date:  2024  Pt Name:    MONICA TRIMBLE                 Department: ER  MRN:        [id number]                      Room:       RD 07  Gender:     Female                       Technician:  :        [id number]                   Requested By:ER TRIAGE PROTOCOL  Order #:    [id number]                    Reading MD:    Measurements  Intervals                                Axis  Rate:       67                           P:          16  AR:         209                          QRS:        -30  QRSD:       112                          T:          62  QT:         403  QTc:        426    Interpretive Statements  Sinus rhythm  Ventricular bigeminy  Consider left atrial enlargement  Left ventricular hypertrophy  Compared to ECG 2024  11:40:17  Ventricular premature complex(es) now present        I have independently interpreted this EKG    RADIOLOGY/PROCEDURES   The attending emergency physician has independently interpreted the diagnostic imaging associated with this visit and am waiting the final reading from the radiologist.     My preliminary interpretation is a follows:  AXEL has reviewed the patient's chest xray. There is some increased interstitial markings.    Radiologist interpretation:  DX-CHEST-PORTABLE (1 VIEW)   Final Result      Stable enlargement of the cardiomediastinal silhouette without acute cardiopulmonary abnormality.      EC-ECHOCARDIOGRAM COMPLETE W/O CONT    (Results Pending)       COURSE & MEDICAL DECISION MAKING     ASSESSMENT, COURSE AND PLAN  Care Narrative: Patient presents to the ER at the request of home health nurse who said the patient's heart rate was in the 30s.  Patient's daughter says that patient's heart rate has been running low since placed on carvedilol after recent hospitalization here at [facility] for hypertension.  Patient's blood pressure still been running a little bit high but is better than it was when she was here in the hospital she was running in the 200s at that time.  Review of the home health nurse records from the last few days revealed that patient had documented heart rate in the low 70s.  There was a heart rate of 51 when she was seen at her primary care doctor's office on May 9.  Here in the ER the patient is in the mid 60s on her heart rate.  She has frequent PVCs which do not perfuse well.  I suspect perhaps home health nurse may have been getting a low heart rate due to these PVCs.  Patient is scheduled to see cardiology next month to discuss need for pacemaker.  Patient does not have chest pain.  Daughter reports she gets short of breath and fatigued when she walks with her walker.  This is relatively new.  EKG is nonacute.  There is no ST elevation or depression.  Troponin is  minimally elevated at 23.  There is no concern for STEMI or non-STEMI at this time.  Patient had a stress test done in November 2023 which showed globally reduced LV systolic function estimated at 38%.  Echo showed LV function at 50%.  It is possible that patient's bradycardia could be related to the carvedilol.  It also could be the patient is throwing frequent PVCs and that is contributing to what is perceived to be bradycardia.  I think it is reasonable to observe the patient on the monitoring system, do a drug holiday from the carvedilol, and see how she does.  Perhaps she is having sick sinus syndrome, and if so, maybe she does need pacemaker sooner than later.  I spoke with Dr. Lindo, hospitalist on-call, he will kindly evaluate the patient hospitalization.    11:43 AM  - Patient seen and examined at bedside. Discussed plan of care, including that based on the patient's heart rate showing multiple PVC which could have not been felt by the home health nurse manually. I informed the patient that she could benefit from an additional inpatient cardiac work up. I will order an basic diagnostic work up with labs and imaging. Patient agrees to the plan of care and was allowed to ask questions at this time.     2:57 PM -  I discussed the patient's case and the above findings with Dr. Mueller (Hospitalist) who agrees to evaluate the patient for hospitalization.     ADDITIONAL PROBLEM LIST  Problem #1: Bradycardia per home health nurse  Problem #2: Increased fatigue and shortness of breath with ambulation    DISPOSITION AND DISCUSSIONS  I have discussed management of the patient with the following physicians and LENO's:  Dr. Mueller (Hospitalist)    Discussion of management with other [facility] or appropriate source(s): None     Escalation of care considered, and ultimately not performed: Patient has not had any episodes of bradycardia here in the ER.  No need for atropine or pacing.    Barriers to care at this  time, including but not limited to:  None .     Decision tools and prescription drugs considered including, but not limited to: Pain Medications patient denies pain.  No need for pain medications. .    DISPOSITION:  Patient will be hospitalized by Dr. Mueller (Hospitalist) in guarded condition.     FINAL DIAGNOSIS  1. Bradycardia Acute   2. Generalized weakness Acute   3. Dyspnea on exertion Acute      This dictation has been created using voice recognition software. The accuracy of the dictation is limited by the abilities of the software. I expect there may be some errors of grammar and possibly content. I made every attempt to manually correct the errors within my dictation. However, errors related to voice recognition software may still exist and should be interpreted within the appropriate context.      Helen STALEY (Scribe), am scribing for, and in the presence of, Dorene Valencia M.D..    Electronically signed by: Helen Mercer (Scribe), 5/17/2024    Dorene STALEY M.D. personally performed the services described in this documentation, as scribed by Helen Mercer in my presence, and it is both accurate and complete.     The note accurately reflects work and decisions made by me.  Dorene Valencia M.D.  5/17/2024  8:19 PM

## 2024-05-17 NOTE — CASE COMMUNICATION
Just AFIA.    Upon checking pt's vital signs, pt's HR was in the 30s. Previous HR documented by HH has been in 60-70's. Pt also felt more weakness than usual. BP, RR, and temp were within defined limits. Pt sent to hospital via ambulance.    Thank you,  MALINA Rush RN.

## 2024-05-17 NOTE — ED TRIAGE NOTES
Chief Complaint   Patient presents with    Weakness     BIB [facility] from home after home health called due to patient's HR being in 30s. She has had bradycardia since leaving hospital on carvedilol instead of metoprolol. She saw her PCP on 5/9 and has cards appointment on 6/6 for possible pacer. Patient has been feeling generally weak but denies pain

## 2024-05-17 NOTE — H&P
Hospital Medicine History & Physical Note    Date of Service  5/17/2024    Primary Care Physician  Charles Ospina M.D.    Consultants      Specialist Names:     Code Status  Full Code    Chief Complaint  Chief Complaint   Patient presents with    Weakness     BIB [facility] from home after home health called due to patient's HR being in 30s. She has had bradycardia since leaving hospital on carvedilol instead of metoprolol. She saw her PCP on 5/9 and has cards appointment on 6/6 for possible pacer. Patient has been feeling generally weak but denies pain       History of Presenting Illness  Lizzette Burton is a 84 y.o. female who presented 5/17/2024 with a history of HTN, HFrEF, glaucoma, CVA, hypothyroid, pulmonary HTN    She is brought in by family for bradycardia.  This was apparently noted by the HH RN with a rate in the 30s.  She has been feelng poorly with worsened SOB with walking and generalized fatigue since being started on coreg 2 wks ago.  No CP, N/V, F/C, CP, unusual neck/back/jaw or shoulder pain.      I discussed the plan of care with patient.    Review of Systems  Review of Systems   Constitutional:  Negative for chills and fever.   Respiratory:  Positive for shortness of breath. Negative for cough.    Cardiovascular:  Negative for chest pain, palpitations and leg swelling.   Gastrointestinal:  Negative for abdominal pain, diarrhea, nausea and vomiting.   Genitourinary:  Negative for dysuria and urgency.   Musculoskeletal:  Negative for back pain.   Skin:  Negative for rash.   Neurological:  Positive for weakness. Negative for dizziness, loss of consciousness and headaches.       Past Medical History   has a past medical history of Acute renal failure (HCC) (02/11/2014), Anesthesia, Angina, Arthritis, Asthma, Breath shortness, CATARACT, Dental disorder, Glaucoma, Heart burn, Hypertension, Indigestion, Pneumonia (03/2018), Sepsis (HCC) (02/08/2014), Stroke ([facility]), Unspecified disorder of thyroid,  Unspecified hemorrhagic conditions (2009), Urinary bladder disorder, and Urinary incontinence.    Surgical History   has a past surgical history that includes hysterectomy, total abdominal (01/01/1989); gyn surgery; trabeculectomy (09/26/2012); cataract phaco with iol (09/26/2012); knee arthroplasty total (02/05/2009); baerveldt implant (12/12/2012); baerveldt implant (03/13/2013); baerveldt implant (05/08/2013); cataract phaco with iol (05/08/2013); knee arthroplasty total (Left, 08/20/2015); anterior and posterior repair (03/20/2017); enterocele repair (03/20/2017); bladder sling female (03/20/2017); vaginal suspension (03/20/2017); shoulder arthroplasty total (Right, 05/02/2018); other; and muscle biopsy (Left, 10/18/2023).     Family History  family history is not on file.   Family history reviewed with patient. There is no family history that is pertinent to the chief complaint.     Social History   reports that she quit smoking about 22 years ago. Her smoking use included cigarettes. She started smoking about 42 years ago. She has a 10 pack-year smoking history. She has never used smokeless tobacco. She reports that she does not drink alcohol and does not use drugs.    Allergies  No Known Allergies    Medications  Prior to Admission Medications   Prescriptions Last Dose Informant Patient Reported? Taking?   Brimonidine Tartrate-Timolol 0.2-0.5 % Solution  Family Member Yes No   Sig: Administer 1 Drop into both eyes in the morning, at noon, and at bedtime. Indications: Glaucoma   Menthol-Methyl Salicylate (SALONPAS PAIN RELIEF PATCH) Patch   Yes No   Sig: Apply 1 Application topically 1 time a day as needed (for back pain). Indications: back pain   Misc Natural Products (OSTEO BI-FLEX ADV TRIPLE ST) Tab   Yes No   Sig: Take 2 Tablets by mouth every day at 6 PM. Indications: arthritis   Naproxen Sodium (ALEVE) 220 MG Cap   Yes No   Sig: Take 2 Capsules by mouth 3 times a day as needed (pain). Indications: Pain    Omega-3 Fatty Acids (OMEGA 3 PO)   Yes No   Sig: Take 2 Tablets by mouth every day at 6 PM. dontains glucoasmine, condroitin, collagen, vitamin c, curcumin, omega 3, MSM   Indications: supplement   acetaminophen (TYLENOL) 325 MG Tab  Family Member Yes No   Sig: Take 650 mg by mouth every 6 hours as needed for Moderate Pain.   alendronate (FOSAMAX) 70 MG Tab  Family Member Yes No   Sig: Take 70 mg by mouth every 7 days. Indications: Osteoporosis   amLODIPine (NORVASC) 10 MG Tab   No No   Sig: Take 1 Tablet by mouth every day.   carvedilol (COREG) 3.125 MG Tab   No No   Sig: Take 1 Tablet by mouth 2 times a day with meals.   latanoprost (XALATAN) 0.005 % Solution  Family Member Yes No   Sig: Administer 1 Drop into both eyes every evening. Indications: Wide-Angle Glaucoma   levothyroxine (SYNTHROID) 100 MCG Tab  Family Member No No   Sig: Take 1 Tablet by mouth every morning before breakfast.   lidocaine (ASPERFLEX) 4 % Patch   No No   Sig: Place 2 Patches on the skin every 24 hours. Apply on the back   losartan (COZAAR) 100 MG Tab  Family Member No No   Sig: Take 1 Tablet by mouth every day.   spironolactone (ALDACTONE) 25 MG Tab   No No   Sig: Take 1 Tablet by mouth every day.   therapeutic multivitamin-minerals (THERAGRAN-M) Tab  Family Member Yes No   Sig: Take 1 Tablet by mouth every day. Indications: supplement   traMADol (ULTRAM) 50 MG Tab   Yes No   Sig: Take 50 mg by mouth every day. Takes it at night      Facility-Administered Medications: None       Physical Exam  Temp:  [36.6 °C (97.9 °F)] 36.6 °C (97.9 °F)  Pulse:  [65-87] 67  Resp:  [19-73] 19  BP: (133-160)/(70-81) 149/70  SpO2:  [91 %-95 %] 93 %  Blood Pressure : (!) 149/70   Temperature: 36.6 °C (97.9 °F)   Pulse: 67   Respiration: 19   Pulse Oximetry: 93 %       Physical Exam  Constitutional:       General: She is not in acute distress.     Appearance: Normal appearance. She is well-developed. She is not diaphoretic.   HENT:      Head: Normocephalic  and atraumatic.   Neck:      Vascular: No JVD.   Cardiovascular:      Rate and Rhythm: Normal rate and regular rhythm.      Heart sounds: No murmur heard.  Pulmonary:      Effort: Pulmonary effort is normal. No respiratory distress.      Breath sounds: No stridor. No wheezing or rales.   Abdominal:      Palpations: Abdomen is soft.      Tenderness: There is no abdominal tenderness. There is no guarding or rebound.   Skin:     General: Skin is warm and dry.      Findings: No rash.   Neurological:      General: No focal deficit present.      Mental Status: She is oriented to person, place, and time.      Cranial Nerves: No cranial nerve deficit.      Comments: Hard of hearing  Slow gait with FWW but otherwise normal gait   Psychiatric:         Mood and Affect: Mood normal.         Behavior: Behavior normal.         Thought Content: Thought content normal.       Laboratory:  Recent Labs     05/17/24  1119   WBC 14.2*   RBC 4.45   HEMOGLOBIN 13.7   HEMATOCRIT 40.9   MCV 91.9   MCH 30.8   MCHC 33.5   RDW 48.2   PLATELETCT 333   MPV 8.5*     Recent Labs     05/17/24  1119   SODIUM 133*   POTASSIUM 4.5   CHLORIDE 101   CO2 22   GLUCOSE 99   BUN 34*   CREATININE 0.89   CALCIUM 8.7     Recent Labs     05/17/24  1119   ALTSGPT 20   ASTSGOT 15   ALKPHOSPHAT 65   TBILIRUBIN 0.5   GLUCOSE 99         Recent Labs     05/17/24  1119   NTPROBNP 993*         Recent Labs     05/17/24  1119   TROPONINT 23*       Imaging:  DX-CHEST-PORTABLE (1 VIEW)   Final Result      Stable enlargement of the cardiomediastinal silhouette without acute cardiopulmonary abnormality.      EC-ECHOCARDIOGRAM COMPLETE W/O CONT    (Results Pending)       X-Ray:  I have personally reviewed the images and compared with prior images.  EKG:  I have personally reviewed the images and compared with prior images. and My impression is: NSR with frequent PVC/s/bigemony    Assessment/Plan:  Justification for Admission Status  I anticipate this patient will require at  least two midnights for appropriate medical management, necessitating inpatient admission because exertional dyspnea    Patient will need a Telemetry bed on MEDICAL service .  The need is secondary to exertional dyspnea.    * Exertional dyspnea- (present on admission)  Assessment & Plan  Etiology is unclear  Patient has 1 documented episode of bradycardia per the home health nurse today, but otherwise all vitals that we have access to in the chart have shown bradycardia no more than in the 50s.  She certainly could have an intermittent dysrhythmia  Admit to monitored bed  Consider discharge with a Zio patch if workup negative  Check cardiac echo and TSH  Even if she is not symptomatically bradycardic, beta-blockade may be causing some fatigue so we will stop the Coreg      Bradycardia  Assessment & Plan  Patient has multiple episodes of documented bradycardia in the 50s, but only 1 episode by report of the home health nurse today of a heart rate in the 30s.  EKG which I personally have reviewed demonstrates sinus rhythm with frequent PVCs/bigeminy.  It is possible depending on the way that this rate of 30 was falsely low due to the bigeminy  Admit the patient to telemetry  Check cardiac echo  Stop Coreg  Consider discharge on Zio patch if no significant episodes are noted and there is still concern.  I do not think the patient needs a pacer at this point in time as we do not have a documented episode of significant bradycardia.    Orthostatic hypotension- (present on admission)  Assessment & Plan  History of  Stop beta-blocker  Check orthostatic vitals    Hypothyroidism- (present on admission)  Assessment & Plan  Check TSH  Continue replacement    HTN (hypertension)- (present on admission)  Assessment & Plan  Continue the patient's home spironolactone 25 mg, losartan 100 mg and amlodipine 10 mg  DC Coreg due to concern of bradycardia, and to fatigue which started at the same time that this medication was  initiated    Glaucoma- (present on admission)  Assessment & Plan  Continue topical therapies        VTE prophylaxis: enoxaparin ppx

## 2024-05-17 NOTE — ED NOTES
Medication history reviewed with patients granddaughter at bedside (Dianna [phone]).   Med rec is complete   Allergies reviewed.     Patient has not had any outpatient antibiotics in the last 30 days.   Anticoagulants: No    Perla Guy

## 2024-05-18 LAB
ANION GAP SERPL CALC-SCNC: 10 MMOL/L (ref 7–16)
BUN SERPL-MCNC: 29 MG/DL (ref 8–22)
CALCIUM SERPL-MCNC: 8.9 MG/DL (ref 8.5–10.5)
CHLORIDE SERPL-SCNC: 101 MMOL/L (ref 96–112)
CO2 SERPL-SCNC: 22 MMOL/L (ref 20–33)
CREAT SERPL-MCNC: 0.77 MG/DL (ref 0.5–1.4)
GFR SERPLBLD CREATININE-BSD FMLA CKD-EPI: 76 ML/MIN/1.73 M 2
GLUCOSE SERPL-MCNC: 93 MG/DL (ref 65–99)
POTASSIUM SERPL-SCNC: 4.6 MMOL/L (ref 3.6–5.5)
SODIUM SERPL-SCNC: 133 MMOL/L (ref 135–145)
TROPONIN T SERPL-MCNC: 23 NG/L (ref 6–19)

## 2024-05-18 PROCEDURE — 700111 HCHG RX REV CODE 636 W/ 250 OVERRIDE (IP): Mod: JZ | Performed by: HOSPITALIST

## 2024-05-18 PROCEDURE — 84484 ASSAY OF TROPONIN QUANT: CPT

## 2024-05-18 PROCEDURE — 700102 HCHG RX REV CODE 250 W/ 637 OVERRIDE(OP): Performed by: HOSPITALIST

## 2024-05-18 PROCEDURE — A9270 NON-COVERED ITEM OR SERVICE: HCPCS | Performed by: HOSPITALIST

## 2024-05-18 PROCEDURE — 700111 HCHG RX REV CODE 636 W/ 250 OVERRIDE (IP): Performed by: HOSPITALIST

## 2024-05-18 PROCEDURE — 36415 COLL VENOUS BLD VENIPUNCTURE: CPT

## 2024-05-18 PROCEDURE — 80048 BASIC METABOLIC PNL TOTAL CA: CPT

## 2024-05-18 PROCEDURE — 99233 SBSQ HOSP IP/OBS HIGH 50: CPT | Performed by: HOSPITALIST

## 2024-05-18 PROCEDURE — 770020 HCHG ROOM/CARE - TELE (206)

## 2024-05-18 RX ORDER — FUROSEMIDE 10 MG/ML
20 INJECTION INTRAMUSCULAR; INTRAVENOUS
Status: DISCONTINUED | OUTPATIENT
Start: 2024-05-18 | End: 2024-05-19

## 2024-05-18 RX ADMIN — LATANOPROST 1 DROP: 50 SOLUTION OPHTHALMIC at 21:13

## 2024-05-18 RX ADMIN — ACETAMINOPHEN 650 MG: 325 TABLET, FILM COATED ORAL at 21:14

## 2024-05-18 RX ADMIN — BRIMONIDINE TARTRATE 1 DROP: 2 SOLUTION OPHTHALMIC at 18:05

## 2024-05-18 RX ADMIN — SPIRONOLACTONE 25 MG: 25 TABLET ORAL at 06:43

## 2024-05-18 RX ADMIN — LEVOTHYROXINE SODIUM 100 MCG: 0.1 TABLET ORAL at 06:43

## 2024-05-18 RX ADMIN — TIMOLOL MALEATE 1 DROP: 5 SOLUTION OPHTHALMIC at 06:43

## 2024-05-18 RX ADMIN — TIMOLOL MALEATE 1 DROP: 5 SOLUTION OPHTHALMIC at 18:05

## 2024-05-18 RX ADMIN — ENOXAPARIN SODIUM 40 MG: 100 INJECTION SUBCUTANEOUS at 18:05

## 2024-05-18 RX ADMIN — LOSARTAN POTASSIUM 100 MG: 50 TABLET, FILM COATED ORAL at 06:43

## 2024-05-18 RX ADMIN — TIMOLOL MALEATE 1 DROP: 5 SOLUTION OPHTHALMIC at 21:17

## 2024-05-18 RX ADMIN — ONDANSETRON 4 MG: 4 TABLET, ORALLY DISINTEGRATING ORAL at 18:03

## 2024-05-18 RX ADMIN — BRIMONIDINE TARTRATE 1 DROP: 2 SOLUTION OPHTHALMIC at 06:43

## 2024-05-18 RX ADMIN — BRIMONIDINE TARTRATE 1 DROP: 2 SOLUTION OPHTHALMIC at 21:17

## 2024-05-18 RX ADMIN — FUROSEMIDE 20 MG: 10 INJECTION, SOLUTION INTRAVENOUS at 18:05

## 2024-05-18 RX ADMIN — AMLODIPINE BESYLATE 10 MG: 5 TABLET ORAL at 06:43

## 2024-05-18 RX ADMIN — FUROSEMIDE 20 MG: 10 INJECTION, SOLUTION INTRAVENOUS at 08:17

## 2024-05-18 ASSESSMENT — ENCOUNTER SYMPTOMS
BRUISES/BLEEDS EASILY: 0
WHEEZING: 0
CHILLS: 0
BLURRED VISION: 0
FEVER: 0
CLAUDICATION: 0
PND: 0
NAUSEA: 0
HEARTBURN: 0
DEPRESSION: 0
DOUBLE VISION: 0
PALPITATIONS: 0
DIZZINESS: 0
VOMITING: 0
MYALGIAS: 0
BACK PAIN: 0
HEMOPTYSIS: 0
COUGH: 0
HEADACHES: 0

## 2024-05-18 ASSESSMENT — COPD QUESTIONNAIRES
DURING THE PAST 4 WEEKS HOW MUCH DID YOU FEEL SHORT OF BREATH: MOST  OR ALL OF THE TIME
COPD SCREENING SCORE: 7
HAVE YOU SMOKED AT LEAST 100 CIGARETTES IN YOUR ENTIRE LIFE: YES
DO YOU EVER COUGH UP ANY MUCUS OR PHLEGM?: NO/ONLY WITH OCCASIONAL COLDS OR INFECTIONS

## 2024-05-18 NOTE — ASSESSMENT & PLAN NOTE
Echo showing elevated RV systolic pressures 46  Chest x-ray reviewed by myself showing cephalization, started on low-dose Lasix  Continue oxygen per protocol  Needs follow-up as outpatient with cardiology

## 2024-05-18 NOTE — PROGRESS NOTES
Bedside report received by RN and patient care assumed. Tele Box in place, patient is A&O4, resting in bed, and on RA. Patient states 0/10 pain. Fall precautions in place and patient educated on use of call light for assistance. Pt updated on POC with no questions or concerns. Hourly monitoring initiated.

## 2024-05-18 NOTE — PROGRESS NOTES
Report received from offgoing RN    Pt A&Ox4, resting in bed. Family at bedside. No complaints at this time.    Bed on lowest setting, call light in reach.

## 2024-05-18 NOTE — ASSESSMENT & PLAN NOTE
Etiology is unclear  Patient has 1 documented episode of bradycardia per the home health nurse today, but otherwise all vitals that we have access to in the chart have shown bradycardia no more than in the 50s.  She certainly could have an intermittent dysrhythmia  Admit to monitored bed  Consider discharge with a Zio patch if workup negative  Check cardiac echo and TSH  Even if she is not symptomatically bradycardic, beta-blockade may be causing some fatigue so we will stop the Coreg    Patient had a stress test in November 2023 at that time cardiology recommended angiogram as outpatient.  Continue holding Coreg  Discussed with bedside nurse will do ambulation and checking patient's heart rate to monitor response  Continue low-dose IV Lasix

## 2024-05-18 NOTE — CARE PLAN
The patient is Stable - Low risk of patient condition declining or worsening    Shift Goals  Clinical Goals: monitor HR, VSS  Patient Goals: sleep, updates  Family Goals: updates    Progress made toward(s) clinical / shift goals:    Problem: Knowledge Deficit - Standard  Goal: Patient and family/care givers will demonstrate understanding of plan of care, disease process/condition, diagnostic tests and medications  Outcome: Progressing     Problem: Fall Risk  Goal: Patient will remain free from falls  Outcome: Progressing       Patient is not progressing towards the following goals:

## 2024-05-18 NOTE — DISCHARGE PLANNING
HTH/SCP TCN chart review completed. The most current review of medical record, knowledge of pt's PLOF and social support, LACE+ score of 73, 6 clicks scores of 19 mobility were considered.      Pt seen at bedside with granddaughter present. Introduced TCN program. Provided education regarding post acute levels of care. Education provided regarding case management policy for blanket SNF referrals. Discussed HTH/SCP plan benefits. Pt verbalizes understanding.     Pt reports no functional concerns with dc to home once medically cleared. Note that pt has and utilizes a FWW as indicated. Would also note that PTA, pt was on service with [facility] for PT, OT and nursing care. She would like to resume these HH services at time of dc. She reports no concerns with transportation to home or for outpatient follow ups if indicated.     No additional provider requests at this time. Given aforementioned, choice proactively obtained for HH and faxed to [facility] to utilize for resumption of [facility] services at time of dc. TCN will continue to follow and collaborate with discharge planning team as additional post acute needs arise. Thank you.     Completed today:  Choice obtained: HH ([facility] for resumption of services; see above)  SCP with [facility] PCP. discussed possible outpatient transitional PCP follow up if indicated and pt in agreement; sent information to assist in scheduling

## 2024-05-18 NOTE — ASSESSMENT & PLAN NOTE
Continue the patient's home spironolactone 25 mg, losartan 100 mg and amlodipine 10 mg  DC Coreg due to concern of bradycardia, and to fatigue which started at the same time that this medication was initiated    Stop Coreg due to bradycardia, patient recently started on Coreg on May 1, 2024

## 2024-05-18 NOTE — PROGRESS NOTES
Report received by RN and patient care assumed. Tele Box in place, patient is A&O4, resting in bed, and on RA. Patient states 0/10 pain. Fall precautions in place and patient educated on use of call light for assistance. Pt updated on POC with no questions or concerns. Hourly monitoring initiated.

## 2024-05-18 NOTE — HOSPITAL COURSE
Lizzette Burton is a 84 y.o. female who presented 5/17/2024 with a history of HTN, HFrEF, glaucoma, CVA, hypothyroid, pulmonary HTN     She is brought in by family for bradycardia.  This was apparently noted by the HH RN with a rate in the 30s.  She has been feelng poorly with worsened SOB with walking and generalized fatigue since being started on coreg 2 wks ago.  No CP, N/V, F/C, CP, unusual neck/back/jaw or shoulder pain.

## 2024-05-18 NOTE — PROGRESS NOTES
Hospital Medicine Daily Progress Note    Date of Service  5/18/2024    Chief Complaint  Lizzette Burton is a 84 y.o. female admitted 5/17/2024 with shortness of breath    Hospital Course  Lizzette Burton is a 84 y.o. female who presented 5/17/2024 with a history of HTN, HFrEF, glaucoma, CVA, hypothyroid, pulmonary HTN     She is brought in by family for bradycardia.  This was apparently noted by the HH RN with a rate in the 30s.  She has been feelng poorly with worsened SOB with walking and generalized fatigue since being started on coreg 2 wks ago.  No CP, N/V, F/C, CP, unusual neck/back/jaw or shoulder pain.    Interval Problem Update  5/18 patient is new to me today, she is in bed she is sitting at the edge of the bed, family is at bedside, patient is alert oriented follows commands, she is able to speak in full sentences, she is on room air, heart review of chest x-ray showing cephalization/mild pulmonary edema, previous echocardiogram showed normal ejection fraction but elevated RV systolic pressures, review records from cardiology as outpatient in November 2023 patient had stress test and recommendation was to do probably angiogram as outpatient, at this time patient denies any chest pain palpitations, shortness of breath is improved, will continue close monitoring, repeat echocardiogram, discussed with bedside nurse charge nurse  pharmacist, all question have been answered, continue holding Coreg since this medication was started recently in 5/1/2024 and apparently is causing bradycardia patient is also on amlodipine 10 mg, continue telemetry    I have discussed this patient's plan of care and discharge plan at IDT rounds today with Case Management, Nursing, Nursing leadership, and other members of the IDT team.    Consultants/Specialty      Code Status  Full Code    Disposition  The patient is not medically cleared for discharge to home or a post-acute facility.      I have placed the  appropriate orders for post-discharge needs.    Review of Systems  Review of Systems   Constitutional:  Negative for chills and fever.   Eyes:  Negative for blurred vision and double vision.   Respiratory:  Negative for cough, hemoptysis and wheezing.    Cardiovascular:  Negative for chest pain, palpitations, claudication, leg swelling and PND.   Gastrointestinal:  Negative for heartburn, nausea and vomiting.   Genitourinary:  Negative for hematuria and urgency.   Musculoskeletal:  Negative for back pain and myalgias.   Skin:  Negative for rash.   Neurological:  Negative for dizziness and headaches.   Endo/Heme/Allergies:  Does not bruise/bleed easily.   Psychiatric/Behavioral:  Negative for depression.         Physical Exam  Temp:  [35.9 °C (96.6 °F)-36.4 °C (97.5 °F)] 36.2 °C (97.2 °F)  Pulse:  [45-86] 54  Resp:  [18-41] 18  BP: (122-186)/() 140/88  SpO2:  [91 %-95 %] 92 %    Physical Exam  Vitals and nursing note reviewed.   Constitutional:       Appearance: Normal appearance.   Eyes:      General: No scleral icterus.        Right eye: No discharge.         Left eye: No discharge.   Cardiovascular:      Rate and Rhythm: Normal rate and regular rhythm.      Pulses: Normal pulses.      Heart sounds: Normal heart sounds.   Pulmonary:      Effort: Pulmonary effort is normal. No respiratory distress.      Breath sounds: Normal breath sounds. No wheezing.   Abdominal:      General: Bowel sounds are normal. There is no distension.      Tenderness: There is no abdominal tenderness.   Musculoskeletal:         General: Normal range of motion.      Cervical back: Normal range of motion and neck supple.      Right lower leg: No edema.      Left lower leg: No edema.   Skin:     General: Skin is warm.      Capillary Refill: Capillary refill takes less than 2 seconds.      Coloration: Skin is not jaundiced.   Neurological:      General: No focal deficit present.      Mental Status: She is alert and oriented to person,  place, and time.      Cranial Nerves: No cranial nerve deficit.   Psychiatric:         Mood and Affect: Mood normal.         Behavior: Behavior normal.         Fluids    Intake/Output Summary (Last 24 hours) at 5/18/2024 1306  Last data filed at 5/18/2024 0900  Gross per 24 hour   Intake 640 ml   Output --   Net 640 ml       Laboratory  Recent Labs     05/17/24  1119   WBC 14.2*   RBC 4.45   HEMOGLOBIN 13.7   HEMATOCRIT 40.9   MCV 91.9   MCH 30.8   MCHC 33.5   RDW 48.2   PLATELETCT 333   MPV 8.5*     Recent Labs     05/17/24  1119 05/18/24  0504   SODIUM 133* 133*   POTASSIUM 4.5 4.6   CHLORIDE 101 101   CO2 22 22   GLUCOSE 99 93   BUN 34* 29*   CREATININE 0.89 0.77   CALCIUM 8.7 8.9                   Imaging  DX-CHEST-PORTABLE (1 VIEW)   Final Result      Stable enlargement of the cardiomediastinal silhouette without acute cardiopulmonary abnormality.      EC-ECHOCARDIOGRAM COMPLETE W/O CONT    (Results Pending)        Assessment/Plan  * Exertional dyspnea- (present on admission)  Assessment & Plan  Etiology is unclear  Patient has 1 documented episode of bradycardia per the home health nurse today, but otherwise all vitals that we have access to in the chart have shown bradycardia no more than in the 50s.  She certainly could have an intermittent dysrhythmia  Admit to monitored bed  Consider discharge with a Zio patch if workup negative  Check cardiac echo and TSH  Even if she is not symptomatically bradycardic, beta-blockade may be causing some fatigue so we will stop the Coreg    Patient had a stress test in November 2023 at that time cardiology recommended angiogram as outpatient.  Continue holding Coreg  Discussed with bedside nurse will do ambulation and checking patient's heart rate to monitor response  Continue low-dose IV Lasix      Bradycardia  Assessment & Plan  Patient has multiple episodes of documented bradycardia in the 50s, but only 1 episode by report of the home health nurse today of a heart rate  in the 30s.  EKG which I personally have reviewed demonstrates sinus rhythm with frequent PVCs/bigeminy.  It is possible depending on the way that this rate of 30 was falsely low due to the bigeminy  Admit the patient to telemetry  Check cardiac echo  Stop Coreg  Consider discharge on Zio patch if no significant episodes are noted and there is still concern.  I do not think the patient needs a pacer at this point in time as we do not have a documented episode of significant bradycardia.      Continue holding Coreg  Telemetry  Ambulation with cardiac monitoring    Pulmonary hypertension (HCC)- (present on admission)  Assessment & Plan  Echo showing elevated RV systolic pressures 46  Chest x-ray reviewed by myself showing cephalization, started on low-dose Lasix  Continue oxygen per protocol  Needs follow-up as outpatient with cardiology    Orthostatic hypotension- (present on admission)  Assessment & Plan  History of  Stop beta-blocker  Check orthostatic vitals  Continue holding Coreg    Hypothyroidism- (present on admission)  Assessment & Plan  Check TSH  Continue replacement    HTN (hypertension)- (present on admission)  Assessment & Plan  Continue the patient's home spironolactone 25 mg, losartan 100 mg and amlodipine 10 mg  DC Coreg due to concern of bradycardia, and to fatigue which started at the same time that this medication was initiated    Stop Coreg due to bradycardia, patient recently started on Coreg on May 1, 2024    Glaucoma- (present on admission)  Assessment & Plan  Continue topical therapies         VTE prophylaxis: Lovenox    I have performed a physical exam and reviewed and updated ROS and Plan today (5/18/2024). In review of yesterday's note (5/17/2024), there are no changes except as documented above.    Total time of 52 minutes spent prepping to see patient (e.g. reviewing  tests/imaging results, notes from consultants, bedside nurse, night shift ) obtaining and/or reviewing separately obtained  history. Performing a medically appropriate examination and evaluation.  Counseling and educating the patient.  Ordering medications, tests, or procedures.  Referring and communicating with other health care professionals.  Documenting clinical information in EPIC.  Independently interpreting results and communicating results to patient.  Care coordination.

## 2024-05-18 NOTE — ASSESSMENT & PLAN NOTE
Patient has multiple episodes of documented bradycardia in the 50s, but only 1 episode by report of the home health nurse today of a heart rate in the 30s.  EKG which I personally have reviewed demonstrates sinus rhythm with frequent PVCs/bigeminy.  It is possible depending on the way that this rate of 30 was falsely low due to the bigeminy  Admit the patient to telemetry  Check cardiac echo  Stop Coreg  Consider discharge on Zio patch if no significant episodes are noted and there is still concern.  I do not think the patient needs a pacer at this point in time as we do not have a documented episode of significant bradycardia.      Continue holding Coreg  Telemetry  Ambulation with cardiac monitoring

## 2024-05-18 NOTE — PROGRESS NOTES
4 Eyes Skin Assessment Completed by JOSSIE Chapin and ARIC Newberry.    Head WDL  Ears WDL  Nose WDL  Mouth WDL  Neck WDL  Breast/Chest WDL  Shoulder Blades WDL  Spine WDL  (R) Arm/Elbow/Hand WDL  (L) Arm/Elbow/Hand WDL  Abdomen WDL  Groin WDL  Scrotum/Coccyx/Buttocks Redness and Blanching  (R) Leg WDL  (L) Leg WDL  (R) Heel/Foot/Toe WDL  (L) Heel/Foot/Toe WDL          Devices In Places Tele Box, Blood Pressure Cuff, and Pulse Ox      Interventions In Place Pillows    Possible Skin Injury No    Pictures Uploaded Into Epic N/A  Wound Consult Placed N/A  RN Wound Prevention Protocol Ordered No

## 2024-05-19 ENCOUNTER — APPOINTMENT (OUTPATIENT)
Dept: CARDIOLOGY | Facility: MEDICAL CENTER | Age: 84
DRG: 204 | End: 2024-05-19
Attending: HOSPITALIST
Payer: MEDICARE

## 2024-05-19 VITALS
TEMPERATURE: 97.3 F | WEIGHT: 177.91 LBS | DIASTOLIC BLOOD PRESSURE: 71 MMHG | BODY MASS INDEX: 31.52 KG/M2 | HEART RATE: 67 BPM | HEIGHT: 63 IN | OXYGEN SATURATION: 91 % | RESPIRATION RATE: 16 BRPM | SYSTOLIC BLOOD PRESSURE: 130 MMHG

## 2024-05-19 PROBLEM — R00.1 BRADYCARDIA: Status: RESOLVED | Noted: 2024-05-17 | Resolved: 2024-05-19

## 2024-05-19 PROBLEM — I95.1 ORTHOSTATIC HYPOTENSION: Status: RESOLVED | Noted: 2024-04-29 | Resolved: 2024-05-19

## 2024-05-19 PROBLEM — R06.09 EXERTIONAL DYSPNEA: Status: RESOLVED | Noted: 2024-05-17 | Resolved: 2024-05-19

## 2024-05-19 LAB
ANION GAP SERPL CALC-SCNC: 11 MMOL/L (ref 7–16)
BUN SERPL-MCNC: 40 MG/DL (ref 8–22)
CALCIUM SERPL-MCNC: 8.1 MG/DL (ref 8.5–10.5)
CHLORIDE SERPL-SCNC: 102 MMOL/L (ref 96–112)
CO2 SERPL-SCNC: 20 MMOL/L (ref 20–33)
CREAT SERPL-MCNC: 1.21 MG/DL (ref 0.5–1.4)
ERYTHROCYTE [DISTWIDTH] IN BLOOD BY AUTOMATED COUNT: 49.6 FL (ref 35.9–50)
GFR SERPLBLD CREATININE-BSD FMLA CKD-EPI: 44 ML/MIN/1.73 M 2
GLUCOSE SERPL-MCNC: 93 MG/DL (ref 65–99)
HCT VFR BLD AUTO: 40.2 % (ref 37–47)
HGB BLD-MCNC: 13.5 G/DL (ref 12–16)
LV EJECT FRACT  99904: 64
LV EJECT FRACT MOD 2C 99903: 64.02
LV EJECT FRACT MOD 4C 99902: 71.94
LV EJECT FRACT MOD BP 99901: 64.88
MAGNESIUM SERPL-MCNC: 2 MG/DL (ref 1.5–2.5)
MCH RBC QN AUTO: 31.7 PG (ref 27–33)
MCHC RBC AUTO-ENTMCNC: 33.6 G/DL (ref 32.2–35.5)
MCV RBC AUTO: 94.4 FL (ref 81.4–97.8)
PLATELET # BLD AUTO: 305 K/UL (ref 164–446)
PMV BLD AUTO: 8.1 FL (ref 9–12.9)
POTASSIUM SERPL-SCNC: 4.5 MMOL/L (ref 3.6–5.5)
RBC # BLD AUTO: 4.26 M/UL (ref 4.2–5.4)
SODIUM SERPL-SCNC: 133 MMOL/L (ref 135–145)
WBC # BLD AUTO: 10.8 K/UL (ref 4.8–10.8)

## 2024-05-19 PROCEDURE — 99239 HOSP IP/OBS DSCHRG MGMT >30: CPT | Performed by: HOSPITALIST

## 2024-05-19 PROCEDURE — 93306 TTE W/DOPPLER COMPLETE: CPT

## 2024-05-19 PROCEDURE — 36415 COLL VENOUS BLD VENIPUNCTURE: CPT

## 2024-05-19 PROCEDURE — 85027 COMPLETE CBC AUTOMATED: CPT

## 2024-05-19 PROCEDURE — 80048 BASIC METABOLIC PNL TOTAL CA: CPT

## 2024-05-19 PROCEDURE — A9270 NON-COVERED ITEM OR SERVICE: HCPCS | Performed by: HOSPITALIST

## 2024-05-19 PROCEDURE — 83735 ASSAY OF MAGNESIUM: CPT

## 2024-05-19 PROCEDURE — 700111 HCHG RX REV CODE 636 W/ 250 OVERRIDE (IP): Mod: JZ | Performed by: HOSPITALIST

## 2024-05-19 PROCEDURE — 93306 TTE W/DOPPLER COMPLETE: CPT | Mod: 26 | Performed by: INTERNAL MEDICINE

## 2024-05-19 PROCEDURE — 700102 HCHG RX REV CODE 250 W/ 637 OVERRIDE(OP): Performed by: HOSPITALIST

## 2024-05-19 RX ADMIN — BRIMONIDINE TARTRATE 1 DROP: 2 SOLUTION OPHTHALMIC at 17:23

## 2024-05-19 RX ADMIN — AMLODIPINE BESYLATE 10 MG: 5 TABLET ORAL at 06:12

## 2024-05-19 RX ADMIN — LOSARTAN POTASSIUM 100 MG: 50 TABLET, FILM COATED ORAL at 06:13

## 2024-05-19 RX ADMIN — BRIMONIDINE TARTRATE 1 DROP: 2 SOLUTION OPHTHALMIC at 06:13

## 2024-05-19 RX ADMIN — SPIRONOLACTONE 25 MG: 25 TABLET ORAL at 06:13

## 2024-05-19 RX ADMIN — LEVOTHYROXINE SODIUM 100 MCG: 0.1 TABLET ORAL at 08:27

## 2024-05-19 RX ADMIN — TIMOLOL MALEATE 1 DROP: 5 SOLUTION OPHTHALMIC at 06:13

## 2024-05-19 RX ADMIN — TIMOLOL MALEATE 1 DROP: 5 SOLUTION OPHTHALMIC at 17:22

## 2024-05-19 RX ADMIN — FUROSEMIDE 20 MG: 10 INJECTION, SOLUTION INTRAVENOUS at 06:13

## 2024-05-19 ASSESSMENT — FIBROSIS 4 INDEX: FIB4 SCORE: 0.93

## 2024-05-19 ASSESSMENT — ENCOUNTER SYMPTOMS
DOUBLE VISION: 0
CLAUDICATION: 0
PND: 0
CHILLS: 0
FEVER: 0
COUGH: 0
BRUISES/BLEEDS EASILY: 0
HEARTBURN: 0
DEPRESSION: 0
PALPITATIONS: 0
NAUSEA: 0
MYALGIAS: 0
HEADACHES: 0
WHEEZING: 0
HEMOPTYSIS: 0
BACK PAIN: 0
DIZZINESS: 0
BLURRED VISION: 0
VOMITING: 0

## 2024-05-19 NOTE — PROGRESS NOTES
Monitor Summary  Rhythm: SR/SB  Rate: 52-65  Ectopy: PVC,PAC  Measurements: 0.21/0.10/0.45  ---12 hr Chart Review---

## 2024-05-19 NOTE — PROGRESS NOTES
Recieved bedside report from RN. Patient is A&Ox4 and denies any pain at this time. Granddaughter is at bedside. Physical assessment completed. All questions addressed. Fall prevention precautionsare in place, teaching reiforced and patient and family verbalizes an understanding. Belongings and call light are within reach. Plan of care is ongoing.

## 2024-05-19 NOTE — PROGRESS NOTES
[facility] Medicine Daily Progress Note    Date of Service  5/19/2024    Chief Complaint  Lizzette Burton is a 84 y.o. female admitted 5/17/2024 with shortness of breath    Hospital Course  Lizzette Burton is a 84 y.o. female who presented 5/17/2024 with a history of HTN, HFrEF, glaucoma, CVA, hypothyroid, pulmonary HTN     She is brought in by family for bradycardia.  This was apparently noted by the HH RN with a rate in the 30s.  She has been feelng poorly with worsened SOB with walking and generalized fatigue since being started on coreg 2 wks ago.  No CP, N/V, F/C, CP, unusual neck/back/jaw or shoulder pain.    Interval Problem Update  5/18 patient is new to me today, she is in bed she is sitting at the edge of the bed, family is at bedside, patient is alert oriented follows commands, she is able to speak in full sentences, she is on room air, heart review of chest x-ray showing cephalization/mild pulmonary edema, previous echocardiogram showed normal ejection fraction but elevated RV systolic pressures, review records from cardiology as outpatient in November 2023 patient had stress test and recommendation was to do probably angiogram as outpatient, at this time patient denies any chest pain palpitations, shortness of breath is improved, will continue close monitoring, repeat echocardiogram, discussed with bedside nurse charge nurse  pharmacist, all question have been answered, continue holding Coreg since this medication was started recently in 5/1/2024 and apparently is causing bradycardia patient is also on amlodipine 10 mg, continue telemetry  5/19 patient is resting in bed, she denies any dizziness lightheadedness, patient stated that she is almost back to her baseline, she denies any palpitations, she is tolerating diet, echo is still pending, discussed with patient discussed with patient family, continue holding beta-blockers, if echo stable probably will be able to DC home tomorrow  with follow-up as outpatient, discussed with bedside nurse charge nurse  pharmacist    I have discussed this patient's plan of care and discharge plan at IDT rounds today with Case Management, Nursing, Nursing leadership, and other members of the IDT team.    Consultants/Specialty      Code Status  Full Code    Disposition  The patient is not medically cleared for discharge to home or a post-acute facility.      I have placed the appropriate orders for post-discharge needs.    Review of Systems  Review of Systems   Constitutional:  Negative for chills and fever.   Eyes:  Negative for blurred vision and double vision.   Respiratory:  Negative for cough, hemoptysis and wheezing.    Cardiovascular:  Negative for chest pain, palpitations, claudication, leg swelling and PND.   Gastrointestinal:  Negative for heartburn, nausea and vomiting.   Genitourinary:  Negative for hematuria and urgency.   Musculoskeletal:  Negative for back pain and myalgias.   Skin:  Negative for rash.   Neurological:  Negative for dizziness and headaches.   Endo/Heme/Allergies:  Does not bruise/bleed easily.   Psychiatric/Behavioral:  Negative for depression.         Physical Exam  Temp:  [36.2 °C (97.2 °F)-36.4 °C (97.5 °F)] 36.3 °C (97.3 °F)  Pulse:  [55-67] 67  Resp:  [16-18] 16  BP: (108-158)/(64-89) 130/71  SpO2:  [90 %-93 %] 91 %    Physical Exam  Vitals and nursing note reviewed.   Constitutional:       Appearance: Normal appearance.   Eyes:      General: No scleral icterus.        Right eye: No discharge.         Left eye: No discharge.   Cardiovascular:      Rate and Rhythm: Normal rate and regular rhythm.      Pulses: Normal pulses.      Heart sounds: Normal heart sounds.   Pulmonary:      Effort: Pulmonary effort is normal. No respiratory distress.      Breath sounds: Normal breath sounds. No wheezing.   Abdominal:      General: Bowel sounds are normal. There is no distension.      Tenderness: There is no abdominal  tenderness.   Musculoskeletal:         General: Normal range of motion.      Cervical back: Normal range of motion and neck supple.      Right lower leg: No edema.      Left lower leg: No edema.   Skin:     General: Skin is warm.      Capillary Refill: Capillary refill takes less than 2 seconds.      Coloration: Skin is not jaundiced.   Neurological:      General: No focal deficit present.      Mental Status: She is alert and oriented to person, place, and time.      Cranial Nerves: No cranial nerve deficit.   Psychiatric:         Mood and Affect: Mood normal.         Behavior: Behavior normal.         Fluids  No intake or output data in the 24 hours ending 05/19/24 1626      Laboratory  Recent Labs     05/17/24  1119 05/19/24  0154   WBC 14.2* 10.8   RBC 4.45 4.26   HEMOGLOBIN 13.7 13.5   HEMATOCRIT 40.9 40.2   MCV 91.9 94.4   MCH 30.8 31.7   MCHC 33.5 33.6   RDW 48.2 49.6   PLATELETCT 333 305   MPV 8.5* 8.1*     Recent Labs     05/17/24  1119 05/18/24  0504 05/19/24  0154   SODIUM 133* 133* 133*   POTASSIUM 4.5 4.6 4.5   CHLORIDE 101 101 102   CO2 22 22 20   GLUCOSE 99 93 93   BUN 34* 29* 40*   CREATININE 0.89 0.77 1.21   CALCIUM 8.7 8.9 8.1*                   Imaging  EC-ECHOCARDIOGRAM COMPLETE W/O CONT         DX-CHEST-PORTABLE (1 VIEW)   Final Result      Stable enlargement of the cardiomediastinal silhouette without acute cardiopulmonary abnormality.           Assessment/Plan  * Exertional dyspnea- (present on admission)  Assessment & Plan  Etiology is unclear  Patient has 1 documented episode of bradycardia per the home health nurse today, but otherwise all vitals that we have access to in the chart have shown bradycardia no more than in the 50s.  She certainly could have an intermittent dysrhythmia  Admit to monitored bed  Consider discharge with a Zio patch if workup negative  Check cardiac echo and TSH  Even if she is not symptomatically bradycardic, beta-blockade may be causing some fatigue so we will  stop the Coreg    Patient had a stress test in November 2023 at that time cardiology recommended angiogram as outpatient.  Continue holding Coreg  Discussed with bedside nurse will do ambulation and checking patient's heart rate to monitor response  Continue low-dose IV Lasix      Bradycardia  Assessment & Plan  Patient has multiple episodes of documented bradycardia in the 50s, but only 1 episode by report of the home health nurse today of a heart rate in the 30s.  EKG which I personally have reviewed demonstrates sinus rhythm with frequent PVCs/bigeminy.  It is possible depending on the way that this rate of 30 was falsely low due to the bigeminy  Admit the patient to telemetry  Check cardiac echo  Stop Coreg  Consider discharge on Zio patch if no significant episodes are noted and there is still concern.  I do not think the patient needs a pacer at this point in time as we do not have a documented episode of significant bradycardia.      Continue holding Coreg  Telemetry  Ambulation with cardiac monitoring    Pulmonary hypertension (HCC)- (present on admission)  Assessment & Plan  Echo showing elevated RV systolic pressures 46  Chest x-ray reviewed by myself showing cephalization, started on low-dose Lasix  Continue oxygen per protocol  Needs follow-up as outpatient with cardiology    Orthostatic hypotension- (present on admission)  Assessment & Plan  History of  Stop beta-blocker  Check orthostatic vitals  Continue holding Coreg    Hypothyroidism- (present on admission)  Assessment & Plan  Check TSH  Continue replacement    HTN (hypertension)- (present on admission)  Assessment & Plan  Continue the patient's home spironolactone 25 mg, losartan 100 mg and amlodipine 10 mg  DC Coreg due to concern of bradycardia, and to fatigue which started at the same time that this medication was initiated    Stop Coreg due to bradycardia, patient recently started on Coreg on May 1, 2024    Glaucoma- (present on  admission)  Assessment & Plan  Continue topical therapies         VTE prophylaxis: Lovenox    I have performed a physical exam and reviewed and updated ROS and Plan today (5/19/2024). In review of yesterday's note (5/18/2024), there are no changes except as documented above.    My total time spent caring for the patient on the day of the encounter was 36 minutes.   This does not include time spent on separately billable procedures/tests.

## 2024-05-20 ENCOUNTER — PATIENT OUTREACH (OUTPATIENT)
Dept: MEDICAL GROUP | Facility: MEDICAL CENTER | Age: 84
End: 2024-05-20
Payer: MEDICARE

## 2024-05-20 VITALS
RESPIRATION RATE: 16 BRPM | SYSTOLIC BLOOD PRESSURE: 122 MMHG | OXYGEN SATURATION: 97 % | TEMPERATURE: 97.8 F | DIASTOLIC BLOOD PRESSURE: 64 MMHG | HEART RATE: 72 BPM

## 2024-05-20 ASSESSMENT — ENCOUNTER SYMPTOMS
HIGHEST PAIN SEVERITY IN PAST 24 HOURS: 8/10
PERSON REPORTING PAIN: PATIENT
PAIN: 1
LOWEST PAIN SEVERITY IN PAST 24 HOURS: 6/10

## 2024-05-20 NOTE — PROGRESS NOTES
Pt given discharge instructions.  Discussed diet, activity, follow up appointments, symptoms and management, and prescriptions provided.  Packet sent with patient.  IV d/c'd, tele box off, and all questions answered.

## 2024-05-20 NOTE — PROGRESS NOTES
Transitional Care Management  TCM Outreach Date and Time: Filed (5/20/2024  9:33 AM)    Discharge Questions  Actual Discharge Date: 05/19/24  Now that you are home, how are you feeling?: Good  Did you receive any new prescriptions?: No  Do you have any questions about your current medications or new medications (Review Med Rec)?: No (STOP: Carvedilol, lidocaine 4% patch, metoprolol, vitamin D)  Did you have any durable medical equipment ordered?: No  Do you have a follow up appointment scheduled with your PCP?: No  Was an appointment scheduled for the patient?: No  Reason not scheduled?: Declines (patient's PCP is with JONATHAN)  If Home Health was ordered, have they contacted you (Patient): No (Give phone number) ([facility])  Did you have enough support after your last discharge?: Yes  Does this patient qualify for the CCM program?: No    Transitional Care  Number of attempts made to contact patient: 1  Current or previous attempts completed within two business days of discharge? : Yes  Provided education regarding treatment plan, medications, self-management, ADLs?: Yes (Dianna has Phone # for Cardiology and for [facility] to make f/u appts.)  Has patient completed an Advanced Directive?: Yes  Is the patient's advanced directive on file?: Yes  Has the Care Manager's phone number provided?: No  Is there anything else I can help you with?: No    Discharge Summary  Chief Complaint: weakness  Admitting Diagnosis: Exertional dyspnea (R06.09)  Discharge Diagnosis: Exertional dyspnea

## 2024-05-20 NOTE — DISCHARGE SUMMARY
Discharge Summary    CHIEF COMPLAINT ON ADMISSION  Chief Complaint   Patient presents with    Weakness     BIB [facility] from home after home health called due to patient's HR being in 30s. She has had bradycardia since leaving hospital on carvedilol instead of metoprolol. She saw her PCP on 5/9 and has cards appointment on 6/6 for possible pacer. Patient has been feeling generally weak but denies pain       Reason for Admission  EMS     Admission Date  5/17/2024    CODE STATUS  Full Code    HPI & HOSPITAL COURSE    Please see original H&P for specific information    Lizzette Burton is a 84 y.o. female who presented 5/17/2024 with a history of HTN, HFrEF, glaucoma, CVA, hypothyroid, pulmonary HTN     She is brought in by family for bradycardia.  This was apparently noted by the HH RN with a rate in the 30s.  She has been feelng poorly with worsened SOB with walking and generalized fatigue since being started on coreg 2 wks ago.  No CP, N/V, F/C, CP, unusual neck/back/jaw or shoulder pain.      Patient was monitored on telemetry, Coreg was stopped, patient was able to ambulate without any dizziness lightheadedness, patient is feeling much better, telemetry showing heart rate between 60 and 85, continue Coreg, no signs of heart block, repeated echo showed normal ejection fraction, no significant valvular disease, patient is alert oriented follows commands, denies any focal weakness numbness tingling she is tolerating diet, she is eager to go home, at this time patient will be discharged home Home health care has been ordered, patient will continue monitoring her blood pressure and heart rate at home keep a log to show to his primary care doctor and cardiology, patient has appointment with cardiology as outpatient already scheduled, patient is again alert oriented follows commands, she is being discharged in stable condition, discussed with patient and patient's family all question answered.    Therefore, she is  discharged in good and stable condition to home with organized home healthcare and close outpatient follow-up.    The patient met 2-midnight criteria for an inpatient stay at the time of discharge.    Discharge Date  5/19/24    FOLLOW UP ITEMS POST DISCHARGE  Primary care physician  Cardiology    DISCHARGE DIAGNOSES  Principal Problem (Resolved):    Exertional dyspnea (POA: Yes)  Active Problems:    Glaucoma (POA: Yes)    HTN (hypertension) (POA: Yes)    Hypothyroidism (POA: Yes)    Pulmonary hypertension (HCC) (POA: Yes)  Resolved Problems:    Orthostatic hypotension (POA: Yes)    Bradycardia (POA: Unknown)      FOLLOW UP  Future Appointments   Date Time Provider Department Center   5/20/2024  7:40 AM Chepe Weathers D.O. [facility] None   5/20/2024 12:00 PM Yamileth Field, PT [facility] None   5/20/2024  5:00 PM Mable Monteiro OT [facility] None   5/21/2024 To Be Determined Marvin Rush R.N. [facility] None   5/22/2024 12:00 PM Yamileth Field, PT [facility] None   5/22/2024  5:00 PM Mable Monteiro OT [facility] None   5/24/2024 To Be Determined Marvin Rush R.N. [facility] None   5/27/2024  5:00 PM Mable Monteiro OT [facility] None   5/28/2024 To Be Determined Lizette Peguero R.N. [facility] None   5/29/2024  5:00 PM Mable Monteiro OT [facility] None   5/31/2024 [facility] None   6/12/2024  3:00 PM [facility] DX 1 [facility] [place]   9/18/2024 11:20 AM Nikunj Hills D.O. [facility] None     No follow-up provider specified.    MEDICATIONS ON DISCHARGE     Medication List        CONTINUE taking these medications        Instructions   alendronate 70 MG Tabs  Commonly known as: Fosamax   Take 70 mg by mouth every 7 days. Indications: Osteoporosis  Dose: 70 mg     amLODIPine 10 MG Tabs  Commonly known as: Norvasc   [place] 1 tableta por vía oral diariamente.  (Take 1 Tablet by mouth every day.)  Dose: 10 mg     Brimonidine Tartrate-Timolol 0.2-0.5 % Soln   Administer 1 Drop into both eyes in the morning, at noon, and at  bedtime. Indications: Glaucoma  Dose: 1 Drop     CALCIUM PO   Take 1 Tablet by mouth every day.  Dose: 1 Tablet     latanoprost 0.005 % Soln  Commonly known as: Xalatan   Administer 1 Drop into both eyes every evening. Indications: Wide-Angle Glaucoma  Dose: 1 Drop     levothyroxine 100 MCG Tabs  Commonly known as: Synthroid   Take 1 Tablet by mouth every morning before breakfast.  Dose: 100 mcg     losartan 100 MG Tabs  Commonly known as: Cozaar   Take 1 Tablet by mouth every day.  Dose: 100 mg     spironolactone 25 MG Tabs  Commonly known as: Aldactone   [place] 1 tableta por vía oral diariamente.  (Take 1 Tablet by mouth every day.)  Dose: 25 mg     traMADol 50 MG Tabs  Commonly known as: Ultram   Take 50 mg by mouth every 8 hours as needed for Moderate Pain. Takes it at night  Dose: 50 mg            STOP taking these medications      carvedilol 3.125 MG Tabs  Commonly known as: Coreg     lidocaine 4 % Ptch  Commonly known as: Asperflex     metoprolol SR 25 MG Tb24  Commonly known as: Toprol XL     VITAMIN D PO              Allergies  Allergies   Allergen Reactions    Tramadol Nausea       DIET  Orders Placed This Encounter   Procedures    Diet Order Diet: Regular     Standing Status:   Standing     Number of Occurrences:   1     Order Specific Question:   Diet:     Answer:   Regular [1]       ACTIVITY  As tolerated.  Weight bearing as tolerated    CONSULTATIONS  None    PROCEDURES  None    LABORATORY  Lab Results   Component Value Date    SODIUM 133 (L) 05/19/2024    POTASSIUM 4.5 05/19/2024    CHLORIDE 102 05/19/2024    CO2 20 05/19/2024    GLUCOSE 93 05/19/2024    BUN 40 (H) 05/19/2024    CREATININE 1.21 05/19/2024    CREATININE 0.9 02/04/2009        Lab Results   Component Value Date    WBC 10.8 05/19/2024    HEMOGLOBIN 13.5 05/19/2024    HEMATOCRIT 40.2 05/19/2024    PLATELETCT 305 05/19/2024        Total time of the discharge process exceeds 35 minutes.

## 2024-05-21 ENCOUNTER — HOME CARE VISIT (OUTPATIENT)
Dept: HOME HEALTH SERVICES | Facility: HOME HEALTHCARE | Age: 84
End: 2024-05-21
Payer: MEDICARE

## 2024-05-21 NOTE — CASE COMMUNICATION
Quality Review for Transfer OASIS by JOE Woodall, JOSSIE on  May 21, 2024    Edits completed by JOE Woodall RN:  1. Changed  C to yes and E to NA per the plan of care  2.  is NA, there is no documentation of a clinically significant medication issue identified

## 2024-05-22 ENCOUNTER — HOME CARE VISIT (OUTPATIENT)
Dept: HOME HEALTH SERVICES | Facility: HOME HEALTHCARE | Age: 84
End: 2024-05-22
Payer: MEDICARE

## 2024-05-22 NOTE — CASE COMMUNICATION
I agree with these changes    Thank you,  MALINA Rush RN.  ----- Message -----  From: Caitlin Woodall R.N.  Sent: 5/21/2024   5:51 AM PDT  To: Marvin Rush R.N.      Quality Review for Transfer OASIS by JOE Woodall RN on  May 21, 2024    Edits completed by JOE Woodall RN:  1. Changed  C to yes and E to NA per the plan of care  2.  is NA, there is no documentation of a clinically significant medication issue identif ied

## 2024-05-28 NOTE — DOCUMENTATION QUERY
"                                                                         [facility]                                                                       Query Response Note      PATIENT:               MONICA TRIMBLE  ACCT #:                  [id number]  MRN:                     [id number]  :                      1940  ADMIT DATE:       2024 11:14 AM  DISCH DATE:        2024 6:15 PM  RESPONDING  PROVIDER #:        [id number]           QUERY TEXT:    History of HFrEF is noted in the record.  Please clarify the acuity and type of congestive heart failure:         The patient's Clinical Indicators include:  -84 y.o. F presents with weakness, bradycardia, exertional dyspnea     H&P: \"a history of HTN, HFrEF, glaucoma, CVA, hypothyroid, pulmonary HTN\"     PN: \"Exertional dyspnea\"  \"Continue low-dose IV Lasix\"  \"Bradycardia\"  \"Stop Coreg\"     CXR- \"Stable enlargement of the cardiomediastinal silhouette without acute cardiopulmonary abnormality.\"     Echo-  \"Normal left ventricular size and systolic function.Normal regional  wall motion. The ejection fraction is measured to be 64% by Abreu's  biplane. Moderate concentric left ventricular hypertrophy.\"    Labs: - NT-proBNP- 993    Risk factors:  history of HFrEF, pulmonary HTN, HTN, hypothyroid, bradycardia, worsened SPB with walking, generalized fatigue since starting coreg    Treatments: Lasix 20 mg IV given x 2 on  and x1 on ; amlodipine, losartan,  spironolactone, BNP, CXR, Echo    Note: If you agree with a diagnosis listed above, please remember to include it in your concurrent daily documentation and onto the Discharge Summary.      If you have any questions, please contact:  Luba CHEW RN CCDS  [facility]  [email or web address]  Luba Burrell [address]  Options provided:   -- Chronic Systolic heart failure   -- Acute on Chronic Systolic heart failure   -- Chronic Diastolic heart failure   -- Acute on Chronic " Diastolic heart failure   -- Chronic Systolic and Diastolic heart failure   -- Acute on Chronic Systolic and diastolic heart failure   -- Other explanation, (please specify the other explanation)      Query created by: Luba Burrell on 5/24/2024 11:23 AM    RESPONSE TEXT:    Chronic Systolic heart failure          Electronically signed by:  ELIZABETH LISA MD 5/27/2024 8:37 PM

## 2024-05-29 NOTE — CASE COMMUNICATION
Patient was discussed on today's Interdiciplinary Meeting, current plan of care meets patient's needs at this time.

## 2024-06-03 ENCOUNTER — NON-PROVIDER VISIT (OUTPATIENT)
Dept: CARDIOLOGY | Facility: MEDICAL CENTER | Age: 84
End: 2024-06-03
Attending: HOSPITALIST
Payer: MEDICARE

## 2024-06-03 DIAGNOSIS — R00.1 BRADYCARDIA: ICD-10-CM

## 2024-06-03 DIAGNOSIS — R42 DIZZINESS: ICD-10-CM

## 2024-06-03 NOTE — PROGRESS NOTES
Home enrollment completed in the 14 day Zio XT Holter monitor per Maynor Chaves MD. Monitor will be shipped to patient via [employer] [place], pending EOS.

## 2024-06-12 ENCOUNTER — HOSPITAL ENCOUNTER (OUTPATIENT)
Dept: RADIOLOGY | Facility: MEDICAL CENTER | Age: 84
End: 2024-06-12
Attending: INTERNAL MEDICINE
Payer: MEDICARE

## 2024-06-26 ENCOUNTER — APPOINTMENT (OUTPATIENT)
Dept: CARDIOLOGY | Facility: MEDICAL CENTER | Age: 84
End: 2024-06-26
Attending: INTERNAL MEDICINE
Payer: MEDICARE

## 2024-07-12 ENCOUNTER — TELEPHONE (OUTPATIENT)
Dept: CARDIOLOGY | Facility: MEDICAL CENTER | Age: 84
End: 2024-07-12
Payer: MEDICARE

## 2024-08-14 ENCOUNTER — OFFICE VISIT (OUTPATIENT)
Dept: CARDIOLOGY | Facility: MEDICAL CENTER | Age: 84
End: 2024-08-14
Attending: INTERNAL MEDICINE
Payer: MEDICARE

## 2024-08-14 VITALS
OXYGEN SATURATION: 94 % | WEIGHT: 177 LBS | HEIGHT: 63 IN | SYSTOLIC BLOOD PRESSURE: 122 MMHG | DIASTOLIC BLOOD PRESSURE: 56 MMHG | RESPIRATION RATE: 16 BRPM | BODY MASS INDEX: 31.36 KG/M2 | HEART RATE: 51 BPM

## 2024-08-14 DIAGNOSIS — N18.31 STAGE 3A CHRONIC KIDNEY DISEASE: ICD-10-CM

## 2024-08-14 DIAGNOSIS — I47.29 NONSUSTAINED VENTRICULAR TACHYCARDIA (HCC): ICD-10-CM

## 2024-08-14 DIAGNOSIS — I10 PRIMARY HYPERTENSION: ICD-10-CM

## 2024-08-14 DIAGNOSIS — R07.9 CHEST PAIN, UNSPECIFIED TYPE: ICD-10-CM

## 2024-08-14 PROBLEM — I16.0 HYPERTENSIVE URGENCY: Status: RESOLVED | Noted: 2024-04-29 | Resolved: 2024-08-14

## 2024-08-14 PROCEDURE — 93005 ELECTROCARDIOGRAM TRACING: CPT | Performed by: INTERNAL MEDICINE

## 2024-08-14 PROCEDURE — 99214 OFFICE O/P EST MOD 30 MIN: CPT | Performed by: INTERNAL MEDICINE

## 2024-08-14 PROCEDURE — 3074F SYST BP LT 130 MM HG: CPT | Performed by: INTERNAL MEDICINE

## 2024-08-14 PROCEDURE — 99212 OFFICE O/P EST SF 10 MIN: CPT | Performed by: INTERNAL MEDICINE

## 2024-08-14 PROCEDURE — 3078F DIAST BP <80 MM HG: CPT | Performed by: INTERNAL MEDICINE

## 2024-08-14 PROCEDURE — 1170F FXNL STATUS ASSESSED: CPT | Performed by: INTERNAL MEDICINE

## 2024-08-14 ASSESSMENT — FIBROSIS 4 INDEX: FIB4 SCORE: 0.93

## 2024-08-14 NOTE — PROGRESS NOTES
[facility]    PatientName:Lizzette Gunn ChaconDate: 2024  :1940    84 y.o.PCP:No primary care provider on file.  MRN:[id number]        Problems and Plans    HTN (hypertension)  Blood pressures currently well-controlled on her current regimen no changes    Chest pain  Clinically, she is doing fair and I have a low clinical suspicion for an ischemic etiology leading to her chest pain given that her most recent nuclear medicine stress test demonstrated predominantly a fixed inferior inferolateral defect suggestive of prior myocardial infarction.  She does remain on appropriate medical therapy for her noted coronary artery disease no changes are made at this time      Nonsustained ventricular tachycardia (HCC)  Regarding her nonsustained ventricular tachycardia noted on her most recent Zio patch monitor to it will be very difficult given her noted bradycardia to add any type of AV maynor blocking agent.  This was very short-lived and she was asymptomatic during these events.  I suspect that this was driven by a prior myocardial infarction as demonstrated on her prior nuclear medicine scan.  Will continue to monitor clinically    Return in about 3 months (around 2024).      Encounter    Reason for Visit / Chief Complaint: Chest pain    HPI    84-year-old female with known history of hypertension, dyslipidemia, hypothyroidism, no diabetes mellitus, osteoporosis, gait instability presents in clinic for ongoing management of her chest pain.    Currently, she notes she is feeling well and not having any adverse significant cardiovascular complaints aside from occasional chest discomfort when she is under tremendous amount of stress.  She does have occasional dizziness with heavy exertion but oftentimes can rest for several minutes and then return to her normal activity.  She says overall that she is feeling well.  Her granddaughter is currently with her and providing   services.    Prior cardiovascular workup consists of echocardiogram performed on 5/19/2024 which demonstrated preserved LV systolic function with moderate concentric left ventricular hypertrophy and no significant valvular disease.    Zio patch monitor performed on 7/12/2024 demonstrated predominant sinus rhythm with no triggered or diary entries wide-complex tachycardia concerning for nonsustained ventricular tachycardia and noted narrow complex tachycardia concerning for paroxysmal atrial tachycardia, paroxysmal atrial tachycardia with aberrancy and junctional tachycardia.  No evidence of atrial fibrillation/atrial flutter.    Nuclear medicine stress testing back in November 2023 demonstrated small predominantly fixed mid to apical inferior inferolateral segments suggestive of prior myocardial infarction.  Estimate ejection fraction of 38%.  ECG tracings did not demonstrate any significant evidence of infarct or ischemia    Past Medical History  Past Medical History:   Diagnosis Date    Acute renal failure ([facility]) 02/11/2014    Anesthesia     PONV    Angina     CONSULT NEG. RESULTS    Arthritis     bilateral knees    Asthma     Breath shortness     WITH EXERCISE    CATARACT     bilat IOL    Dental disorder     upper/lower partials    Glaucoma     Heart burn     Hypertension     Indigestion     Pneumonia 03/2018    Sepsis ([facility]) 02/08/2014    Stroke ([facility])     denies hx. of stroke    Unspecified disorder of thyroid     'thyroid medicine taken in past'    Unspecified hemorrhagic conditions 2009     treated in er,nose bleed    Urinary bladder disorder     Urinary incontinence     wears pads     Past Surgical History  Past Surgical History:   Procedure Laterality Date    MUSCLE BIOPSY Left 10/18/2023    Procedure: LEFT DELTOID BIOPSY;  Surgeon: Miller Hernandez M.D.;  Location: SURGERY [facility];  Service: Neurosurgery    SHOULDER ARTHROPLASTY TOTAL Right 05/02/2018    Procedure: SHOULDER ARTHROPLASTY TOTAL/  REVERSE;  Surgeon: Scott Payton M.D.;  Location: SURGERY SAME DAY [facility];  Service: Orthopedics    ANTERIOR AND POSTERIOR REPAIR  03/20/2017    Procedure: ANTERIOR AND POSTERIOR REPAIR;  Surgeon: Regulo Burton M.D.;  Location: SURGERY SAME DAY [facility];  Service:     ENTEROCELE REPAIR  03/20/2017    Procedure: ENTEROCELE REPAIR - PERINEOPLASTY;  Surgeon: Regulo Burton M.D.;  Location: SURGERY SAME DAY [facility];  Service:     BLADDER SLING FEMALE  03/20/2017    Procedure: BLADDER SLING FEMALE - TOT;  Surgeon: Regulo Burton M.D.;  Location: SURGERY SAME DAY [facility];  Service:     VAGINAL SUSPENSION  03/20/2017    Procedure: VAGINAL SUSPENSION -SACROSPINOUS VAULT;  Surgeon: Regulo Burton M.D.;  Location: SURGERY SAME DAY [facility];  Service:     KNEE ARTHROPLASTY TOTAL Left 08/20/2015    Procedure: KNEE ARTHROPLASTY TOTAL;  Surgeon: Juan Manuel Choi M.D.;  Location: SURGERY [facility];  Service:     BAERVELDT IMPLANT  05/08/2013    Performed by Miller Fletcher M.D. at SURGERY SAME DAY [facility]S    CATARACT PHACO WITH IOL  05/08/2013    Performed by Miller Fletcher M.D. at SURGERY SAME DAY [facility] ORS    BAERVELDT IMPLANT  03/13/2013    Performed by Miller Fletcher M.D. at SURGERY SAME DAY [facility] OR[facility]    BAERVELDT IMPLANT  12/12/2012    Performed by Miller Fletcher M.D. at SURGERY SAME DAY [facility] ORS    TRABECULECTOMY  09/26/2012    Performed by Miller Fletcher M.D. at SURGERY SAME DAY [facility] ORS    CATARACT PHACO WITH IOL  09/26/2012    Performed by Miller Fletcher M.D. at SURGERY SAME DAY [facility] ORS    KNEE ARTHROPLASTY TOTAL  02/05/2009    Performed by GISELA OLSON at SURGERY [facility]    HYSTERECTOMY, TOTAL ABDOMINAL  01/01/1989    GYN SURGERY      OTHER      R shoulder     Social History  Social History     Socioeconomic History    Marital status:      Spouse name: Not on file    Number of children: Not on file    Years of  education: Not on file    Highest education level: Not on file   Occupational History    Not on file   Tobacco Use    Smoking status: Former     Current packs/day: 0.00     Average packs/day: 0.5 packs/day for 20.0 years (10.0 ttl pk-yrs)     Types: Cigarettes     Start date: 3/16/1982     Quit date: 3/16/2002     Years since quittin.4    Smokeless tobacco: Never   Vaping Use    Vaping status: Never Used   Substance and Sexual Activity    Alcohol use: No    Drug use: No    Sexual activity: Not on file   Other Topics Concern    Not on file   Social History Narrative    Not on file     Social Determinants of Health     Financial Resource Strain: Not on file   Food Insecurity: No Food Insecurity (2024)    Hunger Vital Sign     Worried About Running Out of Food in the Last Year: Never true     Ran Out of Food in the Last Year: Never true   Transportation Needs: No Transportation Needs (2024)    PRAPARE - Transportation     Lack of Transportation (Medical): No     Lack of Transportation (Non-Medical): No   Physical Activity: Not on file   Stress: Not on file   Social Connections: Feeling Socially Integrated (5/3/2024)    OASIS : Social Isolation     Frequency of experiencing loneliness or isolation: Rarely   Intimate Partner Violence: Not At Risk (2024)    Humiliation, Afraid, Rape, and Kick questionnaire     Fear of Current or Ex-Partner: No     Emotionally Abused: No     Physically Abused: No     Sexually Abused: No   Housing Stability: Low Risk  (2024)    Housing Stability Vital Sign     Unable to Pay for Housing in the Last Year: No     Number of Places Lived in the Last Year: 1     Unstable Housing in the Last Year: No     Past Family History  No family history on file.  Medication(s)    Current Outpatient Medications:     CALCIUM PO, 1 Tablet, Oral, DAILY, Taking    amLODIPine, 10 mg, Oral, DAILY, Taking    spironolactone, 25 mg, Oral, DAILY, Taking    latanoprost, 1 Drop, Both Eyes,  "Nightly, Taking    Brimonidine Tartrate-Timolol, 1 Drop, Both Eyes, TID, Taking    alendronate, 70 mg, Oral, Q7 DAYS, Taking    levothyroxine, 100 mcg, Oral, QAM AC, Taking    losartan, 100 mg, Oral, DAILY, Taking    traMADol, 50 mg, Oral, Q8HRS PRN (Patient not taking: Reported on 8/14/2024), Not Taking  Allergies  Tramadol    Review of Systems    A comprehensive 10 system review was conducted and is negative except as noted above in the HPI or here.      Vital Signs  /56 (BP Location: Left arm, Patient Position: Sitting, BP Cuff Size: Adult)   Pulse (!) 51   Resp 16   Ht 1.6 m (5' 3\")   Wt 80.3 kg (177 lb)   SpO2 94%   BMI 31.35 kg/m²     Physical Exam  Vitals and nursing note reviewed.   Constitutional:       Appearance: Normal appearance.   HENT:      Head: Normocephalic and atraumatic.      Nose: Nose normal.      Mouth/Throat:      Mouth: Mucous membranes are moist.      Pharynx: Oropharynx is clear.   Eyes:      Pupils: Pupils are equal, round, and reactive to light.   Cardiovascular:      Rate and Rhythm: Normal rate and regular rhythm.      Heart sounds: No murmur heard.     No friction rub. No gallop.   Pulmonary:      Effort: Pulmonary effort is normal.      Breath sounds: Normal breath sounds.   Abdominal:      General: Bowel sounds are normal.      Palpations: Abdomen is soft.   Musculoskeletal:         General: Normal range of motion.      Cervical back: Normal range of motion and neck supple.   Skin:     General: Skin is warm and dry.   Neurological:      General: No focal deficit present.      Mental Status: She is alert and oriented to person, place, and time. Mental status is at baseline.   Psychiatric:         Mood and Affect: Mood normal.         Behavior: Behavior normal.         Thought Content: Thought content normal.         Judgment: Judgment normal.         Lab Results   Component Value Date/Time    TSHULTRASEN 0.570 04/29/2024 1145        Lab Results   Component Value Date/Time "    FREET4 1.74 (H) 04/29/2024 1145        Lab Results   Component Value Date/Time    HBA1C 5.6 12/27/2022 12:18 AM       Lab Results   Component Value Date/Time    CHOLSTRLTOT 214 (H) 11/05/2022 09:01 AM     (H) 11/05/2022 09:01 AM    HDL 65 11/05/2022 09:01 AM    TRIGLYCERIDE 108 11/05/2022 09:01 AM         Lab Results   Component Value Date/Time    SODIUM 133 (L) 05/19/2024 01:54 AM    POTASSIUM 4.5 05/19/2024 01:54 AM    CHLORIDE 102 05/19/2024 01:54 AM    CO2 20 05/19/2024 01:54 AM    GLUCOSE 93 05/19/2024 01:54 AM    BUN 40 (H) 05/19/2024 01:54 AM    CREATININE 1.21 05/19/2024 01:54 AM    CREATININE 0.9 02/04/2009 10:28 AM       Lab Results   Component Value Date/Time    ALKPHOSPHAT 65 05/17/2024 11:19 AM    ASTSGOT 15 05/17/2024 11:19 AM    ALTSGPT 20 05/17/2024 11:19 AM    TBILIRUBIN 0.5 05/17/2024 11:19 AM             Total patient time was estimated to be 30 minutes consisting of chart review, direct patient interaction, medication renewal, plan development and overall communication with the cardiovascular team.        Electronically signed by:   Chepe Weathers DO, MPH  [facility]    Portions of this note were completed using voice recognition software (Dragon Naturally speaking software) . Occasional transcription errors may have escaped proof reading. I have made every reasonable attempt to correct obvious errors, but I expect that there are errors of grammar and possibly content that I did not discover before finalizing the note.

## 2024-08-14 NOTE — ASSESSMENT & PLAN NOTE
Clinically, she is doing fair and I have a low clinical suspicion for an ischemic etiology leading to her chest pain given that her most recent nuclear medicine stress test demonstrated predominantly a fixed inferior inferolateral defect suggestive of prior myocardial infarction.  She does remain on appropriate medical therapy for her noted coronary artery disease no changes are made at this time

## 2024-08-14 NOTE — ASSESSMENT & PLAN NOTE
Regarding her nonsustained ventricular tachycardia noted on her most recent Zio patch monitor to it will be very difficult given her noted bradycardia to add any type of AV maynor blocking agent.  This was very short-lived and she was asymptomatic during these events.  I suspect that this was driven by a prior myocardial infarction as demonstrated on her prior nuclear medicine scan.  Will continue to monitor clinically

## 2024-08-15 LAB — EKG IMPRESSION: NORMAL

## 2024-08-15 PROCEDURE — 93010 ELECTROCARDIOGRAM REPORT: CPT | Performed by: INTERNAL MEDICINE

## 2024-09-18 ENCOUNTER — OFFICE VISIT (OUTPATIENT)
Dept: NEUROLOGY | Facility: MEDICAL CENTER | Age: 84
End: 2024-09-18
Attending: INTERNAL MEDICINE
Payer: MEDICARE

## 2024-09-18 VITALS
WEIGHT: 176.81 LBS | TEMPERATURE: 97.5 F | SYSTOLIC BLOOD PRESSURE: 118 MMHG | OXYGEN SATURATION: 95 % | DIASTOLIC BLOOD PRESSURE: 74 MMHG | BODY MASS INDEX: 31.33 KG/M2 | HEIGHT: 63 IN | HEART RATE: 74 BPM

## 2024-09-18 DIAGNOSIS — I65.22 STENOSIS OF LEFT CAROTID ARTERY: ICD-10-CM

## 2024-09-18 DIAGNOSIS — F51.01 PRIMARY INSOMNIA: ICD-10-CM

## 2024-09-18 DIAGNOSIS — R13.10 DYSPHAGIA, UNSPECIFIED TYPE: ICD-10-CM

## 2024-09-18 DIAGNOSIS — R42 DIZZINESS: ICD-10-CM

## 2024-09-18 PROCEDURE — 1170F FXNL STATUS ASSESSED: CPT | Performed by: INTERNAL MEDICINE

## 2024-09-18 PROCEDURE — 3078F DIAST BP <80 MM HG: CPT | Performed by: INTERNAL MEDICINE

## 2024-09-18 PROCEDURE — 99215 OFFICE O/P EST HI 40 MIN: CPT | Performed by: INTERNAL MEDICINE

## 2024-09-18 PROCEDURE — 3074F SYST BP LT 130 MM HG: CPT | Performed by: INTERNAL MEDICINE

## 2024-09-18 PROCEDURE — G2211 COMPLEX E/M VISIT ADD ON: HCPCS | Performed by: INTERNAL MEDICINE

## 2024-09-18 PROCEDURE — 99212 OFFICE O/P EST SF 10 MIN: CPT | Performed by: INTERNAL MEDICINE

## 2024-09-18 RX ORDER — MIRTAZAPINE 7.5 MG/1
7.5 TABLET, FILM COATED ORAL NIGHTLY
Qty: 90 TABLET | Refills: 1 | Status: SHIPPED | OUTPATIENT
Start: 2024-09-18 | End: 2025-03-17

## 2024-09-18 ASSESSMENT — FIBROSIS 4 INDEX: FIB4 SCORE: 0.93

## 2024-09-18 ASSESSMENT — PATIENT HEALTH QUESTIONNAIRE - PHQ9
5. POOR APPETITE OR OVEREATING: 0 - NOT AT ALL
SUM OF ALL RESPONSES TO PHQ QUESTIONS 1-9: 17
CLINICAL INTERPRETATION OF PHQ2 SCORE: 2

## 2024-09-18 NOTE — PROGRESS NOTES
[facility]  [address] [place] [address], [address]. [place] [address]  Phone: [phone], Fax: [phone]    Nikunj Hills DO  Neurology, Movement Disorders Patient Name: Lizzette Burton  : 1940  MRN: [id number]     ASSESSMENT / PLAN   Lizzette Burton is a 84 y.o. female presenting for dizziness     Dizziness  Possible contribution from blood pressure medication and post-concussive syndrome. Negative tanner-hallpike, no ataxia noted in arms but leg weakness limits examination. Unclear if she is having diplopia as history is challenging to obtain. She isn't interested in PT for now  - check blood pressure and heart rate sitting, then again when standing. Do this a few times a week and bring to cardiologist.   - CTA Head and Neck. Previous US Carotid showed left ICA stenosis 50-70%, and MRI Brain shows extensive white matter disease     Gait instability  No evidence of neuropathy in legs. Reflexes diffusely diminished  MRI Lumbar  shows L5S1 foraminal stenosis but having more hip flexion weakness.   - work on strengthening legs  - consider NCS/EMG if not improving.    Insomnia, sleep initiation  Possible depression  - mirtazapine 7.5mg nightly     Nausea  Suspected GERD since it worsens after meals, with acidic sensation and frequent burping  - try taking less Naproxen. Tylenol is OK  - avoid spicy food, or caffeine  - MBS referral per SLP recs    Cognitive changes  Likely post-concussive as immediately worsened after hitting head  - CTM    Orders Placed This Encounter    mirtazapine (REMERON) 7.5 MG tablet     Return in about 3 months (around 2024).    BILLING DOCUMENTATION:   Excluding time spent on procedures during visit, I spent 40 minutes reviewing the medical record, interviewing and examining the patient, discussing diagnosis and treatment, and coordinating care.              HISTORY OF PRESENT ILLNESS   hypertension, GERD, asthma, urinary incontinence on Myrbetriq,  "pulmonary hypertension, and history of previous stroke      Granddaughter provided interpretation, declined interpretation services.     Initial HPI 05/16/24  Dizziness  4/29/2024:   felt dizzy, fell, hit head on ground. [facility] admission showed blood pressure was significantly elevated in the 220s. presenting symptoms were felt to be due to hypertensive urgency. PT recommended  PT. Blood pressures were usually in the 150s to 180s/90s      Dizziness and fatigue worsened 2 weeks ago after starting new BP meds. BP at home now around 120-130/70-80. Still having dizziness despite normalization of BP. Turning head fast makes it worse. Walking can feel dizzy as well. Standing up quickly can feel dizzy. Sitting down, more mild. Described as spinning sensation with nausea, not feeling faint. Just feels tired and disoriented.      Cognitively worsened after hitting head 4/29. Only having mild headaches now, much worse initially.    Sleep: yelling out in her sleep. Ongoing for many years. Possible PTSD. Trouble falling asleep, but not worse after TBI. Melatonin tried in past, but unclear dose.   +constipation 2 years, BM every 2 days  -anosmia     09/18/24  Hospitalized 5/17 for bradycardia.     Yesterday: getting off the bed and felt dizzy for 2 minutes, spinning sensation of the room, and fell.  Today: walking alongside bed, felt dizzy, and fell onto her daughter.   Dizziness triggered with movement.     Physical therapy didn't work on vestibular issues, just gait and balance. Has to use walker still, no benefit with PT. Outpatient PT in past. Since d/c from hospital 5/2024 then had limited home health PT.    Urinary urgency for 2 years  Cognitive issues since falling in Dec 2023. Improved since then.     Dysphagia  Having sense of something stuck in her throat, even with soup. No choking or cough. Speech therapist at home, pending MBS.  +nausea after meals. \"Like it doesn't go down right\".   +heart burn symptoms, not on " medications          Past Medical History:   Diagnosis Date    Acute renal failure ([facility]) 02/11/2014    Anesthesia     PONV    Angina     CONSULT NEG. RESULTS    Arthritis     bilateral knees    Asthma     Breath shortness     WITH EXERCISE    CATARACT     bilat IOL    Dental disorder     upper/lower partials    Glaucoma     Heart burn     Hypertension     Indigestion     Pneumonia 03/2018    Sepsis ([facility]) 02/08/2014    Stroke ([facility])     denies hx. of stroke    Unspecified disorder of thyroid     'thyroid medicine taken in past'    Unspecified hemorrhagic conditions 2009     treated in er,nose bleed    Urinary bladder disorder     Urinary incontinence     wears pads     Past Surgical History:   Procedure Laterality Date    MUSCLE BIOPSY Left 10/18/2023    Procedure: LEFT DELTOID BIOPSY;  Surgeon: Miller Hernandez M.D.;  Location: SURGERY [facility];  Service: Neurosurgery    SHOULDER ARTHROPLASTY TOTAL Right 05/02/2018    Procedure: SHOULDER ARTHROPLASTY TOTAL/ REVERSE;  Surgeon: Scott Payton M.D.;  Location: SURGERY SAME DAY [facility];  Service: Orthopedics    ANTERIOR AND POSTERIOR REPAIR  03/20/2017    Procedure: ANTERIOR AND POSTERIOR REPAIR;  Surgeon: Regulo Burton M.D.;  Location: SURGERY SAME DAY [facility];  Service:     ENTEROCELE REPAIR  03/20/2017    Procedure: ENTEROCELE REPAIR - PERINEOPLASTY;  Surgeon: Regulo Burton M.D.;  Location: SURGERY SAME DAY [facility];  Service:     BLADDER SLING FEMALE  03/20/2017    Procedure: BLADDER SLING FEMALE - TOT;  Surgeon: Regulo uBrton M.D.;  Location: SURGERY SAME DAY [facility];  Service:     VAGINAL SUSPENSION  03/20/2017    Procedure: VAGINAL SUSPENSION -SACROSPINOUS VAULT;  Surgeon: Regulo Burton M.D.;  Location: SURGERY SAME DAY [facility];  Service:     KNEE ARTHROPLASTY TOTAL Left 08/20/2015    Procedure: KNEE ARTHROPLASTY TOTAL;  Surgeon: Juan Manuel Choi M.D.;  Location: SURGERY [facility];  Service:     BAERVELDT IMPLANT   2013    Performed by Miller Fletcher M.D. at SURGERY SAME DAY [facility]    CATARACT PHACO WITH IOL  2013    Performed by Miller Fletcher M.D. at SURGERY [facility] DAY [facility]    BAERVELDT IMPLANT  2013    Performed by Miller Fletcher M.D. at SURGERY [facility]    BAERVELDT IMPLANT  2012    Performed by Miller Fletcher M.D. at SURGERY [facility]    TRABECULECTOMY  2012    Performed by Miller Fletcher M.D. at SURGERY [facility]    CATARACT PHACO WITH IOL  2012    Performed by Miller Fletcher M.D. at SURGERY [facility]    KNEE ARTHROPLASTY TOTAL  2009    Performed by GISELA OLSON at SURGERY [facility]    HYSTERECTOMY, TOTAL ABDOMINAL  1989    GYN SURGERY      OTHER      R shoulder     No family history on file.  Social History     Socioeconomic History    Marital status:      Spouse name: Not on file    Number of children: Not on file    Years of education: Not on file    Highest education level: Not on file   Occupational History    Not on file   Tobacco Use    Smoking status: Former     Current packs/day: 0.00     Average packs/day: 0.5 packs/day for 20.0 years (10.0 ttl pk-yrs)     Types: Cigarettes     Start date: 3/16/1982     Quit date: 3/16/2002     Years since quittin.5    Smokeless tobacco: Never   Vaping Use    Vaping status: Never Used   Substance and Sexual Activity    Alcohol use: No    Drug use: No    Sexual activity: Not on file   Other Topics Concern    Not on file   Social History Narrative    Not on file     Social Determinants of Health     Financial Resource Strain: Not on file   Food Insecurity: No Food Insecurity (2024)    Hunger Vital Sign     Worried About Running Out of Food in the Last Year: Never true     Ran Out of Food in the Last Year: Never true   Transportation Needs: No Transportation Needs (2024)    PRAPARE - Transportation     Lack of Transportation  (Medical): No     Lack of Transportation (Non-Medical): No   Physical Activity: Not on file   Stress: Not on file   Social Connections: Feeling Socially Integrated (5/3/2024)    OASIS : Social Isolation     Frequency of experiencing loneliness or isolation: Rarely   Intimate Partner Violence: Not At Risk (5/17/2024)    Humiliation, Afraid, Rape, and Kick questionnaire     Fear of Current or Ex-Partner: No     Emotionally Abused: No     Physically Abused: No     Sexually Abused: No   Housing Stability: Low Risk  (5/17/2024)    Housing Stability Vital Sign     Unable to Pay for Housing in the Last Year: No     Number of Places Lived in the Last Year: 1     Unstable Housing in the Last Year: No     Current Outpatient Medications   Medication    mirtazapine (REMERON) 7.5 MG tablet    CALCIUM PO    amLODIPine (NORVASC) 10 MG Tab    spironolactone (ALDACTONE) 25 MG Tab    latanoprost (XALATAN) 0.005 % Solution    Brimonidine Tartrate-Timolol 0.2-0.5 % Solution    alendronate (FOSAMAX) 70 MG Tab    levothyroxine (SYNTHROID) 100 MCG Tab    losartan (COZAAR) 100 MG Tab     No current facility-administered medications for this visit.     Allergies   Allergen Reactions    Tramadol Nausea             DATA / RESULTS     MRI Brain w/o 5/2024  1.  There is moderate dilatation of the lateral and third ventricles. There is prominent sylvian fissure with tight frontoparietal sulci. This findings can be seen in the patients with normal pressure hydrocephalus. The ventricular dilatation is more   prominent than the previous MRI dated 7/19/2021.  2.  Multifocal chronic microhemorrhages.  3.  Chronic lacunar infarct in the RIGHT basal ganglia.  4.  Severe chronic microvascular ischemic disease.     MRI Cervical 8/2023  Multifocal degenerative disease in the cervical spine as described above.      MRI Lumbar w/o 8/2023  1.  Multifocal degenerative disease in the lumbar spine as described above.  2.  Grade 1 spondylolisthesis of L5 on  S1 and L4 on 5.  3.  Bilateral L4 and L5 spondylolysis.  4.  Severe lateral recess stenosis at L4-5 and L5-S1 causing impingement of the exiting L5 and S1 nerve roots respectively.  5.  Moderate central canal stenosis at L4-5  6.  There has been no significant interval change.     Cardiac stress test 11/2023  NUCLEAR IMAGING INTERPRETATION  Small, predominately fixed mid to apical inferior, inferolateral segments   suggestive of prior myocardial infarction.  Globally reduced LV systolic function estimated at 38%.     TTE 11/2023  Compared to the prior study on 12/26/22.  No significant changes.  Normal left ventricular size and systolic function. Moderate concentric   left ventricular hypertrophy. The left ventricular ejection fraction is   visually estimated to be 55-60%. Normal regional wall motion. No   regional wall motion abnormalities. Grade I diastolic dysfunction  Mildly dilated right ventricle  Mildly dilated left atrium  Aortic valve sclerosis without significant stenosis  Mild tricuspid regurgitation with estimated RV systolic pressure of 46   mmHg suggestive of moderate pulmonary hypertension    US Carotid Doppler: 5/2015  1.  There is a moderate amount of atherosclerotic plaque. Plaque is located in carotid bulbs and proximal internal carotid arteries, greater on the left side. Plaque characterization:  heterogeneous with some calcifications   2.  There is no evidence of carotid occlusion.   3. Vertebral arteries demonstrate antegrade flow.   4. Diameter reduction in the internal carotid arteries: less than 50% on the right. Diameter reduction on the left is between 50% and 69%.. There is moderate left-sided and no right-sided hemodynamically significant stenosis.    25-Hydroxy   Vitamin D 25   Date Value Ref Range Status   06/29/2021 28 (L) 30 - 100 ng/mL Final     Comment:     Adult Ranges:   <20 ng/mL - Deficiency  20-29 ng/mL - Insufficiency   ng/mL - Sufficiency  Effective 3/18/2020, this  "electrochemiluminescence binding assay is  performed using Roche monica e immunoassay analyzer.  The [employer] Vitamin D  total II assay is intended for the quantitative determination of total 25  hydroxyvitamin D in human serum and plasma. This assay is to be used as an  aid in the assessment of vitamin D sufficiency in adults.       Vitamin B12 -True Cobalamin   Date Value Ref Range Status   07/18/2023 738 211 - 911 pg/mL Final     TSH   Date Value Ref Range Status   04/29/2024 0.570 0.380 - 5.330 uIU/mL Final     Comment:     The 2011 American Thyroid Association (LALI) guidelines  recommended that the interpretation of thyroid function in  pregnancy be based on trimester specific reference ranges.    1st Trimester  0.100-2.500 mIU/L  2nd Trimester  0.200-3.000 mIU/L  3rd Trimester  0.300-3.500 mIU/L    These established reference ranges have not been validated  at [facility].       Glycohemoglobin   Date Value Ref Range Status   12/27/2022 5.6 4.0 - 5.6 % Final     Comment:     Increased risk for diabetes:  5.7 -6.4%  Diabetes:  >6.4%  Glycemic control for adults with diabetes:  <7.0%    The above interpretations are per ADA guidelines.  Diagnosis  of diabetes mellitus on the basis of elevated Hemoglobin A1c  should be confirmed by repeating the Hb A1c test.       LDL   Date Value Ref Range Status   11/05/2022 127 (H) <100 mg/dL Final                OBJECTIVE      Vitals:    09/18/24 1052   BP: 118/74   BP Location: Left arm   Patient Position: Sitting   BP Cuff Size: Large adult   Pulse: 74   Temp: 36.4 °C (97.5 °F)   TempSrc: Temporal   SpO2: 95%   Weight: 80.2 kg (176 lb 12.9 oz)   Height: 1.6 m (5' 3\")     Physical Exam     Cranial Nerves:  CN2: Right eye less reactive to light 2/2 surgery.   CN3/4/6: EOMI. There is no nystagmus.   CN8: Negative head impulse test.    Gait:   Struggles to stand from wheelchair. +romberg          PROCEDURE   N/A  "

## 2024-09-18 NOTE — PATIENT INSTRUCTIONS
Dizziness:  Epley maneuver: can look up videos on how to do it on youtube if handout is unclear  CTA Head and Neck: they will call to schedule    Sleep: start mirtazapine 7.5mg before bedtime    Nausea: avoid spicy food and caffeine (refer to handout)    Swallowing test (modified barium swallow): they will call to schedule

## 2024-09-27 DIAGNOSIS — R42 DIZZINESS: ICD-10-CM

## 2024-09-27 DIAGNOSIS — R13.10 DYSPHAGIA, UNSPECIFIED TYPE: ICD-10-CM

## 2024-10-07 PROBLEM — G89.18 POSTOPERATIVE PAIN: Status: RESOLVED | Noted: 2018-05-03 | Resolved: 2024-10-07

## 2024-10-07 PROBLEM — S22.49XA RIB FRACTURES: Status: RESOLVED | Noted: 2022-12-27 | Resolved: 2024-10-07

## 2024-10-14 ENCOUNTER — OFF SITE VISIT (OUTPATIENT)
Dept: FAMILY PLANNING/WOMEN'S HEALTH CLINIC | Facility: PHYSICIAN GROUP | Age: 84
End: 2024-10-14
Payer: MEDICARE

## 2024-10-14 ENCOUNTER — HOSPITAL ENCOUNTER (OUTPATIENT)
Dept: LAB | Facility: MEDICAL CENTER | Age: 84
End: 2024-10-14
Attending: INTERNAL MEDICINE
Payer: MEDICARE

## 2024-10-14 VITALS
RESPIRATION RATE: 14 BRPM | WEIGHT: 183 LBS | BODY MASS INDEX: 32.43 KG/M2 | SYSTOLIC BLOOD PRESSURE: 125 MMHG | OXYGEN SATURATION: 97 % | HEART RATE: 45 BPM | DIASTOLIC BLOOD PRESSURE: 69 MMHG | HEIGHT: 63 IN

## 2024-10-14 DIAGNOSIS — E78.5 DYSLIPIDEMIA: ICD-10-CM

## 2024-10-14 DIAGNOSIS — G31.9 CEREBRAL ATROPHY (HCC): ICD-10-CM

## 2024-10-14 DIAGNOSIS — N18.31 CHRONIC KIDNEY DISEASE, STAGE 3A: ICD-10-CM

## 2024-10-14 DIAGNOSIS — I47.29 NONSUSTAINED VENTRICULAR TACHYCARDIA (HCC): ICD-10-CM

## 2024-10-14 DIAGNOSIS — I10 PRIMARY HYPERTENSION: ICD-10-CM

## 2024-10-14 DIAGNOSIS — I70.0 ATHEROSCLEROSIS OF AORTA (HCC): ICD-10-CM

## 2024-10-14 DIAGNOSIS — E03.9 HYPOTHYROIDISM, UNSPECIFIED TYPE: ICD-10-CM

## 2024-10-14 DIAGNOSIS — I27.20 PULMONARY HYPERTENSION (HCC): ICD-10-CM

## 2024-10-14 DIAGNOSIS — H40.1130 PRIMARY OPEN ANGLE GLAUCOMA OF BOTH EYES, UNSPECIFIED GLAUCOMA STAGE: ICD-10-CM

## 2024-10-14 DIAGNOSIS — I50.42 CHRONIC COMBINED SYSTOLIC AND DIASTOLIC HEART FAILURE (HCC): ICD-10-CM

## 2024-10-14 DIAGNOSIS — R42 DIZZINESS: ICD-10-CM

## 2024-10-14 PROBLEM — N18.30 STAGE 3 CHRONIC KIDNEY DISEASE: Status: RESOLVED | Noted: 2018-12-26 | Resolved: 2024-10-14

## 2024-10-14 PROBLEM — I50.32 CHRONIC DIASTOLIC HEART FAILURE (HCC): Status: ACTIVE | Noted: 2024-10-14

## 2024-10-14 LAB
ANION GAP SERPL CALC-SCNC: 13 MMOL/L (ref 7–16)
BUN SERPL-MCNC: 22 MG/DL (ref 8–22)
CALCIUM SERPL-MCNC: 9.6 MG/DL (ref 8.5–10.5)
CHLORIDE SERPL-SCNC: 103 MMOL/L (ref 96–112)
CO2 SERPL-SCNC: 23 MMOL/L (ref 20–33)
CREAT SERPL-MCNC: 0.97 MG/DL (ref 0.5–1.4)
FASTING STATUS PATIENT QL REPORTED: NORMAL
GFR SERPLBLD CREATININE-BSD FMLA CKD-EPI: 57 ML/MIN/1.73 M 2
GLUCOSE SERPL-MCNC: 108 MG/DL (ref 65–99)
POTASSIUM SERPL-SCNC: 4.4 MMOL/L (ref 3.6–5.5)
SODIUM SERPL-SCNC: 139 MMOL/L (ref 135–145)

## 2024-10-14 PROCEDURE — 80048 BASIC METABOLIC PNL TOTAL CA: CPT

## 2024-10-14 PROCEDURE — 36415 COLL VENOUS BLD VENIPUNCTURE: CPT

## 2024-10-14 PROCEDURE — G0439 PPPS, SUBSEQ VISIT: HCPCS | Performed by: NURSE PRACTITIONER

## 2024-10-14 SDOH — ECONOMIC STABILITY: HOUSING INSECURITY: AT ANY TIME IN THE PAST 12 MONTHS, WERE YOU HOMELESS OR LIVING IN A SHELTER (INCLUDING NOW)?: NO

## 2024-10-14 SDOH — ECONOMIC STABILITY: FOOD INSECURITY: WITHIN THE PAST 12 MONTHS, THE FOOD YOU BOUGHT JUST DIDN'T LAST AND YOU DIDN'T HAVE MONEY TO GET MORE.: NEVER TRUE

## 2024-10-14 SDOH — ECONOMIC STABILITY: INCOME INSECURITY: IN THE LAST 12 MONTHS, WAS THERE A TIME WHEN YOU WERE NOT ABLE TO PAY THE MORTGAGE OR RENT ON TIME?: NO

## 2024-10-14 SDOH — ECONOMIC STABILITY: TRANSPORTATION INSECURITY
IN THE PAST 12 MONTHS, HAS LACK OF TRANSPORTATION KEPT YOU FROM MEETINGS, WORK, OR FROM GETTING THINGS NEEDED FOR DAILY LIVING?: NO

## 2024-10-14 SDOH — HEALTH STABILITY: PHYSICAL HEALTH: ON AVERAGE, HOW MANY DAYS PER WEEK DO YOU ENGAGE IN MODERATE TO STRENUOUS EXERCISE (LIKE A BRISK WALK)?: 3 DAYS

## 2024-10-14 SDOH — ECONOMIC STABILITY: FOOD INSECURITY: WITHIN THE PAST 12 MONTHS, YOU WORRIED THAT YOUR FOOD WOULD RUN OUT BEFORE YOU GOT MONEY TO BUY MORE.: NEVER TRUE

## 2024-10-14 SDOH — ECONOMIC STABILITY: INCOME INSECURITY: HOW HARD IS IT FOR YOU TO PAY FOR THE VERY BASICS LIKE FOOD, HOUSING, MEDICAL CARE, AND HEATING?: SOMEWHAT HARD

## 2024-10-14 SDOH — ECONOMIC STABILITY: INCOME INSECURITY: HOW HARD IS IT FOR YOU TO PAY FOR THE VERY BASICS LIKE FOOD, HOUSING, MEDICAL CARE, AND HEATING?: NOT VERY HARD

## 2024-10-14 SDOH — ECONOMIC STABILITY: HOUSING INSECURITY: IN THE PAST 12 MONTHS, HOW MANY TIMES HAVE YOU MOVED WHERE YOU WERE LIVING?: 0

## 2024-10-14 SDOH — HEALTH STABILITY: PHYSICAL HEALTH: ON AVERAGE, HOW MANY MINUTES DO YOU ENGAGE IN EXERCISE AT THIS LEVEL?: 20 MIN

## 2024-10-14 SDOH — HEALTH STABILITY: PHYSICAL HEALTH: ON AVERAGE, HOW MANY DAYS PER WEEK DO YOU ENGAGE IN MODERATE TO STRENUOUS EXERCISE (LIKE A BRISK WALK)?: 7 DAYS

## 2024-10-14 SDOH — HEALTH STABILITY: PHYSICAL HEALTH: ON AVERAGE, HOW MANY MINUTES DO YOU ENGAGE IN EXERCISE AT THIS LEVEL?: 10 MIN

## 2024-10-14 ASSESSMENT — PATIENT HEALTH QUESTIONNAIRE - PHQ9: CLINICAL INTERPRETATION OF PHQ2 SCORE: 0

## 2024-10-14 ASSESSMENT — FIBROSIS 4 INDEX: FIB4 SCORE: 0.93

## 2024-10-14 ASSESSMENT — ACTIVITIES OF DAILY LIVING (ADL)
TRANSPORTATION COMMENTS: FAMILY ASSISTS
SHOPPING_COMMENTS: FAMILY ASSISTS
BATHING_COMMENTS: FAMILY ASSISTS
BATHING_REQUIRES_ASSISTANCE: 1

## 2024-10-14 ASSESSMENT — ENCOUNTER SYMPTOMS: GENERAL WELL-BEING: POOR

## 2024-10-14 ASSESSMENT — PAIN SCALES - GENERAL: PAINLEVEL: NO PAIN

## 2024-10-15 ENCOUNTER — PATIENT MESSAGE (OUTPATIENT)
Dept: HEALTH INFORMATION MANAGEMENT | Facility: OTHER | Age: 84
End: 2024-10-15

## 2024-10-15 ENCOUNTER — PATIENT OUTREACH (OUTPATIENT)
Dept: HEALTH INFORMATION MANAGEMENT | Facility: OTHER | Age: 84
End: 2024-10-15
Payer: MEDICARE

## 2024-10-15 DIAGNOSIS — I10 HYPERTENSION, UNSPECIFIED TYPE: ICD-10-CM

## 2024-10-15 DIAGNOSIS — M81.0 SENILE OSTEOPOROSIS: ICD-10-CM

## 2024-10-15 DIAGNOSIS — G31.9 CEREBRAL ATROPHY (HCC): ICD-10-CM

## 2024-10-15 DIAGNOSIS — K21.9 GASTROESOPHAGEAL REFLUX DISEASE, UNSPECIFIED WHETHER ESOPHAGITIS PRESENT: ICD-10-CM

## 2024-10-15 DIAGNOSIS — N18.31 CHRONIC KIDNEY DISEASE, STAGE 3A: ICD-10-CM

## 2024-10-15 DIAGNOSIS — E03.9 HYPOTHYROIDISM, UNSPECIFIED TYPE: ICD-10-CM

## 2024-10-15 DIAGNOSIS — H40.1190 PRIMARY OPEN ANGLE GLAUCOMA, UNSPECIFIED GLAUCOMA STAGE, UNSPECIFIED LATERALITY: ICD-10-CM

## 2024-10-15 DIAGNOSIS — I50.42 CHRONIC COMBINED SYSTOLIC AND DIASTOLIC HEART FAILURE (HCC): ICD-10-CM

## 2024-10-21 PROBLEM — N18.31 CHRONIC KIDNEY DISEASE, STAGE 3A: Status: ACTIVE | Noted: 2018-12-26

## 2024-11-05 SDOH — ECONOMIC STABILITY: INCOME INSECURITY: IN THE PAST 12 MONTHS HAS THE ELECTRIC, GAS, OIL, OR WATER COMPANY THREATENED TO SHUT OFF SERVICES IN YOUR HOME?: NO

## 2024-11-05 SDOH — ECONOMIC STABILITY: FOOD INSECURITY: WITHIN THE PAST 12 MONTHS, THE FOOD YOU BOUGHT JUST DIDN'T LAST AND YOU DIDN'T HAVE MONEY TO GET MORE.: NEVER TRUE

## 2024-11-05 SDOH — HEALTH STABILITY: MENTAL HEALTH: HOW OFTEN DO YOU HAVE A DRINK CONTAINING ALCOHOL?: 2-4 TIMES A MONTH

## 2024-11-05 SDOH — SOCIAL STABILITY: SOCIAL INSECURITY
WITHIN THE LAST YEAR, HAVE YOU BEEN KICKED, HIT, SLAPPED, OR OTHERWISE PHYSICALLY HURT BY YOUR PARTNER OR EX-PARTNER?: NO

## 2024-11-05 SDOH — HEALTH STABILITY: PHYSICAL HEALTH: ON AVERAGE, HOW MANY DAYS PER WEEK DO YOU ENGAGE IN MODERATE TO STRENUOUS EXERCISE (LIKE A BRISK WALK)?: 7 DAYS

## 2024-11-05 SDOH — ECONOMIC STABILITY: HOUSING INSECURITY: AT ANY TIME IN THE PAST 12 MONTHS, WERE YOU HOMELESS OR LIVING IN A SHELTER (INCLUDING NOW)?: NO

## 2024-11-05 SDOH — SOCIAL STABILITY: SOCIAL INSECURITY: ARE YOU MARRIED, WIDOWED, DIVORCED, SEPARATED, NEVER MARRIED, OR LIVING WITH A PARTNER?: WIDOWED

## 2024-11-05 SDOH — ECONOMIC STABILITY: FOOD INSECURITY: WITHIN THE PAST 12 MONTHS, YOU WORRIED THAT YOUR FOOD WOULD RUN OUT BEFORE YOU GOT THE MONEY TO BUY MORE.: NEVER TRUE

## 2024-11-05 SDOH — ECONOMIC STABILITY: HOUSING INSECURITY: IN THE LAST 12 MONTHS, WAS THERE A TIME WHEN YOU WERE NOT ABLE TO PAY THE MORTGAGE OR RENT ON TIME?: NO

## 2024-11-05 SDOH — SOCIAL STABILITY: SOCIAL NETWORK
DO YOU BELONG TO ANY CLUBS OR ORGANIZATIONS SUCH AS CHURCH GROUPS, UNIONS, FRATERNAL OR ATHLETIC GROUPS, OR SCHOOL GROUPS?: NO

## 2024-11-05 SDOH — HEALTH STABILITY: MENTAL HEALTH: HOW OFTEN DO YOU HAVE SIX OR MORE DRINKS ON ONE OCCASION?: NEVER

## 2024-11-05 SDOH — HEALTH STABILITY: PHYSICAL HEALTH: ON AVERAGE, HOW MANY MINUTES DO YOU ENGAGE IN EXERCISE AT THIS LEVEL?: 20 MIN

## 2024-11-05 SDOH — SOCIAL STABILITY: SOCIAL INSECURITY: WITHIN THE LAST YEAR, HAVE YOU BEEN AFRAID OF YOUR PARTNER OR EX-PARTNER?: NO

## 2024-11-05 SDOH — SOCIAL STABILITY: SOCIAL NETWORK: IN A TYPICAL WEEK, HOW MANY TIMES DO YOU TALK ON THE PHONE WITH FAMILY, FRIENDS, OR NEIGHBORS?: ONCE A WEEK

## 2024-11-05 SDOH — ECONOMIC STABILITY: FOOD INSECURITY: HOW HARD IS IT FOR YOU TO PAY FOR THE VERY BASICS LIKE FOOD, HOUSING, MEDICAL CARE, AND HEATING?: NOT VERY HARD

## 2024-11-05 SDOH — SOCIAL STABILITY: SOCIAL INSECURITY
WITHIN THE LAST YEAR, HAVE YOU BEEN RAPED OR FORCED TO HAVE ANY KIND OF SEXUAL ACTIVITY BY YOUR PARTNER OR EX-PARTNER?: NO

## 2024-11-05 SDOH — ECONOMIC STABILITY: HOUSING INSECURITY: IN THE PAST 12 MONTHS, HOW MANY TIMES HAVE YOU MOVED WHERE YOU WERE LIVING?: 0

## 2024-11-05 SDOH — HEALTH STABILITY: MENTAL HEALTH: HOW MANY DRINKS CONTAINING ALCOHOL DO YOU HAVE ON A TYPICAL DAY WHEN YOU ARE DRINKING?: 1 OR 2

## 2024-11-05 SDOH — SOCIAL STABILITY: SOCIAL NETWORK: HOW OFTEN DO YOU ATTEND CHURCH OR RELIGIOUS SERVICES?: 1 TO 4 TIMES PER YEAR

## 2024-11-05 SDOH — HEALTH STABILITY: PHYSICAL HEALTH
HOW OFTEN DO YOU NEED TO HAVE SOMEONE HELP YOU WHEN YOU READ INSTRUCTIONS, PAMPHLETS, OR OTHER WRITTEN MATERIAL FROM YOUR DOCTOR OR PHARMACY?: ALWAYS

## 2024-11-05 SDOH — SOCIAL STABILITY: SOCIAL NETWORK: HOW OFTEN DO YOU GET TOGETHER WITH FRIENDS OR RELATIVES?: MORE THAN THREE TIMES A WEEK

## 2024-11-05 SDOH — HEALTH STABILITY: MENTAL HEALTH
DO YOU FEEL STRESS - TENSE, RESTLESS, NERVOUS, OR ANXIOUS, OR UNABLE TO SLEEP AT NIGHT BECAUSE YOUR MIND IS TROUBLED ALL THE TIME - THESE DAYS?: VERY MUCH

## 2024-11-05 SDOH — SOCIAL STABILITY: SOCIAL NETWORK: HOW OFTEN DO YOU ATTEND MEETINGS OF THE CLUBS OR ORGANIZATIONS YOU BELONG TO?: NEVER

## 2024-11-05 SDOH — SOCIAL STABILITY: SOCIAL INSECURITY: WITHIN THE LAST YEAR, HAVE YOU BEEN HUMILIATED OR EMOTIONALLY ABUSED IN OTHER WAYS BY YOUR PARTNER OR EX-PARTNER?: NO

## 2024-11-05 SDOH — ECONOMIC STABILITY: TRANSPORTATION INSECURITY: IN THE PAST 12 MONTHS, HAS LACK OF TRANSPORTATION KEPT YOU FROM MEDICAL APPOINTMENTS OR FROM GETTING MEDICATIONS?: NO

## 2024-11-05 ASSESSMENT — PATIENT HEALTH QUESTIONNAIRE - PHQ9
CLINICAL INTERPRETATION OF PHQ2 SCORE: 3
5. POOR APPETITE OR OVEREATING: 0 - NOT AT ALL
SUM OF ALL RESPONSES TO PHQ QUESTIONS 1-9: 10

## 2024-11-05 ASSESSMENT — ACTIVITIES OF DAILY LIVING (ADL): LACK_OF_TRANSPORTATION: NO

## 2024-11-05 ASSESSMENT — LIFESTYLE VARIABLES
AUDIT-C TOTAL SCORE: 2
SKIP TO QUESTIONS 9-10: 1

## 2024-11-05 NOTE — PROGRESS NOTES
INITIAL CARE MANAGEMENT CARE PLAN/ASSESSMENT     PCM enrollment completed via telephone call with patient and her granddaughter Dianna, who declined use of  Services during this outreach and translated for Lizzette throughout. Lizzette lives in a second-floor apartment with her daughter. Her daughter stays with her during the mornings and evenings (before and after work), and her granddaughter stays with her during the day, ensuring that there is always at least one person in her home with her. Lizzette relies on her family for almost all ADLs/IADLs, mainly due to physical deconditioning. She uses a walker to ambulate short distances, and relies on a wheelchair for long distances. She does try to stay active, exercising every day for short periods (approximately 20 minutes) by walking on her porch and doing low-impact leg exercises. While Dianna does report that Lizzette has had at least one fall within the last year, she is also able to identify fall precautions to use at home to prevent further falls or injuries. Of note, Lizzette was hospitalized in May and prescribed a few new medications, but Dianna reports that she stopped taking them due to side effects. Medication review was completed during this enrollment.    Lizzette reports feeling down or depressed almost every day, and Dianna agrees with this. She reports that, because Lizzette is not able to do much of what she used to (like shopping and cooking) due to physical deconditioning, Lizzette feels depressed related to her loss of independence over time. Her family is very involved and deny any concerns with their ability to provide needed assistance. Dianna reports that Lizzette also has difficulty staying awake and active during the day, most likely due to difficulty sleeping. She describes that the patient wakes multiple times every night and has been unable to sleep through the night for a very long time. She has tried antidepressants and sleep aids in the past, but no  longer takes these due to intolerable side effects.    Dianna expressed interest in home health and respite care. This RN explained the typical services provided by these programs, suggested that Dianna and her mother can keep respite care in mind for use to avoid caregiver burnout, as they spend a significant amount of time caring for Lizzette. Further, home health services could be appropriate for Lizzette but they typically are provided to reach an established goal that is identified in collaboration between the patient and her PCP. Dianna verbalized understanding of this information. At this time, she denies needs for CHW referral, HH inquiry with PCP, or respite care information. This RN encouraged her to call [phone] at any time before next monthly outreach if needs are identified.     Medication Self-Management Goals:     Reviewed medications listed below with patient.      Current Outpatient Medications:     CALCIUM PO, Take 1 Tablet by mouth every day., Disp: , Rfl:     amLODIPine (NORVASC) 10 MG Tab, Take 1 Tablet by mouth every day., Disp: 30 Tablet, Rfl: 0    spironolactone (ALDACTONE) 25 MG Tab, Take 1 Tablet by mouth every day., Disp: 30 Tablet, Rfl: 3    latanoprost (XALATAN) 0.005 % Solution, Administer 1 Drop into both eyes every evening. Indications: Wide-Angle Glaucoma, Disp: , Rfl:     Brimonidine Tartrate-Timolol 0.2-0.5 % Solution, Administer 1 Drop into both eyes in the morning, at noon, and at bedtime. Indications: Glaucoma, Disp: , Rfl:     alendronate (FOSAMAX) 70 MG Tab, Take 70 mg by mouth every 7 days. Indications: Osteoporosis, Disp: , Rfl:     levothyroxine (SYNTHROID) 100 MCG Tab, Take 1 Tablet by mouth every morning before breakfast., Disp: 30 Tablet, Rfl:     losartan (COZAAR) 100 MG Tab, Take 1 Tablet by mouth every day., Disp: 30 Tablet, Rfl:     mirtazapine (REMERON) 7.5 MG tablet, Take 1 Tablet by mouth every evening for 180 days. For sleep (Patient not taking: Reported on  11/5/2024), Disp: 90 Tablet, Rfl: 1         Goal:  Continue adherence to prescribed medications. Collaborate with PCP to remain free from significant side effects. Continue to rely on family for optimal medication adherence.       Physical/Functional/Environmental Status:     Activities of Daily Living:  Bathing: needs assistance   Dressing: needs assistance  Grooming: needs assistance  Mouth Care: needs assistance  Toileting: independent  Climbing a Flight of Stairs: total dependence    Independent Activities of Daily Living:  Shopping: total dependence  Cooking: total dependence  Managing Medications: needs assistance  Using the phone and looking up numbers: needs assistance  Driving or using public transportation: total dependence  Managing Finances: needs assistance        11/5/2024     1:05 PM   STEADI Fall Risk   STEADI Risk for Falling Score 9   One or more falls in the last year Yes   Advised to use a cane or walker to get around safely Yes   Feels unsteady when walking Yes   Steadies self on furniture while walking at home No   Worried about falling Yes   Needs to push with hands when rising from a chair Yes   Has trouble stepping up onto a curb / using stairs Yes   Often has to rush to the toilet No   Has lost some feeling in feet No   Takes medicine that makes him/her feel lightheaded or more tired than usual No   Takes medicine to sleep or improve mood No   Often feels sad or depressed Yes         Goal:  Identify fall risk prevention strategies for use at home. Connect with community resources available for assistance with home caregiving.    Social Determinants of Health       Financial Status:      Financial Resource Strain: Low Risk  (11/5/2024)    Overall Financial Resource Strain (CARDIA)     Difficulty of Paying Living Expenses: Not very hard         Referred to CHW/SW:  NA       Transportation Status:      Transportation Needs: No Transportation Needs (11/5/2024)    PRAPARE - Transportation      Lack of Transportation (Medical): No     Lack of Transportation (Non-Medical): No        Referred to CHW/SW:  NA      Food Insecurity:      Food Insecurity: No Food Insecurity (11/5/2024)    Hunger Vital Sign     Worried About Running Out of Food in the Last Year: Never true     Ran Out of Food in the Last Year: Never true        Referred to CHW/SW:  NA       Housing Status:     Housing Stability: Low Risk  (11/5/2024)    Housing Stability Vital Sign     Unable to Pay for Housing in the Last Year: No     Number of Times Moved in the Last Year: 0     Homeless in the Last Year: No        Referred to CHW/SW:  NA      Social Connections:     Social Connections: Moderately Isolated (11/5/2024)    Social Connection and Isolation Panel [NHANES]     Frequency of Communication with Friends and Family: Once a week     Frequency of Social Gatherings with Friends and Family: More than three times a week     Attends [employer] Services: 1 to 4 times per year     Active Member of Clubs or Organizations: No     Attends Club or Organization Meetings: Never     Marital Status:         Referred to CHW/SW:  NA      Mental/Behavioral/Psychosocial Status:        9/18/2024    11:20 AM 10/14/2024    11:00 AM 10/15/2024    10:36 AM   Depression Screen (PHQ-2/PHQ-9)   PHQ-2 Total Score 2 0 3   PHQ-9 Total Score 17  10       Interpretation of PHQ-9 Total Score   Score Severity   1-4 No Depression   5-9 Mild Depression   10-14 Moderate Depression   15-19 Moderately Severe Depression   20-27 Severe Depression       Goal:  Continue to rely on family for support in all facets of life. Self-monitor mental health for symptoms of worsening depression. Consider seeing therapist if agreeable. Remain free from SI/HI. Identify effective coping mechanisms.    Review of Benefits    This service is an included benefit with your insurance plan, for all other benefits a copy of the website and phone number have been provided for any  questions.    Phone Number and Website provided for questions:    [facility]:  [phone]   [email or web address]    Advance Planning    Do you have an Advance Directive?  Yes  Is there one on file? Yes      (If no, a copy can be provided for patient.)    Would you like an Advance Directive Packet sent to you? No      Chronic Care Management Care Plan         Care Plans       General Care Management    Met: 0 of 19 Met: 0 of 19      No Outcome (19)       Reduce the Risk of Falls and Fall Related Injuries (Risk of Falls)  Disciplines:  Nurse, Interdisciplinary  Expected end:  11/05/25     Demonstrate ability to verbalize fall safety concerns (Knowledge deficit surrounding fall safety)  Disciplines:  Nurse, Interdisciplinary  Expected end:  11/05/25     Demonstrate Knowledge of Hypertension (Knowledge deficit of hypertension)  Disciplines:  Interdisciplinary  Expected end:  11/05/25     Demonstrate factors that can contribute to high blood pressure (Knowledge deficit of factors contributing to hypertension)  Disciplines:  Interdisciplinary  Expected end:  11/05/25     Identify which modifiable health habits can be modifed (Recognize current health habits that may contribute to hypertension)  Disciplines:  Interdisciplinary  Expected end:  11/05/25     Identify barriers to managing blood pressure (Patient barriers to managing high blood pressure)  Disciplines:  Interdisciplinary  Expected end:  11/05/25     Demonstrate the elements of self-monitoring blood pressure (Knowledge deficit of self-monitoring blood pressure)  Disciplines:  Interdisciplinary  Expected end:  11/05/25     Improve medication adherence (Adherence to prescribed medications)  Disciplines:  Interdisciplinary  Expected end:  11/05/25     Increase understanding of cardiac function/disease/failure (Knowledge deficient regarding condition, treatment regimen, and self care)  Disciplines:  Nurse, Interdisciplinary  Expected end:   11/05/25     Learn to Manage Calories (Diet Management)  Disciplines:  Nurse, Interdisciplinary  Expected end:  11/05/25     HP Increase understanding of congestive heart failure (Knowledge deficit of congestive heart failure disease process)  Disciplines:  Nurse, Interdisciplinary  Expected end:  11/05/25     Limit dietary sodium (Low Sodium Diet Management)  Disciplines:  Nurse, Interdisciplinary  Expected end:  11/05/25     Establish a plan for excess fluid management (Excess Fluid)  Disciplines:  Nurse, Interdisciplinary  Expected end:  11/05/25     Consistently take Medications as Prescribed (Med Adherence)  Disciplines:  Nurse, Interdisciplinary  Expected end:  11/05/25     Establish resources to assist with medical costs (Med Adherence)  Disciplines:  Nurse, Interdisciplinary  Expected end:  11/05/25     Exercise a designated amount of time daily (Patient is Inactive)  Disciplines:  Nurse, Interdisciplinary  Expected end:  11/05/25     Patient able to identify personalized signs and symptoms of depression and anxiety. (Knowledge deficit of signs and symptoms of depression and anxiety)  Disciplines:  Nurse, Interdisciplinary  Expected end:  11/05/25     Patient experiences a reduction in feelings of loneliness, grief, and sadness (Patient expresses feelings of loneliness, losses associated with aging and/or depression or anxiety)  Disciplines:  Interdisciplinary  Expected end:  11/05/25     Patient will participate in social activities outside of the home (Patient is isolating at home)  Disciplines:  Interdisciplinary  Expected end:  11/05/25                                            Discussion of Self Management of Care: Lizzette relies almost completely on her family for care. However, her granddaughter and daughter together feel very confident in managing her care. They deny any concerns regarding escalation of medical concerns, obtaining medications, or providing adequate care for Lizzette at home.    Barriers  to Goals: Language barrier; physical deconditioning; fall risk; at risk for social isolation; at risk for worsening depression    Resources Provided:  Discussion regarding availability of respite care, home care, and home assistance resources in the community. Family declined further resources. This RN to send physical information regarding these resources when family feels they are needed. Family will call [phone] as needed if resource needs are identified.     Next Scheduled patient outreach: Next month.

## 2024-11-11 ENCOUNTER — OFFICE VISIT (OUTPATIENT)
Dept: CARDIOLOGY | Facility: MEDICAL CENTER | Age: 84
End: 2024-11-11
Attending: INTERNAL MEDICINE
Payer: MEDICARE

## 2024-11-11 VITALS
HEIGHT: 63 IN | BODY MASS INDEX: 32.42 KG/M2 | SYSTOLIC BLOOD PRESSURE: 100 MMHG | DIASTOLIC BLOOD PRESSURE: 58 MMHG | OXYGEN SATURATION: 93 % | HEART RATE: 61 BPM

## 2024-11-11 DIAGNOSIS — I47.29 NONSUSTAINED VENTRICULAR TACHYCARDIA (HCC): ICD-10-CM

## 2024-11-11 DIAGNOSIS — I10 HYPERTENSION, UNSPECIFIED TYPE: ICD-10-CM

## 2024-11-11 DIAGNOSIS — R07.9 CHEST PAIN, UNSPECIFIED TYPE: ICD-10-CM

## 2024-11-11 DIAGNOSIS — E78.5 DYSLIPIDEMIA: ICD-10-CM

## 2024-11-11 PROCEDURE — 3074F SYST BP LT 130 MM HG: CPT | Performed by: INTERNAL MEDICINE

## 2024-11-11 PROCEDURE — 99212 OFFICE O/P EST SF 10 MIN: CPT | Performed by: INTERNAL MEDICINE

## 2024-11-11 PROCEDURE — 3078F DIAST BP <80 MM HG: CPT | Performed by: INTERNAL MEDICINE

## 2024-11-11 PROCEDURE — 99214 OFFICE O/P EST MOD 30 MIN: CPT | Performed by: INTERNAL MEDICINE

## 2024-11-11 PROCEDURE — 1170F FXNL STATUS ASSESSED: CPT | Performed by: INTERNAL MEDICINE

## 2024-11-11 NOTE — PROGRESS NOTES
[facility]    PatientName:Lizzette Gunn ChaconDate: 2024  :1940    84 y.o.PCP:LIBERTY Henderson  MRN:[id number]        Problems and Plans    Nonsustained ventricular tachycardia (HCC)  Patient had a syncopal episodes and was noted to have nonsustained ventricular tachycardia on prior Zio patch monitor.  At this time we will continue to monitor clinically.  I would not recommend further cardiovascular workup.  I suspect that this is scar mediated based on her prior nuclear medicine stress test that demonstrated an area of infarcted tissue in an inferior distribution    HTN (hypertension)  Blood pressures currently well-controlled on her current regimen no changes    Dyslipidemia  Recommend continue ongoing lifestyle modification.    Chest pain  Regarding her chest pain I feel this is largely noncardiac in nature despite having multiple risk factors.  It is likely that she has had a prior myocardial infarction and remains on appropriate medical therapy for secondary prevention.  Would not be unreasonable to consider adding aspirin therapy    Return in about 6 months (around 2025).      Encounter    Reason for Visit / Chief Complaint: Atypical chest pain    HPI    84-year-old female with known history of hypertension, dyslipidemia, hypothyroidism, no diabetes mellitus, osteoporosis, gait instability presents in clinic for ongoing management of her chest pain.     Currently, she notes that she has been feeling well but occasionally has a spiking sensation that she describes as chest pain that lasts 2 to 3 minutes occurring spontaneously with various situations of activity and not activity.  He has not gotten progressively worse.  Her other noncardiovascular complaint is that she is having intermittent difficulty with swallowing.  Her granddaughter is acting as her  and this is at the request of the patient  Past Medical History  Past Medical  History:   Diagnosis Date    Acute renal failure ([facility]) 02/11/2014    Anesthesia     PONV    Angina     CONSULT NEG. RESULTS    Arthritis     bilateral knees    Asthma     Breath shortness     WITH EXERCISE    CATARACT     bilat IOL    Dental disorder     upper/lower partials    Glaucoma     Heart burn     Hypertension     Indigestion     Pneumonia 03/2018    Rib fractures 12/27/2022    Sepsis ([facility]) 02/08/2014    Stroke ([facility])     denies hx. of stroke    Unspecified disorder of thyroid     'thyroid medicine taken in past'    Unspecified hemorrhagic conditions 2009     treated in er,nose bleed    Urinary bladder disorder     Urinary incontinence     wears pads     Past Surgical History  Past Surgical History:   Procedure Laterality Date    MUSCLE BIOPSY Left 10/18/2023    Procedure: LEFT DELTOID BIOPSY;  Surgeon: Miller Hernandez M.D.;  [facility]: [facility]ERY [facility];  Service: Neurosurgery    SHOULDER ARTHROPLASTY TOTAL Right 05/02/2018    Procedure: SHOULDER ARTHROPLASTY TOTAL/ REVERSE;  Surgeon: Scott Payton M.D.;  Location: SURGERY SAME DAY [facility];  Service: Orthopedics    ANTERIOR AND POSTERIOR REPAIR  03/20/2017    Procedure: ANTERIOR AND POSTERIOR REPAIR;  Surgeon: Regulo Burton M.D.;  Location: SURGERY SAME DAY [facility]S;  Service:     ENTEROCELE REPAIR  03/20/2017    Procedure: ENTEROCELE REPAIR - PERINEOPLASTY;  Surgeon: Regulo Burton M.D.;  Location: SURGERY SAME DAY [facility] OR[facility];  Service:     BLADDER SLING FEMALE  03/20/2017    Procedure: BLADDER SLING FEMALE - TOT;  Surgeon: Regulo Burton M.D.;  Location: SURGERY SAME DAY [facility];  Service:     VAGINAL SUSPENSION  03/20/2017    Procedure: VAGINAL SUSPENSION -SACROSPINOUS VAULT;  Surgeon: Regulo Burton M.D.;  Location: SURGERY SAME DAY [facility];  Service:     KNEE ARTHROPLASTY TOTAL Left 08/20/2015    Procedure: KNEE ARTHROPLASTY TOTAL;  Surgeon: Juan Manuel Choi M.D.;  Location: SURGERY [facility];  Service:      BAERVELDT IMPLANT  2013    [facility] Miller Fletcher [facility].D. [facility]    CATARACT PHACO WITH IOL  2013    Performed by Miller Fletcher M.D. at [facility]ERY [facility]    BAERVELDT IMPLANT  2013    Performed by Miller Fletcher M.D. at [facility]ERY [facility]    BAERVELDT IMPLANT  2012    Performed by Miller Fletcher M.D. at SURGERY [facility]    TRABECULECTOMY  2012    Performed by Miller Fletcher M.D. at [facility]GERY [facility]    CATARACT PHACO WITH IOL  2012    Performed [facility] Miller Fletcher [facility].D. [facility]    KNEE ARTHROPLASTY TOTAL  2009    Performed by GISELA OLSON at SURGERY [facility]    HYSTERECTOMY, TOTAL ABDOMINAL  1989    GYN SURGERY      OTHER      R shoulder     Social History  Social History     Socioeconomic History    Marital status:      Spouse name: Not on file    Number of children: Not on file    Years of education: Not on file    Highest education level: Not on file   Occupational History    Not on file   Tobacco Use    Smoking status: Former     Current packs/day: 0.00     Average packs/day: 0.5 packs/day for 20.0 years (10.0 ttl pk-yrs)     Types: Cigarettes     Start date: 3/16/1982     Quit date: 3/16/2002     Years since quittin.6    Smokeless tobacco: Never   Vaping Use    Vaping status: Never Used   Substance and Sexual Activity    Alcohol use: No    Drug use: No    Sexual activity: Not Currently   Other Topics Concern    Not on file   Social History Narrative    Not on file     Social Drivers of Health     Financial Resource Strain: Low Risk  (2024)    Overall Financial Resource Strain (CARDIA)     Difficulty of Paying Living Expenses: Not very hard   Food Insecurity: No Food Insecurity (2024)    Hunger Vital Sign     Worried About Running Out of Food in the Last Year: Never true     Ran Out of Food in the Last Year: Never  true   Transportation Needs: No Transportation Needs (11/5/2024)    PRAPARE - Transportation     Lack of Transportation (Medical): No     Lack of Transportation (Non-Medical): No   Physical Activity: Insufficiently Active (11/5/2024)    Exercise Vital Sign     Days of Exercise per Week: 7 days     Minutes of Exercise per Session: 20 min   Stress: Stress Concern Present (11/5/2024)    [employer] [place] [employer] - Occupational Stress Questionnaire     Feeling of Stress : Very much   Social Connections: Moderately Isolated (11/5/2024)    Social Connection and Isolation Panel [NHANES]     Frequency of Communication with Friends and Family: Once a week     Frequency of Social Gatherings with Friends and Family: More than three times a week     Attends [employer] Services: 1 to 4 times per year     Active Member of Clubs or Organizations: No     Attends Club or Organization Meetings: Never     Marital Status:    Intimate Partner Violence: Not At Risk (11/5/2024)    Humiliation, Afraid, Rape, and Kick questionnaire     Fear of Current or Ex-Partner: No     Emotionally Abused: No     Physically Abused: No     Sexually Abused: No   Housing Stability: Low Risk  (11/5/2024)    Housing Stability Vital Sign     Unable to Pay for Housing in the Last Year: No     Number of Times Moved in the Last Year: 0     Homeless in the Last Year: No     Past Family History  No family history on file.  Medication(s)    Current Outpatient Medications:     CALCIUM PO, 1 Tablet, Oral, DAILY, Taking    amLODIPine, 10 mg, Oral, DAILY, Taking    spironolactone, 25 mg, Oral, DAILY, Taking    latanoprost, 1 Drop, Both Eyes, Nightly, Taking    Brimonidine Tartrate-Timolol, 1 Drop, Both Eyes, TID, Taking    alendronate, 70 mg, Oral, Q7 DAYS, Taking    levothyroxine, 100 mcg, Oral, QAM AC, Taking    losartan, 100 mg, Oral, DAILY, Taking  Allergies  Oxycodone and Tramadol    Review of Systems    A comprehensive 10 system review was  "conducted and is negative except as noted above in the HPI or here.      Vital Signs  /58 (BP Location: Left arm, Patient Position: Sitting, BP Cuff Size: Adult)   Pulse 61   Ht 1.6 m (5' 3\")   SpO2 93%   BMI 32.42 kg/m²     Physical Exam  Vitals and nursing note reviewed.   Constitutional:       Appearance: Normal appearance.   HENT:      Head: Normocephalic and atraumatic.      Nose: Nose normal.      Mouth/Throat:      Mouth: Mucous membranes are moist.      Pharynx: Oropharynx is clear.   Eyes:      Pupils: Pupils are equal, round, and reactive to light.   Cardiovascular:      Rate and Rhythm: Normal rate and regular rhythm.      Heart sounds: No murmur heard.     No friction rub. No gallop.   Pulmonary:      Effort: Pulmonary effort is normal.      Breath sounds: Normal breath sounds.   Abdominal:      General: Bowel sounds are normal.      Palpations: Abdomen is soft.   Musculoskeletal:         General: Normal range of motion.      Cervical back: Normal range of motion and neck supple.   Skin:     General: Skin is warm and dry.   Neurological:      General: No focal deficit present.      Mental Status: She is alert and oriented to person, place, and time. Mental status is at baseline.   Psychiatric:         Mood and Affect: Mood normal.         Behavior: Behavior normal.         Thought Content: Thought content normal.         Judgment: Judgment normal.         Lab Results   Component Value Date/Time    TSHULTRASEN 0.570 04/29/2024 1145        Lab Results   Component Value Date/Time    FREET4 1.74 (H) 04/29/2024 1145        Lab Results   Component Value Date/Time    HBA1C 5.6 12/27/2022 12:18 AM       Lab Results   Component Value Date/Time    CHOLSTRLTOT 214 (H) 11/05/2022 09:01 AM     (H) 11/05/2022 09:01 AM    HDL 65 11/05/2022 09:01 AM    TRIGLYCERIDE 108 11/05/2022 09:01 AM         Lab Results   Component Value Date/Time    SODIUM 139 10/14/2024 02:47 PM    POTASSIUM 4.4 10/14/2024 02:47 " PM    CHLORIDE 103 10/14/2024 02:47 PM    CO2 23 10/14/2024 02:47 PM    GLUCOSE 108 (H) 10/14/2024 02:47 PM    BUN 22 10/14/2024 02:47 PM    CREATININE 0.97 10/14/2024 02:47 PM    CREATININE 0.9 02/04/2009 10:28 AM       Lab Results   Component Value Date/Time    ALKPHOSPHAT 65 05/17/2024 11:19 AM    ASTSGOT 15 05/17/2024 11:19 AM    ALTSGPT 20 05/17/2024 11:19 AM    TBILIRUBIN 0.5 05/17/2024 11:19 AM         Total patient time was estimated to be 20 minutes consisting of chart review, direct patient interaction, medication renewal, plan development and overall communication with the cardiovascular team.        Electronically signed by:   Chepe Weathers DO, MPH  [facility]    Portions of this note were completed using voice recognition software (Dragon Naturally speaking software) . Occasional transcription errors may have escaped proof reading. I have made every reasonable attempt to correct obvious errors, but I expect that there are errors of grammar and possibly content that I did not discover before finalizing the note.

## 2024-11-12 NOTE — ASSESSMENT & PLAN NOTE
Regarding her chest pain I feel this is largely noncardiac in nature despite having multiple risk factors.  It is likely that she has had a prior myocardial infarction and remains on appropriate medical therapy for secondary prevention.  Would not be unreasonable to consider adding aspirin therapy

## 2024-11-12 NOTE — ASSESSMENT & PLAN NOTE
Patient had a syncopal episodes and was noted to have nonsustained ventricular tachycardia on prior Zio patch monitor.  At this time we will continue to monitor clinically.  I would not recommend further cardiovascular workup.  I suspect that this is scar mediated based on her prior nuclear medicine stress test that demonstrated an area of infarcted tissue in an inferior distribution

## 2024-11-18 ENCOUNTER — HOSPITAL ENCOUNTER (OUTPATIENT)
Dept: RADIOLOGY | Facility: MEDICAL CENTER | Age: 84
End: 2024-11-18
Attending: INTERNAL MEDICINE
Payer: MEDICARE

## 2024-11-18 DIAGNOSIS — I65.22 STENOSIS OF LEFT CAROTID ARTERY: ICD-10-CM

## 2024-11-18 PROCEDURE — 70496 CT ANGIOGRAPHY HEAD: CPT

## 2024-11-18 PROCEDURE — 700117 HCHG RX CONTRAST REV CODE 255: Performed by: INTERNAL MEDICINE

## 2024-11-18 PROCEDURE — 70498 CT ANGIOGRAPHY NECK: CPT

## 2024-11-18 RX ADMIN — IOHEXOL 80 ML: 350 INJECTION, SOLUTION INTRAVENOUS at 09:18

## 2024-12-05 ENCOUNTER — PATIENT OUTREACH (OUTPATIENT)
Dept: HEALTH INFORMATION MANAGEMENT | Facility: OTHER | Age: 84
End: 2024-12-05
Payer: MEDICARE

## 2024-12-05 DIAGNOSIS — I70.0 ATHEROSCLEROSIS OF AORTA (HCC): ICD-10-CM

## 2024-12-05 DIAGNOSIS — I47.29 NONSUSTAINED VENTRICULAR TACHYCARDIA (HCC): ICD-10-CM

## 2024-12-05 DIAGNOSIS — I50.42 CHRONIC COMBINED SYSTOLIC AND DIASTOLIC HEART FAILURE (HCC): ICD-10-CM

## 2024-12-05 DIAGNOSIS — I10 HYPERTENSION, UNSPECIFIED TYPE: ICD-10-CM

## 2024-12-07 NOTE — CARE PLAN
Problem: Risk of Falls  Goal: (Long Term) Reduce the Risk of Falls and Fall Related Injuries  Outcome: Progressing  Note: Though pt is at risk for falls at home, pt's daughter and granddaughter maintain a consistent caregiver presence for her and help her avoid falls/injury.  Intervention: Assess the patient's history of falls, including frequency, circumstances, and any resulting injuries  Intervention: Evaluate intrinsic risk factors (e.g., age, chronic conditions, medications, balance issues)  Intervention: Assess extrinsic risk factors (e.g., home environment, footwear, assistive devices)  Intervention: Review all medications to identify those that may increase fall risk     Problem: Recognize current health habits that may contribute to hypertension  Goal: Identify which modifiable health habits can be modifed  Outcome: Progressing  Note: Pt and granddaughter aware of lifestyle modifications to follow to minimize hypertension.  Intervention: Provide education and support about changing health habits     Problem: Med Adherence  Goal: (Long Term) Consistently take Medications as Prescribed  Outcome: Progressing  Note: Pt receives assistance for family for medication management. Pt's granddaughter denies difficulty with medication management.  Intervention: Discuss barriers to medication adherence with patient  Intervention: Assist patient in setting up daily notes/alarms for meds. Consider pill box use

## 2024-12-07 NOTE — PROGRESS NOTES
"Assessment    Monthly outreach completed via telephone call with pt's granddaughter Dianna, who was previously authorized by pt to give and receive health information. Dianna reports that Lizzette is doing okay lately, and denies any acute medical concerns at this time. She also denies any falls/injuries, UC/ED visits, or hospitalizations since last outreach. Most significantly, Lizzette recently underwent workup for chest pain and palpitations. Per Cardiology notes: \"Patient had a syncopal episodes and was noted to have nonsustained ventricular tachycardia on prior Zio patch monitor.\" Dianna reports that Lizzette's chest pain remains unchanged and her PCP believes she has a persistent arrhythmia. They are interested in exploring other options for workup. Notably, pt also recently completed a head and neck CT. Imaging results have not yet been discussed with a provider, but they do have a follow up with Neurology scheduled for 12/18 and expect to discuss imaging results at that time.    Assessment by Diagnosis:    HTN  No concerns regarding blood pressure to note at this time  Per Cardiology note, no medication changes needed  Pt's granddaughter reports pt is adhering to medication regimen  Most recent blood pressure reading 100/58 mmHg    CHF  Pt's granddaughter denies any sudden weight gain, or concerns regarding fluid overload  They remain concerned about palpitations/chest pain  Alarm symptoms reviewed with pt's granddaughter    Education and Self-Management of Care    Education topics discussed during this outreach include: Cardiology findings from [facility]iop[facility]; Cardiology recommendations; options for further workup/follow-up regarding cardiac concerns; head/neck CT results to be reviewed with Neurology    Resources offered during this outreach include: care coordination assistance with pursuing further workup for chest pain    Pt's granddaughter denies need for further resources at this time. She verbalized she will call " [phone] if needs are identified before next monthly outreach.    Plan of Care and Goals    See Care Plan note.    Barriers:    Language barrier needing ; fall risk; persistent chest pain/palpitations; activity intolerance r/t physical deconditioning    Progress:    Progressing as expected toward established goals. See Care Plan note for details.    Next outreach:     January 2025.

## 2024-12-10 ENCOUNTER — TELEPHONE (OUTPATIENT)
Dept: CARDIOLOGY | Facility: MEDICAL CENTER | Age: 84
End: 2024-12-10
Payer: MEDICARE

## 2024-12-10 NOTE — TELEPHONE ENCOUNTER
Phone number called: [phone] (home)      Call outcome: I spoke to pt's granddaughter Stephenie but was not available to talk at the time. She states that she will call out office back to schedule an appointment.       To : Please follow up and contact pt to schedule an appointment  1-2 weeks with BD . Thank you.

## 2024-12-16 ENCOUNTER — TELEPHONE (OUTPATIENT)
Dept: NEUROLOGY | Facility: MEDICAL CENTER | Age: 84
End: 2024-12-16
Payer: MEDICARE

## 2024-12-17 NOTE — TELEPHONE ENCOUNTER
NEUROLOGY PATIENT PRE-VISIT PLANNING     Patient was NOT contacted to complete PVP.  Note: Patient will not be contacted if there is no indication to call.     Patient Appointment is scheduled as: Established Patient     Is visit type and length scheduled correctly? Yes    [facility] in Patient Demographics? Yes    3.   Is referral attached to visit? Yes    4. Were records received from referring provider? Yes    4. Patient was NOT contacted to have someone accompany them to visit.     5. Is this appointment scheduled as a Hospital Follow-Up?  No    6. Does the patient require any pre procedure or post procedure follow up? No    7. If any orders were placed at last visit or intended to be done for this visit do we have Results/Consult Notes? Yes  Labs - Labs were not ordered at last office visit.  Imaging - Imaging ordered, completed and results are in chart. CT done 11/18/24  Referrals - Referral ordered, patient has NOT been seen.  Note: If patient appointment is for lab or imaging review and patient did not complete the studies, check with provider if OK to reschedule patient until completed.    8. If patient appointment is for Botox - is order pended for provider? N/A    9. Was Plan Assessment from last Neurology Office Visit Reviewed?  Yes

## 2024-12-18 ENCOUNTER — OFFICE VISIT (OUTPATIENT)
Dept: NEUROLOGY | Facility: MEDICAL CENTER | Age: 84
End: 2024-12-18
Attending: INTERNAL MEDICINE
Payer: MEDICARE

## 2024-12-18 VITALS
TEMPERATURE: 97.8 F | OXYGEN SATURATION: 95 % | SYSTOLIC BLOOD PRESSURE: 112 MMHG | HEART RATE: 87 BPM | WEIGHT: 179.23 LBS | DIASTOLIC BLOOD PRESSURE: 70 MMHG | RESPIRATION RATE: 16 BRPM | HEIGHT: 63 IN | BODY MASS INDEX: 31.76 KG/M2

## 2024-12-18 DIAGNOSIS — R42 VERTIGO: ICD-10-CM

## 2024-12-18 DIAGNOSIS — M25.562 CHRONIC PAIN OF BOTH KNEES: ICD-10-CM

## 2024-12-18 DIAGNOSIS — M25.561 CHRONIC PAIN OF BOTH KNEES: ICD-10-CM

## 2024-12-18 DIAGNOSIS — G89.29 CHRONIC PAIN OF BOTH KNEES: ICD-10-CM

## 2024-12-18 PROCEDURE — G2211 COMPLEX E/M VISIT ADD ON: HCPCS | Performed by: INTERNAL MEDICINE

## 2024-12-18 PROCEDURE — 99215 OFFICE O/P EST HI 40 MIN: CPT | Performed by: INTERNAL MEDICINE

## 2024-12-18 PROCEDURE — 3078F DIAST BP <80 MM HG: CPT | Performed by: INTERNAL MEDICINE

## 2024-12-18 PROCEDURE — 1170F FXNL STATUS ASSESSED: CPT | Performed by: INTERNAL MEDICINE

## 2024-12-18 PROCEDURE — 3074F SYST BP LT 130 MM HG: CPT | Performed by: INTERNAL MEDICINE

## 2024-12-18 PROCEDURE — 99212 OFFICE O/P EST SF 10 MIN: CPT | Performed by: INTERNAL MEDICINE

## 2024-12-18 ASSESSMENT — PATIENT HEALTH QUESTIONNAIRE - PHQ9
5. POOR APPETITE OR OVEREATING: 0 - NOT AT ALL
CLINICAL INTERPRETATION OF PHQ2 SCORE: 1
SUM OF ALL RESPONSES TO PHQ QUESTIONS 1-9: 8

## 2024-12-18 ASSESSMENT — FIBROSIS 4 INDEX: FIB4 SCORE: 0.93

## 2024-12-18 NOTE — PROGRESS NOTES
[facility]  [address] [place] [address], [address]. [place] [address]  Phone: [phone], Fax: [phone]    Nikunj Hills DO  Neurology, Movement Disorders Patient Name: Lizzette Burton  : 1940  MRN: [id number]     ASSESSMENT / PLAN   Lizzette Burton is a 84 y.o. female presenting for dizziness     Dizziness  Possible contribution from blood pressure medication and post-concussive syndrome. Negative tanner-hallpike, no ataxia noted in arms but leg weakness limits examination. 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 Reflexes diffusely diminished  MRI Lumbar  shows L5S1 foraminal stenosis but having more hip flexion weakness.   MRI Brain w/o 2024: moderate dilatation of the lateral and third ventricles. There is prominent sylvian fissure with tight frontoparietal sulci.        I personally reviewed imaging, agree with report and normal pressure hydrocephalus under consideration. However, no acute callosal angle, lynne ratio 0.29. Clinically also having urinary urgency.   - will discuss lumbar puncture trial at next visit.  Shunt may exacerbate her dizziness but can help with gait  - consider NCS/EMG if not improving    Insomnia, sleep initiation  Possible depression  - mirtazapine 7.5mg nightly     Nausea  Suspected GERD since it worsens after meals, with acidic sensation and frequent burping  - try taking less Naproxen. Tylenol is OK  - avoid spicy food, or caffeine  - MBS referral per SLP recs    Cognitive changes  Likely post-concussive as immediately worsened after hitting head  - CTM    Orders Placed This Encounter    Referral  to Audiology    diclofenac sodium (VOLTAREN) 1 % Gel     Return in about 4 months (around 4/18/2025).    BILLING DOCUMENTATION:   Excluding time spent on procedures during visit, I spent 40 minutes reviewing the medical record, interviewing and examining the patient, discussing diagnosis and treatment, and coordinating care.              HISTORY OF PRESENT ILLNESS     Lizzette Burton is a 84 y.o. female presenting for dizziness    PMHx: hypertension, GERD, asthma, urinary incontinence on Myrbetriq, pulmonary hypertension, and history of previous stroke      Granddaughter provided interpretation, declined interpretation services.     Initial HPI 05/16/24  Dizziness  4/29/2024: felt dizzy, fell, hit head on ground. [facility] admission showed blood pressure was significantly elevated in the 220s. presenting symptoms were felt to be due to hypertensive urgency. PT recommended HH PT. Blood pressures were usually in the 150s to 180s/90s   Dizziness and fatigue worsened 2 weeks ago after starting new BP meds. BP at home now around 120-130/70-80. Still having dizziness despite normalization of BP. Turning head fast makes it worse. Walking can feel dizzy as well. Standing up quickly can feel dizzy. Sitting down, more mild. Described as spinning sensation with nausea, not feeling faint. Just feels tired and disoriented.      09/18/24  Hospitalized 5/17 for bradycardia     Yesterday: getting off the bed and felt dizzy for 2 minutes, spinning sensation of the room, and fell.  Today: walking alongside bed, felt dizzy, and fell onto her daughter.   Dizziness triggered with movement.   Physical therapy didn't work on vestibular issues, just gait and balance. Has to use walker still, no benefit with PT. Outpatient PT in past. Since d/c from hospital 5/2024 then had limited home health PT.    12/18/24  No falls. Dizziness still triggered with standing. Dizziness also when turning in bed.       ROS  Cognitively worsened after  "hitting head 4/29. Only having mild headaches now, much worse initially.    9/18/24: Cognitive issues since falling in Dec 2023. Improved since then.  Sleep: yelling out in her sleep. Ongoing for many years. Possible PTSD. Trouble falling asleep, but not worse after TBI. Melatonin tried in past, but unclear dose.   +constipation 2 years, BM every 2 days  -anosmia  +Urinary urgency for 2 years  Dysphagia:  Having sense of something stuck in her throat, even with soup. No choking or cough. Speech therapist at home, pending MBS.  +nausea after meals. \"Like it doesn't go down right\"  +heart burn symptoms, not on medications  12/18/24: stopped spironolactone, doing better for a few days          Past Medical History:   Diagnosis Date    Acute renal failure (HCC) 02/11/2014    Anesthesia     PONV    Angina     CONSULT NEG. RESULTS    Arthritis     bilateral knees    Asthma     Breath shortness     WITH EXERCISE    CATARACT     bilat IOL    Dental disorder     upper/lower partials    Glaucoma     Heart burn     Hypertension     Indigestion     Pneumonia 03/2018    Rib fractures 12/27/2022    Sepsis ([facility]) 02/08/2014    Stroke ([facility])     denies hx. of stroke    Unspecified disorder of thyroid     'thyroid medicine taken in past'    Unspecified hemorrhagic conditions 2009     treated in er,nose bleed    Urinary bladder disorder     Urinary incontinence     wears pads     Past Surgical History:   Procedure Laterality Date    MUSCLE BIOPSY Left 10/18/2023    Procedure: LEFT DELTOID BIOPSY;  Surgeon: Miller Hernandez M.D.;  Location: SURGERY [facility];  Service: Neurosurgery    SHOULDER ARTHROPLASTY TOTAL Right 05/02/2018    Procedure: SHOULDER ARTHROPLASTY TOTAL/ REVERSE;  Surgeon: Scott Payton M.D.;  Location: SURGERY SAME DAY [facility];  Service: Orthopedics    ANTERIOR AND POSTERIOR REPAIR  03/20/2017    Procedure: ANTERIOR AND POSTERIOR REPAIR;  Surgeon: Regulo Burton M.D.;  Location: SURGERY SAME DAY " [facility];  Service:     ENTEROCELE REPAIR  03/20/2017    Procedure: ENTEROCELE REPAIR - PERINEOPLASTY;  Surgeon: Regulo Burton M.D.;  Location: SURGERY SAME DAY [facility];  Service:     BLADDER SLING FEMALE  03/20/2017    Procedure: BLADDER SLING FEMALE - TOT;  Surgeon: Regulo Burton M.D.;  Location: SURGERY SAME DAY [facility];  Service:     VAGINAL SUSPENSION  03/20/2017    Procedure: VAGINAL SUSPENSION -SACROSPINOUS VAULT;  Surgeon: Rgeulo Burton M.D.;  Location: SURGERY SAME DAY [facility];  Service:     KNEE ARTHROPLASTY TOTAL Left 08/20/2015    Procedure: KNEE ARTHROPLASTY TOTAL;  Surgeon: Juan Manuel Choi M.D.;  Location: SURGERY [facility];  Service:     BAERVELDT IMPLANT  05/08/2013    Performed by Miller Fletcher M.D. at SURGERY SAME DAY [facility]    CATARACT PHACO WITH IOL  05/08/2013    Performed by Miller Fletcher M.D. at SURGERY SAME DAY [facility]    BAERVELDT IMPLANT  03/13/2013    Performed by Miller Fletcher M.D. at SURGERY SAME DAY [facility]    BAERVELDT IMPLANT  12/12/2012    Performed by Miller Fletcher M.D. at SURGERY SAME DAY [facility]    TRABECULECTOMY  09/26/2012    Performed by Miller Fletcher M.D. at SURGERY SAME DAY [facility]    CATARACT PHACO WITH IOL  09/26/2012    Performed by Miller Fletcher M.D. at SURGERY SAME DAY [facility]    KNEE ARTHROPLASTY TOTAL  02/05/2009    Performed by GISELA OLSON at SURGERY [facility]S    HYSTERECTOMY, TOTAL ABDOMINAL  01/01/1989    GYN SURGERY      OTHER      R shoulder     No family history on file.  Social History     Socioeconomic History    Marital status:      Spouse name: Not on file    Number of children: Not on file    Years of education: Not on file    Highest education level: Not on file   Occupational History    Not on file   Tobacco Use    Smoking status: Former     Current packs/day: 0.00     Average packs/day: 0.5 packs/day for 20.0 years (10.0 ttl pk-yrs)     Types:  Cigarettes     Start date: 3/16/1982     Quit date: 3/16/2002     Years since quittin.7    Smokeless tobacco: Never   Vaping Use    Vaping status: Never Used   Substance and Sexual Activity    Alcohol use: No    Drug use: No    Sexual activity: Not Currently   Other Topics Concern    Not on file   Social History Narrative    Not on file     Social Drivers of Health     Financial Resource Strain: Low Risk  (2024)    Overall Financial Resource Strain (CARDIA)     Difficulty of Paying Living Expenses: Not very hard   Food Insecurity: No Food Insecurity (2024)    Hunger Vital Sign     Worried About Running Out of Food in the Last Year: Never true     Ran Out of Food in the Last Year: Never true   Transportation Needs: No Transportation Needs (2024)    PRAPARE - Transportation     Lack of Transportation (Medical): No     Lack of Transportation (Non-Medical): No   Physical Activity: Insufficiently Active (2024)    Exercise Vital Sign     Days of Exercise per Week: 7 days     Minutes of Exercise per Session: 20 min   Stress: Stress Concern Present (2024)    [place] [employer] - Occupational Stress Questionnaire     Feeling of Stress : Very much   Social Connections: Moderately Isolated (2024)    Social Connection and Isolation Panel [NHANES]     Frequency of Communication with Friends and Family: Once a week     Frequency of Social Gatherings with Friends and Family: More than three times a week     Attends Gnosticism Services: 1 to 4 times per year     Active Member of Clubs or Organizations: No     Attends Club or Organization Meetings: Never     Marital Status:    Intimate Partner Violence: Not At Risk (2024)    Humiliation, Afraid, Rape, and Kick questionnaire     Fear of Current or Ex-Partner: No     Emotionally Abused: No     Physically Abused: No     Sexually Abused: No   Housing Stability: Low Risk  (2024)    Housing Stability Vital Sign      Unable to Pay for Housing in the Last Year: No     Number of Times Moved in the Last Year: 0     Homeless in the Last Year: No     Current Outpatient Medications   Medication    diclofenac sodium (VOLTAREN) 1 % Gel    CALCIUM PO    amLODIPine (NORVASC) 10 MG Tab    latanoprost (XALATAN) 0.005 % Solution    Brimonidine Tartrate-Timolol 0.2-0.5 % Solution    alendronate (FOSAMAX) 70 MG Tab    levothyroxine (SYNTHROID) 100 MCG Tab    losartan (COZAAR) 100 MG Tab    spironolactone (ALDACTONE) 25 MG Tab     No current facility-administered medications for this visit.     Allergies   Allergen Reactions    Oxycodone Nausea    Tramadol Nausea             DATA / RESULTS     CTA Head 11/19/24  Estimated 50% stenosis of the RIGHT A3 segment , of uncertain clinical significance.  CT angiogram of the [place] otherwise within normal limits for age with mild vascular irregularity likely atherosclerosis.    CTA Neck 11/19/24  CT angiogram of the neck within normal limits.     MRI Brain w/o 5/2024  1.  There is moderate dilatation of the lateral and third ventricles. There is prominent sylvian fissure with tight frontoparietal sulci. This findings can be seen in the patients with normal pressure hydrocephalus. The ventricular dilatation is more prominent than the previous MRI dated 7/19/2021.  2.  Multifocal chronic microhemorrhages.  3.  Chronic lacunar infarct in the RIGHT basal ganglia.  4.  Severe chronic microvascular ischemic disease.     MRI Cervical 8/2023  Multifocal degenerative disease in the cervical spine as described above.      MRI Lumbar w/o 8/2023  1.  Multifocal degenerative disease in the lumbar spine as described above.  2.  Grade 1 spondylolisthesis of L5 on S1 and L4 on 5.  3.  Bilateral L4 and L5 spondylolysis.  4.  Severe lateral recess stenosis at L4-5 and L5-S1 causing impingement of the exiting L5 and S1 nerve roots respectively.  5.  Moderate central canal stenosis at L4-5  6.  There has been no  significant interval change.     Cardiac stress test 11/2023  NUCLEAR IMAGING INTERPRETATION  Small, predominately fixed mid to apical inferior, inferolateral segments   suggestive of prior myocardial infarction.  Globally reduced LV systolic function estimated at 38%.     TTE 11/2023  Compared to the prior study on 12/26/22.  No significant changes.  Normal left ventricular size and systolic function. Moderate concentric   left ventricular hypertrophy. The left ventricular ejection fraction is   visually estimated to be 55-60%. Normal regional wall motion. No   regional wall motion abnormalities. Grade I diastolic dysfunction  Mildly dilated right ventricle  Mildly dilated left atrium  Aortic valve sclerosis without significant stenosis  Mild tricuspid regurgitation with estimated RV systolic pressure of 46   mmHg suggestive of moderate pulmonary hypertension    US Carotid Doppler: 5/2015  1.  There is a moderate amount of atherosclerotic plaque. Plaque is located in carotid bulbs and proximal internal carotid arteries, greater on the left side. Plaque characterization:  heterogeneous with some calcifications   2.  There is no evidence of carotid occlusion.   3. Vertebral arteries demonstrate antegrade flow.   4. Diameter reduction in the internal carotid arteries: less than 50% on the right. Diameter reduction on the left is between 50% and 69%.. There is moderate left-sided and no right-sided hemodynamically significant stenosis.    25-Hydroxy   Vitamin D 25   Date Value Ref Range Status   06/29/2021 28 (L) 30 - 100 ng/mL Final     Comment:     Adult Ranges:   <20 ng/mL - Deficiency  20-29 ng/mL - Insufficiency   ng/mL - Sufficiency  Effective 3/18/2020, this electrochemiluminescence binding assay is  performed using Roche monica e immunoassay analyzer.  The [employer] Vitamin D  total II assay is intended for the quantitative determination of total 25  hydroxyvitamin D in human serum and plasma. This assay is  "to be used as an  aid in the assessment of vitamin D sufficiency in adults.       Vitamin B12 -True Cobalamin   Date Value Ref Range Status   07/18/2023 738 211 - 911 pg/mL Final     TSH   Date Value Ref Range Status   04/29/2024 0.570 0.380 - 5.330 uIU/mL Final     Comment:     The 2011 American Thyroid Association (LALI) guidelines  recommended that the interpretation of thyroid function in  pregnancy be based on trimester specific reference ranges.    1st Trimester  0.100-2.500 mIU/L  2nd Trimester  0.200-3.000 mIU/L  3rd Trimester  0.300-3.500 mIU/L    These established reference ranges have not been validated  at [facility].       Glycohemoglobin   Date Value Ref Range Status   12/27/2022 5.6 4.0 - 5.6 % Final     Comment:     Increased risk for diabetes:  5.7 -6.4%  Diabetes:  >6.4%  Glycemic control for adults with diabetes:  <7.0%    The above interpretations are per ADA guidelines.  Diagnosis  of diabetes mellitus on the basis of elevated Hemoglobin A1c  should be confirmed by repeating the Hb A1c test.       LDL   Date Value Ref Range Status   11/05/2022 127 (H) <100 mg/dL Final                OBJECTIVE      Vitals:    12/18/24 1050   BP: 112/70   BP Location: Right arm   Patient Position: Sitting   BP Cuff Size: Adult   Pulse: 87   Resp: 16   Temp: 36.6 °C (97.8 °F)   TempSrc: Temporal   SpO2: 95%   Weight: 81.3 kg (179 lb 3.7 oz)   Height: 1.6 m (5' 3\")     Physical Exam   EOMI. There is no nystagmus.     Gait:   Struggles to stand from wheelchair. +romberg  Standing did not trigger \"dizziness\", nor did testing romberg.          PROCEDURE   N/A  "

## 2024-12-28 ENCOUNTER — OFFICE VISIT (OUTPATIENT)
Dept: URGENT CARE | Facility: PHYSICIAN GROUP | Age: 84
End: 2024-12-28
Payer: MEDICARE

## 2024-12-28 ENCOUNTER — HOSPITAL ENCOUNTER (OUTPATIENT)
Dept: RADIOLOGY | Facility: MEDICAL CENTER | Age: 84
End: 2024-12-28
Attending: PHYSICIAN ASSISTANT
Payer: MEDICARE

## 2024-12-28 VITALS
HEIGHT: 62 IN | HEART RATE: 44 BPM | WEIGHT: 179.3 LBS | OXYGEN SATURATION: 92 % | RESPIRATION RATE: 26 BRPM | DIASTOLIC BLOOD PRESSURE: 72 MMHG | SYSTOLIC BLOOD PRESSURE: 104 MMHG | BODY MASS INDEX: 33 KG/M2 | TEMPERATURE: 97.7 F

## 2024-12-28 DIAGNOSIS — R05.1 ACUTE COUGH: ICD-10-CM

## 2024-12-28 DIAGNOSIS — J18.9 COMMUNITY ACQUIRED PNEUMONIA, UNSPECIFIED LATERALITY: ICD-10-CM

## 2024-12-28 DIAGNOSIS — R06.02 SHORTNESS OF BREATH: ICD-10-CM

## 2024-12-28 PROCEDURE — 3078F DIAST BP <80 MM HG: CPT | Performed by: PHYSICIAN ASSISTANT

## 2024-12-28 PROCEDURE — 1170F FXNL STATUS ASSESSED: CPT | Performed by: PHYSICIAN ASSISTANT

## 2024-12-28 PROCEDURE — 3074F SYST BP LT 130 MM HG: CPT | Performed by: PHYSICIAN ASSISTANT

## 2024-12-28 PROCEDURE — 71046 X-RAY EXAM CHEST 2 VIEWS: CPT

## 2024-12-28 PROCEDURE — 99214 OFFICE O/P EST MOD 30 MIN: CPT | Performed by: PHYSICIAN ASSISTANT

## 2024-12-28 RX ORDER — BENZONATATE 200 MG/1
200 CAPSULE ORAL 3 TIMES DAILY PRN
Qty: 30 CAPSULE | Refills: 0 | Status: SHIPPED | OUTPATIENT
Start: 2024-12-28

## 2024-12-28 RX ORDER — AZITHROMYCIN 250 MG/1
TABLET, FILM COATED ORAL
Qty: 6 TABLET | Refills: 0 | Status: ON HOLD | OUTPATIENT
Start: 2024-12-28 | End: 2024-12-31

## 2024-12-28 RX ORDER — ALBUTEROL SULFATE 90 UG/1
2 INHALANT RESPIRATORY (INHALATION) EVERY 6 HOURS PRN
Qty: 8.5 G | Refills: 0 | Status: SHIPPED | OUTPATIENT
Start: 2024-12-28

## 2024-12-28 ASSESSMENT — FIBROSIS 4 INDEX: FIB4 SCORE: 0.93

## 2024-12-29 ENCOUNTER — HOSPITAL ENCOUNTER (INPATIENT)
Facility: MEDICAL CENTER | Age: 84
LOS: 2 days | End: 2024-12-31
Attending: EMERGENCY MEDICINE | Admitting: STUDENT IN AN ORGANIZED HEALTH CARE EDUCATION/TRAINING PROGRAM
Payer: MEDICARE

## 2024-12-29 ENCOUNTER — APPOINTMENT (OUTPATIENT)
Dept: RADIOLOGY | Facility: MEDICAL CENTER | Age: 84
DRG: 193 | End: 2024-12-29
Attending: EMERGENCY MEDICINE
Payer: MEDICARE

## 2024-12-29 DIAGNOSIS — I50.9 CONGESTIVE HEART FAILURE, UNSPECIFIED HF CHRONICITY, UNSPECIFIED HEART FAILURE TYPE (HCC): ICD-10-CM

## 2024-12-29 DIAGNOSIS — I47.29 NONSUSTAINED VENTRICULAR TACHYCARDIA (HCC): ICD-10-CM

## 2024-12-29 DIAGNOSIS — J10.1 INFLUENZA A: ICD-10-CM

## 2024-12-29 DIAGNOSIS — R09.02 HYPOXIA: ICD-10-CM

## 2024-12-29 DIAGNOSIS — R65.10 SIRS (SYSTEMIC INFLAMMATORY RESPONSE SYNDROME) (HCC): ICD-10-CM

## 2024-12-29 DIAGNOSIS — R79.89 ELEVATED TROPONIN: ICD-10-CM

## 2024-12-29 DIAGNOSIS — I27.20 PULMONARY HYPERTENSION (HCC): ICD-10-CM

## 2024-12-29 DIAGNOSIS — J96.01 ACUTE HYPOXEMIC RESPIRATORY FAILURE (HCC): ICD-10-CM

## 2024-12-29 DIAGNOSIS — E87.1 HYPONATREMIA: ICD-10-CM

## 2024-12-29 DIAGNOSIS — I50.42 CHRONIC COMBINED SYSTOLIC AND DIASTOLIC HEART FAILURE (HCC): ICD-10-CM

## 2024-12-29 PROBLEM — Z71.89 ACP (ADVANCE CARE PLANNING): Status: ACTIVE | Noted: 2024-12-29

## 2024-12-29 LAB
ALBUMIN SERPL BCP-MCNC: 3.7 G/DL (ref 3.2–4.9)
ALBUMIN/GLOB SERPL: 1.1 G/DL
ALP SERPL-CCNC: 62 U/L (ref 30–99)
ALT SERPL-CCNC: 13 U/L (ref 2–50)
ANION GAP SERPL CALC-SCNC: 10 MMOL/L (ref 7–16)
AST SERPL-CCNC: 20 U/L (ref 12–45)
BASOPHILS # BLD AUTO: 0.5 % (ref 0–1.8)
BASOPHILS # BLD: 0.03 K/UL (ref 0–0.12)
BILIRUB SERPL-MCNC: 0.2 MG/DL (ref 0.1–1.5)
BUN SERPL-MCNC: 28 MG/DL (ref 8–22)
CALCIUM ALBUM COR SERPL-MCNC: 8.9 MG/DL (ref 8.5–10.5)
CALCIUM SERPL-MCNC: 8.7 MG/DL (ref 8.5–10.5)
CHLORIDE SERPL-SCNC: 97 MMOL/L (ref 96–112)
CO2 SERPL-SCNC: 22 MMOL/L (ref 20–33)
CREAT SERPL-MCNC: 1.06 MG/DL (ref 0.5–1.4)
EKG IMPRESSION: NORMAL
EOSINOPHIL # BLD AUTO: 0.03 K/UL (ref 0–0.51)
EOSINOPHIL NFR BLD: 0.5 % (ref 0–6.9)
ERYTHROCYTE [DISTWIDTH] IN BLOOD BY AUTOMATED COUNT: 45.1 FL (ref 35.9–50)
FLUAV RNA SPEC QL NAA+PROBE: POSITIVE
FLUBV RNA SPEC QL NAA+PROBE: NEGATIVE
GFR SERPLBLD CREATININE-BSD FMLA CKD-EPI: 51 ML/MIN/1.73 M 2
GLOBULIN SER CALC-MCNC: 3.3 G/DL (ref 1.9–3.5)
GLUCOSE SERPL-MCNC: 97 MG/DL (ref 65–99)
HCT VFR BLD AUTO: 42.8 % (ref 37–47)
HGB BLD-MCNC: 14.2 G/DL (ref 12–16)
IMM GRANULOCYTES # BLD AUTO: 0.02 K/UL (ref 0–0.11)
IMM GRANULOCYTES NFR BLD AUTO: 0.3 % (ref 0–0.9)
LYMPHOCYTES # BLD AUTO: 1.42 K/UL (ref 1–4.8)
LYMPHOCYTES NFR BLD: 22.2 % (ref 22–41)
MCH RBC QN AUTO: 30.9 PG (ref 27–33)
MCHC RBC AUTO-ENTMCNC: 33.2 G/DL (ref 32.2–35.5)
MCV RBC AUTO: 93.2 FL (ref 81.4–97.8)
MONOCYTES # BLD AUTO: 0.88 K/UL (ref 0–0.85)
MONOCYTES NFR BLD AUTO: 13.8 % (ref 0–13.4)
NEUTROPHILS # BLD AUTO: 4.02 K/UL (ref 1.82–7.42)
NEUTROPHILS NFR BLD: 62.7 % (ref 44–72)
NRBC # BLD AUTO: 0 K/UL
NRBC BLD-RTO: 0 /100 WBC (ref 0–0.2)
NT-PROBNP SERPL IA-MCNC: 429 PG/ML (ref 0–125)
PLATELET # BLD AUTO: 256 K/UL (ref 164–446)
PMV BLD AUTO: 8.2 FL (ref 9–12.9)
POTASSIUM SERPL-SCNC: 4 MMOL/L (ref 3.6–5.5)
PROT SERPL-MCNC: 7 G/DL (ref 6–8.2)
RBC # BLD AUTO: 4.59 M/UL (ref 4.2–5.4)
RSV RNA SPEC QL NAA+PROBE: NEGATIVE
SARS-COV-2 RNA RESP QL NAA+PROBE: NOTDETECTED
SODIUM SERPL-SCNC: 129 MMOL/L (ref 135–145)
TROPONIN T SERPL-MCNC: 19 NG/L (ref 6–19)
TROPONIN T SERPL-MCNC: 23 NG/L (ref 6–19)
WBC # BLD AUTO: 6.4 K/UL (ref 4.8–10.8)

## 2024-12-29 PROCEDURE — 71045 X-RAY EXAM CHEST 1 VIEW: CPT

## 2024-12-29 PROCEDURE — 84484 ASSAY OF TROPONIN QUANT: CPT

## 2024-12-29 PROCEDURE — 36415 COLL VENOUS BLD VENIPUNCTURE: CPT

## 2024-12-29 PROCEDURE — 99497 ADVNCD CARE PLAN 30 MIN: CPT | Mod: 25 | Performed by: STUDENT IN AN ORGANIZED HEALTH CARE EDUCATION/TRAINING PROGRAM

## 2024-12-29 PROCEDURE — 700105 HCHG RX REV CODE 258: Mod: UD | Performed by: EMERGENCY MEDICINE

## 2024-12-29 PROCEDURE — 770020 HCHG ROOM/CARE - TELE (206)

## 2024-12-29 PROCEDURE — 93005 ELECTROCARDIOGRAM TRACING: CPT | Mod: TC | Performed by: EMERGENCY MEDICINE

## 2024-12-29 PROCEDURE — 0241U HCHG SARS-COV-2 COVID-19 NFCT DS RESP RNA 4 TRGT ED POC: CPT

## 2024-12-29 PROCEDURE — 99223 1ST HOSP IP/OBS HIGH 75: CPT | Mod: 25 | Performed by: STUDENT IN AN ORGANIZED HEALTH CARE EDUCATION/TRAINING PROGRAM

## 2024-12-29 PROCEDURE — 93005 ELECTROCARDIOGRAM TRACING: CPT | Mod: TC

## 2024-12-29 PROCEDURE — 80053 COMPREHEN METABOLIC PANEL: CPT

## 2024-12-29 PROCEDURE — 85025 COMPLETE CBC W/AUTO DIFF WBC: CPT

## 2024-12-29 PROCEDURE — 700102 HCHG RX REV CODE 250 W/ 637 OVERRIDE(OP): Performed by: EMERGENCY MEDICINE

## 2024-12-29 PROCEDURE — 94640 AIRWAY INHALATION TREATMENT: CPT

## 2024-12-29 PROCEDURE — 99285 EMERGENCY DEPT VISIT HI MDM: CPT

## 2024-12-29 PROCEDURE — 700105 HCHG RX REV CODE 258: Performed by: STUDENT IN AN ORGANIZED HEALTH CARE EDUCATION/TRAINING PROGRAM

## 2024-12-29 PROCEDURE — A9270 NON-COVERED ITEM OR SERVICE: HCPCS | Performed by: EMERGENCY MEDICINE

## 2024-12-29 PROCEDURE — 83880 ASSAY OF NATRIURETIC PEPTIDE: CPT

## 2024-12-29 RX ORDER — LEVOTHYROXINE SODIUM 100 UG/1
100 TABLET ORAL
Status: DISCONTINUED | OUTPATIENT
Start: 2024-12-30 | End: 2024-12-31 | Stop reason: HOSPADM

## 2024-12-29 RX ORDER — ONDANSETRON 4 MG/1
4 TABLET, ORALLY DISINTEGRATING ORAL EVERY 4 HOURS PRN
Status: DISCONTINUED | OUTPATIENT
Start: 2024-12-29 | End: 2024-12-31 | Stop reason: HOSPADM

## 2024-12-29 RX ORDER — ALBUTEROL SULFATE 90 UG/1
2 INHALANT RESPIRATORY (INHALATION)
Status: DISCONTINUED | OUTPATIENT
Start: 2024-12-29 | End: 2024-12-30

## 2024-12-29 RX ORDER — LABETALOL HYDROCHLORIDE 5 MG/ML
10 INJECTION, SOLUTION INTRAVENOUS EVERY 4 HOURS PRN
Status: DISCONTINUED | OUTPATIENT
Start: 2024-12-29 | End: 2024-12-31 | Stop reason: HOSPADM

## 2024-12-29 RX ORDER — AMLODIPINE BESYLATE 10 MG/1
10 TABLET ORAL DAILY
Status: DISCONTINUED | OUTPATIENT
Start: 2024-12-30 | End: 2024-12-31 | Stop reason: HOSPADM

## 2024-12-29 RX ORDER — ACETAMINOPHEN 325 MG/1
650 TABLET ORAL EVERY 6 HOURS PRN
Status: DISCONTINUED | OUTPATIENT
Start: 2024-12-29 | End: 2024-12-31 | Stop reason: HOSPADM

## 2024-12-29 RX ORDER — SODIUM CHLORIDE 9 MG/ML
1000 INJECTION, SOLUTION INTRAVENOUS ONCE
Status: COMPLETED | OUTPATIENT
Start: 2024-12-29 | End: 2024-12-29

## 2024-12-29 RX ORDER — GUAIFENESIN/DEXTROMETHORPHAN 100-10MG/5
10 SYRUP ORAL EVERY 6 HOURS PRN
Status: DISCONTINUED | OUTPATIENT
Start: 2024-12-29 | End: 2024-12-31 | Stop reason: HOSPADM

## 2024-12-29 RX ORDER — SODIUM CHLORIDE 9 MG/ML
INJECTION, SOLUTION INTRAVENOUS CONTINUOUS
Status: DISCONTINUED | OUTPATIENT
Start: 2024-12-29 | End: 2024-12-29

## 2024-12-29 RX ORDER — LOSARTAN POTASSIUM 50 MG/1
100 TABLET ORAL DAILY
Status: DISCONTINUED | OUTPATIENT
Start: 2024-12-30 | End: 2024-12-31 | Stop reason: HOSPADM

## 2024-12-29 RX ORDER — ALBUTEROL SULFATE 90 UG/1
2 INHALANT RESPIRATORY (INHALATION)
Status: COMPLETED | OUTPATIENT
Start: 2024-12-29 | End: 2024-12-29

## 2024-12-29 RX ORDER — ENOXAPARIN SODIUM 100 MG/ML
40 INJECTION SUBCUTANEOUS DAILY
Status: DISCONTINUED | OUTPATIENT
Start: 2024-12-30 | End: 2024-12-31 | Stop reason: HOSPADM

## 2024-12-29 RX ORDER — ONDANSETRON 2 MG/ML
4 INJECTION INTRAMUSCULAR; INTRAVENOUS EVERY 4 HOURS PRN
Status: DISCONTINUED | OUTPATIENT
Start: 2024-12-29 | End: 2024-12-31 | Stop reason: HOSPADM

## 2024-12-29 RX ORDER — SPIRONOLACTONE 25 MG/1
25 TABLET ORAL DAILY
Status: DISCONTINUED | OUTPATIENT
Start: 2024-12-30 | End: 2024-12-31 | Stop reason: HOSPADM

## 2024-12-29 RX ORDER — OSELTAMIVIR PHOSPHATE 30 MG/1
30 CAPSULE ORAL ONCE
Status: COMPLETED | OUTPATIENT
Start: 2024-12-29 | End: 2024-12-29

## 2024-12-29 RX ORDER — IPRATROPIUM BROMIDE AND ALBUTEROL SULFATE 2.5; .5 MG/3ML; MG/3ML
3 SOLUTION RESPIRATORY (INHALATION)
Status: ACTIVE | OUTPATIENT
Start: 2024-12-29 | End: 2024-12-30

## 2024-12-29 RX ORDER — OSELTAMIVIR PHOSPHATE 30 MG/1
30 CAPSULE ORAL 2 TIMES DAILY
Status: DISCONTINUED | OUTPATIENT
Start: 2024-12-30 | End: 2024-12-31 | Stop reason: HOSPADM

## 2024-12-29 RX ORDER — IPRATROPIUM BROMIDE AND ALBUTEROL SULFATE 2.5; .5 MG/3ML; MG/3ML
3 SOLUTION RESPIRATORY (INHALATION)
Status: DISCONTINUED | OUTPATIENT
Start: 2024-12-29 | End: 2024-12-29

## 2024-12-29 RX ORDER — SODIUM CHLORIDE 9 MG/ML
INJECTION, SOLUTION INTRAVENOUS CONTINUOUS
Status: DISCONTINUED | OUTPATIENT
Start: 2024-12-29 | End: 2024-12-31 | Stop reason: HOSPADM

## 2024-12-29 RX ADMIN — ALBUTEROL SULFATE 2 PUFF: 90 AEROSOL, METERED RESPIRATORY (INHALATION) at 21:00

## 2024-12-29 RX ADMIN — SODIUM CHLORIDE: 9 INJECTION, SOLUTION INTRAVENOUS at 19:22

## 2024-12-29 RX ADMIN — ALBUTEROL SULFATE 2 PUFF: 90 AEROSOL, METERED RESPIRATORY (INHALATION) at 19:17

## 2024-12-29 RX ADMIN — OSELTAMIVIR PHOSPHATE 30 MG: 30 CAPSULE ORAL at 19:17

## 2024-12-29 RX ADMIN — SODIUM CHLORIDE 1000 ML: 9 INJECTION, SOLUTION INTRAVENOUS at 17:30

## 2024-12-29 ASSESSMENT — FIBROSIS 4 INDEX
FIB4 SCORE: 0.93
FIB4 SCORE: 1.84
FIB4 SCORE: 1.84

## 2024-12-29 ASSESSMENT — COGNITIVE AND FUNCTIONAL STATUS - GENERAL
TURNING FROM BACK TO SIDE WHILE IN FLAT BAD: A LITTLE
MOVING FROM LYING ON BACK TO SITTING ON SIDE OF FLAT BED: A LITTLE
EATING MEALS: A LITTLE
MOVING TO AND FROM BED TO CHAIR: A LITTLE
WALKING IN HOSPITAL ROOM: A LOT
PERSONAL GROOMING: A LITTLE
HELP NEEDED FOR BATHING: A LITTLE
DRESSING REGULAR LOWER BODY CLOTHING: A LOT
STANDING UP FROM CHAIR USING ARMS: A LITTLE
TOILETING: A LITTLE
DRESSING REGULAR UPPER BODY CLOTHING: A LITTLE
CLIMB 3 TO 5 STEPS WITH RAILING: A LOT
MOBILITY SCORE: 16
SUGGESTED CMS G CODE MODIFIER MOBILITY: CK
DAILY ACTIVITIY SCORE: 17
SUGGESTED CMS G CODE MODIFIER DAILY ACTIVITY: CK

## 2024-12-29 ASSESSMENT — LIFESTYLE VARIABLES
HAVE YOU EVER FELT YOU SHOULD CUT DOWN ON YOUR DRINKING: NO
HAVE PEOPLE ANNOYED YOU BY CRITICIZING YOUR DRINKING: NO
HOW MANY TIMES IN THE PAST YEAR HAVE YOU HAD 5 OR MORE DRINKS IN A DAY: 0
TOTAL SCORE: 0
DOES PATIENT WANT TO STOP DRINKING: NO
EVER FELT BAD OR GUILTY ABOUT YOUR DRINKING: NO
ALCOHOL_USE: NO
TOTAL SCORE: 0
TOTAL SCORE: 0
EVER HAD A DRINK FIRST THING IN THE MORNING TO STEADY YOUR NERVES TO GET RID OF A HANGOVER: NO
AVERAGE NUMBER OF DAYS PER WEEK YOU HAVE A DRINK CONTAINING ALCOHOL: 0
CONSUMPTION TOTAL: NEGATIVE
ON A TYPICAL DAY WHEN YOU DRINK ALCOHOL HOW MANY DRINKS DO YOU HAVE: 0

## 2024-12-29 ASSESSMENT — COPD QUESTIONNAIRES
HAVE YOU SMOKED AT LEAST 100 CIGARETTES IN YOUR ENTIRE LIFE: YES
COPD SCREENING SCORE: 6
DO YOU EVER COUGH UP ANY MUCUS OR PHLEGM?: YES, A FEW DAYS A WEEK OR MONTH
DURING THE PAST 4 WEEKS HOW MUCH DID YOU FEEL SHORT OF BREATH: SOME OF THE TIME

## 2024-12-29 ASSESSMENT — SOCIAL DETERMINANTS OF HEALTH (SDOH)
WITHIN THE LAST YEAR, HAVE YOU BEEN HUMILIATED OR EMOTIONALLY ABUSED IN OTHER WAYS BY YOUR PARTNER OR EX-PARTNER?: NO
WITHIN THE PAST 12 MONTHS, THE FOOD YOU BOUGHT JUST DIDN'T LAST AND YOU DIDN'T HAVE MONEY TO GET MORE: NEVER TRUE
WITHIN THE PAST 12 MONTHS, YOU WORRIED THAT YOUR FOOD WOULD RUN OUT BEFORE YOU GOT THE MONEY TO BUY MORE: NEVER TRUE
WITHIN THE LAST YEAR, HAVE TO BEEN RAPED OR FORCED TO HAVE ANY KIND OF SEXUAL ACTIVITY BY YOUR PARTNER OR EX-PARTNER?: NO
IN THE PAST 12 MONTHS, HAS THE ELECTRIC, GAS, OIL, OR WATER COMPANY THREATENED TO SHUT OFF SERVICE IN YOUR HOME?: NO
WITHIN THE LAST YEAR, HAVE YOU BEEN AFRAID OF YOUR PARTNER OR EX-PARTNER?: NO
WITHIN THE LAST YEAR, HAVE YOU BEEN KICKED, HIT, SLAPPED, OR OTHERWISE PHYSICALLY HURT BY YOUR PARTNER OR EX-PARTNER?: NO

## 2024-12-29 ASSESSMENT — PAIN DESCRIPTION - PAIN TYPE: TYPE: ACUTE PAIN

## 2024-12-30 ENCOUNTER — HOME HEALTH ADMISSION (OUTPATIENT)
Dept: HOME HEALTH SERVICES | Facility: HOME HEALTHCARE | Age: 84
End: 2024-12-30
Payer: MEDICARE

## 2024-12-30 ENCOUNTER — APPOINTMENT (OUTPATIENT)
Dept: RADIOLOGY | Facility: MEDICAL CENTER | Age: 84
DRG: 193 | End: 2024-12-30
Attending: INTERNAL MEDICINE
Payer: MEDICARE

## 2024-12-30 LAB
ALBUMIN SERPL BCP-MCNC: 3.3 G/DL (ref 3.2–4.9)
ALBUMIN/GLOB SERPL: 1.1 G/DL
ALP SERPL-CCNC: 59 U/L (ref 30–99)
ALT SERPL-CCNC: 11 U/L (ref 2–50)
ANION GAP SERPL CALC-SCNC: 11 MMOL/L (ref 7–16)
AST SERPL-CCNC: 21 U/L (ref 12–45)
BASOPHILS # BLD AUTO: 0.6 % (ref 0–1.8)
BASOPHILS # BLD: 0.04 K/UL (ref 0–0.12)
BILIRUB SERPL-MCNC: 0.2 MG/DL (ref 0.1–1.5)
BUN SERPL-MCNC: 23 MG/DL (ref 8–22)
CALCIUM ALBUM COR SERPL-MCNC: 8.6 MG/DL (ref 8.5–10.5)
CALCIUM SERPL-MCNC: 8 MG/DL (ref 8.5–10.5)
CHLORIDE SERPL-SCNC: 101 MMOL/L (ref 96–112)
CO2 SERPL-SCNC: 18 MMOL/L (ref 20–33)
CREAT SERPL-MCNC: 0.77 MG/DL (ref 0.5–1.4)
EOSINOPHIL # BLD AUTO: 0.01 K/UL (ref 0–0.51)
EOSINOPHIL NFR BLD: 0.2 % (ref 0–6.9)
ERYTHROCYTE [DISTWIDTH] IN BLOOD BY AUTOMATED COUNT: 46.2 FL (ref 35.9–50)
GFR SERPLBLD CREATININE-BSD FMLA CKD-EPI: 75 ML/MIN/1.73 M 2
GLOBULIN SER CALC-MCNC: 3.1 G/DL (ref 1.9–3.5)
GLUCOSE SERPL-MCNC: 99 MG/DL (ref 65–99)
HCT VFR BLD AUTO: 40 % (ref 37–47)
HGB BLD-MCNC: 13.3 G/DL (ref 12–16)
IMM GRANULOCYTES # BLD AUTO: 0.03 K/UL (ref 0–0.11)
IMM GRANULOCYTES NFR BLD AUTO: 0.5 % (ref 0–0.9)
LYMPHOCYTES # BLD AUTO: 1.51 K/UL (ref 1–4.8)
LYMPHOCYTES NFR BLD: 23.4 % (ref 22–41)
MCH RBC QN AUTO: 31.4 PG (ref 27–33)
MCHC RBC AUTO-ENTMCNC: 33.3 G/DL (ref 32.2–35.5)
MCV RBC AUTO: 94.3 FL (ref 81.4–97.8)
MONOCYTES # BLD AUTO: 0.9 K/UL (ref 0–0.85)
MONOCYTES NFR BLD AUTO: 14 % (ref 0–13.4)
NEUTROPHILS # BLD AUTO: 3.95 K/UL (ref 1.82–7.42)
NEUTROPHILS NFR BLD: 61.3 % (ref 44–72)
NRBC # BLD AUTO: 0 K/UL
NRBC BLD-RTO: 0 /100 WBC (ref 0–0.2)
PLATELET # BLD AUTO: 218 K/UL (ref 164–446)
PMV BLD AUTO: 8.2 FL (ref 9–12.9)
POTASSIUM SERPL-SCNC: 4.1 MMOL/L (ref 3.6–5.5)
PROT SERPL-MCNC: 6.4 G/DL (ref 6–8.2)
RBC # BLD AUTO: 4.24 M/UL (ref 4.2–5.4)
SODIUM SERPL-SCNC: 130 MMOL/L (ref 135–145)
WBC # BLD AUTO: 6.4 K/UL (ref 4.8–10.8)

## 2024-12-30 PROCEDURE — 700102 HCHG RX REV CODE 250 W/ 637 OVERRIDE(OP): Performed by: STUDENT IN AN ORGANIZED HEALTH CARE EDUCATION/TRAINING PROGRAM

## 2024-12-30 PROCEDURE — 36415 COLL VENOUS BLD VENIPUNCTURE: CPT

## 2024-12-30 PROCEDURE — 97162 PT EVAL MOD COMPLEX 30 MIN: CPT

## 2024-12-30 PROCEDURE — 700111 HCHG RX REV CODE 636 W/ 250 OVERRIDE (IP): Mod: JZ | Performed by: STUDENT IN AN ORGANIZED HEALTH CARE EDUCATION/TRAINING PROGRAM

## 2024-12-30 PROCEDURE — 770001 HCHG ROOM/CARE - MED/SURG/GYN PRIV*

## 2024-12-30 PROCEDURE — 99233 SBSQ HOSP IP/OBS HIGH 50: CPT | Performed by: INTERNAL MEDICINE

## 2024-12-30 PROCEDURE — A9270 NON-COVERED ITEM OR SERVICE: HCPCS | Performed by: STUDENT IN AN ORGANIZED HEALTH CARE EDUCATION/TRAINING PROGRAM

## 2024-12-30 PROCEDURE — 94640 AIRWAY INHALATION TREATMENT: CPT

## 2024-12-30 PROCEDURE — 700117 HCHG RX CONTRAST REV CODE 255: Performed by: INTERNAL MEDICINE

## 2024-12-30 PROCEDURE — 71275 CT ANGIOGRAPHY CHEST: CPT

## 2024-12-30 PROCEDURE — 700102 HCHG RX REV CODE 250 W/ 637 OVERRIDE(OP): Performed by: INTERNAL MEDICINE

## 2024-12-30 PROCEDURE — A9270 NON-COVERED ITEM OR SERVICE: HCPCS | Performed by: INTERNAL MEDICINE

## 2024-12-30 PROCEDURE — 80053 COMPREHEN METABOLIC PANEL: CPT

## 2024-12-30 PROCEDURE — 85025 COMPLETE CBC W/AUTO DIFF WBC: CPT

## 2024-12-30 PROCEDURE — 700105 HCHG RX REV CODE 258: Performed by: STUDENT IN AN ORGANIZED HEALTH CARE EDUCATION/TRAINING PROGRAM

## 2024-12-30 RX ORDER — AMOXICILLIN 250 MG
2 CAPSULE ORAL EVERY EVENING
Status: DISCONTINUED | OUTPATIENT
Start: 2024-12-30 | End: 2024-12-31 | Stop reason: HOSPADM

## 2024-12-30 RX ORDER — ALBUTEROL SULFATE 90 UG/1
2 INHALANT RESPIRATORY (INHALATION)
Status: DISCONTINUED | OUTPATIENT
Start: 2024-12-30 | End: 2024-12-31 | Stop reason: HOSPADM

## 2024-12-30 RX ORDER — POLYETHYLENE GLYCOL 3350 17 G/17G
1 POWDER, FOR SOLUTION ORAL
Status: DISCONTINUED | OUTPATIENT
Start: 2024-12-30 | End: 2024-12-31 | Stop reason: HOSPADM

## 2024-12-30 RX ADMIN — SENNOSIDES AND DOCUSATE SODIUM 2 TABLET: 50; 8.6 TABLET ORAL at 18:13

## 2024-12-30 RX ADMIN — ENOXAPARIN SODIUM 40 MG: 100 INJECTION SUBCUTANEOUS at 18:12

## 2024-12-30 RX ADMIN — ALBUTEROL SULFATE 2 PUFF: 90 AEROSOL, METERED RESPIRATORY (INHALATION) at 15:57

## 2024-12-30 RX ADMIN — SODIUM CHLORIDE: 9 INJECTION, SOLUTION INTRAVENOUS at 08:07

## 2024-12-30 RX ADMIN — IOHEXOL 60 ML: 350 INJECTION, SOLUTION INTRAVENOUS at 12:15

## 2024-12-30 RX ADMIN — SODIUM CHLORIDE: 9 INJECTION, SOLUTION INTRAVENOUS at 18:17

## 2024-12-30 RX ADMIN — LOSARTAN POTASSIUM 100 MG: 50 TABLET, FILM COATED ORAL at 05:40

## 2024-12-30 RX ADMIN — OSELTAMIVIR PHOSPHATE 30 MG: 30 CAPSULE ORAL at 07:59

## 2024-12-30 RX ADMIN — SPIRONOLACTONE 25 MG: 25 TABLET ORAL at 05:40

## 2024-12-30 RX ADMIN — AMLODIPINE BESYLATE 10 MG: 10 TABLET ORAL at 05:40

## 2024-12-30 RX ADMIN — ALBUTEROL SULFATE 2 PUFF: 90 AEROSOL, METERED RESPIRATORY (INHALATION) at 11:56

## 2024-12-30 RX ADMIN — ALBUTEROL SULFATE 2 PUFF: 90 AEROSOL, METERED RESPIRATORY (INHALATION) at 19:38

## 2024-12-30 RX ADMIN — ALBUTEROL SULFATE 2 PUFF: 90 AEROSOL, METERED RESPIRATORY (INHALATION) at 07:59

## 2024-12-30 RX ADMIN — OSELTAMIVIR PHOSPHATE 30 MG: 30 CAPSULE ORAL at 21:02

## 2024-12-30 RX ADMIN — LEVOTHYROXINE SODIUM 100 MCG: 0.1 TABLET ORAL at 07:59

## 2024-12-30 RX ADMIN — ACETAMINOPHEN 650 MG: 325 TABLET ORAL at 07:59

## 2024-12-30 ASSESSMENT — GAIT ASSESSMENTS
ASSISTIVE DEVICE: FRONT WHEEL WALKER
DISTANCE (FEET): 10
DEVIATION: BRADYKINETIC;SHUFFLED GAIT
GAIT LEVEL OF ASSIST: STANDBY ASSIST

## 2024-12-30 ASSESSMENT — ENCOUNTER SYMPTOMS
FOCAL WEAKNESS: 0
SHORTNESS OF BREATH: 1
NAUSEA: 0
DIZZINESS: 0
LOSS OF CONSCIOUSNESS: 0
VOMITING: 0
NERVOUS/ANXIOUS: 0
RHINORRHEA: 1
PALPITATIONS: 0
COUGH: 1
VOMITING: 0
MYALGIAS: 1
SEIZURES: 0
ABDOMINAL PAIN: 0
DIARRHEA: 0
HEADACHES: 0
BLOOD IN STOOL: 0
WHEEZING: 1
EYE REDNESS: 0
NAUSEA: 0
TREMORS: 0
CHILLS: 1
FEVER: 1
EYE PAIN: 0
FEVER: 0
CHILLS: 0
SHORTNESS OF BREATH: 1
CONSTIPATION: 0
FALLS: 0
HEMOPTYSIS: 0
INSOMNIA: 0
MYALGIAS: 1
HEMOPTYSIS: 0
SPUTUM PRODUCTION: 1
HEADACHES: 0
SPUTUM PRODUCTION: 1
COUGH: 1
SORE THROAT: 0
ABDOMINAL PAIN: 0
WHEEZING: 0
WEAKNESS: 0
DIARRHEA: 0

## 2024-12-30 ASSESSMENT — COGNITIVE AND FUNCTIONAL STATUS - GENERAL
SUGGESTED CMS G CODE MODIFIER MOBILITY: CJ
CLIMB 3 TO 5 STEPS WITH RAILING: A LITTLE
MOBILITY SCORE: 20
WALKING IN HOSPITAL ROOM: A LITTLE
MOVING FROM LYING ON BACK TO SITTING ON SIDE OF FLAT BED: A LITTLE
MOVING TO AND FROM BED TO CHAIR: A LITTLE

## 2024-12-30 ASSESSMENT — FIBROSIS 4 INDEX: FIB4 SCORE: 2.47

## 2024-12-30 ASSESSMENT — PAIN DESCRIPTION - PAIN TYPE: TYPE: ACUTE PAIN

## 2024-12-30 NOTE — PROGRESS NOTES
4 Eyes Skin Assessment Completed by JOSSIE Medina and JOSSIE Santana.    Head WDL  Ears Redness and Blanching  Nose WDL  Mouth WDL  Neck WDL  Breast/Chest WDL  Shoulder Blades Redness  Spine WDL  (R) Arm/Elbow/Hand WDL  (L) Arm/Elbow/Hand WDL  Abdomen WDL  Groin Redness and Blanching  Scrotum/Coccyx/Buttocks Redness and Blanching  (R) Leg WDL  (L) Leg WDL  (R) Heel/Foot/Toe Redness, Blanching, and Boggy  (L) Heel/Foot/Toe Redness, Blanching, and Boggy          Devices In Places Tele Box, Blood Pressure Cuff, Pulse Ox, and Nasal Cannula      Interventions In Place Gray Ear Foams, Pillows, and Pressure Redistribution Mattress    Possible Skin Injury No    Pictures Uploaded Into Epic N/A  Wound Consult Placed N/A  RN Wound Prevention Protocol Ordered No

## 2024-12-30 NOTE — ASSESSMENT & PLAN NOTE
Likely from dehydration  Fluids  Repeat in am    Recent Labs     12/29/24  1646 12/30/24  0128   SODIUM 129* 130*   POTASSIUM 4.0 4.1   CHLORIDE 97 101   CO2 22 18*   GLUCOSE 97 99   BUN 28* 23*   CREATININE 1.06 0.77   ;lImproving

## 2024-12-30 NOTE — ED TRIAGE NOTES
"Chief Complaint   Patient presents with    Flu Like Symptoms     Pt report worsening HA, fever, cough, neck and chest pain x1wk. Per pt's daughter, she took pt at  yesterday and was told pt has PNA, sent home with medications but with no relief.        Pt wheelchair to triage. Pt A&Ox4, for above complaint.     Pt placed on 6L of O2 via NC in triage, SpO2 86% on RA(baseline).    ipad used during triage process. [place] [id number]     Pt to lobby. Pt educated on alerting staff in changes to condition. Pt verbalized understanding. Protocol initiated.     /83   Pulse 96   Temp 36.6 °C (97.8 °F) (Temporal)   Resp 16   Ht 1.549 m (5' 1\")   Wt 79.7 kg (175 lb 11.3 oz)   SpO2 95%   BMI 33.20 kg/m²    "

## 2024-12-30 NOTE — DISCHARGE PLANNING
HTH/SCP TCN chart review completed. Collaborated with WINDY Garcia.  The most current review of medical record, knowledge of pt's PLOF and social support, LACE+ score of 73, 6 clicks scores of 17 ADL's and 16 mobility were considered.      Pt seen at bedside. Introduced TCN program. Provided education regarding post acute levels of care. Education provided regarding case management policy for blanket SNF referrals. Discussed HTH/SCP plan benefits. Pt verbalizes partial understanding.     Patient seen with  Ashlee #[id number].  We were cut off then  John #[id number] completed translations.      Patient states she lives with her daughter and grand daughter in a 2nd floor apartment and required assistance with ADL's.  She reports that her son assisted her up the stairs.  She is unable to state if she has had a decline in function as  states she does not understand the question.  Patient states she wants to return home at discharge.  This TCN called family for more details on PLOF.  Awaiting call back.      Addendum:  1412- Dianna Troy (DOMINIK) grandchild returned call and states that she is patients caregiver but does not live with her. Introduced TCN program. Provided education regarding post acute levels of care. Education provided regarding case management policy for blanket SNF referrals. Discussed HTH/SCP plan benefits. Pt verbalizes partial understanding.     Dianna reports that patients daughter Denise lives with the patient and states family provided transportation as needed and completed all IADL's.  She assisted patient with ADL's and states patient ambulated with a 4WW household distances with SBA.  Patient was on RA at her baseline and is now on 4 L/min O2.  She reports that son is not around but Denise and Dianna provide assistance to manage stairs as needed.  She reports that patient has a W/C and a FWW.  Dianna states that patient has had a decline in function with mobility and  ADL's and she would like patient to have Home Health at discharge.      Choice proactively obtained for HH (1. [facility], 2. [facility]) & DME ([facility]) if indicated, faxed to DPA and given to CM.      In collaboration with CM, current discharge considerations are anticipated to be for Home with possible HH, possible supplemental O2 and close outpatient f/u when medically cleared.      TCN will continue to follow and collaborate with discharge planning team as additional post acute needs arise. Thank you.     Completed today:  PT recommends HH on 12/30.   OT consults in chart.   Choice obtained:  HH (1. [facility], 2. [facility]) & DME ([facility]) if indicated.  Pt aware of [facility] blanket referral policy  SCP with [facility] PCP.

## 2024-12-30 NOTE — ASSESSMENT & PLAN NOTE
17 minutes spent discussing goals of care with patient.  I went over the processes of CPR, which can include chest compressions and intubation if patient clinically deteriorates.  I explained that due to her age, the chances of ROSC being achieved let alone having a normal quality of life is extremely low.  Knowing this, they state that they want everything done.

## 2024-12-30 NOTE — ED NOTES
Patient Identifiers verified; wheeled to room via Wheel chair pushed by family member; changed into gown; connected to wall-mounted o2 supply at 6LPM via NC; connected to BP, pulse ox, and cardiac monitoring; charted the patient for Emergency Room Physician to see.

## 2024-12-30 NOTE — THERAPY
Physical Therapy   Initial Evaluation     Patient Name: Lizzette Burton  Age:  84 y.o., Sex:  female  Medical Record #: [id number]  Today's Date: 12/30/2024          Assessment  Patient is 84 y.o. female admitted w/ influenza A, w/ weakness and cough.  She reports living in a ground floor apartment w/ her adult children and grandchildren.  She was indep PTA w/o AD.  Today, she needs min assist to sit eob and mod assist to return to supine.  Gait distance is limited by fatigue.  She declined to sit in her bed side chair, requesting to return to bed 2/2 fatigue.  Anticipate that w/ increased oob by both therapy and nsg, along w/ the assistance of her family, she will be able to d/c home.  PT will follow and address problems noted below.    Plan    Physical Therapy Initial Treatment Plan   Treatment Plan : Bed Mobility, Gait Training, Therapeutic Activities  Treatment Frequency: 3 Times per Week  Duration: Until Therapy Goals Met    DC Equipment Recommendations: Unable to determine at this time  Discharge Recommendations: Recommend home health for continued physical therapy services         Objective       12/30/24 1120   Prior Living Situation   Housing / Facility 1 Story Apartment / Condo   Steps Into Home 0   Steps In Home 0   Equipment Owned None   Lives with - Patient's Self Care Capacity Adult Children   Prior Level of Functional Mobility   Bed Mobility Independent   Transfer Status Independent   Ambulation Independent   Assistive Devices Used None   Cognition    Level of Consciousness Alert   Strength Lower Body   Comments grossly wnl   Balance Assessment   Sitting Balance (Static) Fair +   Sitting Balance (Dynamic) Fair +   Standing Balance (Static) Fair   Standing Balance (Dynamic) Fair   Weight Shift Sitting Good   Weight Shift Standing Good   Comments w/ fww   Bed Mobility    Supine to Sit Minimal Assist   Sit to Supine Moderate Assist   Gait Analysis   Gait Level Of Assist Standby Assist   Assistive  Device Front Wheel Walker   Distance (Feet) 10   Deviation Bradykinetic;Shuffled Gait   Functional Mobility   Sit to Stand Standby Assist   Bed, Chair, Wheelchair Transfer Refused   Short Term Goals    Short Term Goal # 1 Pt to move supie to/from eob w/ spv in 6 visits to improve fxl indep   Short Term Goal # 2 Pt to move sit to/from stand w/ spv in 6 vistis to improve fxl indep   Short Term Goal # 3 Pt to ambulate 75 ft w/ fww and spv in 6 visits to improve fxl indep   Physical Therapy Initial Treatment Plan    Treatment Plan  Bed Mobility;Gait Training;Therapeutic Activities   Treatment Frequency 3 Times per Week   Duration Until Therapy Goals Met   Problem List    Problems Impaired Bed Mobility;Impaired Transfers;Impaired Ambulation;Decreased Activity Tolerance   Anticipated Discharge Equipment and Recommendations   DC Equipment Recommendations Unable to determine at this time   Discharge Recommendations Recommend home health for continued physical therapy services

## 2024-12-30 NOTE — PROGRESS NOTES
Monitor Summary:    Rhythm: SR w/ 1 degree  Rate: 84-99  Ectopy: (F) PVC, (O) bigem, (O) PAC, (R) trigem  Measurement : .24/.10/.35

## 2024-12-30 NOTE — CARE PLAN
The patient is Watcher - Medium risk of patient condition declining or worsening    Shift Goals  Clinical Goals: monitor O2  Patient Goals: rest, feel better  Family Goals: support    Progress made toward(s) clinical / shift goals:    Problem: Fall Risk  Goal: Patient will remain free from falls  Outcome: Progressing  Note: Strip alarm is on, patient uses call light prior to getting up.        Patient is not progressing towards the following goals:      Problem: Respiratory  Goal: Patient will achieve/maintain optimum respiratory ventilation and gas exchange  Outcome: Not Progressing  Note: Patient continues to require 4L O2, baseline is room air.

## 2024-12-30 NOTE — PROGRESS NOTES
Subjective     Lizzette Burton is a 84 y.o. female who presents with Fever (X1 week fever, body ache, cough, chest pain.)            Patient is here with relative who acts as the patient's .    Cough  This is a new problem. Episode onset: 1 week. The problem has been gradually worsening. The problem occurs constantly. The cough is Productive of sputum. Associated symptoms include chest pain, chills, a fever, myalgias, nasal congestion, rhinorrhea and shortness of breath. Pertinent negatives include no ear pain, headaches, hemoptysis, postnasal drip, rash, sore throat or wheezing. The symptoms are aggravated by lying down. She has tried OTC cough suppressant for the symptoms. The treatment provided no relief.     Past Medical History:   Diagnosis Date    Acute renal failure ([facility]) 02/11/2014    Anesthesia     PONV    Angina     CONSULT NEG. RESULTS    Arthritis     bilateral knees    Asthma     Breath shortness     WITH EXERCISE    CATARACT     bilat IOL    Dental disorder     upper/lower partials    Glaucoma     Heart burn     Hypertension     Indigestion     Pneumonia 03/2018    Rib fractures 12/27/2022    Sepsis ([facility]) 02/08/2014    Stroke ([facility])     denies hx. of stroke    Unspecified disorder of thyroid     'thyroid medicine taken in past'    Unspecified hemorrhagic conditions 2009     treated in er,nose bleed    Urinary bladder disorder     Urinary incontinence     wears pads     Past Surgical History:   Procedure Laterality Date    MUSCLE BIOPSY Left 10/18/2023    Procedure: LEFT DELTOID BIOPSY;  Surgeon: Miller Hernandez M.D.;  Location: SURGERY [facility];  Service: Neurosurgery    SHOULDER ARTHROPLASTY TOTAL Right 05/02/2018    Procedure: SHOULDER ARTHROPLASTY TOTAL/ REVERSE;  Surgeon: Scott Payton M.D.;  Location: SURGERY SAME DAY [facility];  Service: Orthopedics    ANTERIOR AND POSTERIOR REPAIR  03/20/2017    Procedure: ANTERIOR AND POSTERIOR REPAIR;  Surgeon: Regulo Burton  M.D.;  Location: SURGERY SAME DAY [facility];  Service:     ENTEROCELE REPAIR  03/20/2017    Procedure: ENTEROCELE REPAIR - PERINEOPLASTY;  Surgeon: Regulo Burton M.D.;  Location: SURGERY SAME DAY [facility];  Service:     BLADDER SLING FEMALE  03/20/2017    Procedure: BLADDER SLING FEMALE - TOT;  Surgeon: Regulo Burton M.D.;  Location: SURGERY SAME DAY [facility];  Service:     VAGINAL SUSPENSION  03/20/2017    Procedure: VAGINAL SUSPENSION -SACROSPINOUS VAULT;  Surgeon: Regulo Burton M.D.;  Location: SURGERY SAME DAY [facility];  Service:     KNEE ARTHROPLASTY TOTAL Left 08/20/2015    Procedure: KNEE ARTHROPLASTY TOTAL;  Surgeon: Juan Manuel Choi M.D.;  Location: [facility];  Service:     BAERVELDT IMPLANT  05/08/2013    Performed by Miller Fletcher M.D. at SURGERY SAME DAY [facility]    CATARACT PHACO WITH IOL  05/08/2013    Performed by Miller Fletcher M.D. at SURGERY SAME DAY [facility]    BAERVELDT IMPLANT  03/13/2013    Performed by Miller Fletcher M.D. at SURGERY SAME DAY [facility]    BAERVELDT IMPLANT  12/12/2012    Performed by Miller Fletcher M.D. at SURGERY SAME DAY [facility]    TRABECULECTOMY  09/26/2012    Performed by Miller Fletcher M.D. at SURGERY SAME DAY [facility]    CATARACT PHACO WITH IOL  09/26/2012    Performed by Miller Fletcher M.D. at SURGERY SAME DAY [facility]    KNEE ARTHROPLASTY TOTAL  02/05/2009    Performed by GISELA OLSON at SURGERY [facility]    HYSTERECTOMY, TOTAL ABDOMINAL  01/01/1989    GYN SURGERY      OTHER      R shoulder         History reviewed. No pertinent family history.  Allergies:  Oxycodone and Tramadol      Medications, Allergies, and current problem list reviewed today in Epic      Review of Systems   Constitutional:  Positive for chills, fever and malaise/fatigue.   HENT:  Positive for congestion and rhinorrhea. Negative for ear pain, postnasal drip and sore throat.    Respiratory:  Positive for cough,  "sputum production and shortness of breath. Negative for hemoptysis and wheezing.    Cardiovascular:  Positive for chest pain.   Gastrointestinal:  Negative for abdominal pain, diarrhea, nausea and vomiting.   Musculoskeletal:  Positive for myalgias.   Skin:  Negative for rash.   Neurological:  Negative for headaches.     All other systems reviewed and are negative.            Objective     /72 (BP Location: Right arm, Patient Position: Sitting, BP Cuff Size: Adult)   Pulse (!) 44   Temp 36.5 °C (97.7 °F)   Resp (!) 26   Ht 1.562 m (5' 1.5\")   Wt 81.3 kg (179 lb 4.8 oz)   SpO2 92%   BMI 33.33 kg/m²      Physical Exam  Constitutional:       General: She is not in acute distress.     Appearance: She is ill-appearing. She is not toxic-appearing.   HENT:      Head: Normocephalic and atraumatic.      Right Ear: Tympanic membrane, ear canal and external ear normal.      Left Ear: Tympanic membrane, ear canal and external ear normal.      Nose: Nose normal.      Mouth/Throat:      Mouth: Mucous membranes are moist.      Pharynx: No posterior oropharyngeal erythema.   Eyes:      Conjunctiva/sclera: Conjunctivae normal.   Cardiovascular:      Rate and Rhythm: Regular rhythm. Bradycardia present.      Heart sounds: Normal heart sounds.      Comments: According to previous charts- this low HR is not abnormal for her  Pulmonary:      Effort: No respiratory distress.      Breath sounds: No stridor. No wheezing, rhonchi or rales.      Comments: Decreased breath sounds bilateral lower lungs  Skin:     General: Skin is warm and dry.   Neurological:      General: No focal deficit present.      Mental Status: She is alert and oriented to person, place, and time.   Psychiatric:         Mood and Affect: Mood normal.         Behavior: Behavior normal.         Thought Content: Thought content normal.         Judgment: Judgment normal.               12/28/2024 2:49 PM     HISTORY/REASON FOR EXAM:  Shortness of Breath; cough, " fever, SOB  Cough, weakness, shortness of breath, fever x one week.        TECHNIQUE/EXAM DESCRIPTION AND NUMBER OF VIEWS:  Two views of the chest.     COMPARISON:  None.     FINDINGS:     Patchy opacities in the bilateral lung bases.  No pleural effusions, no pneumothorax are appreciated.  Normal cardiopericardial silhouette.        IMPRESSION:        Patchy opacities in the bilateral lung bases, atelectasis or infection.                        Assessment & Plan        Assessment & Plan  Acute cough    Orders:    DX-CHEST-2 VIEWS; Future    benzonatate (TESSALON) 200 MG capsule; Take 1 Capsule by mouth 3 times a day as needed for Cough.    Shortness of breath    Orders:    DX-CHEST-2 VIEWS; Future    albuterol 108 (90 Base) MCG/ACT Aero Soln inhalation aerosol; Inhale 2 Puffs every 6 hours as needed for Shortness of Breath.    Community acquired pneumonia, unspecified laterality    Orders:    amoxicillin-clavulanate (AUGMENTIN) 875-125 MG Tab; Take 1 Tablet by mouth 2 times a day for 5 days.    azithromycin (ZITHROMAX) 250 MG Tab; Take 2 tablets (500 mg) PO on day 1 and then take 1 tablet (250 mg) daily on 2-5        Patient is stable upon discharge. Very Strict ER  precautions discussed. Recommend ER evaluation if any worsening in symptoms including uncontrollable fevers, worsening cough/shortness of breath or difficulty breathing.     Patient and relative verbalized understanding.     Differential diagnoses, Supportive care, and indications for immediate follow-up discussed with patient/relative.   Pathogenesis of diagnosis discussed including typical length and natural progression.   Instructed to return to clinic or nearest emergency department for any change in condition, further concerns, or worsening of symptoms.      The patient and relative  demonstrated a good understanding and agreed with the treatment plan.      Radha Colmenares P.A.-C.

## 2024-12-30 NOTE — ASSESSMENT & PLAN NOTE
Orders:    amoxicillin-clavulanate (AUGMENTIN) 875-125 MG Tab; Take 1 Tablet by mouth 2 times a day for 5 days.    azithromycin (ZITHROMAX) 250 MG Tab; Take 2 tablets (500 mg) PO on day 1 and then take 1 tablet (250 mg) daily on 2-5

## 2024-12-30 NOTE — PROGRESS NOTES
Bedside report received from off going RN/tech: Keyla, assumed care of patient.     Fall Risk Score:  HIGH RISK  Fall risk interventions in place: Provide patient/family education based on risk assessment, Educate patient/family to call staff for assistance when getting out of bed, Place fall precaution signage outside patient door, and Utilize bed/chair fall alarm  Bed type: Regular (Rory Score less than 17 interventions in place)  Patient on cardiac monitor: Yes  IVF/IV medications: Infusion per MAR (List Med(s)) NS  Oxygen: How many liters 4L  Bedside sitter: Not Applicable   Isolation: Not applicable

## 2024-12-30 NOTE — ASSESSMENT & PLAN NOTE
SIRS criteria identified on my evaluation include:  Tachycardia, with heart rate greater than 90 BPM and Tachypnea, with respirations greater than 20 per minute

## 2024-12-30 NOTE — PROGRESS NOTES
Bedside report received from off going RN/tech: Carol, assumed care of patient.     Fall Risk Score: HIGH RISK  Fall risk interventions in place: Place yellow fall risk ID band on patient, Provide patient/family education based on risk assessment, Educate patient/family to call staff for assistance when getting out of bed, Place fall precaution signage outside patient door, Utilize bed/chair fall alarm, Notify charge of high risk for huddle, and Bed alarm connected correctly  Bed type: Regular (Rory Score less than 17 interventions in place)  Patient on cardiac monitor: Yes  IVF/IV medications: Infusion per MAR (List Med(s)) NS  Oxygen: How many liters 4L and Traced the line to wall oxygen  Bedside sitter: Not Applicable   Isolation: Isolation precautions in place

## 2024-12-30 NOTE — H&P
Hospital Medicine History & Physical Note    Date of Service  12/29/2024    Primary Care Physician  LIBERTY Henderson    Consultants  None    Code Status  Full Code    Chief Complaint  Chief Complaint   Patient presents with    Flu Like Symptoms     Pt report worsening HA, fever, cough, neck and chest pain x1wk. Per pt's daughter, she took pt at  yesterday and was told pt has PNA, sent home with medications but with no relief.        History of Presenting Illness  Lizzette Burton is a 84 y.o. female who presented 12/29/2024 with flulike symptoms.  About a week ago, patient began to have generalized weakness and frequent coughs.  This has progressed, patient reports myalgias, subjective fever, decreased appetite, lethargy.  She went to urgent care yesterday, and was prescribed albuterol and cough medicine.  As symptoms persisted, she decided to come into the ER today for further evaluation.    In ER, patient hypoxic.  Found to be positive for influenza A.  Chest x-ray negative for acute cardiopulmonary abnormality.  Admitted for hypoxemia secondary to flu.    I discussed the plan of care with patient.    Review of Systems  ROS    Past Medical History   has a past medical history of Acute renal failure (HCC) (02/11/2014), Anesthesia, Angina, Arthritis, Asthma, Breath shortness, CATARACT, Dental disorder, Glaucoma, Heart burn, Hypertension, Indigestion, Pneumonia (03/2018), Rib fractures (12/27/2022), Sepsis (HCC) (02/08/2014), Stroke (HCC), Unspecified disorder of thyroid, Unspecified hemorrhagic conditions (2009), Urinary bladder disorder, and Urinary incontinence.    Surgical History   has a past surgical history that includes hysterectomy, total abdominal (01/01/1989); gyn surgery; trabeculectomy (09/26/2012); cataract phaco with iol (09/26/2012); knee arthroplasty total (02/05/2009); baerveldt implant (12/12/2012); baerveldt implant (03/13/2013); baerveldt implant (05/08/2013); cataract phaco with iol  (05/08/2013); knee arthroplasty total (Left, 08/20/2015); anterior and posterior repair (03/20/2017); enterocele repair (03/20/2017); bladder sling female (03/20/2017); vaginal suspension (03/20/2017); shoulder arthroplasty total (Right, 05/02/2018); other; and muscle biopsy (Left, 10/18/2023).     Family History  family history is not on file.   Family history reviewed with patient. There is no family history that is pertinent to the chief complaint.     Social History   reports that she quit smoking about 22 years ago. Her smoking use included cigarettes. She started smoking about 42 years ago. She has a 10 pack-year smoking history. She has never used smokeless tobacco. She reports that she does not drink alcohol and does not use drugs.    Allergies  Allergies   Allergen Reactions    Oxycodone Nausea    Tramadol Nausea       Medications  Prior to Admission Medications   Prescriptions Last Dose Informant Patient Reported? Taking?   Brimonidine Tartrate-Timolol 0.2-0.5 % Solution  Family Member Yes No   Sig: Administer 1 Drop into both eyes in the morning, at noon, and at bedtime. Indications: Glaucoma   CALCIUM PO  Family Member Yes No   Sig: Take 1 Tablet by mouth every day.   albuterol 108 (90 Base) MCG/ACT Aero Soln inhalation aerosol   No No   Sig: Inhale 2 Puffs every 6 hours as needed for Shortness of Breath.   alendronate (FOSAMAX) 70 MG Tab  Family Member Yes No   Sig: Take 70 mg by mouth every 7 days. Indications: Osteoporosis   amLODIPine (NORVASC) 10 MG Tab  Family Member No No   Sig: Take 1 Tablet by mouth every day.   amoxicillin-clavulanate (AUGMENTIN) 875-125 MG Tab   No No   Sig: Take 1 Tablet by mouth 2 times a day for 5 days.   azithromycin (ZITHROMAX) 250 MG Tab   No No   Sig: Take 2 tablets (500 mg) PO on day 1 and then take 1 tablet (250 mg) daily on 2-5   benzonatate (TESSALON) 200 MG capsule   No No   Sig: Take 1 Capsule by mouth 3 times a day as needed for Cough.   diclofenac sodium  (VOLTAREN) 1 % Gel   No No   Sig: Apply 4 g topically 4 times a day as needed (apply to affected areas for pain).   latanoprost (XALATAN) 0.005 % Solution  Family Member Yes No   Sig: Administer 1 Drop into both eyes every evening. Indications: Wide-Angle Glaucoma   levothyroxine (SYNTHROID) 100 MCG Tab  Family Member No No   Sig: Take 1 Tablet by mouth every morning before breakfast.   losartan (COZAAR) 100 MG Tab  Family Member No No   Sig: Take 1 Tablet by mouth every day.   spironolactone (ALDACTONE) 25 MG Tab  Family Member No No   Sig: Take 1 Tablet by mouth every day.      Facility-Administered Medications: None       Physical Exam  Temp:  [36.6 °C (97.8 °F)-36.6 °C (97.9 °F)] 36.6 °C (97.9 °F)  Pulse:  [94-96] 94  Resp:  [16-22] 22  BP: (137-164)/(72-83) 164/72  SpO2:  [86 %-97 %] 97 %  Blood Pressure : (!) 164/72   Temperature: 36.6 °C (97.9 °F)   Pulse: 94   Respiration: (!) 22   Pulse Oximetry: 97 %       Physical Exam  Constitutional:       Appearance: Normal appearance. She is obese.   HENT:      Head: Normocephalic.      Nose: Nose normal.      Mouth/Throat:      Mouth: Mucous membranes are moist.   Eyes:      Pupils: Pupils are equal, round, and reactive to light.   Cardiovascular:      Rate and Rhythm: Regular rhythm. Tachycardia present.   Pulmonary:      Effort: Pulmonary effort is normal.      Comments: Rhonchi heard  Expiratory wheezing heard  Abdominal:      General: Abdomen is flat. Bowel sounds are normal.      Palpations: Abdomen is soft.   Musculoskeletal:         General: Normal range of motion.      Cervical back: Neck supple.   Skin:     General: Skin is warm.   Neurological:      General: No focal deficit present.      Mental Status: She is alert and oriented to person, place, and time. Mental status is at baseline.   Psychiatric:         Mood and Affect: Mood normal.         Behavior: Behavior normal.         Thought Content: Thought content normal.         Judgment: Judgment normal.          Laboratory:  Recent Labs     12/29/24  1646   WBC 6.4   RBC 4.59   HEMOGLOBIN 14.2   HEMATOCRIT 42.8   MCV 93.2   MCH 30.9   MCHC 33.2   RDW 45.1   PLATELETCT 256   MPV 8.2*     Recent Labs     12/29/24  1646   SODIUM 129*   POTASSIUM 4.0   CHLORIDE 97   CO2 22   GLUCOSE 97   BUN 28*   CREATININE 1.06   CALCIUM 8.7     Recent Labs     12/29/24  1646   ALTSGPT 13   ASTSGOT 20   ALKPHOSPHAT 62   TBILIRUBIN 0.2   GLUCOSE 97         Recent Labs     12/29/24  1646   NTPROBNP 429*         Recent Labs     12/29/24  1646   TROPONINT 23*       Imaging:  DX-CHEST-PORTABLE (1 VIEW)   Final Result      No acute cardiopulmonary abnormality.             X-Ray:  I have personally reviewed the images and compared with prior images.    Assessment/Plan:  Justification for Admission Status  I anticipate this patient will require at least two midnights for appropriate medical management, necessitating inpatient admission because patient has acute hypoxemic respiratory failure    Patient will need a Telemetry bed on EMERGENCY service .  The need is secondary to hypoxemia.    * Acute hypoxemic respiratory failure (HCC)  Assessment & Plan  Secondary to influenza A, no flu shot this year  Start Tamiflu, no indication for antibiotics at this time.  Chest x-ray negative for any acute cardiopulmonary abnormality  DuoNebs as needed.  I appreciated rhonchi and wheezing on physical exam  Oxygen as needed, keep O2 over 90%    ACP (advance care planning)  Assessment & Plan  17 minutes spent discussing goals of care with patient.  I went over the processes of CPR, which can include chest compressions and intubation if patient clinically deteriorates.  I explained that due to her age, the chances of ROSC being achieved let alone having a normal quality of life is extremely low.  Knowing this, they state that they want everything done.    SIRS (systemic inflammatory response syndrome) ([facility])  Assessment & Plan  SIRS criteria identified on my  evaluation include:  Tachycardia, with heart rate greater than 90 BPM and Tachypnea, with respirations greater than 20 per minute      Hypertension- (present on admission)  Assessment & Plan  Restart as needed    Hypothyroidism- (present on admission)  Assessment & Plan  Synthroid     Hyponatremia- (present on admission)  Assessment & Plan  Likely from dehydration  Fluids  Repeat in am        VTE prophylaxis: enoxaparin ppx

## 2024-12-30 NOTE — DISCHARGE PLANNING
Care Transition Team Assessment  Patient is an 84 year-old female admitted for flu like symptoms. Please see pt's H&P for prior medical history. RNCM met with pt and daughter at bedside to complete assessment. Pt A&Ox4 and able to verify the information on the face sheet. Pt lives with daughter Maritza Simmons in a single-story, upstairs apt. Emergency contact is daughter Maritza [phone]. Prior to admission patient uses a FWW, no home O2, does not drive but family assists patient with transportation. The patient's PCP is Cassie ROSAS. Patient's preferred pharmacy is [facility]. Patient denies a history of SNF/IPR , has had HHC use in the past ([facility]).Pt denies any SA or MH concerns. Patients confirmed medical coverage with [facility] and Medicaid FFS.  Patient has means to transportation and family will provide transport once medically stable for discharge. RNCM discussed discharge planning. Patient reported pt's goal is to return home once medically stable.      Information Source  Orientation Level: Oriented X4  Information Given By: Patient, Relative  Informant's Name: Maritza Simmons (daughter)  Who is responsible for making decisions for patient? : Patient    Readmission Evaluation  Is this a readmission?: No    Elopement Risk  Legal Hold: No  Ambulatory or Self Mobile in Wheelchair: Yes  Disoriented: No  Psychiatric Symptoms: None  History of Wandering: No  Elopement this Admit: No  Vocalizing Wanting to Leave: No  Displays Behaviors, Body Language Wanting to Leave: No-Not at Risk for Elopement  Elopement Risk: Not at Risk for Elopement    Interdisciplinary Discharge Planning  Lives with - Patient's Self Care Capacity: Adult Children, Child Less than 18 Years of Age  Patient or legal guardian wants to designate a caregiver: Yes  Caregiver name: Denise Simmons  Support Systems: Family Member(s)  Housing / Facility: 2 Story House    Discharge Preparedness  What is your plan after discharge?: Home with  help  What are your discharge supports?: Child  Prior Functional Level: Ambulatory, Needs Assist with Activities of Daily Living, Independent with Medication Management, Uses Walker  Difficulity with ADLs: Walking  Difficulty with ADLs Comment: fww  Difficulity with IADLs: Driving, Shopping  Difficulity with IADL Comments: family    Functional Assesment  Prior Functional Level: Ambulatory, Needs Assist with Activities of Daily Living, Independent with Medication Management, Uses Walker    Finances  Financial Barriers to Discharge: No    Vision / Hearing Impairment  Vision Impairment : Yes  Right Eye Vision: Impaired, Wears Glasses  Left Eye Vision: Impaired, Wears Glasses  Hearing Impairment : No    Advance Directive  Advance Directive?: DPOA for Health Care  Durable Power of  Name and Contact : Dianna Simmons [phone]    Domestic Abuse  Have you ever been the victim of abuse or violence?: No  Possible Abuse/Neglect Reported to:: Not Applicable    Psychological Assessment  History of Substance Abuse: None  History of Psychiatric Problems: No  Non-compliant with Treatment: No  Newly Diagnosed Illness: No    Discharge Risks or Barriers  Discharge risks or barriers?: No    Anticipated Discharge Information  Discharge Disposition: Discharged to home/self care (01)  Discharge Address: [facility] [address]  [address] [facility] [address]  Discharge Contact Phone Number: [phone]  Case Management Discharge Planning    Admission Date: 12/29/2024  GMLOS: 4  ALOS: 1    6-Clicks ADL Score: 17  6-Clicks Mobility Score: 16  PT and/or OT Eval ordered: Yes  Post-acute Referrals Ordered: No  Post-acute Choice Obtained: No  Has referral(s) been sent to post-acute provider:  No    Anticipated Discharge Dispo: Discharge Disposition: Discharged to home/self care (01)  Discharge Address: [facility] [address]  [address] [facility] [address]  Discharge Contact Phone Number: [phone]    DME Needed: anticipate home O2 needs    Action(s) Taken: SERGEI  Assessment Complete (See below)    Escalations Completed: None    Medically Clear: No    Next Steps: RNCM to continue to follow patient for DCP needs.     Barriers to Discharge: Medical clearance and DME

## 2024-12-30 NOTE — ED NOTES
Med rec updated and complete. Allergies reviewed.      Interviewed family ( daughter) at bedside.  Daughter has some of the pts medications at bedside.    Pt is currently on Augmentin and Azithromycin.   Start dates 12/28/24.  Last doses 12/29/24.    No anticoagulant medications.      Preferred pharmacy   [facility] = [phone]

## 2024-12-30 NOTE — ASSESSMENT & PLAN NOTE
Secondary to influenza A, no flu shot this year  Start Tamiflu, no indication for antibiotics at this time.  Chest x-ray negative for any acute cardiopulmonary abnormality  DuoNebs as needed.  I appreciated rhonchi and wheezing on physical exam  Oxygen as needed, keep O2 over 90%

## 2024-12-30 NOTE — ED PROVIDER NOTES
ER Provider Note    Scribed for LORETTA St.* by Latonia Kulkarni. 12/29/2024  4:57 PM    Primary Care Provider: LIBERTY Henderson    CHIEF COMPLAINT  Chief Complaint   Patient presents with    Flu Like Symptoms     Pt report worsening HA, fever, cough, neck and chest pain x1wk. Per pt's daughter, she took pt at  yesterday and was told pt has PNA, sent home with medications but with no relief.        EXTERNAL RECORDS REVIEWED  Outpatient Notes Patient seen at  yesterday. Imaging of the chest showed patchy infiltrates bilaterally.     HPI/ROS  LIMITATION TO HISTORY   Select: Language Bulgarian,  Used     OUTSIDE HISTORIAN(S):  Family Daughter at bedside who provides helpful information    Lizzette Burton is a 84 y.o. female, with a history of acute renal failure, asthma, hypertension, who presents to the ED for evaluation of worsening shortness of breath and flu like symptoms onset one week ago. The patient was seen yesterday at  and diagnosed with Pneumonia. She got an albuterol treatment which helped, but has not taken any medications since. She has been experiencing symptoms of mid sternal chest pain, cough, fever (tmax 104 °F). She reports that the chest pain only occurs with cough. She is not dependent on O2 in the home and is now requiring 6L nasal cannula to keep her oxygen saturation up. She did not receive a flu shot this year.     PAST MEDICAL HISTORY  Past Medical History:   Diagnosis Date    Acute renal failure (HCC) 02/11/2014    Anesthesia     PONV    Angina     CONSULT NEG. RESULTS    Arthritis     bilateral knees    Asthma     Breath shortness     WITH EXERCISE    CATARACT     bilat IOL    Dental disorder     upper/lower partials    Glaucoma     Heart burn     Hypertension     Indigestion     Pneumonia 03/2018    Rib fractures 12/27/2022    Sepsis ([facility]) 02/08/2014    Stroke ([facility])     denies hx. of stroke    Unspecified disorder of thyroid     'thyroid medicine  taken in past'    Unspecified hemorrhagic conditions 2009     treated in er,nose bleed    Urinary bladder disorder     Urinary incontinence     wears pads       SURGICAL HISTORY  Past Surgical History:   Procedure Laterality Date    MUSCLE BIOPSY Left 10/18/2023    Procedure: LEFT DELTOID BIOPSY;  Surgeon: Miller Hernandez M.D.;  Location: [facility];  Service: Neurosurgery    SHOULDER ARTHROPLASTY TOTAL Right 05/02/2018    Procedure: SHOULDER ARTHROPLASTY TOTAL/ REVERSE;  Surgeon: Scott Payton M.D.;  Location: SURGERY SAME DAY [facility];  Service: Orthopedics    ANTERIOR AND POSTERIOR REPAIR  03/20/2017    Procedure: ANTERIOR AND POSTERIOR REPAIR;  Surgeon: Regulo Burton M.D.;  Location: SURGERY SAME DAY [facility];  Service:     ENTEROCELE REPAIR  03/20/2017    Procedure: ENTEROCELE REPAIR - PERINEOPLASTY;  Surgeon: Regulo Burton M.D.;  Location: SURGERY SAME DAY [facility];  Service:     BLADDER SLING FEMALE  03/20/2017    Procedure: BLADDER SLING FEMALE - TOT;  Surgeon: Regulo Burton M.D.;  Location: SURGERY SAME DAY [facility];  Service:     VAGINAL SUSPENSION  03/20/2017    Procedure: VAGINAL SUSPENSION -SACROSPINOUS VAULT;  Surgeon: Regulo Burton M.D.;  Location: SURGERY SAME DAY [facility];  Service:     KNEE ARTHROPLASTY TOTAL Left 08/20/2015    Procedure: KNEE ARTHROPLASTY TOTAL;  Surgeon: Juan Manuel Choi M.D.;  Location: [facility];  Service:     BAERVELDT IMPLANT  05/08/2013    Performed by Miller Fletcher M.D. at SURGERY SAME DAY [facility]    CATARACT PHACO WITH IOL  05/08/2013    Performed by Miller Fletcher M.D. at SURGERY SAME DAY [facility]    BAERVELDT IMPLANT  03/13/2013    Performed by Miller Fletcher M.D. at SURGERY SAME DAY [facility]    BAERVELDT IMPLANT  12/12/2012    Performed by Miller Fletcher M.D. at SURGERY SAME DAY [facility]    TRABECULECTOMY  09/26/2012    Performed by Miller Fletcher M.D. at SURGERY SAME DAY  [facility]    CATARACT PHACO WITH IOL  09/26/2012    Performed by Miller Fletcher M.D. at SURGERY SAME DAY [facility]    KNEE ARTHROPLASTY TOTAL  02/05/2009    Performed by GISELA OLSON at SURGERY [facility]    HYSTERECTOMY, TOTAL ABDOMINAL  01/01/1989    GYN SURGERY      OTHER      R shoulder       FAMILY HISTORY  History reviewed. No pertinent family history.    SOCIAL HISTORY   reports that she quit smoking about 22 years ago. Her smoking use included cigarettes. She started smoking about 42 years ago. She has a 10 pack-year smoking history. She has never used smokeless tobacco. She reports that she does not drink alcohol and does not use drugs.    CURRENT MEDICATIONS  Previous Medications    ALBUTEROL 108 (90 BASE) MCG/ACT AERO SOLN INHALATION AEROSOL    Inhale 2 Puffs every 6 hours as needed for Shortness of Breath.    ALENDRONATE (FOSAMAX) 70 MG TAB    Take 70 mg by mouth every 7 days. Indications: Osteoporosis    AMLODIPINE (NORVASC) 10 MG TAB    Take 1 Tablet by mouth every day.    AMOXICILLIN-CLAVULANATE (AUGMENTIN) 875-125 MG TAB    Take 1 Tablet by mouth 2 times a day for 5 days.    AZITHROMYCIN (ZITHROMAX) 250 MG TAB    Take 2 tablets (500 mg) PO on day 1 and then take 1 tablet (250 mg) daily on 2-5    BENZONATATE (TESSALON) 200 MG CAPSULE    Take 1 Capsule by mouth 3 times a day as needed for Cough.    BRIMONIDINE TARTRATE-TIMOLOL 0.2-0.5 % SOLUTION    Administer 1 Drop into both eyes in the morning, at noon, and at bedtime. Indications: Glaucoma    CALCIUM PO    Take 1 Tablet by mouth every day.    DICLOFENAC SODIUM (VOLTAREN) 1 % GEL    Apply 4 g topically 4 times a day as needed (apply to affected areas for pain).    LATANOPROST (XALATAN) 0.005 % SOLUTION    Administer 1 Drop into both eyes every evening. Indications: Wide-Angle Glaucoma    LEVOTHYROXINE (SYNTHROID) 100 MCG TAB    Take 1 Tablet by mouth every morning before breakfast.    LOSARTAN (COZAAR) 100 MG TAB    Take 1 Tablet by  "mouth every day.    SPIRONOLACTONE (ALDACTONE) 25 MG TAB    Take 1 Tablet by mouth every day.       ALLERGIES  Oxycodone and Tramadol    PHYSICAL EXAM  /83   Pulse 96   Temp 36.6 °C (97.8 °F) (Temporal)   Resp 16   Ht 1.549 m (5' 1\")   Wt 79.7 kg (175 lb 11.3 oz)   SpO2 95%   BMI 33.20 kg/m²   Constitutional: Well developed, Well nourished, mild distress.   HENT: Normocephalic, Atraumatic.   Eyes: Conjunctiva normal, No discharge.   Neck: Supple, No stridor  Cardiovascular: Tachycardic heart rate, Normal rhythm, No murmurs, equal pulses.   Pulmonary: Occasional rales at the base. No respiratory distress, No wheezing, No rales, No rhonchi.  Chest: No chest wall tenderness or deformity.   Abdomen:Soft, No tenderness, No masses, no rebound, no guarding.   Back: No CVA tenderness.   Musculoskeletal: No major deformities noted, No tenderness. No calf tenderness, no edema  Skin: Warm, Dry, No erythema, No rash.   Neurologic: Alert & oriented x 3, Normal motor function,  No focal deficits noted.   Psychiatric: Affect normal, Judgment normal, Mood normal.     DIAGNOSTIC STUDIES & PROCEDURES    Labs:   Results for orders placed or performed during the hospital encounter of 12/29/24   CBC with Differential    Collection Time: 12/29/24  4:46 PM   Result Value Ref Range    WBC 6.4 4.8 - 10.8 K/uL    RBC 4.59 4.20 - 5.40 M/uL    Hemoglobin 14.2 12.0 - 16.0 g/dL    Hematocrit 42.8 37.0 - 47.0 %    MCV 93.2 81.4 - 97.8 fL    MCH 30.9 27.0 - 33.0 pg    MCHC 33.2 32.2 - 35.5 g/dL    RDW 45.1 35.9 - 50.0 fL    Platelet Count 256 164 - 446 K/uL    MPV 8.2 (L) 9.0 - 12.9 fL    Neutrophils-Polys 62.70 44.00 - 72.00 %    Lymphocytes 22.20 22.00 - 41.00 %    Monocytes 13.80 (H) 0.00 - 13.40 %    Eosinophils 0.50 0.00 - 6.90 %    Basophils 0.50 0.00 - 1.80 %    Immature Granulocytes 0.30 0.00 - 0.90 %    Nucleated RBC 0.00 0.00 - 0.20 /100 WBC    Neutrophils (Absolute) 4.02 1.82 - 7.42 K/uL    Lymphs (Absolute) 1.42 1.00 - " 4.80 K/uL    Monos (Absolute) 0.88 (H) 0.00 - 0.85 K/uL    Eos (Absolute) 0.03 0.00 - 0.51 K/uL    Baso (Absolute) 0.03 0.00 - 0.12 K/uL    Immature Granulocytes (abs) 0.02 0.00 - 0.11 K/uL    NRBC (Absolute) 0.00 K/uL   Complete Metabolic Panel (CMP)    Collection Time: 12/29/24  4:46 PM   Result Value Ref Range    Sodium 129 (L) 135 - 145 mmol/L    Potassium 4.0 3.6 - 5.5 mmol/L    Chloride 97 96 - 112 mmol/L    Co2 22 20 - 33 mmol/L    Anion Gap 10.0 7.0 - 16.0    Glucose 97 65 - 99 mg/dL    Bun 28 (H) 8 - 22 mg/dL    Creatinine 1.06 0.50 - 1.40 mg/dL    Calcium 8.7 8.5 - 10.5 mg/dL    Correct Calcium 8.9 8.5 - 10.5 mg/dL    AST(SGOT) 20 12 - 45 U/L    ALT(SGPT) 13 2 - 50 U/L    Alkaline Phosphatase 62 30 - 99 U/L    Total Bilirubin 0.2 0.1 - 1.5 mg/dL    Albumin 3.7 3.2 - 4.9 g/dL    Total Protein 7.0 6.0 - 8.2 g/dL    Globulin 3.3 1.9 - 3.5 g/dL    A-G Ratio 1.1 g/dL   proBrain Natriuretic Peptide, NT (BNP)    Collection Time: 12/29/24  4:46 PM   Result Value Ref Range    NT-proBNP 429 (H) 0 - 125 pg/mL   Troponins NOW    Collection Time: 12/29/24  4:46 PM   Result Value Ref Range    Troponin T 23 (H) 6 - 19 ng/L   ESTIMATED GFR    Collection Time: 12/29/24  4:46 PM   Result Value Ref Range    GFR (CKD-EPI) 51 (A) >60 mL/min/1.73 m 2   POC CoV-2, FLU A/B, RSV by PCR    Collection Time: 12/29/24  5:43 PM   Result Value Ref Range    POC Influenza A RNA, PCR POSITIVE (A) Negative    POC Influenza B RNA, PCR Negative Negative    POC RSV, by PCR Negative Negative    POC SARS-CoV-2, PCR NotDetected NotDetected   Troponins in two (2) hours    Collection Time: 12/29/24  7:00 PM   Result Value Ref Range    Troponin T 19 6 - 19 ng/L   EKG    Collection Time: 12/29/24  8:50 PM   Result Value Ref Range    Report       [facility] Emergency Dept.    Test Date:  2024-12-29  Pt Name:    MONICA TRIMBLE                 Department: ER  MRN:        [id number]                      Room:  Gender:     Female                        Technician: [id number]  :        [id number]                   Requested By:ER TRIAGE PROTOCOL  Order #:    [id number]                    Reading MD: ALIREZA DYER MD    Measurements  Intervals                                Axis  Rate:       90                           P:          24  CA:         232                          QRS:        -33  QRSD:       107                          T:          99  QT:         351  QTc:        430    Interpretive Statements  Sinus rhythm, Rate of 90, leftward axis, multiple PVCs, No ST elevation  Multiple ventricular premature complexes  Prolonged CA interval  Abnormal R-wave progression, late transition  LVH with secondary repolarization abnormality  Compared to ECG 2024 10:37:13  First degree AV block now present  Left vent ricular hypertrophy now present  Early repolarization now present  Intraventricular conduction delay no longer present  Electronically Signed On 2024 20:50:50 PST by ALIREZA DYER MD       All labs reviewed by me.    EKG:   I have independently interpreted this EKG     Radiology:   The attending Emergency Physician has independently interpreted the diagnostic imaging associated with this visit and is awaiting the final reading from the radiologist, which will be displayed below.    Preliminary interpretation is a follows: Chest x-ray does not show any lobar pneumonia.  Radiologist interpretation:    DX-CHEST-PORTABLE (1 VIEW)   Final Result      No acute cardiopulmonary abnormality.              COURSE & MEDICAL DECISION MAKING    Hydration: Based on the patient's presentation of Dehydration the patient was given IV fluids. IV Hydration was used because oral hydration was not adequate alone. Upon recheck following hydration, the patient was mildly improved.     INITIAL ASSESSMENT AND PLAN  Care Narrative:       4:57 PM - Patient seen and evaluated at bedside. Discussed plan of care, including labs, imaging, and starting the  patient on antibiotics for PNA. Patient agrees to plan of care. Ordered EKG, DX-chest, CBC with diff, CMP, troponin, POCT by PCR, to evaluate. Differential diagnoses include but are not limited to: Covid, flu, PNA    6:44 PM- Patient reassessed at bedside. Discussed lab and imaging results. Discussed treatment with Tamiflu and albuterol and admission to the hospital. Patient verbalizes understanding and agreement to this plan of care.     6:58 PM- I discussed the patient's case and the above findings with Dr. Wynn (hospitalist) who agrees to hospitalize the patient and take over the plan of care moving forward.       PROBLEM LIST AND DISPOSITION  Patient presents with hypoxia and cough and bodyaches at this point time this looks to be secondary to influenza.  I do not see any secondary signs of a secondary pneumonia.  Patient will be started on Tamiflu.  Patient is still hypoxic and requiring oxygen I think she would benefit from hospitalization.  Patient also appears to be mildly hyponatremic she was started on IV fluids here.  Her EKG does not show any ST elevation she has a mild elevated troponin I think this will need to be trended.               DISPOSITION AND DISCUSSIONS  I have discussed management of the patient with the following physicians and LENO's: Dr. Wynn (hospitalist)    Discussion of management with other [place] [facility] or appropriate source(s): None     DISPOSITION:  Patient will be hospitalized by Dr. Wynn in guarded condition.    FINAL IMPRESSION   1. Influenza A    2. Hypoxia    3. Hyponatremia    4. Elevated troponin         I, Latonia Kulkarni (Juma), am scribing for, and in the presence of, RICKI Yañez*.    Electronically signed by: Latonia Kulkarni (Juma), 12/29/2024    I, LORETTA Yañez.* personally performed the services described in this documentation, as scribed by Latonia Kulkarni in my presence, and it is both accurate and complete.    The note accurately reflects  work and decisions made by me.  Nguyễn Auguste M.D.  12/29/2024  8:51 PM

## 2024-12-31 ENCOUNTER — APPOINTMENT (OUTPATIENT)
Dept: CARDIOLOGY | Facility: MEDICAL CENTER | Age: 84
End: 2024-12-31
Attending: INTERNAL MEDICINE
Payer: MEDICARE

## 2024-12-31 ENCOUNTER — TELEPHONE (OUTPATIENT)
Dept: CASE MANAGEMENT | Facility: MEDICAL CENTER | Age: 84
End: 2024-12-31

## 2024-12-31 ENCOUNTER — PHARMACY VISIT (OUTPATIENT)
Dept: PHARMACY | Facility: MEDICAL CENTER | Age: 84
End: 2024-12-31
Payer: COMMERCIAL

## 2024-12-31 VITALS
WEIGHT: 180.78 LBS | HEART RATE: 86 BPM | HEIGHT: 61 IN | OXYGEN SATURATION: 90 % | SYSTOLIC BLOOD PRESSURE: 138 MMHG | TEMPERATURE: 98.1 F | DIASTOLIC BLOOD PRESSURE: 91 MMHG | RESPIRATION RATE: 18 BRPM | BODY MASS INDEX: 34.13 KG/M2

## 2024-12-31 LAB
ALBUMIN SERPL BCP-MCNC: 3.7 G/DL (ref 3.2–4.9)
BUN SERPL-MCNC: 15 MG/DL (ref 8–22)
CALCIUM ALBUM COR SERPL-MCNC: 9 MG/DL (ref 8.5–10.5)
CALCIUM SERPL-MCNC: 8.8 MG/DL (ref 8.5–10.5)
CHLORIDE SERPL-SCNC: 102 MMOL/L (ref 96–112)
CO2 SERPL-SCNC: 18 MMOL/L (ref 20–33)
CREAT SERPL-MCNC: 0.77 MG/DL (ref 0.5–1.4)
ERYTHROCYTE [DISTWIDTH] IN BLOOD BY AUTOMATED COUNT: 45.7 FL (ref 35.9–50)
GFR SERPLBLD CREATININE-BSD FMLA CKD-EPI: 75 ML/MIN/1.73 M 2
GLUCOSE SERPL-MCNC: 111 MG/DL (ref 65–99)
HCT VFR BLD AUTO: 42.9 % (ref 37–47)
HGB BLD-MCNC: 14.4 G/DL (ref 12–16)
LV EJECT FRACT  99904: 60
LV EJECT FRACT MOD 2C 99903: 60.44
LV EJECT FRACT MOD 4C 99902: 63.73
LV EJECT FRACT MOD BP 99901: 61.86
MCH RBC QN AUTO: 31.4 PG (ref 27–33)
MCHC RBC AUTO-ENTMCNC: 33.6 G/DL (ref 32.2–35.5)
MCV RBC AUTO: 93.5 FL (ref 81.4–97.8)
PHOSPHATE SERPL-MCNC: 2.3 MG/DL (ref 2.5–4.5)
PLATELET # BLD AUTO: 236 K/UL (ref 164–446)
PMV BLD AUTO: 8.3 FL (ref 9–12.9)
POTASSIUM SERPL-SCNC: 3.9 MMOL/L (ref 3.6–5.5)
RBC # BLD AUTO: 4.59 M/UL (ref 4.2–5.4)
SODIUM SERPL-SCNC: 133 MMOL/L (ref 135–145)
WBC # BLD AUTO: 6.7 K/UL (ref 4.8–10.8)

## 2024-12-31 PROCEDURE — 80069 RENAL FUNCTION PANEL: CPT

## 2024-12-31 PROCEDURE — 93306 TTE W/DOPPLER COMPLETE: CPT

## 2024-12-31 PROCEDURE — 3E01340 INTRODUCTION OF INFLUENZA VACCINE INTO SUBCUTANEOUS TISSUE, PERCUTANEOUS APPROACH: ICD-10-PCS | Performed by: STUDENT IN AN ORGANIZED HEALTH CARE EDUCATION/TRAINING PROGRAM

## 2024-12-31 PROCEDURE — A9270 NON-COVERED ITEM OR SERVICE: HCPCS | Performed by: STUDENT IN AN ORGANIZED HEALTH CARE EDUCATION/TRAINING PROGRAM

## 2024-12-31 PROCEDURE — 99239 HOSP IP/OBS DSCHRG MGMT >30: CPT | Performed by: STUDENT IN AN ORGANIZED HEALTH CARE EDUCATION/TRAINING PROGRAM

## 2024-12-31 PROCEDURE — 85027 COMPLETE CBC AUTOMATED: CPT

## 2024-12-31 PROCEDURE — 700102 HCHG RX REV CODE 250 W/ 637 OVERRIDE(OP): Performed by: STUDENT IN AN ORGANIZED HEALTH CARE EDUCATION/TRAINING PROGRAM

## 2024-12-31 PROCEDURE — RXMED WILLOW AMBULATORY MEDICATION CHARGE: Performed by: STUDENT IN AN ORGANIZED HEALTH CARE EDUCATION/TRAINING PROGRAM

## 2024-12-31 PROCEDURE — 93306 TTE W/DOPPLER COMPLETE: CPT | Mod: 26 | Performed by: INTERNAL MEDICINE

## 2024-12-31 RX ORDER — OSELTAMIVIR PHOSPHATE 30 MG/1
30 CAPSULE ORAL 2 TIMES DAILY
Qty: 6 CAPSULE | Refills: 0 | Status: ACTIVE | OUTPATIENT
Start: 2024-12-31 | End: 2025-01-03

## 2024-12-31 RX ADMIN — LOSARTAN POTASSIUM 100 MG: 50 TABLET, FILM COATED ORAL at 06:42

## 2024-12-31 RX ADMIN — LEVOTHYROXINE SODIUM 100 MCG: 0.1 TABLET ORAL at 06:42

## 2024-12-31 RX ADMIN — SPIRONOLACTONE 25 MG: 25 TABLET ORAL at 06:42

## 2024-12-31 RX ADMIN — AMLODIPINE BESYLATE 10 MG: 10 TABLET ORAL at 06:42

## 2024-12-31 RX ADMIN — OSELTAMIVIR PHOSPHATE 30 MG: 30 CAPSULE ORAL at 06:42

## 2024-12-31 ASSESSMENT — FIBROSIS 4 INDEX: FIB4 SCORE: 2.47

## 2024-12-31 ASSESSMENT — PAIN DESCRIPTION - PAIN TYPE: TYPE: ACUTE PAIN

## 2024-12-31 NOTE — PROGRESS NOTES
Upon taking updated vitals for pt med administration, pts HR is now bradycardic at 44 (previously 81 at 0423). Message sent via [employer] messaging to Dr. Macario.

## 2024-12-31 NOTE — CARE PLAN
The patient is Watcher - Medium risk of patient condition declining or worsening    Shift Goals  Clinical Goals: monitor o2, safety  Patient Goals: rest  Family Goals: support    Progress made toward(s) clinical / shift goals:      Problem: Pain - Standard  Goal: Alleviation of pain or a reduction in pain to the patient’s comfort goal  Outcome: Progressing     Problem: Knowledge Deficit - Standard  Goal: Patient and family/care givers will demonstrate understanding of plan of care, disease process/condition, diagnostic tests and medications  Outcome: Progressing     Problem: Fall Risk  Goal: Patient will remain free from falls  Outcome: Progressing       Patient is not progressing towards the following goals:

## 2024-12-31 NOTE — DISCHARGE PLANNING
-1218  DPA sent DME oxygen referral to [facility], per choice form.     -1340  DPA confirmed with Elenita at [facility] that company has processed oxygen order. Elenita unable to provide ETA but oxygen will be delivered today.

## 2024-12-31 NOTE — DISCHARGE PLANNING
TCN following. HTH/SCP chart reviewed. No new TCN needs identified. Please see prior TCN note from 12/30/24 for most recent discharge planning considerations if indicated. Current discharge considerations are anticipated to be for home with HH, family support, possible supplemental O2 (O2 choice is in media if needed) and close outpatient f/u when medically cleared.  Per previous TCN note, patient has a W/C and FWW.  Patient seen at bedside and is currently on RA sitting up in chair.  Per collaboration with CM, plan for home O2 eval.  Per review, noted current 6 click scores 17 ADL's and 20 mobility and per kardex mobility documented at 10 feet X 2 with FWW.     Completed:  [facility] has accepted.   PT recommends HH on 12/30.   OT consults in chart.   Choice obtained:  HH (1. [facility], 2. [facility]) & DME (O2 [facility]) if indicated.  Pt aware of [facility] blanket referral policy  SCP with Non-[facility] PCP.

## 2024-12-31 NOTE — DISCHARGE PLANNING
Care Transition Team Discharge Planning    Anticipated Discharge Information  Discharge Disposition: D/T to home under [facility]HA care in anticipation of covered skilled care (06)  Discharge Address: [address] [facility] [address]  Discharge Contact Phone Number: [phone]    Discharge Plan:  Patient discussed in AM rounds. Anticipate DC home today with [facility] who has accepted, and home O2 (choice in media for [facility]). Pending updated home O2 eval.

## 2024-12-31 NOTE — FACE TO FACE
"Face to Face Note  -  Durable Medical Equipment    Zak Trujillo M.D. - NPI: [id number]  I certify that this patient is under my care and that they had a durable medical equipment(DME)face to face encounter by myself that meets the physician DME face-to-face encounter requirements with this patient on:    Date of encounter:   Patient:                    MRN:                       YOB: 2025  Lizzette Burton  [id number]  1940     The encounter with the patient was in whole, or in part, for the following medical condition, which is the primary reason for durable medical equipment:  CHF and Cardiac Disease    I certify that, based on my findings, the following durable medical equipment is medically necessary:    Oxygen   HOME O2 Saturation Measurements:(Values must be present for Home Oxygen orders)  Room air sat at rest: 86  Room air sat with amb: 88  With liters of O2: 2, O2 sat at rest with O2: 91  With Liters of O2: 2, O2 sat with amb with O2 : 90  Is the patient mobile?: Yes  If patient feels more short of breath, they can go up to 6 liters per minute and contact healthcare provider.    Supporting Symptoms: The patient requires supplemental oxygen, as the following interventions have been tried with limited or no improvement: \"Bronchodilators and/or steroid inhalers, \"Ambulation with oximetry, and \"Incentive spirometry.    My Clinical findings support the need for the above equipment due to:  Hypoxia  "

## 2024-12-31 NOTE — PROGRESS NOTES
Bedside report received from off going RN/tech: Yamileth, assumed care of patient.     Fall Risk Score: HIGH RISK  Fall risk interventions in place: Place yellow fall risk ID band on patient, Provide patient/family education based on risk assessment, Educate patient/family to call staff for assistance when getting out of bed, Place fall precaution signage outside patient door, Utilize bed/chair fall alarm, and Bed alarm connected correctly  Bed type: Regular (Rory Score less than 17 interventions in place)  Patient on cardiac monitor: No   IVF/IV medications: Infusion per MAR (List Med(s))   Oxygen: How many liters 4L, Traced the line to wall oxygen, and No oxygen tank in room  Bedside sitter: Not Applicable   Isolation: Isolation precautions in place

## 2024-12-31 NOTE — CARE PLAN
The patient is Stable - Low risk of patient condition declining or worsening    Shift Goals  Clinical Goals: stable VS, safety  Patient Goals: Sleep  Family Goals: NATACHA    Progress made toward(s) clinical / shift goals:    Problem: Pain - Standard  Goal: Alleviation of pain or a reduction in pain to the patient’s comfort goal  Description: Target End Date:  Prior to discharge or change in level of care    Document on Vitals flowsheet    1.  Document pain using the appropriate pain scale per order or unit policy  2.  Educate and implement non-pharmacologic comfort measures (i.e. relaxation, distraction, massage, cold/heat therapy, etc.)  3.  Pain management medications as ordered  4.  Reassess pain after pain med administration per policy  5.  If opiods administered assess patient's response to pain medication is appropriate per POSS sedation scale  6.  Follow pain management plan developed in collaboration with patient and interdisciplinary team (including palliative care or pain specialists if applicable)  Outcome: Progressing     Problem: Fall Risk  Goal: Patient will remain free from falls  Description: Target End Date:  Prior to discharge or change in level of care    Document interventions on the [place] Cirilo Fall Risk Assessment    1.  Assess for fall risk factors  2.  Implement fall precautions  Outcome: Progressing       Patient is not progressing towards the following goals:

## 2024-12-31 NOTE — PROGRESS NOTES
Hospital Medicine Daily Progress Note    Date of Service  12/30/2024    Chief Complaint  Lizzette Burton is a 84 y.o. female admitted 12/29/2024 with Flu Like Symptoms (Pt report worsening HA, fever, cough, neck and chest pain x1wk. Per pt's daughter, she took pt at  yesterday and was told pt has PNA, sent home with medications but with no relief. )        Hospital Course  No notes on file    Interval Problem Update  Patient seen and examine at bedside  Medically cleared: No Possibly TOMORROW  Pending: Echo and CTA chest  Estimated Discharge Day: 12/31?  Disposition: PT/OT eval home vs skilled    Managing hypoxia and flu. CXR clear. Ordered CTA chest and echo PENDING.     90% 4LO2  Na 129-130  She feels deconditioned, unready for discharge. PT/OT to see.  I reviewed the chart along with vitals, labs, imaging, test (both pending and resulted) and recommendations from specialists and interdisciplinary team.  I have discussed this patient's plan of care and discharge plan at IDT rounds today with Case Management, Nursing, Nursing leadership, and other members of the IDT team.    Consultants/Specialty      Code Status  Full Code    Disposition  The patient is not medically cleared for discharge to home or a post-acute facility.      I have placed the appropriate orders for post-discharge needs.    Review of Systems  Review of Systems   Constitutional:  Negative for chills and fever.   HENT:  Negative for congestion, hearing loss and nosebleeds.    Eyes:  Negative for pain and redness.   Respiratory:  Positive for cough, sputum production, shortness of breath and wheezing. Negative for hemoptysis.    Cardiovascular:  Positive for chest pain. Negative for palpitations.   Gastrointestinal:  Negative for abdominal pain, blood in stool, constipation, diarrhea, nausea and vomiting.   Genitourinary:  Negative for dysuria, frequency and hematuria.   Musculoskeletal:  Positive for myalgias. Negative for falls and joint  pain.   Skin:  Negative for rash.   Neurological:  Negative for dizziness, tremors, focal weakness, seizures, loss of consciousness, weakness and headaches.   Psychiatric/Behavioral:  The patient is not nervous/anxious and does not have insomnia.    All other systems reviewed and are negative.       Physical Exam  Temp:  [36.3 °C (97.3 °F)-36.8 °C (98.2 °F)] 36.4 °C (97.6 °F)  Pulse:  [] 92  Resp:  [16-27] 18  BP: (130-138)/(69-87) 132/78  SpO2:  [90 %-97 %] 90 %    Physical Exam  Vitals and nursing note reviewed.   Constitutional:       General: She is not in acute distress.     Appearance: She is obese. She is ill-appearing.   HENT:      Head: Normocephalic and atraumatic.      Right Ear: External ear normal.      Left Ear: External ear normal.      Nose: Nose normal.      Mouth/Throat:      Mouth: Mucous membranes are moist.   Eyes:      General: No scleral icterus.     Conjunctiva/sclera: Conjunctivae normal.   Cardiovascular:      Rate and Rhythm: Normal rate and regular rhythm.      Pulses: Normal pulses.      Heart sounds: No murmur heard.     No friction rub. No gallop.   Pulmonary:      Effort: Pulmonary effort is normal. No respiratory distress.      Breath sounds: No stridor. Wheezing, rhonchi and rales present.   Chest:      Chest wall: No tenderness.   Abdominal:      General: Abdomen is flat. Bowel sounds are normal. There is no distension.      Palpations: Abdomen is soft.      Tenderness: There is no abdominal tenderness. There is no guarding or rebound.   Musculoskeletal:         General: No swelling, tenderness or deformity. Normal range of motion.      Cervical back: Normal range of motion and neck supple. No rigidity.   Skin:     General: Skin is warm.      Coloration: Skin is not jaundiced.   Neurological:      General: No focal deficit present.      Mental Status: She is alert and oriented to person, place, and time. Mental status is at baseline.   Psychiatric:         Mood and Affect:  Mood normal.         Behavior: Behavior normal.         Thought Content: Thought content normal.         Judgment: Judgment normal.         Fluids    Intake/Output Summary (Last 24 hours) at 12/30/2024 1725  Last data filed at 12/30/2024 1228  Gross per 24 hour   Intake 1600 ml   Output 752 ml   Net 848 ml        Laboratory  Recent Labs     12/29/24  1646 12/30/24  0128   WBC 6.4 6.4   RBC 4.59 4.24   HEMOGLOBIN 14.2 13.3   HEMATOCRIT 42.8 40.0   MCV 93.2 94.3   MCH 30.9 31.4   MCHC 33.2 33.3   RDW 45.1 46.2   PLATELETCT 256 218   MPV 8.2* 8.2*     Recent Labs     12/29/24  1646 12/30/24  0128   SODIUM 129* 130*   POTASSIUM 4.0 4.1   CHLORIDE 97 101   CO2 22 18*   GLUCOSE 97 99   BUN 28* 23*   CREATININE 1.06 0.77   CALCIUM 8.7 8.0*                   Imaging  CT-CTA CHEST PULMONARY ARTERY W/ RECONS   Final Result      1.  No evidence of pulmonary embolism.   2.  Perihilar, right middle lobe and basilar scarring or atelectasis appearing similar to the previous exam.   3.  Atherosclerosis and coronary artery disease.            DX-CHEST-PORTABLE (1 VIEW)   Final Result      No acute cardiopulmonary abnormality.         EC-ECHOCARDIOGRAM COMPLETE W/O CONT    (Results Pending)        Assessment/Plan  * Acute hypoxemic respiratory failure (HCC)  Assessment & Plan  Secondary to influenza A, no flu shot this year  Start Tamiflu, no indication for antibiotics at this time.  Chest x-ray negative for any acute cardiopulmonary abnormality  DuoNebs as needed.  I appreciated rhonchi and wheezing on physical exam  Oxygen as needed, keep O2 over 90%    ACP (advance care planning)  Assessment & Plan  17 minutes spent discussing goals of care with patient.  I went over the processes of CPR, which can include chest compressions and intubation if patient clinically deteriorates.  I explained that due to her age, the chances of ROSC being achieved let alone having a normal quality of life is extremely low.  Knowing this, they state  that they want everything done.    SIRS (systemic inflammatory response syndrome) (HCC)  Assessment & Plan  SIRS criteria identified on my evaluation include:  Tachycardia, with heart rate greater than 90 BPM and Tachypnea, with respirations greater than 20 per minute      Hypertension- (present on admission)  Assessment & Plan  Restart as needed    Hypothyroidism- (present on admission)  Assessment & Plan  Synthroid     Hyponatremia- (present on admission)  Assessment & Plan  Likely from dehydration  Fluids  Repeat in am    Recent Labs     12/29/24  1646 12/30/24  0128   SODIUM 129* 130*   POTASSIUM 4.0 4.1   CHLORIDE 97 101   CO2 22 18*   GLUCOSE 97 99   BUN 28* 23*   CREATININE 1.06 0.77   ;lImproving         VTE prophylaxis:    enoxaparin ppx      I have performed a physical exam and reviewed and updated ROS and Plan today (12/30/2024). In review of yesterday's note (12/29/2024), there are no changes except as documented above.    Patient is has a high medical complexity, complex decision making and is at high risk for complication, morbidity, and mortality, thus requiring the highest level of my preparedness for sudden, emergent intervention. Medical decision making is therefore complex. I provided  services, which included ordering labs and/or imaging, and discussing the case with various consultants.medication orders, frequent reevaluations of the patient's condition and response to treatment. Time was also devoted to counseling and coordinating care including review of records, pertinent lab data and studies, as well as discussing diagnostic evaluation and work up, planned therapeutic interventions and future disposition of care. Where indicated, the assessment and plan reflect discussion of patient with consultants, other healthcare providers, family members, and additional research needed to obtain further information in formulating the plan of care for Lizzette Burton. Total time spent was 51  minutes.

## 2025-01-01 NOTE — DISCHARGE SUMMARY
Discharge Summary    CHIEF COMPLAINT ON ADMISSION  Chief Complaint   Patient presents with    Flu Like Symptoms     Pt report worsening HA, fever, cough, neck and chest pain x1wk. Per pt's daughter, she took pt at  yesterday and was told pt has PNA, sent home with medications but with no relief.        Reason for Admission  Flu like symptoms     Admission Date  12/29/2024    CODE STATUS  Full Code    HPI & HOSPITAL COURSE  Lizzette Burton is a 84 y.o. female admitted 12/29/2024 with Flu Like Symptoms (Pt report worsening HA, fever, cough, neck and chest pain x1wk. Per pt's daughter, she took pt at  yesterday and was told pt has PNA, sent home with medications but with no relief).    Diagnosed with influenza A, started on Tamiflu, required supplemental oxygen for acute respiratory failure.  Vitals have been stable, no leukocytosis, stable CBC.  CTA chest negative for PE, shows perihilar right middle lobe and basilar scarring or atelectasis appearing similar to previous exam.  Tolerating p.o. intake.  Family at bedside who is her caregiver as well.  Patient requesting to go home, feeling improved although still quite weak, deconditioned, requiring supplemental O2.  Home oxygen evaluation performed at home oxygen will be set up.  Patient and family who is her caregiver feel comfortable with discharging today with home oxygen.  Feel like she would recover better at home.    Therefore, she is discharged in fair and stable condition to home with organized home healthcare and close outpatient follow-up.    The patient met 2-midnight criteria for an inpatient stay at the time of discharge.    Discharge Date  12/31/2024    FOLLOW UP ITEMS POST DISCHARGE  PCP follow-up for post Hospital discharge care.  Evaluate and monitor respiratory status.    DISCHARGE DIAGNOSES  Principal Problem:    Acute hypoxemic respiratory failure (HCC) (POA: Unknown)  Active Problems:    Hyponatremia (POA: Yes)    Hypothyroidism (POA:  Yes)    Hypertension (POA: Yes)    SIRS (systemic inflammatory response syndrome) (HCC) (POA: Unknown)    ACP (advance care planning) (POA: Unknown)  Resolved Problems:    * No resolved hospital problems. *      FOLLOW UP  Future Appointments   Date Time Provider Department Center   1/7/2025  1:00 PM Annalisa Moran M.D. [facility]   4/11/2025  3:40 PM HERNANDO Campbell None   4/16/2025  2:00 PM Nikunj Hills D.O. [facility] None     LIBERTY Henderson  [address]  [facility] [id number] [phone]    Call in 1 week(s)        MEDICATIONS ON DISCHARGE     Medication List        START taking these medications        Instructions   oseltamivir 30 MG Caps  Commonly known as: Tamiflu   Take 1 Capsule by mouth 2 times a day for 3 days.  Dose: 30 mg            CONTINUE taking these medications        Instructions   albuterol 108 (90 Base) MCG/ACT Aers inhalation aerosol   Inhale 2 Puffs every 6 hours as needed for Shortness of Breath.  Dose: 2 Puff     alendronate 70 MG Tabs  Commonly known as: Fosamax   Take 70 mg by mouth every Monday. Indications: Osteoporosis  Dose: 70 mg     amLODIPine 10 MG Tabs  Commonly known as: Norvasc   [place] 1 tableta por vía oral diariamente.  (Take 1 Tablet by mouth every day.)  Dose: 10 mg     benzonatate 200 MG capsule  Commonly known as: Tessalon   Take 1 Capsule by mouth 3 times a day as needed for Cough.  Dose: 200 mg     Brimonidine Tartrate-Timolol 0.2-0.5 % Soln   Administer 1 Drop into both eyes 2 times a day. Indications: Glaucoma  Dose: 1 Drop     latanoprost 0.005 % Soln  Commonly known as: Xalatan   Administer 1 Drop into both eyes every evening. Indications: Wide-Angle Glaucoma  Dose: 1 Drop     levothyroxine 100 MCG Tabs  Commonly known as: Synthroid   Take 1 Tablet by mouth every morning before breakfast.  Dose: 100 mcg     losartan 100 MG Tabs  Commonly known as: Cozaar   Take 1 Tablet by mouth every day.  Dose: 100 mg     spironolactone 25 MG  Tabs  Commonly known as: Aldactone   [place] 1 tableta por vía oral diariamente.  (Take 1 Tablet by mouth every day.)  Dose: 25 mg            STOP taking these medications      amoxicillin-clavulanate 875-125 MG Tabs  Commonly known as: Augmentin     azithromycin 250 MG Tabs  Commonly known as: Zithromax              Allergies  Allergies   Allergen Reactions    Oxycodone Nausea    Tramadol Nausea       DIET  Orders Placed This Encounter   Procedures    Diet Order Diet: Regular     Standing Status:   Standing     Number of Occurrences:   1     Order Specific Question:   Diet:     Answer:   Regular [1]       ACTIVITY  As tolerated.  Weight bearing as tolerated    CONSULTATIONS  NA    PROCEDURES  NA    LABORATORY  Lab Results   Component Value Date    SODIUM 133 (L) 12/31/2024    POTASSIUM 3.9 12/31/2024    CHLORIDE 102 12/31/2024    CO2 18 (L) 12/31/2024    GLUCOSE 111 (H) 12/31/2024    BUN 15 12/31/2024    CREATININE 0.77 12/31/2024    CREATININE 0.9 02/04/2009        Lab Results   Component Value Date    WBC 6.7 12/31/2024    HEMOGLOBIN 14.4 12/31/2024    HEMATOCRIT 42.9 12/31/2024    PLATELETCT 236 12/31/2024        Total time of the discharge process exceeds 35 minutes.

## 2025-01-01 NOTE — CARE PLAN
The patient is Watcher - Medium risk of patient condition declining or worsening    Shift Goals  Clinical Goals: wean oxygen  Patient Goals: rest  Family Goals: karley    Progress made toward(s) clinical / shift goals:    Problem: Pain - Standard  Goal: Alleviation of pain or a reduction in pain to the patient’s comfort goal  Outcome: Progressing     Problem: Knowledge Deficit - Standard  Goal: Patient and family/care givers will demonstrate understanding of plan of care, disease process/condition, diagnostic tests and medications  Outcome: Progressing     Problem: Fall Risk  Goal: Patient will remain free from falls  Outcome: Progressing     Problem: Respiratory  Goal: Patient will achieve/maintain optimum respiratory ventilation and gas exchange  Outcome: Progressing       Patient is not progressing towards the following goals:

## 2025-01-01 NOTE — PROGRESS NOTES
Patient arrived to discharge lounge. Discussed discharge paperwork and medications with patient and family. Answered all questions. No home meds to return, M2B given to patient. Patient escorted out with family via wheelchair, personal belongings in hand.

## 2025-01-02 ENCOUNTER — PATIENT OUTREACH (OUTPATIENT)
Dept: HEALTH INFORMATION MANAGEMENT | Facility: OTHER | Age: 85
End: 2025-01-02
Payer: MEDICARE

## 2025-01-03 NOTE — PROGRESS NOTES
Transitional Care Management  TCM Outreach Date and Time: Filed (1/2/2025  8:54 AM)    Discharge Questions  Actual Discharge Date: 12/31/24  Now that you are home, how are you feeling?: Good  Did you receive any new prescriptions?: Yes (Tamiflu)  Were you able to get them filled?: Yes  Meds to Bed or Pharmacy filled?: Meds to Bed  Do you have any questions about your current medications or new medications (Review Med Rec)?: No (Medications reviewed and patient education provided on taking inhalers as prescribed. Patient's healthcare agent verbalized understanding.)  Did you have any durable medical equipment ordered?: Yes (DME O2 New set-up)  Did you receive it?: Yes  Do you have a follow up appointment scheduled with your PCP?: No  Was an appointment scheduled for the patient?: Yes  Appointment Date: 01/07/25  Appointment Time: 1300 (This appt. was made as a New to You appt. for the patient prior to discharge. Healthcare agent (granddaughter) was unaware of this appointment, therefore she stated that she would call and schedule a hospital follow-up appointment for the patient herself)  Any issues or paperwork you wish to discuss with your PCP?: No  Are you (patient) able to get to the appointment?: Yes  If Home Health was ordered, have they contacted you (Patient): Yes ([phone])  Did you have enough support after your last discharge?: Yes  Does this patient qualify for the CCM program?: Yes (Patient currently enrolled in [facility].)    Transitional Care  Number of attempts made to contact patient: 2  Current or previous attempts completed within two business days of discharge? : Yes  Provided education regarding treatment plan, medications, self-management, ADLs?: Yes  Has patient completed an Advanced Directive?: Yes  Is the patient's advanced directive on file?: Yes  Has the Care Manager's phone number provided?: Yes ([phone]; Left via voicemail.)  Is there anything else I can help you with?: No    Discharge  Summary  Chief Complaint: Flu-like symptoms (worsening HA, fever, cough, neck and chest pain x1wk)  Admitting Diagnosis: Acute hypoxemic respiratory failure (HCC) (J96.01)  Discharge Diagnosis: Acute hypoxemic respiratory failure (HCC)

## 2025-01-10 ENCOUNTER — HOME CARE VISIT (OUTPATIENT)
Dept: HOME HEALTH SERVICES | Facility: HOME HEALTHCARE | Age: 85
End: 2025-01-10
Payer: MEDICARE

## 2025-01-10 VITALS
RESPIRATION RATE: 18 BRPM | TEMPERATURE: 97.3 F | SYSTOLIC BLOOD PRESSURE: 80 MMHG | HEART RATE: 62 BPM | DIASTOLIC BLOOD PRESSURE: 48 MMHG | OXYGEN SATURATION: 95 % | WEIGHT: 174 LBS | BODY MASS INDEX: 32.88 KG/M2

## 2025-01-10 PROCEDURE — 665998 HH PPS REVENUE CREDIT

## 2025-01-10 PROCEDURE — 665001 SOC-HOME HEALTH

## 2025-01-10 PROCEDURE — G0299 HHS/HOSPICE OF RN EA 15 MIN: HCPCS

## 2025-01-10 PROCEDURE — 665999 HH PPS REVENUE DEBIT

## 2025-01-10 PROCEDURE — 665005 NO-PAY RAP - HOME HEALTH

## 2025-01-10 SDOH — ECONOMIC STABILITY: HOUSING INSECURITY: EVIDENCE OF SMOKING MATERIAL: 0

## 2025-01-10 ASSESSMENT — ENCOUNTER SYMPTOMS
FORGETFULNESS: 1
POOR JUDGMENT: 1
PAIN LOCATION - EXACERBATING FACTORS: UNKNOW
LAST BOWEL MOVEMENT: 67215
PAIN LOCATION - PAIN FREQUENCY: INTERMITTENT
HIGHEST PAIN SEVERITY IN PAST 24 HOURS: 6/10
PAIN LOCATION - PAIN FREQUENCY: FREQUENT
DESCRIPTION OF MEMORY LOSS: SHORT TERM
PAIN LOCATION: NECK AND BACK
PAIN LOCATION: HEAD
SUBJECTIVE PAIN PROGRESSION: WAXING AND WANING
PAIN SEVERITY GOAL: 3/10
PAIN LOCATION - PAIN DURATION: CHRONIC
PAIN LOCATION - PAIN QUALITY: ACHING
DYSPNEA ACTIVITY LEVEL: AFTER AMBULATING MORE THAN 20 FT
PAIN LOCATION - PAIN QUALITY: ACHING
DIZZINESS: 1
PAIN LOCATION - PAIN SEVERITY: 6/10
PAIN LOCATION - PAIN DURATION: SINCE FALL
LOWEST PAIN SEVERITY IN PAST 24 HOURS: 0/10
PAIN LOCATION - PAIN SEVERITY: 6/10
SHORTNESS OF BREATH: 1
CONSTIPATION: 1
TROUBLE SWALLOWING: 1
PAIN: 1

## 2025-01-10 ASSESSMENT — FIBROSIS 4 INDEX: FIB4 SCORE: 2.28

## 2025-01-10 ASSESSMENT — ACTIVITIES OF DAILY LIVING (ADL): OASIS_M1830: 03

## 2025-01-11 PROCEDURE — 665998 HH PPS REVENUE CREDIT

## 2025-01-11 PROCEDURE — 665999 HH PPS REVENUE DEBIT

## 2025-01-12 PROCEDURE — 665998 HH PPS REVENUE CREDIT

## 2025-01-12 PROCEDURE — 665999 HH PPS REVENUE DEBIT

## 2025-01-13 ENCOUNTER — DOCUMENTATION (OUTPATIENT)
Dept: MEDICAL GROUP | Facility: PHYSICIAN GROUP | Age: 85
End: 2025-01-13
Payer: MEDICARE

## 2025-01-13 PROCEDURE — 665998 HH PPS REVENUE CREDIT

## 2025-01-13 PROCEDURE — 665999 HH PPS REVENUE DEBIT

## 2025-01-13 NOTE — PROGRESS NOTES
Medication chart review for [facility] services    Received referral from [facility].   Medications reviewed  compared with discharge summary if available.  Discharge summary date, if applicable:   12/24    Current medication list per [facility]     Medication list one, patient is currently taking    Current Outpatient Medications:     acetaminophen, 2 Tablet, Oral, Q8HRS PRN    Turmeric (QC TUMERIC COMPLEX PO), 1 Tablet, Oral, DAILY    Non Formulary Request, 1 Tablet, Oral, DAILY    Home Care Oxygen, 3 L/min, Inhalation, Continuous    benzonatate, 200 mg, Oral, TID PRN (Patient not taking: Reported on 1/10/2025)    albuterol, 2 Puff, Inhalation, Q6HRS PRN    amLODIPine, 10 mg, Oral, DAILY    spironolactone, 25 mg, Oral, DAILY    latanoprost, 1 Drop, Both Eyes, Nightly    Brimonidine Tartrate-Timolol, 1 Drop, Both Eyes, BID    levothyroxine, 100 mcg, Oral, QAM AC    losartan, 100 mg, Oral, DAILY      Medication list two, drugs that the patient has been prescribed or recommended to take by their healthcare provider on discharge summary          MEDICATIONS ON DISCHARGE      Medication List          START taking these medications         Instructions   oseltamivir 30 MG Caps  Commonly known as: Tamiflu    Take 1 Capsule by mouth 2 times a day for 3 days.  Dose: 30 mg                CONTINUE taking these medications         Instructions   albuterol 108 (90 Base) MCG/ACT Aers inhalation aerosol    Inhale 2 Puffs every 6 hours as needed for Shortness of Breath.  Dose: 2 Puff      alendronate 70 MG Tabs  Commonly known as: Fosamax    Take 70 mg by mouth every Monday. Indications: Osteoporosis  Dose: 70 mg      amLODIPine 10 MG Tabs  Commonly known as: Norvasc    [place] 1 tableta por vía oral diariamente.  (Take 1 Tablet by mouth every day.)  Dose: 10 mg      benzonatate 200 MG capsule  Commonly known as: Tessalon    Take 1 Capsule by mouth 3 times a day as needed for Cough.  Dose: 200 mg      Brimonidine  Tartrate-Timolol 0.2-0.5 % Soln    Administer 1 Drop into both eyes 2 times a day. Indications: Glaucoma  Dose: 1 Drop      latanoprost 0.005 % Soln  Commonly known as: Xalatan    Administer 1 Drop into both eyes every evening. Indications: Wide-Angle Glaucoma  Dose: 1 Drop      levothyroxine 100 MCG Tabs  Commonly known as: Synthroid    Take 1 Tablet by mouth every morning before breakfast.  Dose: 100 mcg      losartan 100 MG Tabs  Commonly known as: Cozaar    Take 1 Tablet by mouth every day.  Dose: 100 mg      spironolactone 25 MG Tabs  Commonly known as: Aldactone    [place] 1 tableta por vía oral diariamente.  (Take 1 Tablet by mouth every day.)  Dose: 25 mg                STOP taking these medications       amoxicillin-clavulanate 875-125 MG Tabs  Commonly known as: Augmentin      azithromycin 250 MG Tabs  Commonly known as: Zithromax          Allergies   Allergen Reactions    Oxycodone Nausea    Tramadol Nausea       Labs     Lab Results   Component Value Date/Time    SODIUM 133 (L) 12/31/2024 03:03 AM    POTASSIUM 3.9 12/31/2024 03:03 AM    CHLORIDE 102 12/31/2024 03:03 AM    CO2 18 (L) 12/31/2024 03:03 AM    GLUCOSE 111 (H) 12/31/2024 03:03 AM    BUN 15 12/31/2024 03:03 AM    CREATININE 0.77 12/31/2024 03:03 AM    CREATININE 0.9 02/04/2009 10:28 AM     Lab Results   Component Value Date/Time    ALKPHOSPHAT 59 12/30/2024 01:28 AM    ASTSGOT 21 12/30/2024 01:28 AM    ALTSGPT 11 12/30/2024 01:28 AM    TBILIRUBIN 0.2 12/30/2024 01:28 AM    INR 0.96 05/01/2024 01:17 AM    ALBUMIN 3.7 12/31/2024 03:03 AM        Assessment for clinically significant drug interactions, drug omissions/additions, duplicative therapies.            CC   TONYA HendersonREnedinaNEnedina  [address]  [place] [address]  Fax: [phone]    [facility]  Phone [phone] fax [phone]    This note was created using voice recognition software (Dragon). The accuracy of the dictation is limited by the  abilities of the software. I have reviewed the note prior to signing, however some errors in grammar and context are still possible. If you have any questions related to this note please do not hesitate to contact our office.

## 2025-01-14 ENCOUNTER — PATIENT OUTREACH (OUTPATIENT)
Dept: HEALTH INFORMATION MANAGEMENT | Facility: OTHER | Age: 85
End: 2025-01-14
Payer: MEDICARE

## 2025-01-14 ENCOUNTER — HOME CARE VISIT (OUTPATIENT)
Dept: HOME HEALTH SERVICES | Facility: HOME HEALTHCARE | Age: 85
End: 2025-01-14
Payer: MEDICARE

## 2025-01-14 VITALS
RESPIRATION RATE: 18 BRPM | TEMPERATURE: 98.5 F | DIASTOLIC BLOOD PRESSURE: 60 MMHG | SYSTOLIC BLOOD PRESSURE: 92 MMHG | HEART RATE: 63 BPM | BODY MASS INDEX: 32.69 KG/M2 | WEIGHT: 173 LBS | OXYGEN SATURATION: 91 %

## 2025-01-14 PROCEDURE — G0299 HHS/HOSPICE OF RN EA 15 MIN: HCPCS

## 2025-01-14 PROCEDURE — 665999 HH PPS REVENUE DEBIT

## 2025-01-14 PROCEDURE — 665998 HH PPS REVENUE CREDIT

## 2025-01-14 SDOH — ECONOMIC STABILITY: HOUSING INSECURITY: EVIDENCE OF SMOKING MATERIAL: 0

## 2025-01-14 ASSESSMENT — ENCOUNTER SYMPTOMS
NAUSEA: DENIIES
LAST BOWEL MOVEMENT: 67218
PAIN LOCATION - EXACERBATING FACTORS: ACTIVITY
VOMITING: DENIES
SUBJECTIVE PAIN PROGRESSION: UNCHANGED
PAIN LOCATION - PAIN QUALITY: ACHY
PAIN LOCATION - RELIEVING FACTORS: REST
PAIN: 1
PAIN LOCATION - PAIN FREQUENCY: CONSTANT
PAIN SEVERITY GOAL: 5/10
HIGHEST PAIN SEVERITY IN PAST 24 HOURS: 6/10
LOWEST PAIN SEVERITY IN PAST 24 HOURS: 5/10
PAIN LOCATION: BILATERAL LEGS
PAIN LOCATION - PAIN SEVERITY: 6/10

## 2025-01-14 ASSESSMENT — FIBROSIS 4 INDEX: FIB4 SCORE: 2.28

## 2025-01-14 ASSESSMENT — ACTIVITIES OF DAILY LIVING (ADL)
AMBULATION ASSISTANCE: STAND BY ASSIST
CURRENT_FUNCTION: STAND BY ASSIST

## 2025-01-14 NOTE — PROGRESS NOTES
Due to the patient currently being on home health for this month, I am deferring the PCM monthly call to next month until after the patient is discharged from home health.

## 2025-01-15 PROCEDURE — 665998 HH PPS REVENUE CREDIT

## 2025-01-15 PROCEDURE — 665999 HH PPS REVENUE DEBIT

## 2025-01-16 ENCOUNTER — PATIENT OUTREACH (OUTPATIENT)
Dept: HEALTH INFORMATION MANAGEMENT | Facility: OTHER | Age: 85
End: 2025-01-16
Payer: MEDICARE

## 2025-01-16 DIAGNOSIS — I10 HYPERTENSION, UNSPECIFIED TYPE: ICD-10-CM

## 2025-01-16 PROCEDURE — 665999 HH PPS REVENUE DEBIT

## 2025-01-16 PROCEDURE — 665998 HH PPS REVENUE CREDIT

## 2025-01-17 ENCOUNTER — HOME CARE VISIT (OUTPATIENT)
Dept: HOME HEALTH SERVICES | Facility: HOME HEALTHCARE | Age: 85
End: 2025-01-17
Payer: MEDICARE

## 2025-01-17 PROCEDURE — 665999 HH PPS REVENUE DEBIT

## 2025-01-17 PROCEDURE — G0299 HHS/HOSPICE OF RN EA 15 MIN: HCPCS

## 2025-01-17 PROCEDURE — 665998 HH PPS REVENUE CREDIT

## 2025-01-17 ASSESSMENT — FIBROSIS 4 INDEX: FIB4 SCORE: 2.28

## 2025-01-18 PROCEDURE — 665999 HH PPS REVENUE DEBIT

## 2025-01-18 PROCEDURE — 665998 HH PPS REVENUE CREDIT

## 2025-01-19 PROCEDURE — 665999 HH PPS REVENUE DEBIT

## 2025-01-19 PROCEDURE — 665998 HH PPS REVENUE CREDIT

## 2025-01-20 ENCOUNTER — HOME CARE VISIT (OUTPATIENT)
Dept: HOME HEALTH SERVICES | Facility: HOME HEALTHCARE | Age: 85
End: 2025-01-20
Payer: MEDICARE

## 2025-01-20 VITALS
SYSTOLIC BLOOD PRESSURE: 110 MMHG | DIASTOLIC BLOOD PRESSURE: 70 MMHG | TEMPERATURE: 97.8 F | OXYGEN SATURATION: 92 % | BODY MASS INDEX: 32.69 KG/M2 | RESPIRATION RATE: 18 BRPM | HEART RATE: 65 BPM | WEIGHT: 173 LBS

## 2025-01-20 PROCEDURE — 665999 HH PPS REVENUE DEBIT

## 2025-01-20 PROCEDURE — 665998 HH PPS REVENUE CREDIT

## 2025-01-20 SDOH — ECONOMIC STABILITY: HOUSING INSECURITY: EVIDENCE OF SMOKING MATERIAL: 0

## 2025-01-20 ASSESSMENT — ENCOUNTER SYMPTOMS
STOOL FREQUENCY: DAILY
LAST BOWEL MOVEMENT: 67222
NAUSEA: DENIES ANY
DENIES PAIN: 1
BOWEL PATTERN NORMAL: 1

## 2025-01-20 ASSESSMENT — PATIENT HEALTH QUESTIONNAIRE - PHQ9: CLINICAL INTERPRETATION OF PHQ2 SCORE: 0

## 2025-01-20 NOTE — CASE COMMUNICATION
Quality Review Completed for SOC OASIS by DONTAE Mcmahon RN on 1/20/2025:     Revisions completed by DONTAE Mcmahon RN:     1.  and  diagnosis coding updated per chart review  2.  changed to 1/10/25 for LSOC ordered  3.  changed to 4 per neuro assessment: impaired safety awareness, impaired memory, poor problem solving, impulsive, forgetful  4. [id number] changed to #5 per , G and H #3 per narrative  5.  is yes per in terventions  6.  changed to 3 per guidelines when ambulation is supervised  7. Added details to 485 form for F2F encounter Billy Wynn M.D. on 12/29/2024

## 2025-01-21 ENCOUNTER — HOME CARE VISIT (OUTPATIENT)
Dept: HOME HEALTH SERVICES | Facility: HOME HEALTHCARE | Age: 85
End: 2025-01-21
Payer: MEDICARE

## 2025-01-21 VITALS
RESPIRATION RATE: 18 BRPM | SYSTOLIC BLOOD PRESSURE: 105 MMHG | OXYGEN SATURATION: 95 % | BODY MASS INDEX: 33.07 KG/M2 | TEMPERATURE: 97.7 F | HEART RATE: 79 BPM | DIASTOLIC BLOOD PRESSURE: 65 MMHG | WEIGHT: 175 LBS

## 2025-01-21 PROCEDURE — 665998 HH PPS REVENUE CREDIT

## 2025-01-21 PROCEDURE — 665999 HH PPS REVENUE DEBIT

## 2025-01-21 PROCEDURE — G0299 HHS/HOSPICE OF RN EA 15 MIN: HCPCS

## 2025-01-21 SDOH — ECONOMIC STABILITY: HOUSING INSECURITY: EVIDENCE OF SMOKING MATERIAL: 0

## 2025-01-21 ASSESSMENT — ENCOUNTER SYMPTOMS
PAIN: 1
NAUSEA: PT DENIES ANY NAUSEA AT THIS TIME
PAIN LOCATION - PAIN QUALITY: ACHY
PAIN SEVERITY GOAL: 3/10
PAIN LOCATION - RELIEVING FACTORS: REST/MEDS
LOWEST PAIN SEVERITY IN PAST 24 HOURS: 6/10
PAIN LOCATION - PAIN FREQUENCY: FREQUENT
MUSCLE WEAKNESS: 1
STOOL FREQUENCY: LESS THAN DAILY
PAIN LOCATION - PAIN SEVERITY: 6/10
SHORTNESS OF BREATH: 1
SUBJECTIVE PAIN PROGRESSION: UNCHANGED
LAST BOWEL MOVEMENT: 67226
PAIN LOCATION - EXACERBATING FACTORS: ACTIVITY
HIGHEST PAIN SEVERITY IN PAST 24 HOURS: 6/10
VOMITING: PT DENIES ANY EMESIS AT THIS TIME
BOWEL PATTERN NORMAL: 1
PAIN LOCATION: GENERALIZED
DYSPNEA ACTIVITY LEVEL: AFTER AMBULATING 10 - 20 FT

## 2025-01-21 ASSESSMENT — ACTIVITIES OF DAILY LIVING (ADL)
CURRENT_FUNCTION: STAND BY ASSIST
AMBULATION ASSISTANCE: STAND BY ASSIST

## 2025-01-21 ASSESSMENT — FIBROSIS 4 INDEX: FIB4 SCORE: 2.28

## 2025-01-21 NOTE — CASE COMMUNICATION
I agree with changes.  Floridalam Agustin RN  ----- Message -----  From: Iliana Mcmahon R.N.  Sent: 1/20/2025   7:32 AM PST  To: Floridalma Agustin R.N.      Quality Review Completed for SOC OASIS by DONTAE Mcmahon, RN on 1/20/2025:     Revisions completed by DONTAE Mcmahon RN:     1.  and  diagnosis coding updated per chart review  2.  changed to 1/10/25 for LSOC ordered  3.  changed to 4 per neuro assessment: impaired safety aware ness, impaired memory, poor problem solving, impulsive, forgetful  4. [id number] changed to #5 per , G and H #3 per narrative  5.  is yes per interventions  6.  changed to 3 per guidelines when ambulation is supervised  7. Added details to 485 form for F2F encounter Billy Wynn M.D. on 12/29/2024

## 2025-01-22 ENCOUNTER — HOME CARE VISIT (OUTPATIENT)
Dept: HOME HEALTH SERVICES | Facility: HOME HEALTHCARE | Age: 85
End: 2025-01-22
Payer: MEDICARE

## 2025-01-22 PROCEDURE — 665998 HH PPS REVENUE CREDIT

## 2025-01-22 PROCEDURE — 665999 HH PPS REVENUE DEBIT

## 2025-01-23 PROCEDURE — 665999 HH PPS REVENUE DEBIT

## 2025-01-23 PROCEDURE — 665998 HH PPS REVENUE CREDIT

## 2025-01-24 ENCOUNTER — HOME CARE VISIT (OUTPATIENT)
Dept: HOME HEALTH SERVICES | Facility: HOME HEALTHCARE | Age: 85
End: 2025-01-24
Payer: MEDICARE

## 2025-01-24 VITALS
RESPIRATION RATE: 18 BRPM | TEMPERATURE: 97.6 F | SYSTOLIC BLOOD PRESSURE: 108 MMHG | WEIGHT: 176 LBS | BODY MASS INDEX: 33.25 KG/M2 | OXYGEN SATURATION: 95 % | HEART RATE: 58 BPM | DIASTOLIC BLOOD PRESSURE: 62 MMHG

## 2025-01-24 PROCEDURE — G0299 HHS/HOSPICE OF RN EA 15 MIN: HCPCS

## 2025-01-24 PROCEDURE — 665999 HH PPS REVENUE DEBIT

## 2025-01-24 PROCEDURE — 665998 HH PPS REVENUE CREDIT

## 2025-01-24 SDOH — ECONOMIC STABILITY: HOUSING INSECURITY: EVIDENCE OF SMOKING MATERIAL: 0

## 2025-01-24 ASSESSMENT — ENCOUNTER SYMPTOMS
DENIES PAIN: 1
BOWEL PATTERN NORMAL: 1
VOMITING: DENIES
NAUSEA: DENIES

## 2025-01-24 ASSESSMENT — FIBROSIS 4 INDEX: FIB4 SCORE: 2.28

## 2025-01-24 ASSESSMENT — ACTIVITIES OF DAILY LIVING (ADL)
CURRENT_FUNCTION: STAND BY ASSIST
AMBULATION ASSISTANCE: STAND BY ASSIST

## 2025-01-25 PROCEDURE — 665998 HH PPS REVENUE CREDIT

## 2025-01-25 PROCEDURE — 665999 HH PPS REVENUE DEBIT

## 2025-01-26 PROCEDURE — 665999 HH PPS REVENUE DEBIT

## 2025-01-26 PROCEDURE — 665998 HH PPS REVENUE CREDIT

## 2025-01-27 PROCEDURE — 665998 HH PPS REVENUE CREDIT

## 2025-01-27 PROCEDURE — 665999 HH PPS REVENUE DEBIT

## 2025-01-28 ENCOUNTER — HOME CARE VISIT (OUTPATIENT)
Dept: HOME HEALTH SERVICES | Facility: HOME HEALTHCARE | Age: 85
End: 2025-01-28
Payer: MEDICARE

## 2025-01-28 VITALS
DIASTOLIC BLOOD PRESSURE: 68 MMHG | TEMPERATURE: 98.6 F | RESPIRATION RATE: 18 BRPM | SYSTOLIC BLOOD PRESSURE: 128 MMHG | HEART RATE: 66 BPM | OXYGEN SATURATION: 98 %

## 2025-01-28 PROCEDURE — G0493 RN CARE EA 15 MIN HH/HOSPICE: HCPCS

## 2025-01-28 PROCEDURE — 665999 HH PPS REVENUE DEBIT

## 2025-01-28 PROCEDURE — 665998 HH PPS REVENUE CREDIT

## 2025-01-28 SDOH — ECONOMIC STABILITY: HOUSING INSECURITY: EVIDENCE OF SMOKING MATERIAL: 0

## 2025-01-28 SDOH — ECONOMIC STABILITY: FOOD INSECURITY: MEALS PER DAY: 3

## 2025-01-28 ASSESSMENT — ENCOUNTER SYMPTOMS
DENIES PAIN: 1
BOWEL PATTERN NORMAL: 1
APPETITE LEVEL: GOOD
LAST BOWEL MOVEMENT: 67232
STOOL FREQUENCY: DAILY
DESCRIPTION OF MEMORY LOSS: SHORT TERM
VOMITING: DENIES
NAUSEA: DENIES

## 2025-01-28 ASSESSMENT — ACTIVITIES OF DAILY LIVING (ADL)
OASIS_M1830: 02
HOME_HEALTH_OASIS: 01

## 2025-01-28 NOTE — CASE COMMUNICATION
Quality Review for [place] OASIS by TAMMIE Yeh, PT  on  January 28, 2025       Edits completed by TAMMIE Yeh, PT:  1.  - changed to 1 as pt uses walker.  2.  - changed to 0 per pain assessment.

## 2025-01-28 NOTE — Clinical Note
i agree with these changes  ----- Message -----  From: Tara Yeh, PT  Sent: 1/28/2025  12:13 PM PST  To: Amarilis Berger R.N.      Quality Review for [place] OASIS by TAMMIE Yeh PT  on  January 28, 2025       Edits completed by TAMMIE Yeh, PT:  1.  - changed to 1 as pt uses walker.  2.  - changed to 0 per pain assessment.

## 2025-02-04 NOTE — CASE COMMUNICATION
In provider inbox   Patient was discussed in IDT today due to recent admission. Discussed progress toward goals of care and plans for discharge with 1-2 weeks with the treatment team. The treatment team currently involves the following disciplines: nursing. Plan is to discharge from home health services in 1-2 wks.

## 2025-02-07 ENCOUNTER — HOME CARE VISIT (OUTPATIENT)
Dept: HOME HEALTH SERVICES | Facility: HOME HEALTHCARE | Age: 85
End: 2025-02-07
Payer: MEDICARE

## 2025-02-12 ENCOUNTER — OFFICE VISIT (OUTPATIENT)
Dept: MEDICAL GROUP | Facility: MEDICAL CENTER | Age: 85
End: 2025-02-12
Attending: FAMILY MEDICINE
Payer: MEDICARE

## 2025-02-12 VITALS
BODY MASS INDEX: 33.29 KG/M2 | WEIGHT: 180.9 LBS | OXYGEN SATURATION: 92 % | RESPIRATION RATE: 16 BRPM | HEART RATE: 82 BPM | TEMPERATURE: 97.5 F | HEIGHT: 62 IN

## 2025-02-12 DIAGNOSIS — E03.9 HYPOTHYROIDISM, UNSPECIFIED TYPE: ICD-10-CM

## 2025-02-12 DIAGNOSIS — M81.0 SENILE OSTEOPOROSIS: ICD-10-CM

## 2025-02-12 DIAGNOSIS — Z23 NEED FOR VACCINATION: ICD-10-CM

## 2025-02-12 DIAGNOSIS — R06.02 SHORTNESS OF BREATH: ICD-10-CM

## 2025-02-12 DIAGNOSIS — H40.1190 PRIMARY OPEN ANGLE GLAUCOMA, UNSPECIFIED GLAUCOMA STAGE, UNSPECIFIED LATERALITY: ICD-10-CM

## 2025-02-12 DIAGNOSIS — I10 HYPERTENSION, UNSPECIFIED TYPE: ICD-10-CM

## 2025-02-12 DIAGNOSIS — E66.811 CLASS 1 OBESITY DUE TO EXCESS CALORIES WITHOUT SERIOUS COMORBIDITY WITH BODY MASS INDEX (BMI) OF 33.0 TO 33.9 IN ADULT: ICD-10-CM

## 2025-02-12 DIAGNOSIS — J96.01 ACUTE HYPOXEMIC RESPIRATORY FAILURE (HCC): ICD-10-CM

## 2025-02-12 DIAGNOSIS — E66.09 CLASS 1 OBESITY DUE TO EXCESS CALORIES WITHOUT SERIOUS COMORBIDITY WITH BODY MASS INDEX (BMI) OF 33.0 TO 33.9 IN ADULT: ICD-10-CM

## 2025-02-12 DIAGNOSIS — E78.5 DYSLIPIDEMIA: ICD-10-CM

## 2025-02-12 PROBLEM — D72.823 LEUKEMOID REACTION: Status: RESOLVED | Noted: 2024-04-29 | Resolved: 2025-02-12

## 2025-02-12 PROBLEM — J18.9 PNEUMONIA DUE TO INFECTIOUS ORGANISM: Status: RESOLVED | Noted: 2022-12-26 | Resolved: 2025-02-12

## 2025-02-12 PROBLEM — Z78.9 FULL CODE STATUS: Status: RESOLVED | Noted: 2024-04-29 | Resolved: 2025-02-12

## 2025-02-12 PROBLEM — Z71.89 ACP (ADVANCE CARE PLANNING): Status: RESOLVED | Noted: 2024-12-29 | Resolved: 2025-02-12

## 2025-02-12 PROBLEM — R65.10 SIRS (SYSTEMIC INFLAMMATORY RESPONSE SYNDROME) (HCC): Status: RESOLVED | Noted: 2024-12-29 | Resolved: 2025-02-12

## 2025-02-12 PROBLEM — R73.09 ABNORMAL GLUCOSE LEVEL: Status: RESOLVED | Noted: 2021-12-13 | Resolved: 2025-02-12

## 2025-02-12 PROBLEM — N18.31 CHRONIC KIDNEY DISEASE, STAGE 3A: Status: RESOLVED | Noted: 2018-12-26 | Resolved: 2025-02-12

## 2025-02-12 PROCEDURE — 99215 OFFICE O/P EST HI 40 MIN: CPT | Performed by: FAMILY MEDICINE

## 2025-02-12 PROCEDURE — 90471 IMMUNIZATION ADMIN: CPT

## 2025-02-12 PROCEDURE — 99417 PROLNG OP E/M EACH 15 MIN: CPT | Performed by: FAMILY MEDICINE

## 2025-02-12 PROCEDURE — 99213 OFFICE O/P EST LOW 20 MIN: CPT | Mod: 25 | Performed by: FAMILY MEDICINE

## 2025-02-12 RX ORDER — LOSARTAN POTASSIUM 100 MG/1
100 TABLET ORAL DAILY
Qty: 100 EACH | Refills: 1 | Status: SHIPPED | OUTPATIENT
Start: 2025-02-12

## 2025-02-12 RX ORDER — BRIMONIDINE TARTRATE AND TIMOLOL MALEATE 2; 5 MG/ML; MG/ML
1 SOLUTION OPHTHALMIC 2 TIMES DAILY
Qty: 15 ML | Refills: 0 | Status: SHIPPED | OUTPATIENT
Start: 2025-02-12

## 2025-02-12 RX ORDER — ALBUTEROL SULFATE 90 UG/1
2 INHALANT RESPIRATORY (INHALATION) EVERY 6 HOURS PRN
Qty: 8.5 G | Refills: 0 | Status: SHIPPED | OUTPATIENT
Start: 2025-02-12

## 2025-02-12 RX ORDER — LATANOPROST 50 UG/ML
1 SOLUTION/ DROPS OPHTHALMIC NIGHTLY
Qty: 7.5 ML | Refills: 0 | Status: SHIPPED | OUTPATIENT
Start: 2025-02-12

## 2025-02-12 ASSESSMENT — FIBROSIS 4 INDEX: FIB4 SCORE: 2.28

## 2025-02-12 NOTE — PROGRESS NOTES
Verbal consent was acquired by the patient to use [employer] ambient listening note generation during this visit Yes     Subjective:     CC:    Chief Complaint   Patient presents with    Establish Care     Med refills        HISTORY OF THE PRESENT ILLNESS: Patient is a 84 y.o. female. This pleasant patient is here today to establish care and discuss the following issues.   His/her prior PCP was Cassie Caballero [facility].  Patient presents with granddaughter, who serves as  and is patient's primary caretaker.     Specialists -   - Cardiology - Dr. Weathers  - Neuro - Dr. Hills  - ophthalmology - Dr. Fletcher - glaucoma       HTN (hypertension)  Granddaughter states that pt was previously recommended to be on spironolactone alone and to have losartan and amlodipine discontinued. She notes that pt was having significant dizziness on all 3 medications  She does have hx of a few decreased GFRs, also hx of chronic systolic/diastolic heart failure, which have seemed to have recovered on recent echo.   Unfortunately granddaughter states that her mom has been giving patient amlodipine 10mg alone, and bp today is well controlled.        Primary open angle glaucoma  Reportedly well controlled, pt seeking refill on this today    Senile osteoporosis  Bone scan done 2023 showing osteoporosis. Reportedly she took a pill once weekly but gave her GI upset so she stopped it. Since then she has been taking a naturopathic therapy for bone density.   They do not think she has had any broken bones but previously she was falling frequently and xrays done previously show healing pelvic fx, possible vertebral compression fx.     Acute hypoxemic respiratory failure (HCC)  Pt recently admitted to ER for influenza requiring oxygen support. Granddaughter states HH took her off of supplemental O2 with improvement.   Notes that pt gets SOB with ambulation with walker in the home.       Allergies: Oxycodone and Tramadol    Current Outpatient  Medications Ordered in Epic   Medication Sig Dispense Refill    losartan (COZAAR) 100 MG Tab Take 1 Tablet by mouth every day. Indications: High Blood Pressure 100 Each 1    latanoprost (XALATAN) 0.005 % Solution Administer 1 Drop into both eyes every evening. Indications: Wide-Angle Glaucoma 7.5 mL 0    Brimonidine Tartrate-Timolol 0.2-0.5 % Solution Administer 1 Drop into both eyes 2 times a day. Indications: Glaucoma 15 mL 0    albuterol 108 (90 Base) MCG/ACT Aero Soln inhalation aerosol Inhale 2 Puffs every 6 hours as needed for Shortness of Breath. Indications: Asthma 8.5 g 0    acetaminophen (TYLENOL) 650 MG CR tablet Take 2 Tablets by mouth every 8 hours as needed for Mild Pain or Moderate Pain. Indications: Pain      Turmeric (QC TUMERIC COMPLEX PO) Take 1 Tablet by mouth every day. Indications: supplement      Non Formulary Request Take 1 Tablet by mouth every day. bee vemon, glucosemine, tumeric  Indications: supplement      Home Care Oxygen Inhale 1 L/min as needed for Shortness of Breath (SPO2 <90%). Oxygen dose range: 0 to 1 L/min  Respiratory route via: Nasal Cannula   Oxygen supplier: [facility]      Indications: SOB d/t flu      levothyroxine (SYNTHROID) 100 MCG Tab Take 1 Tablet by mouth every morning before breakfast. 30 Tablet      No current Epic-ordered facility-administered medications on file.       Past Medical History:   Diagnosis Date    Acute renal failure (Formerly [facility]) 02/11/2014    Anesthesia     PONV    Angina     CONSULT NEG. RESULTS    Arthritis     bilateral knees    Asthma     Breath shortness     WITH EXERCISE    CATARACT     bilat IOL    Dental disorder     upper/lower partials    Glaucoma     Heart burn     Hypertension     Indigestion     Pneumonia 03/2018    Rib fractures 12/27/2022    Sepsis (Formerly [facility]) 02/08/2014    Stroke (Formerly [facility])     denies hx. of stroke    Unspecified disorder of thyroid     'thyroid medicine taken in past'    Unspecified hemorrhagic conditions 2009     treated in er,nose bleed     Urinary bladder disorder     Urinary incontinence     wears pads       Past Surgical History:   Procedure Laterality Date    MUSCLE BIOPSY Left 10/18/2023    Procedure: LEFT DELTOID BIOPSY;  Surgeon: Miller Hernandez M.D.;  Location: [facility];  Service: Neurosurgery    SHOULDER ARTHROPLASTY TOTAL Right 05/02/2018    Procedure: SHOULDER ARTHROPLASTY TOTAL/ REVERSE;  Surgeon: Scott Payton M.D.;  Location: SURGERY SAME DAY [facility];  Service: Orthopedics    ANTERIOR AND POSTERIOR REPAIR  03/20/2017    Procedure: ANTERIOR AND POSTERIOR REPAIR;  Surgeon: Regulo Burton M.D.;  Location: SURGERY SAME DAY [facility];  Service:     ENTEROCELE REPAIR  03/20/2017    Procedure: ENTEROCELE REPAIR - PERINEOPLASTY;  Surgeon: Regulo Burton M.D.;  Location: SURGERY SAME DAY [facility];  Service:     BLADDER SLING FEMALE  03/20/2017    Procedure: BLADDER SLING FEMALE - TOT;  Surgeon: Regulo Burton M.D.;  Location: SURGERY SAME DAY [facility];  Service:     VAGINAL SUSPENSION  03/20/2017    Procedure: VAGINAL SUSPENSION -SACROSPINOUS VAULT;  Surgeon: Regulo Burton M.D.;  Location: SURGERY SAME DAY [facility];  Service:     KNEE ARTHROPLASTY TOTAL Left 08/20/2015    Procedure: KNEE ARTHROPLASTY TOTAL;  Surgeon: Juan Manuel Choi M.D.;  Location: [facility];  Service:     BAERVELDT IMPLANT  05/08/2013    Performed by Miller Fletcher M.D. at SURGERY SAME DAY [facility]    CATARACT PHACO WITH IOL  05/08/2013    Performed by Miller Fletcher M.D. at SURGERY SAME DAY [facility]    BAERVELDT IMPLANT  03/13/2013    Performed by Miller Fletcher M.D. at SURGERY SAME DAY [facility]    BAERVELDT IMPLANT  12/12/2012    Performed by Miller Fletcher M.D. at SURGERY SAME DAY [facility]    TRABECULECTOMY  09/26/2012    Performed by Miller Fletcher M.D. at SURGERY SAME DAY [facility]    CATARACT PHACO WITH IOL  09/26/2012    Performed by Miller Fletcher M.D. at SURGERY SAME  "[facility]    KNEE ARTHROPLASTY TOTAL  2009    Performed by GISELA OLSON at SURGERY [facility]    HYSTERECTOMY, TOTAL ABDOMINAL  1989    GYN SURGERY      OTHER      R shoulder       Social History     Tobacco Use    Smoking status: Former     Current packs/day: 0.00     Average packs/day: 0.1 packs/day for 20.0 years (2.0 ttl pk-yrs)     Types: Cigarettes     Start date: 3/16/1982     Quit date: 3/16/2002     Years since quittin.9    Smokeless tobacco: Never   Vaping Use    Vaping status: Never Used   Substance Use Topics    Alcohol use: Yes     Alcohol/week: 0.6 oz     Types: 1 Glasses of wine per week     Comment: weekly    Drug use: No       Social History     Social History Narrative    Not on file       No family history on file.    ROS:   Pulm: no sob, no cough  CV: no chest pain, no lower extremity edema  GI: no abd pain, hematochezia, melena          Objective:     Exam: Pulse 82   Temp 36.4 °C (97.5 °F) (Temporal)   Resp 16   Ht 1.575 m (5' 2\")   Wt 82.1 kg (180 lb 14.4 oz)   SpO2 92%  Body mass index is 33.09 kg/m².    General: Normal appearing. No distress.  Head: normocephalic  Neck: Supple. Thyroid is not enlarged.  Pulmonary: Clear to ausculation.  Normal effort. Scattered wheezes.  Cardiovascular: Regular rate and rhythm without murmur.   Abdomen: Soft, nontender, nondistended. Normal bowel sounds.  Neurologic: no facial droop, gait normal  Psych: Normal mood and affect. Pt is AAO to herself and place.       Data reviewed: Reviewed hospital progress notes, procedure notes, labs, and imaging.     Assessment & Plan:   84 y.o. female with the following -    1. Class 1 obesity due to excess calories without serious comorbidity with body mass index (BMI) of 33.0 to 33.9 in adult    2. Hypertension, unspecified type  - losartan (COZAAR) 100 MG Tab; Take 1 Tablet by mouth every day. Indications: High Blood Pressure  Dispense: 100 Each; Refill: 1  Will have pt try singular " bp med and have granddaughter send me bp log over the next couple of weeks.   Losartan for hx of CHF and decreased GFR. [place] possibly was helpful for swelling.     3. Primary open angle glaucoma, unspecified glaucoma stage, unspecified laterality  - latanoprost (XALATAN) 0.005 % Solution; Administer 1 Drop into both eyes every evening. Indications: Wide-Angle Glaucoma  Dispense: 7.5 mL; Refill: 0  - Brimonidine Tartrate-Timolol 0.2-0.5 % Solution; Administer 1 Drop into both eyes 2 times a day. Indications: Glaucoma  Dispense: 15 mL; Refill: 0  Temporary refill, have asked pt to obtain refills from ophtho    4. Senile osteoporosis  - DS-BONE DENSITY STUDY (DEXA); Future  - Referral to Endocrinology  With hx of fx and osteoporosis, pt should be on a bone building medication, will send to endocrinology    5. Need for vaccination  - Tdap =>6yo IM    6. Shortness of breath  - albuterol 108 (90 Base) MCG/ACT Aero Soln inhalation aerosol; Inhale 2 Puffs every 6 hours as needed for Shortness of Breath. Indications: Asthma  Dispense: 8.5 g; Refill: 0  Wheezes noted, trial of albuterol. Granddaughter to check O2 sats with activity at home. Unable to perform 6min walk today as she did not have walker.     7. Hypothyroidism, unspecified type  - TSH WITH REFLEX TO FT4; Future    8. Dyslipidemia  - Lipid Profile; Future    9. Acute hypoxemic respiratory failure (HCC)  - Comp Metabolic Panel; Future    My total time spent caring for the patient on the day of the encounter was 61 minutes.   This does not include time spent on separately billable procedures/tests.      Return in about 1 month (around 3/12/2025).    Please note that this dictation was created using voice recognition software. I have made every reasonable attempt to correct obvious errors, but I expect that there are errors of grammar and possibly content that I did not discover before finalizing the note.

## 2025-02-12 NOTE — LETTER
[facility]  LIBERTY Henderson  [address]  [place] [address]  Fax: [phone]   Authorization for Release/Disclosure of   Protected Health Information   Name: MONICA BURTON : 1940 SSN: [id number]   Address:  [facility]  [address]  [place] [address] Phone:    [phone] (home)    I authorize the entity listed below to release/disclose the PHI below to:   [facility] and Gaye Latham M.D.   Provider or Entity Name:  [facility]   City, State, Zip   Phone:      Fax:     Reason for request: continuity of care   Information to be released:    [  ] LAST COLONOSCOPY,  including any PATH REPORT and follow-up  [  ] LAST FIT/COLOGUARD RESULT [  ] LAST DEXA  [  ] LAST MAMMOGRAM  [  ] LAST PAP  [  ] LAST LABS [  ] RETINA EXAM REPORT  [  ] IMMUNIZATION RECORDS  [  X] Release all info      [  ] Check here and initial the line next to each item to release ALL health information INCLUDING  _____ Care and treatment for drug and / or alcohol abuse  _____ HIV testing, infection status, or AIDS  _____ Genetic Testing    DATES OF SERVICE OR TIME PERIOD TO BE DISCLOSED: _____________  I understand and acknowledge that:  * This Authorization may be revoked at any time by you in writing, except if your health information has already been used or disclosed.  * Your health information that will be used or disclosed as a result of you signing this authorization could be re-disclosed by the recipient. If this occurs, your re-disclosed health information may no longer be protected by State or Federal laws.  * You may refuse to sign this Authorization. Your refusal will not affect your ability to obtain treatment.  * This Authorization becomes effective upon signing and will  on (date) __________.      If no date is indicated, this Authorization will  one (1) year from the signature date.    Name: Monica Burton  Signature: Date:   2025     PLEASE FAX  REQUESTED RECORDS BACK TO: [phone]

## 2025-02-13 NOTE — ASSESSMENT & PLAN NOTE
Granddaughter states that pt was previously recommended to be on spironolactone alone and to have losartan and amlodipine discontinued. She notes that pt was having significant dizziness on all 3 medications  She does have hx of a few decreased GFRs, also hx of chronic systolic/diastolic heart failure, which have seemed to have recovered on recent echo.   Unfortunately granddaughter states that her mom has been giving patient amlodipine 10mg alone, and bp today is well controlled.

## 2025-02-13 NOTE — ASSESSMENT & PLAN NOTE
Pt recently admitted to ER for influenza requiring oxygen support. Granddaughter states HH took her off of supplemental O2 with improvement.   Notes that pt gets SOB with ambulation with walker in the home.

## 2025-02-13 NOTE — ASSESSMENT & PLAN NOTE
Bone scan done 2023 showing osteoporosis. Reportedly she took a pill once weekly but gave her GI upset so she stopped it. Since then she has been taking a naturopathic therapy for bone density.   They do not think she has had any broken bones but previously she was falling frequently and xrays done previously show healing pelvic fx, possible vertebral compression fx.

## 2025-02-14 ENCOUNTER — PATIENT OUTREACH (OUTPATIENT)
Dept: HEALTH INFORMATION MANAGEMENT | Facility: OTHER | Age: 85
End: 2025-02-14
Payer: MEDICARE

## 2025-02-14 DIAGNOSIS — I50.42 CHRONIC COMBINED SYSTOLIC AND DIASTOLIC HEART FAILURE (HCC): ICD-10-CM

## 2025-02-14 DIAGNOSIS — I10 HYPERTENSION, UNSPECIFIED TYPE: ICD-10-CM

## 2025-02-14 NOTE — Clinical Note
REFERRAL APPROVAL NOTICE         Sent on February 14, 2025                   Lizzette Burton  [address]  [address]  [place] [address]                   Dear Ms. Burton,    After a careful review of the medical information and benefit coverage, [facility] has processed your referral. See below for additional details.    If applicable, you must be actively enrolled with your insurance for coverage of the authorized service. If you have any questions regarding your coverage, please contact your insurance directly.    REFERRAL INFORMATION   Referral #:  [id number]  Referred-To Department    Referred-By Provider:  Endocrinology    Gaye Latham M.D.   Endocrinology [facility]      [address]  [address]  [facility] [id number] [phone] [address], [address]  [facility] [address] [phone]    Referral Start Date:  02/12/2025  Referral End Date:   02/12/2026           SCHEDULING  If you do not already have an appointment, please call [phone] to make an appointment.   MORE INFORMATION  As a reminder, [facility] ownership has changed, meaning this location is now owned and operated by [facility]. As such, we want to clarify that our patients should expect to receive two separate bills for the services received at [facility] - one representing the [facility] facility fees as the owner of the establishment, and the other to represent the physician's services and subsequent fees. You can speak with your insurance carrier for a pricing estimate by calling the customer service number on the back of your card and ask about charges for a hospital outpatient visit.  If you do not already have a [facility] account, sign up at: [email or web address]  You can access your medical information, make appointments, see lab results, billing information, and more.  If you have questions regarding this referral, please contact  the [facility] Referrals department at:              [phone]. Monday - Friday 7:30AM - 5:00PM.      Sincerely,  [facility]

## 2025-02-19 ENCOUNTER — PATIENT OUTREACH (OUTPATIENT)
Dept: HEALTH INFORMATION MANAGEMENT | Facility: OTHER | Age: 85
End: 2025-02-19
Payer: MEDICARE

## 2025-02-19 DIAGNOSIS — I10 HYPERTENSION, UNSPECIFIED TYPE: ICD-10-CM

## 2025-02-19 NOTE — PROGRESS NOTES
"Reason for Follow-Up:  Monthly outreach call.  Patient reports -spoke with patient's granddaughter, Dianna    HTN  Losartan started recently by Dr Latham-patient tolerating without side effects  Patient's grand daughter is monitoring BP at home and keeping a log book to provide to Dr Latham at next appointment  Patient's grand daughter states that the patient is reluctant to start a low sodium low fat diet-\"she eats what she wants to\"  CHF  No edema  No weight gain  Balance/Falls  No falls  Patient uses walker for ambulation  Needs assistance standing from a sitting position  Hospitalization Dec 2024  No supplemental oxygen usage currently  Patient's grand daughter states patient uses albuterol inhaler prn mild shortness of breath  Patient recently changed PCP to Dr Gaye Latham. Dr Latham is a [facility] provider, so this RN will change patient from a community Complex Care Program enrollee to a Personal Care Management Program enrollee.      Current Health Status:HTN, CHF, Fall risk    Medical Updates:  new PCP    Medication Updates:  started losartan    Symptoms:  no acute symptoms    Plan of Care Goals and Progress:    Priority 1. HTN     Progress:  BP being monitored frequently at home by family member      Barriers:  disease processes, patient adherence     Interventions:  medications, diet     Education:  Work on decreasing sodium and fat in diet    Priority 2. Fall Risk     Progress:  No falls      Barriers:  disease processes,      Interventions:  walker for ambulation, assistance with standing and ambulation     Education:  Floor safety discussion. Will add additional education on fall risk in coming months calls      Patient's Concerns and Feedback with Self-Management of Care:    Patient's grand daughter states main concern is patient not wanting to eat a low sodium low fat diet as prescribed by her PCP    Next Steps:     Follow-Up Plan:  Follow up with Dr Latham regarding new BP medication      Appointments:  03/21/2025 " PCP     Contact Information:  Call [phone] with any questions or concerns.       Next Outreach: March 2025

## 2025-02-19 NOTE — CARE PLAN
Problem: Knowledge deficit of hypertension  Goal: Demonstrate Knowledge of Hypertension-Long Term  Description: Due 09/01/2025  Outcome: Progressing     Problem: Knowledge deficit of factors contributing to hypertension  Goal: Demonstrate factors that can contribute to high blood pressure-Long Term  Description: Due 09/01/2025  Outcome: Progressing  Note: Patient has verbalized understanding of factors contributing to high blood pressure      Problem: Recognize current health habits that may contribute to hypertension  Goal: Identify which modifiable health habits can be modifed-Long Term  Description: Due 09/01/2025  Outcome: Progressing  Note: Patient has verbalized need to change dietary habits     Problem: Patient barriers to managing high blood pressure  Goal: Identify barriers to managing blood pressure-Long Term  Description: Due 09/01/2025  Outcome: Progressing  Note: Patient has vocalized commitment to lessening the barriers -discussion on diet and sodium/fat intake     Problem: Knowledge deficit of self-monitoring blood pressure  Goal: Demonstrate the elements of self-monitoring blood pressure-Long Term  Description: Due 09/01/2025  Outcome: Progressing  Note:   -Patient has verbalized commitment to self-monitoring blood pressure at least once a week.    -Patient will log home blood pressures and bring the log to medical provider appointments.       Problem: Adherence to prescribed medications  Goal: Improve medication adherence-Long Term  Description: Due 09/01/2025  Outcome: Progressing     Problem: Risk of Falls  Goal: Reduce the Risk of Falls and Fall Related Injuries-Long Term  Description: Due 09/01/2025  Outcome: Progressing     Problem: Knowledge deficit surrounding fall safety  Goal: Demonstrate ability to verbalize fall safety concerns-Long Term  Description: Due 09/01/2025  Outcome: Progressing

## 2025-02-22 ENCOUNTER — HOSPITAL ENCOUNTER (OUTPATIENT)
Dept: LAB | Facility: MEDICAL CENTER | Age: 85
End: 2025-02-22
Attending: FAMILY MEDICINE
Payer: MEDICARE

## 2025-02-22 DIAGNOSIS — J96.01 ACUTE HYPOXEMIC RESPIRATORY FAILURE (HCC): ICD-10-CM

## 2025-02-22 DIAGNOSIS — E03.9 HYPOTHYROIDISM, UNSPECIFIED TYPE: ICD-10-CM

## 2025-02-22 DIAGNOSIS — E78.5 DYSLIPIDEMIA: ICD-10-CM

## 2025-02-22 LAB
ALBUMIN SERPL BCP-MCNC: 3.8 G/DL (ref 3.2–4.9)
ALBUMIN/GLOB SERPL: 1.2 G/DL
ALP SERPL-CCNC: 58 U/L (ref 30–99)
ALT SERPL-CCNC: 11 U/L (ref 2–50)
ANION GAP SERPL CALC-SCNC: 10 MMOL/L (ref 7–16)
AST SERPL-CCNC: 19 U/L (ref 12–45)
BILIRUB SERPL-MCNC: 0.4 MG/DL (ref 0.1–1.5)
BUN SERPL-MCNC: 20 MG/DL (ref 8–22)
CALCIUM ALBUM COR SERPL-MCNC: 9.3 MG/DL (ref 8.5–10.5)
CALCIUM SERPL-MCNC: 9.1 MG/DL (ref 8.5–10.5)
CHLORIDE SERPL-SCNC: 102 MMOL/L (ref 96–112)
CHOLEST SERPL-MCNC: 186 MG/DL (ref 100–199)
CO2 SERPL-SCNC: 25 MMOL/L (ref 20–33)
CREAT SERPL-MCNC: 0.93 MG/DL (ref 0.5–1.4)
GFR SERPLBLD CREATININE-BSD FMLA CKD-EPI: 60 ML/MIN/1.73 M 2
GLOBULIN SER CALC-MCNC: 3.2 G/DL (ref 1.9–3.5)
GLUCOSE SERPL-MCNC: 100 MG/DL (ref 65–99)
HDLC SERPL-MCNC: 54 MG/DL
LDLC SERPL CALC-MCNC: 114 MG/DL
POTASSIUM SERPL-SCNC: 4.2 MMOL/L (ref 3.6–5.5)
PROT SERPL-MCNC: 7 G/DL (ref 6–8.2)
SODIUM SERPL-SCNC: 137 MMOL/L (ref 135–145)
TRIGL SERPL-MCNC: 91 MG/DL (ref 0–149)
TSH SERPL DL<=0.005 MIU/L-ACNC: 3.13 UIU/ML (ref 0.38–5.33)

## 2025-02-22 PROCEDURE — 80053 COMPREHEN METABOLIC PANEL: CPT

## 2025-02-22 PROCEDURE — 80061 LIPID PANEL: CPT

## 2025-02-22 PROCEDURE — 36415 COLL VENOUS BLD VENIPUNCTURE: CPT

## 2025-02-22 PROCEDURE — 84443 ASSAY THYROID STIM HORMONE: CPT

## 2025-02-28 ENCOUNTER — RESULTS FOLLOW-UP (OUTPATIENT)
Dept: MEDICAL GROUP | Facility: MEDICAL CENTER | Age: 85
End: 2025-02-28
Payer: MEDICARE

## 2025-03-07 DIAGNOSIS — R06.02 SHORTNESS OF BREATH: ICD-10-CM

## 2025-03-07 RX ORDER — ALBUTEROL SULFATE 90 UG/1
2 INHALANT RESPIRATORY (INHALATION) EVERY 6 HOURS PRN
Qty: 8.5 EACH | Refills: 11 | Status: SHIPPED | OUTPATIENT
Start: 2025-03-07

## 2025-03-07 NOTE — TELEPHONE ENCOUNTER
Received request via: Pharmacy    Was the patient seen in the last year in this department? Yes    Does the patient have an active prescription (recently filled or refills available) for medication(s) requested? No    Pharmacy Name: [facility]    Does the patient have [facility] Plus and need 100-day supply? (This applies to ALL medications) Yes, quantity updated to 100 days

## 2025-03-17 ENCOUNTER — PATIENT OUTREACH (OUTPATIENT)
Dept: HEALTH INFORMATION MANAGEMENT | Facility: OTHER | Age: 85
End: 2025-03-17
Payer: MEDICARE

## 2025-03-17 DIAGNOSIS — I10 HYPERTENSION, UNSPECIFIED TYPE: ICD-10-CM

## 2025-03-17 DIAGNOSIS — N18.31 CHRONIC KIDNEY DISEASE, STAGE 3A: ICD-10-CM

## 2025-03-17 NOTE — PROGRESS NOTES
Monthly outreach call deferred for March 2025. Patient is stable with no Emergency Department/Urgent Care/Hospitalizations in past month.  Will follow up for monthly outreach call in April 2025                 Next Outreach: April 2025

## 2025-04-07 ENCOUNTER — HOSPITAL ENCOUNTER (OUTPATIENT)
Dept: RADIOLOGY | Facility: MEDICAL CENTER | Age: 85
End: 2025-04-07
Attending: FAMILY MEDICINE
Payer: MEDICARE

## 2025-04-07 DIAGNOSIS — M81.0 SENILE OSTEOPOROSIS: ICD-10-CM

## 2025-04-07 PROCEDURE — 77080 DXA BONE DENSITY AXIAL: CPT

## 2025-04-11 ENCOUNTER — RESULTS FOLLOW-UP (OUTPATIENT)
Dept: MEDICAL GROUP | Facility: MEDICAL CENTER | Age: 85
End: 2025-04-11
Payer: MEDICARE

## 2025-04-11 ENCOUNTER — APPOINTMENT (OUTPATIENT)
Dept: CARDIOLOGY | Facility: MEDICAL CENTER | Age: 85
End: 2025-04-11
Attending: INTERNAL MEDICINE
Payer: MEDICARE

## 2025-04-11 ENCOUNTER — TELEPHONE (OUTPATIENT)
Dept: NEUROLOGY | Facility: MEDICAL CENTER | Age: 85
End: 2025-04-11
Payer: MEDICARE

## 2025-04-16 ENCOUNTER — APPOINTMENT (OUTPATIENT)
Dept: NEUROLOGY | Facility: MEDICAL CENTER | Age: 85
End: 2025-04-16
Attending: INTERNAL MEDICINE
Payer: MEDICARE

## 2025-04-18 ENCOUNTER — PATIENT OUTREACH (OUTPATIENT)
Dept: HEALTH INFORMATION MANAGEMENT | Facility: OTHER | Age: 85
End: 2025-04-18
Payer: MEDICARE

## 2025-04-18 DIAGNOSIS — I10 HYPERTENSION, UNSPECIFIED TYPE: ICD-10-CM

## 2025-04-18 DIAGNOSIS — N18.31 CHRONIC KIDNEY DISEASE, STAGE 3A: ICD-10-CM

## 2025-04-21 NOTE — PROGRESS NOTES
Spoke with patient's granddaughter, Stephenie. She indicates her grandmother is doing well. However, the entire family is grieving and she can not talk today. Encouraged Stephenie to call the PCM line if there are any issues, concerns or questions she has about her grandmother's health. Will call back in May for regularly scheduled PCM outreach call.

## 2025-04-21 NOTE — PROGRESS NOTES
New Patient Consult Note for Endocrinology  Referred by: Gaye Latham M.D.    Reason for consult:   Osteoporosis    HPI:  Lizzette Burton is a 85 y.o. female who was referred to endocrinology service for osteoporosis management.     Osteoporosis:  - diagnosed based on  - history of low impact fractures: 2009 - healed R sided rib fractures, 2019 - R comminuted proximal fibular fracture, 2023 - healed sacral fracture    - change in height:  - positive history of falls: balance problems due to MS  - previous treatment: none  - daily Ca intake  - daily vit D intake  - exercise  RISK FACTORS:  - late menarche  - early menopause  - history of hysterectectomy or oophorectomy  - hypogonadism  - low BMI  - denies history of Ca, phosphorus, vit D, thyroid/parathryoid disorders, kidney stones, CKD, liver disease, chronic malabsorption/diarrhea, immobilization, smoking, alcohol abuse, chronic steroid use, Fhx of fractures/osteoporosis   Factors affecting osteoporosis treatment choice:  History of prior radiation  GERD/dysphagia  Hx of teeth problems, upcoming dental work  Hx CKD (baseline creatinine)  Hx of CVA or MI       pt was on fosamax but stopped due to GI issues. hx of frequent falls, xrays show past fractures. consider other bone building treatments   Bone scan done 2023 showing osteoporosis. Reportedly she took a pill once weekly but gave her GI upset so she stopped it. Since then she has been taking a naturopathic therapy for bone density.   They do not think she has had any broken bones but previously she was falling frequently and xrays done previously show healing pelvic fx, possible vertebral compression fx.   With hx of fx and osteoporosis, pt should be on a bone building medication, will send to endocrinology     Past Medical History:  Patient Active Problem List    Diagnosis Date Noted    Cerebral atrophy (HCC) 10/14/2024    Chronic combined systolic and diastolic heart failure (HCC) 10/14/2024     Atherosclerosis of aorta (HCC) 10/14/2024    Dyslipidemia 10/14/2024    Nonsustained ventricular tachycardia (HCC) 08/14/2024    Dizziness 04/29/2024    Pulmonary hypertension (HCC) 04/29/2024    Chest pain 11/13/2023    Recurrent falls and weakness 12/27/2022    Senile osteoporosis 12/26/2018    Status post reverse total arthroplasty of right shoulder 05/03/2018    Primary osteoarthritis of right shoulder 05/03/2018    MILAN (stress urinary incontinence, female) 03/20/2017    Status post bilateral knee replacements 12/12/2016    Nontoxic single thyroid nodule 08/14/2016    Gastroesophageal reflux disease 07/04/2016    Class 1 obesity due to excess calories without serious comorbidity with body mass index (BMI) of 33.0 to 33.9 in adult 05/23/2015    Menopause 02/20/2014    Hyponatremia 02/11/2014    Acute hypoxemic respiratory failure (HCC) 02/11/2014    Primary open angle glaucoma 05/08/2013    Senile nuclear sclerosis 05/08/2013    HTN (hypertension) 03/21/2011    Hypothyroidism 03/21/2011     Past Surgical History:  Past Surgical History:   Procedure Laterality Date    MUSCLE BIOPSY Left 10/18/2023    Procedure: LEFT DELTOID BIOPSY;  Surgeon: Miller Hernandez M.D.;  Location: SURGERY [facility];  Service: Neurosurgery    SHOULDER ARTHROPLASTY TOTAL Right 05/02/2018    Procedure: SHOULDER ARTHROPLASTY TOTAL/ REVERSE;  Surgeon: Scott Payton M.D.;  Location: SURGERY SAME DAY [facility];  Service: Orthopedics    ANTERIOR AND POSTERIOR REPAIR  03/20/2017    Procedure: ANTERIOR AND POSTERIOR REPAIR;  Surgeon: Regulo Burton M.D.;  Location: SURGERY SAME DAY [facility];  Service:     ENTEROCELE REPAIR  03/20/2017    Procedure: ENTEROCELE REPAIR - PERINEOPLASTY;  Surgeon: Regulo Burton M.D.;  Location: SURGERY SAME DAY [facility];  Service:     BLADDER SLING FEMALE  03/20/2017    Procedure: BLADDER SLING FEMALE - TOT;  Surgeon: Regulo Burton M.D.;  Location: SURGERY SAME DAY [facility];  Service:      VAGINAL SUSPENSION  2017    Procedure: VAGINAL SUSPENSION -SACROSPINOUS VAULT;  Surgeon: Regulo Burton M.D.;  Location: SURGERY SAME DAY [facility];  Service:     KNEE ARTHROPLASTY TOTAL Left 2015    Procedure: KNEE ARTHROPLASTY TOTAL;  Surgeon: Juan Manuel Choi M.D.;  Location: SURGERY [facility];  Service:     BAERVELDT IMPLANT  2013    Performed by Miller Fletcher M.D. at SURGERY SAME DAY [facility] ORS    CATARACT PHACO WITH IOL  2013    Performed by Miller Fletcher M.D. at SURGERY SAME DAY [facility]S    BAERVELDT IMPLANT  2013    Performed by Miller Fletcher M.D. at SURGERY SAME DAY [facility]S    BAERVELDT IMPLANT  2012    Performed by Miller Fletcher M.D. at SURGERY SAME DAY [facility]    TRABECULECTOMY  2012    Performed by Miller Fletcher M.D. at SURGERY SAME DAY [facility]    CATARACT PHACO WITH IOL  2012    Performed by Miller Fletcher M.D. at SURGERY SAME DAY [facility]    KNEE ARTHROPLASTY TOTAL  2009    Performed by GISELA OLSON at SURGERY [facility]S    HYSTERECTOMY, TOTAL ABDOMINAL  1989    GYN SURGERY      OTHER      R shoulder     Allergies:  Oxycodone and Tramadol    Social History:  Social History     Socioeconomic History    Marital status:      Spouse name: Not on file    Number of children: Not on file    Years of education: Not on file    Highest education level: Not on file   Occupational History    Not on file   Tobacco Use    Smoking status: Former     Current packs/day: 0.00     Average packs/day: 0.1 packs/day for 20.0 years (2.0 ttl pk-yrs)     Types: Cigarettes     Start date: 3/16/1982     Quit date: 3/16/2002     Years since quittin.1    Smokeless tobacco: Never   Vaping Use    Vaping status: Never Used   Substance and Sexual Activity    Alcohol use: Yes     Alcohol/week: 0.6 oz     Types: 1 Glasses of wine per week     Comment: weekly    Drug use: No    Sexual activity: Not  Currently   Other Topics Concern    Not on file   Social History Narrative    Not on file     Social Drivers of Health     Financial Resource Strain: Low Risk  (11/5/2024)    Overall Financial Resource Strain (CARDIA)     Difficulty of Paying Living Expenses: Not very hard   Food Insecurity: No Food Insecurity (12/29/2024)    Hunger Vital Sign     Worried About Running Out of Food in the Last Year: Never true     Ran Out of Food in the Last Year: Never true   Transportation Needs: No Transportation Needs (12/29/2024)    PRAPARE - Transportation     Lack of Transportation (Medical): No     Lack of Transportation (Non-Medical): No   Physical Activity: Insufficiently Active (11/5/2024)    Exercise Vital Sign     Days of Exercise per Week: 7 days     Minutes of Exercise per Session: 20 min   Stress: Stress Concern Present (11/5/2024)    [employer] - Occupational Stress Questionnaire     Feeling of Stress : Very much   Social Connections: Feeling Socially Integrated (1/28/2025)    OASIS : Social Isolation     Frequency of experiencing loneliness or isolation: Never   Recent Concern: Social Connections - Moderately Isolated (11/5/2024)    Social Connection and Isolation Panel [NHANES]     Frequency of Communication with Friends and Family: Once a week     Frequency of Social Gatherings with Friends and Family: More than three times a week     Attends [employer] Services: 1 to 4 times per year     Active Member of Clubs or Organizations: No     Attends Club or Organization Meetings: Never     Marital Status:    Intimate Partner Violence: Not At Risk (12/29/2024)    Humiliation, Afraid, Rape, and Kick questionnaire     Fear of Current or Ex-Partner: No     Emotionally Abused: No     Physically Abused: No     Sexually Abused: No   Housing Stability: Low Risk  (12/29/2024)    Housing Stability Vital Sign     Unable to Pay for Housing in the Last Year: No     Number of Times Moved in  the Last Year: 1     Homeless in the Last Year: No     Family History:  No family history on file.    Medications:  Current Outpatient Medications:     albuterol 108 (90 Base) MCG/ACT Aero Soln inhalation aerosol, INHALE 2 PUFFS EVERY 6 HOURS AS NEEDED FOR SHORTNESS OF BREATH. INDICATIONS: ASTHMA, Disp: 8.5 Each, Rfl: 11    losartan (COZAAR) 100 MG Tab, Take 1 Tablet by mouth every day. Indications: High Blood Pressure, Disp: 100 Each, Rfl: 1    latanoprost (XALATAN) 0.005 % Solution, Administer 1 Drop into both eyes every evening. Indications: Wide-Angle Glaucoma, Disp: 7.5 mL, Rfl: 0    Brimonidine Tartrate-Timolol 0.2-0.5 % Solution, Administer 1 Drop into both eyes 2 times a day. Indications: Glaucoma, Disp: 15 mL, Rfl: 0    acetaminophen (TYLENOL) 650 MG CR tablet, Take 2 Tablets by mouth every 8 hours as needed for Mild Pain or Moderate Pain. Indications: Pain, Disp: , Rfl:     Turmeric (QC TUMERIC COMPLEX PO), Take 1 Tablet by mouth every day. Indications: supplement, Disp: , Rfl:     Non Formulary Request, Take 1 Tablet by mouth every day. bee vemon, glucosemine, tumeric  Indications: supplement, Disp: , Rfl:     Home Care Oxygen, Inhale 1 L/min as needed for Shortness of Breath (SPO2 <90%). Oxygen dose range: 0 to 1 L/min Respiratory route via: Nasal Cannula  Oxygen supplier: [place]    Indications: SOB d/t flu (Patient not taking: Reported on 2/19/2025), Disp: , Rfl:     levothyroxine (SYNTHROID) 100 MCG Tab, Take 1 Tablet by mouth every morning before breakfast., Disp: 30 Tablet, Rfl:     Physical Examination:   Vital signs: There were no vitals taken for this visit.  General: No distress, cooperative, well dressed and well nourished.   Eyes: No scleral icterus or discharge  ENMT: Normal on external inspection of nose, lips, No nasal drainage   Neck: No abnormal masses on inspection  Resp: Normal effort  CVS: Regular rate and rhythm  Extremities: No edema bilateral extremities  Neuro: Alert and  oriented  Skin: No rash, No Ulcers  Psych: Normal mood and affect    Labs:  - reviewed   Reference Range 12/29/24 12/30/24 12/31/24 02/22/25    Hb 12.0 - 16.0 g/dL 14.2 13.3 14.4    Hct 37.0 - 47.0 % 42.8 40.0 42.9    Sodium 135 - 145 mmol/L 129 (L) 130 (L) 133 (L) 137   Creatinine 0.50 - 1.40 mg/dL 1.06 0.77 0.77 0.93   GFR >60 mL/min/1.73 m 2 51 ! 75 75 60   Calcium 8.5 - 10.5 mg/dL 8.7 8.0 (L) 8.8 9.1   Correct Calcium 8.5 - 10.5 mg/dL 8.9 8.6 9.0 9.3   AST(SGOT) 12 - 45 U/L 20 21  19   ALT(SGPT) 2 - 50 U/L 13 11  11   ALP 30 - 99 U/L 62 59  58   Albumin 3.2 - 4.9 g/dL 3.7 3.3 3.7 3.8   Phosphorus 2.5 - 4.5 mg/dL   2.3 (L)    TSH 0.380 - 5.330 uIU/mL    3.130     Imaging:  - reviewed  DEXA scan:  Date Machine L2- L4  BMD/ T-score LFN  BMD/ T-score  L distal forearm  BMD/T-score FRAX Rx    12/5/2018   GE Prodigy unit   0.952 g/cm2  / - 2.1   0.868 g/cm2  / - 1.1  0.637 / - 2.7  12.0% / 3.8%     1/6/2023   GE Prodigy unit   0.709 g/cm2  / - 3.8 (- 34.0%)  0.821 g/cm2  / - 1.5 (- 5.4%)   23.5% / 16.2 %     4/7/2025   GE Prodigy unit   1.053 g/cm2  / - 1.0 (+ 29.0%)  0.819 g/cm2  / - 1.5 (- 0.2%)   15.8% / 4.9 %      Please note that upon review of the prior studies from 2023 and 2018, that the areas of the lumbar spine utilized to calculate the bone density have varied between the different studies making comparison difficult and limited. Therefore, the degree of   improvement noted in the findings for the current lumbar spine bone mineral density is likely artifactually elevated. Attempts at recalculating the bone density scores from the current scan versus a 2023 scan have been attempted but are not successful.    Assessment and Plan:        RTC:    Total time (face-to-face and non-face-to face time):  min - discussion of diagnoses, treatment, prognosis, medical charts, lab, imaging, pathology review, documentation.      Plan reviewed with the patient and agreed with plan.  All questions answered to patient's  satisfaction.  Thank you kindly for allowing me to participate in the care plan for this patient.    Kerry Greer MD    CC:   Gaye Latham M.D.

## 2025-04-22 ENCOUNTER — APPOINTMENT (OUTPATIENT)
Dept: ENDOCRINOLOGY | Facility: MEDICAL CENTER | Age: 85
End: 2025-04-22
Attending: STUDENT IN AN ORGANIZED HEALTH CARE EDUCATION/TRAINING PROGRAM
Payer: MEDICARE

## 2025-04-29 NOTE — TELEPHONE ENCOUNTER
VOICEMAIL  1. Caller Name: Lizzette Velia Chacon                       Call Back Number: [phone]     2. Message: left patient a vm in regards her results to please give us a call back.    3. Patient approves office to leave a detailed voicemail/MyChart message: yes----- Message from Physician Gaye Latham M.D. sent at 4/11/2025  9:57 PM PDT -----  Pls call Latvian speaking pt  Bone scan shows improvement in bone density

## 2025-05-19 ENCOUNTER — PATIENT OUTREACH (OUTPATIENT)
Dept: HEALTH INFORMATION MANAGEMENT | Facility: OTHER | Age: 85
End: 2025-05-19
Payer: MEDICARE

## 2025-05-19 DIAGNOSIS — N18.31 CHRONIC KIDNEY DISEASE, STAGE 3A: ICD-10-CM

## 2025-05-19 DIAGNOSIS — I10 HYPERTENSION, UNSPECIFIED TYPE: Primary | ICD-10-CM

## 2025-05-22 NOTE — PROGRESS NOTES
Unable to contact patient's granddaughter, Stephenie, after two attempts. Will contact patient again for scheduled monthly outreach call in June 2025.   Quarterly chart review completed. Pt continues to qualify for and benefit from Personal Care Management Program.

## 2025-06-16 ENCOUNTER — PATIENT OUTREACH (OUTPATIENT)
Dept: HEALTH INFORMATION MANAGEMENT | Facility: OTHER | Age: 85
End: 2025-06-16
Payer: MEDICARE

## 2025-06-16 DIAGNOSIS — N18.31 CHRONIC KIDNEY DISEASE, STAGE 3A: ICD-10-CM

## 2025-06-16 DIAGNOSIS — I10 HYPERTENSION, UNSPECIFIED TYPE: Primary | ICD-10-CM

## 2025-06-16 NOTE — CARE PLAN
Problem: Knowledge deficit of hypertension  Goal: Demonstrate Knowledge of Hypertension-Long Term  Description: Due 01/30/2026  Outcome: Progressing     Problem: Knowledge deficit of factors contributing to hypertension  Goal: Demonstrate factors that can contribute to high blood pressure-Long Term  Description: Due 01/30/2026  Outcome: Progressing  Note: Patient's family members have verbalized understanding of factors contributing to high blood pressure      Problem: Recognize current health habits that may contribute to hypertension  Goal: Identify which modifiable health habits can be modified-Long Term  Description: Due 01/30/2026  Outcome: Progressing  Note: Patient has verbalized limited willingness to change dietary health habits      Problem: Patient barriers to managing high blood pressure  Goal: Identify barriers to managing blood pressure-Long Term  Description: Due 01/30/2026  Outcome: Progressing  Note: Patient has vocalized commitment to lessening the barriers -discussions with family about dietary changes     Problem: Knowledge deficit of self-monitoring blood pressure  Goal: Demonstrate the elements of self-monitoring blood pressure-Long Term  Description: Due 01/30/2026  Outcome: Progressing  Note:   -Patient's grand daughter has verbalized commitment to self-monitoring blood pressure at least once a week.    -Patient's grand daughter  will log home blood pressures and bring the log to medical provider appointments.       Problem: Adherence to prescribed medications  Goal: Improve medication adherence-Long Term  Description: Due 01/30/2026  Outcome: Progressing     Problem: Knowledge deficient regarding condition, treatment regimen, and self care  Goal: Increase understanding of cardiac function/disease/failure-Long Term  Description: Due 01/30/2026  Outcome: Progressing     Problem: Diet Management  Goal: Learn to Manage Calories-Long Term  Description: Due 01/30/2026  Outcome: Progressing      Problem: Knowledge deficit of congestive heart failure disease process  Goal: HP Increase understanding of congestive heart failure-Long Term  Description: Due 01/30/2026  Outcome: Progressing     Problem: Low Sodium Diet Management  Goal: Limit dietary sodium-Long Term  Description: Due 01/30/2026  Outcome: Progressing     Problem: Excess Fluid  Goal: Establish a plan for excess fluid management-Long Term  Description: Due 01/30/2026  Outcome: Progressing     Problem: Patient is Inactive  Goal: Exercise a designated amount of time daily-Long Term  Description: Due 01/30/2026  Outcome: Progressing

## 2025-06-16 NOTE — PROGRESS NOTES
"Reason for Follow-Up:  Monthly outreach call.  Patient reports-spoke with patient's grand daughter    HTN  Grand daughter has not been checking BP at home very often-she does indicate the BP's have been in the \"good range\"  Dietary changes to sodium intake limited secondary to patient adherence  CHF  No edema   No weight gain  Discussion on watching for increased edema, sudden weight gain, >5 pounds gain a week, or increased shortness of breath-contact PCP or go to emergency room  Falls/Balance  No falls or close calls  Continues to use walker for ambulation      Current Health Status:HTN, CHF    Medical Updates:  No acute issues    Medication Updates:  No changes to medications    Symptoms:  No acute symptoms    Plan of Care Goals and Progress:    Priority 1. HTN                 Progress:  BP being monitored occasionally at home by family member-WNL                            Barriers:  disease processes, patient adherence                 Interventions:  medications, diet                 Education:  Work on decreasing sodium and fat in diet, take BP more often at home     Priority 2. Fall Risk                 Progress:  No falls                            Barriers:  disease processes,                  Interventions:  walker for ambulation, assistance with standing and ambulation                 Education:  Floor safety discussion. Leg strength exercises information sent to patient      Patient's Concerns and Feedback with Self-Management of Care:    Patient's grand daughter has no concerns regarding management of patient's care    Next Steps:     Follow-Up Plan:  Follow up with PCP as scheduled      Appointments:  06/27/2025-PCP     Contact Information:  Call [phone] with any questions or concerns.       Next Outreach:  July 2025                               "

## 2025-06-19 ENCOUNTER — TELEPHONE (OUTPATIENT)
Dept: FAMILY PLANNING/WOMEN'S HEALTH CLINIC | Facility: PHYSICIAN GROUP | Age: 85
End: 2025-06-19
Payer: MEDICARE

## 2025-06-19 NOTE — TELEPHONE ENCOUNTER
Message: Called and left message for patient to schedule annual Comprehensive Health Assessment visit with the [facility]. Left phone number for patient to call [facility] at [phone] to schedule. Home visit

## 2025-06-27 ENCOUNTER — OFFICE VISIT (OUTPATIENT)
Dept: MEDICAL GROUP | Facility: MEDICAL CENTER | Age: 85
End: 2025-06-27
Attending: FAMILY MEDICINE
Payer: MEDICARE

## 2025-06-27 VITALS
HEIGHT: 67 IN | BODY MASS INDEX: 27.62 KG/M2 | HEART RATE: 61 BPM | DIASTOLIC BLOOD PRESSURE: 94 MMHG | OXYGEN SATURATION: 94 % | RESPIRATION RATE: 16 BRPM | TEMPERATURE: 98.2 F | SYSTOLIC BLOOD PRESSURE: 144 MMHG | WEIGHT: 176 LBS

## 2025-06-27 DIAGNOSIS — M54.50 CHRONIC LOW BACK PAIN, UNSPECIFIED BACK PAIN LATERALITY, UNSPECIFIED WHETHER SCIATICA PRESENT: ICD-10-CM

## 2025-06-27 DIAGNOSIS — M48.00 CENTRAL STENOSIS OF SPINAL CANAL: ICD-10-CM

## 2025-06-27 DIAGNOSIS — J96.11 CHRONIC HYPOXIC RESPIRATORY FAILURE (HCC): ICD-10-CM

## 2025-06-27 DIAGNOSIS — I10 HYPERTENSION, UNSPECIFIED TYPE: ICD-10-CM

## 2025-06-27 DIAGNOSIS — G89.29 CHRONIC LOW BACK PAIN, UNSPECIFIED BACK PAIN LATERALITY, UNSPECIFIED WHETHER SCIATICA PRESENT: ICD-10-CM

## 2025-06-27 DIAGNOSIS — J96.01 ACUTE HYPOXEMIC RESPIRATORY FAILURE (HCC): ICD-10-CM

## 2025-06-27 DIAGNOSIS — I27.20 PULMONARY HYPERTENSION (HCC): Primary | ICD-10-CM

## 2025-06-27 PROCEDURE — 3077F SYST BP >= 140 MM HG: CPT | Performed by: FAMILY MEDICINE

## 2025-06-27 PROCEDURE — 99213 OFFICE O/P EST LOW 20 MIN: CPT | Performed by: FAMILY MEDICINE

## 2025-06-27 PROCEDURE — 99214 OFFICE O/P EST MOD 30 MIN: CPT | Performed by: FAMILY MEDICINE

## 2025-06-27 PROCEDURE — 1170F FXNL STATUS ASSESSED: CPT | Performed by: FAMILY MEDICINE

## 2025-06-27 PROCEDURE — 3080F DIAST BP >= 90 MM HG: CPT | Performed by: FAMILY MEDICINE

## 2025-06-27 RX ORDER — BUDESONIDE AND FORMOTEROL FUMARATE DIHYDRATE 80; 4.5 UG/1; UG/1
2 AEROSOL RESPIRATORY (INHALATION) 2 TIMES DAILY
Qty: 10.2 G | Refills: 5 | Status: SHIPPED | OUTPATIENT
Start: 2025-06-27

## 2025-06-27 RX ORDER — AMLODIPINE BESYLATE 5 MG/1
5 TABLET ORAL DAILY
Qty: 100 TABLET | Refills: 3 | Status: SHIPPED | OUTPATIENT
Start: 2025-06-27 | End: 2025-06-27

## 2025-06-27 RX ORDER — AMLODIPINE BESYLATE 5 MG/1
5 TABLET ORAL DAILY
Qty: 100 TABLET | Refills: 3 | Status: SHIPPED | OUTPATIENT
Start: 2025-06-27 | End: 2026-08-01

## 2025-06-27 ASSESSMENT — FIBROSIS 4 INDEX: FIB4 SCORE: 2.09

## 2025-06-28 NOTE — PROGRESS NOTES
Verbal consent was acquired by the patient to use [employer] ambient listening note generation during this visit   Subjective:     HPI:   History of Present Illness  The patient presents for evaluation of decreased bone density, blood pressure management, and breathing issues.  She presents with her daughter        6/27/2025     1:54 PM    SERVICES   Is patient using  services for this encounter? Yes   Language Interpreted Faroese    Mode iPad   Refusal signed by patient? No         Decreased Bone Density and Related Symptoms  She has a history of fractures due to falls, with the most recent incident occurring 2 years ago, resulting in a 6-week hospital stay. During this period, she sustained a rib fracture but did not undergo surgery.  She reports experiencing dizziness and memory lapses, which she attributes to her previous falls.   Dexa showed osteopenia, however has hx of fractures after falls, xrays from 2023 showing past pelvic fx.     Blood Pressure Management  Her blood pressure readings at home have been inconsistent, ranging from 120 to 130 systolic and 90 diastolic. She was previously on 3 antihypertensive medications (losartan 100, amlodipine 10, spironolactone 25), but 2 were discontinued due to dizziness. She is currently on losartan, which she tolerates well, however has had elevated diastolic. Her bp today is slightly elevated, daughter states that she gets anxious at doctors appointments    Breathing Issues  She uses an albuterol inhaler when she feels fatigued or experiences difficulty breathing. The inhaler provides relief, but she often feels tired and experiences palpitations. She has a wheelchair for mobility and has access to oxygen at home, although she has not been using it recently, daughter states patient doesn't want to use it. She becomes easily fatigued during physical activity, such as walking outside the house. She expresses a desire to increase her  "walking distance but is limited by her fatigue.  She only started to need supplemental O2 after influenza and hospitalization 12/2024  Echo done 12/2024 was normal          Objective:     Exam:  BP (!) 144/94 (BP Location: Left arm, Patient Position: Sitting, BP Cuff Size: Adult)   Pulse 61   Temp 36.8 °C (98.2 °F) (Temporal)   Resp 16   Ht 1.702 m (5' 7\")   Wt 79.8 kg (176 lb)   SpO2 94%   BMI 27.57 kg/m²  Body mass index is 27.57 kg/m².    Physical Exam      General: Normal appearing. No distress.  Pulmonary: Clear to ausculation.  Normal effort. No rales, ronchi, or wheezing.  Cardiovascular: Regular rate and rhythm without murmur.       6 min walk test  At rest on room air: 94%  At rest on 3L O2: 98%  Walk on room air: 83%  Walking on 3L O2: 98%           Assessment & Plan:     1. Pulmonary hypertension (HCC)  DISCONTINUED: amLODIPine (NORVASC) 5 MG Tab      2. Hypertension, unspecified type  amLODIPine (NORVASC) 5 MG Tab      3. Acute hypoxemic respiratory failure (HCC)  PULMONARY FUNCTION TESTS -Test requested: Complete Pulmonary Function Test      4. Chronic hypoxic respiratory failure (HCC)  Referral to Pulmonary and Sleep Medicine    budesonide-formoterol (SYMBICORT) 80-4.5 MCG/ACT Aerosol      5. Central stenosis of spinal canal  Referral to Neurosurgery      6. Chronic low back pain, unspecified back pain laterality, unspecified whether sciatica present  Referral to Neurosurgery          Assessment & Plan  1. Decreased bone density: Stable. History of fractures from falls, with the last significant fall occurring 2 years ago.  - Referral to an endocrinologist for further management of bone density  - Endocrinologist appointment to be scheduled    2. Hypertension: Elevated. Blood pressure readings have been consistently elevated, with today's reading at 144/94 mmHg.  - Amlodipine 5 mg to be reintroduced to manage blood pressure, lower dose than previous  - Monitor blood pressure regularly and report " any instances of dizziness    3. Chronic hypoxic resp failure: Frequent use of albuterol and experiences of fatigue and palpitations. Positive 6 min walk test today  - resend orders to [facility], needs portable option.  - trial symbicort, instructions given.   - Referral to a pulmonologist for further evaluation. PFTs ordered  - Use oxygen at home, especially during physical activity, and carry oxygen when leaving the house    4. Chronic low back pain  Patient's daughter requesting management of her pain. Abnormal MRI of L spine  Advised that pt not likely a good candidate for surgery but they can recommend other modalities of treatment.    Follow-up  - Follow up in 1 month for a blood pressure check          Return in about 1 month (around 7/27/2025).      Please note that this dictation was created using voice recognition software. I have made every reasonable attempt to correct obvious errors, but I expect that there are errors of grammar and possibly content that I did not discover before finalizing the note.

## 2025-06-30 NOTE — Clinical Note
REFERRAL APPROVAL NOTICE         Sent on June 30, 2025                   Lizzette Burton  [address]  [facility] [address]                   Dear Ms. Burton,    After a careful review of the medical information and benefit coverage, [facility] has processed your referral. See below for additional details.    If applicable, you must be actively enrolled with your insurance for coverage of the authorized service. If you have any questions regarding your coverage, please contact your insurance directly.    REFERRAL INFORMATION   Referral #:  [id number]  Referred-To Department    Referred-By Provider:  Pulmonary and Sleep Medicine    Gaye Latham M.D.   Pulmonary/sleep c      [address]  [place] [id number] [phone] [address]  [place] [id number] [phone]    Referral Start Date:  06/27/2025  Referral End Date:   06/27/2026           SCHEDULING  If you do not already have an appointment, please call [phone] to make an appointment.   MORE INFORMATION  As a reminder, [facility] ownership has changed, meaning this location is now owned and operated by [facility]. As such, we want to clarify that our patients should expect to receive two separate bills for the services received at [facility] - one representing the [facility] facility fees as the owner of the establishment, and the other to represent the physician's services and subsequent fees. You can speak with your insurance carrier for a pricing estimate by calling the customer service number on the back of your card and ask about charges for a hospital outpatient visit.  If you do not already have a [employer] account, sign up at: [email or web address]  You can access your medical information, make appointments, see lab results, billing information, and  more.  If you have questions regarding this referral, please contact  the [facility] department at:             [phone]. Monday - Friday 7:30AM - 5:00PM.      Sincerely,  [facility]

## 2025-06-30 NOTE — Clinical Note
REFERRAL APPROVAL NOTICE         Sent on June 30, 2025                   Lizzette Burton  [address]  [place] [place] [address]                   Dear Ms. Burton,    After a careful review of the medical information and benefit coverage, [facility] has processed your referral. See below for additional details.    If applicable, you must be actively enrolled with your insurance for coverage of the authorized service. If you have any questions regarding your coverage, please contact your insurance directly.    REFERRAL INFORMATION   Referral #:  [id number]  Referred-To Provider    Referred-By Provider:  Neurosurgery    Gaye Latham M.D.   [facility]      [address]  [facility] [address]  [phone] [address]  [facility] [address]  [phone]    Referral Start Date:  06/27/2025  Referral End Date:   06/27/2026             SCHEDULING  If you do not already have an appointment, please call [phone] to make an appointment.     MORE INFORMATION  If you do not already have a J[employer] account, sign up at: [email or web address]  You can access your medical information, make appointments, see lab results, billing information, and more.  If you have questions regarding this referral, please contact  the [facility] Referrals department at:             [phone]. Monday - Friday 8:00AM - 5:00PM.     Sincerely,    [facility]

## 2025-07-01 RX ORDER — BUDESONIDE AND FORMOTEROL FUMARATE DIHYDRATE 80; 4.5 UG/1; UG/1
2 AEROSOL RESPIRATORY (INHALATION) 2 TIMES DAILY
Qty: 10.2 G | Refills: 5 | Status: SHIPPED | OUTPATIENT
Start: 2025-07-01 | End: 2025-07-29

## 2025-07-03 NOTE — Clinical Note
REFERRAL APPROVAL NOTICE         Sent on July 3, 2025                   Lizzette Burton  [address]  [facility] [address]                   Dear Ms. Burton,    After a careful review of the medical information and benefit coverage, [facility] has processed your referral. See below for additional details.    If applicable, you must be actively enrolled with your insurance for coverage of the authorized service. If you have any questions regarding your coverage, please contact your insurance directly.    REFERRAL INFORMATION   Referral #:  [id number]  Referred-To Department    Referred-By Provider:  Pulmonary and Sleep Medicine    Gaye Latham M.D.   Pulmonary/sleep c      [address]  [place] [id number] [phone] [address]  [place] [id number] [phone]    Referral Start Date:  06/27/2025  Referral End Date:   06/27/2026           SCHEDULING  If you do not already have an appointment, please call [phone] to make an appointment.   MORE INFORMATION  As a reminder, [facility] ownership has changed, meaning this location is now owned and operated by [facility]. As such, we want to clarify that our patients should expect to receive two separate bills for the services received at [facility] - one representing the [facility] facility fees as the owner of the establishment, and the other to represent the physician's services and subsequent fees. You can speak with your insurance carrier for a pricing estimate by calling the customer service number on the back of your card and ask about charges for a hospital outpatient visit.  If you do not already have a [employer] account, sign up at: [email or web address]  You can access your medical information, make appointments, see lab results, billing information, and  more.  If you have questions regarding this referral, please contact  the [facility] department at:             [phone]. Monday - Friday 7:30AM - 5:00PM.      Sincerely,  [facility]

## 2025-07-24 ENCOUNTER — PATIENT OUTREACH (OUTPATIENT)
Dept: HEALTH INFORMATION MANAGEMENT | Facility: OTHER | Age: 85
End: 2025-07-24
Payer: MEDICARE

## 2025-07-24 DIAGNOSIS — I10 HYPERTENSION, UNSPECIFIED TYPE: Primary | ICD-10-CM

## 2025-07-24 DIAGNOSIS — N18.31 CHRONIC KIDNEY DISEASE, STAGE 3A: ICD-10-CM

## 2025-07-24 NOTE — PROGRESS NOTES
Monthly outreach call deferred for July 2025. Patient is stable with no Emergency Department/Urgent Care/Hospitalizations in past month.  Will follow up for monthly outreach call in August 2025                 Next Outreach: August 2025

## 2025-07-27 DIAGNOSIS — J96.11 CHRONIC HYPOXIC RESPIRATORY FAILURE (HCC): ICD-10-CM

## 2025-07-29 RX ORDER — BUDESONIDE AND FORMOTEROL FUMARATE DIHYDRATE 80; 4.5 UG/1; UG/1
2 AEROSOL RESPIRATORY (INHALATION) 2 TIMES DAILY
Qty: 10.2 EACH | Refills: 5 | Status: SHIPPED | OUTPATIENT
Start: 2025-07-29

## 2025-08-05 ENCOUNTER — OFFICE VISIT (OUTPATIENT)
Dept: MEDICAL GROUP | Facility: MEDICAL CENTER | Age: 85
End: 2025-08-05
Attending: FAMILY MEDICINE
Payer: MEDICARE

## 2025-08-05 VITALS
TEMPERATURE: 96.6 F | BODY MASS INDEX: 28.19 KG/M2 | SYSTOLIC BLOOD PRESSURE: 110 MMHG | RESPIRATION RATE: 16 BRPM | HEART RATE: 60 BPM | WEIGHT: 180 LBS | OXYGEN SATURATION: 93 % | DIASTOLIC BLOOD PRESSURE: 60 MMHG

## 2025-08-05 DIAGNOSIS — M81.0 SENILE OSTEOPOROSIS: Primary | ICD-10-CM

## 2025-08-05 DIAGNOSIS — I10 HYPERTENSION, UNSPECIFIED TYPE: ICD-10-CM

## 2025-08-05 DIAGNOSIS — J96.11 CHRONIC HYPOXIC RESPIRATORY FAILURE (HCC): ICD-10-CM

## 2025-08-05 DIAGNOSIS — R07.9 CHEST PAIN, UNSPECIFIED TYPE: ICD-10-CM

## 2025-08-05 PROBLEM — I50.42 CHRONIC COMBINED SYSTOLIC AND DIASTOLIC HEART FAILURE (HCC): Status: RESOLVED | Noted: 2024-10-14 | Resolved: 2025-08-05

## 2025-08-05 PROCEDURE — 99212 OFFICE O/P EST SF 10 MIN: CPT | Performed by: FAMILY MEDICINE

## 2025-08-05 PROCEDURE — 3074F SYST BP LT 130 MM HG: CPT | Performed by: FAMILY MEDICINE

## 2025-08-05 PROCEDURE — 99214 OFFICE O/P EST MOD 30 MIN: CPT | Performed by: FAMILY MEDICINE

## 2025-08-05 PROCEDURE — 3078F DIAST BP <80 MM HG: CPT | Performed by: FAMILY MEDICINE

## 2025-08-05 PROCEDURE — 1170F FXNL STATUS ASSESSED: CPT | Performed by: FAMILY MEDICINE

## 2025-08-05 ASSESSMENT — FIBROSIS 4 INDEX: FIB4 SCORE: 2.09

## 2025-08-20 ENCOUNTER — PATIENT OUTREACH (OUTPATIENT)
Dept: HEALTH INFORMATION MANAGEMENT | Facility: OTHER | Age: 85
End: 2025-08-20
Payer: MEDICARE

## 2025-08-20 DIAGNOSIS — I10 HYPERTENSION, UNSPECIFIED TYPE: Primary | ICD-10-CM

## 2025-08-20 DIAGNOSIS — N18.31 CHRONIC KIDNEY DISEASE, STAGE 3A: ICD-10-CM

## 2025-08-25 DIAGNOSIS — E03.9 HYPOTHYROIDISM, UNSPECIFIED TYPE: Primary | ICD-10-CM

## 2025-08-25 DIAGNOSIS — E03.9 HYPOTHYROIDISM, UNSPECIFIED TYPE: ICD-10-CM

## 2025-08-25 RX ORDER — LEVOTHYROXINE SODIUM 100 UG/1
100 TABLET ORAL
Qty: 100 TABLET | Refills: 3 | Status: SHIPPED | OUTPATIENT
Start: 2025-08-25

## 2025-08-25 RX ORDER — LEVOTHYROXINE SODIUM 100 UG/1
100 TABLET ORAL
Qty: 90 TABLET | Refills: 3 | Status: SHIPPED | OUTPATIENT
Start: 2025-08-25 | End: 2025-08-25 | Stop reason: SDUPTHER

## (undated) DEVICE — SUTURE GENERAL

## (undated) DEVICE — COVER LIGHT HANDLE ALC PLUS DISP (18EA/BX)

## (undated) DEVICE — SENSOR SPO2 NEO LNCS ADHESIVE (20/BX) SEE USER NOTES

## (undated) DEVICE — DRESSING XEROFORM 1X8 - (50/BX 4BX/CA)

## (undated) DEVICE — TOWEL STOP TIMEOUT SAFETY FLAG (40EA/CA)

## (undated) DEVICE — LACTATED RINGERS INJ 1000 ML - (14EA/CA 60CA/PF)

## (undated) DEVICE — DRAPE IOBAN II INCISE 23X17 - (10EA/BX 4BX/CA)

## (undated) DEVICE — KIT ROOM DECONTAMINATION

## (undated) DEVICE — CANISTER SUCTION RIGID RED 1500CC (40EA/CA)

## (undated) DEVICE — SUCTION INSTRUMENT YANKAUER BULBOUS TIP W/O VENT (50EA/CA)

## (undated) DEVICE — DEVICE SUTURE CAPTURING

## (undated) DEVICE — BLADE SURGICAL CLIPPER - (50EA/CA)

## (undated) DEVICE — SUTURE 2-0 VICRYL PLUS CT-1 36 (36PK/BX)"

## (undated) DEVICE — PAD SANITARY 11IN MAXI IND WRAPPED  (12EA/PK 24PK/CA)

## (undated) DEVICE — SET IRRIGATION CYSTOSCOPY TUBE L80 IN (20EA/CA)

## (undated) DEVICE — LEAD SET 6 DISP. EKG NIHON KOHDEN

## (undated) DEVICE — KIT SURGIFLO W/OUT THROMBIN - (6EA/CA)

## (undated) DEVICE — TUBING CLEARLINK DUO-VENT - C-FLO (48EA/CA)

## (undated) DEVICE — GLOVE BIOGEL INDICATOR SZ 8 SURGICAL PF LTX - (50/BX 4BX/CA)

## (undated) DEVICE — HANDPIECE 10FT INTPLS SCT PLS IRRIGATION HAND CONTROL SET (6/PK)

## (undated) DEVICE — SENSOR OXIMETER ADULT SPO2 RD SET (20EA/BX)

## (undated) DEVICE — DRAPE U SPLIT IMP 54 X 76 - (24/CA)

## (undated) DEVICE — SLEEVE, VASO, THIGH, MED

## (undated) DEVICE — 2.5MM DRILL BIT

## (undated) DEVICE — SET LEADWIRE 5 LEAD BEDSIDE DISPOSABLE ECG (1SET OF 5/EA)

## (undated) DEVICE — HEAD HOLDER JUNIOR/ADULT

## (undated) DEVICE — DRAPE VAGINAL BIB W/ POUCH (10EA/CA)

## (undated) DEVICE — BLADE SURGICAL #15 - (50/BX 3BX/CA)

## (undated) DEVICE — ELECTRODE DUAL RETURN W/ CORD - (50/PK)

## (undated) DEVICE — CONTAINER SPECIMEN BAG OR - STERILE 4 OZ W/LID (100EA/CA)

## (undated) DEVICE — FOAM FACEHOLDER SPIDER (8EA/BX)

## (undated) DEVICE — SYRINGE 10 ML CONTROL LL (25EA/BX 4BX/CA)

## (undated) DEVICE — CHLORAPREP 26 ML APPLICATOR - ORANGE TINT(25/CA)

## (undated) DEVICE — KIT ANESTHESIA W/CIRCUIT & 3/LT BAG W/FILTER (20EA/CA)

## (undated) DEVICE — SUTURE 2-0 MONOCRYL CT-1

## (undated) DEVICE — GOWN WARMING STANDARD FLEX - (30/CA)

## (undated) DEVICE — DRAPE U ORTHOPEDIC - (10/BX)

## (undated) DEVICE — ELECTRODE 850 FOAM ADHESIVE - HYDROGEL RADIOTRNSPRNT (50/PK)

## (undated) DEVICE — CATHETER IV 20 GA X 1-1/4 ---SURG.& SDS ONLY--- (50EA/BX)

## (undated) DEVICE — GLOVE BIOGEL SZ 7.5 SURGICAL PF LTX - (50PR/BX 4BX/CA)

## (undated) DEVICE — SET EXTENSION WITH 2 PORTS (48EA/CA) ***PART #2C8610 IS A SUBSTITUTE*****

## (undated) DEVICE — PROTECTOR ULNA NERVE - (36PR/CA)

## (undated) DEVICE — TUBE CONNECTING SUCTION - CLEAR PLASTIC STERILE 72 IN (50EA/CA)

## (undated) DEVICE — SUTURE 0 VICRYL PLUS CT-1 - 8 X 18 INCH (12/BX)

## (undated) DEVICE — SUTURE CAPIO (12EA/BX)

## (undated) DEVICE — DRESSING NON ADHERENT 3 X 4 - STERILE (100/BX 12BX/CA)

## (undated) DEVICE — SODIUM CHL IRRIGATION 0.9% 1000ML (12EA/CA)

## (undated) DEVICE — BLADE SAGITTAL SAW DUAL CUT 75.0 X 25.0MM (1/EA)

## (undated) DEVICE — TUBE E-T HI-LO CUFF 6.5MM (10EA/BX)

## (undated) DEVICE — CORDS BIPOLAR COAGULATION - 12FT STERILE DISP. (10EA/BX)

## (undated) DEVICE — Device

## (undated) DEVICE — CANISTER SUCTION 3000ML MECHANICAL FILTER AUTO SHUTOFF MEDI-VAC NONSTERILE LF DISP  (40EA/CA)

## (undated) DEVICE — GUIDE PIN

## (undated) DEVICE — GLOVE BIOGEL SZ 8 SURGICAL PF LTX - (50PR/BX 4BX/CA)

## (undated) DEVICE — NEPTUNE 4 PORT MANIFOLD - (20/PK)

## (undated) DEVICE — SUTURE 0 ETHIBOND CT-1 - (12/BX) 18 INCH

## (undated) DEVICE — SLING ORTH UNV TIETX VLFM ARM

## (undated) DEVICE — MASK, LARYNGEAL AIRWAY #4

## (undated) DEVICE — PACK TOTAL HIP - (1/CA)

## (undated) DEVICE — TIP INTPLS HFLO ML ORFC BTRY - (12/CS)  FOR SURGILAV

## (undated) DEVICE — TRAY FOLEY CATHETER STATLOCK 16FR SURESTEP  (10EA/CA)

## (undated) DEVICE — KIT  I.V. START (100EA/CA)

## (undated) DEVICE — DRAPE STRLE REG TOWEL 18X24 - (10/BX 4BX/CA)"

## (undated) DEVICE — TRAY SRGPRP PVP IOD WT PRP - (20/CA)

## (undated) DEVICE — GLOVE BIOGEL M SZ 8 SURGICAL PF LTX - (50/BX 4BX/CA)

## (undated) DEVICE — SYS BN CMNT HI VAC KT MXR BWL - (MIX-E-VAC II)  (10EA/CA)

## (undated) DEVICE — WATER IRRIGATION STERILE 1000ML (12EA/CA)

## (undated) DEVICE — SPONGE GAUZESTER 4 X 4 4PLY - (128PK/CA)

## (undated) DEVICE — PACK MINOR BASIN - (2EA/CA)

## (undated) DEVICE — SUTURE 5 TI-CRON HOS-14 - (36/BX)

## (undated) DEVICE — MASK ANESTHESIA ADULT  - (100/CA)

## (undated) DEVICE — SHEET PEDIATRIC LAPAROTOMY - (10/CA)

## (undated) DEVICE — BLADE SURGICAL #11 - (50/BX)

## (undated) DEVICE — DETERGENT RENUZYME PLUS 10 OZ PACKET (50/BX)

## (undated) DEVICE — GLOVE BIOGEL SZ 6.5 SURGICAL PF LTX (50PR/BX 4BX/CA)

## (undated) DEVICE — DRAPE SURGICAL U 77X120 - (10/CA)